# Patient Record
Sex: MALE | Race: WHITE | NOT HISPANIC OR LATINO | Employment: OTHER | URBAN - METROPOLITAN AREA
[De-identification: names, ages, dates, MRNs, and addresses within clinical notes are randomized per-mention and may not be internally consistent; named-entity substitution may affect disease eponyms.]

---

## 2019-08-01 ENCOUNTER — OFFICE VISIT (OUTPATIENT)
Dept: NEUROLOGY | Facility: CLINIC | Age: 79
End: 2019-08-01

## 2019-08-01 VITALS
HEIGHT: 70 IN | SYSTOLIC BLOOD PRESSURE: 126 MMHG | BODY MASS INDEX: 33.36 KG/M2 | WEIGHT: 233 LBS | OXYGEN SATURATION: 96 % | DIASTOLIC BLOOD PRESSURE: 78 MMHG | HEART RATE: 85 BPM

## 2019-08-01 DIAGNOSIS — R25.1 TREMORS OF NERVOUS SYSTEM: Primary | ICD-10-CM

## 2019-08-01 PROCEDURE — 99204 OFFICE O/P NEW MOD 45 MIN: CPT | Performed by: PSYCHIATRY & NEUROLOGY

## 2019-08-01 RX ORDER — RANITIDINE 150 MG/1
150 TABLET ORAL 2 TIMES DAILY PRN
Refills: 3 | COMMUNITY
Start: 2019-06-03 | End: 2020-05-06

## 2019-08-01 RX ORDER — SPIRONOLACTONE 25 MG/1
25 TABLET ORAL DAILY
Refills: 1 | COMMUNITY
Start: 2019-06-29 | End: 2020-11-12 | Stop reason: HOSPADM

## 2019-08-01 RX ORDER — CARVEDILOL 6.25 MG/1
6.25 TABLET ORAL 2 TIMES DAILY
Refills: 1 | COMMUNITY
Start: 2019-06-19

## 2019-08-01 RX ORDER — TAMSULOSIN HYDROCHLORIDE 0.4 MG/1
1 CAPSULE ORAL DAILY
Refills: 3 | COMMUNITY
Start: 2019-07-15

## 2019-08-01 RX ORDER — ATORVASTATIN CALCIUM 40 MG/1
40 TABLET, FILM COATED ORAL DAILY
Refills: 3 | COMMUNITY
Start: 2019-07-16 | End: 2020-11-12 | Stop reason: HOSPADM

## 2019-08-01 RX ORDER — ELUXADOLINE 100 MG/1
1 TABLET, FILM COATED ORAL 2 TIMES DAILY WITH MEALS
Refills: 2 | COMMUNITY
Start: 2019-05-16 | End: 2019-08-01 | Stop reason: ALTCHOICE

## 2019-08-01 RX ORDER — CYANOCOBALAMIN 1000 UG/ML
INJECTION, SOLUTION INTRAMUSCULAR; SUBCUTANEOUS
Refills: 2 | COMMUNITY
Start: 2019-05-18 | End: 2020-05-06

## 2019-08-01 RX ORDER — DULAGLUTIDE 0.75 MG/.5ML
INJECTION, SOLUTION SUBCUTANEOUS
Refills: 1 | COMMUNITY
Start: 2019-06-06 | End: 2020-11-12 | Stop reason: HOSPADM

## 2019-08-01 RX ORDER — SACUBITRIL AND VALSARTAN 24; 26 MG/1; MG/1
1 TABLET, FILM COATED ORAL 3 TIMES DAILY
Refills: 1 | COMMUNITY
Start: 2019-06-29 | End: 2020-11-12 | Stop reason: HOSPADM

## 2019-08-01 RX ORDER — PYRIDOSTIGMINE BROMIDE 60 MG/1
TABLET ORAL
Refills: 0 | COMMUNITY
Start: 2019-06-21 | End: 2020-05-06

## 2019-08-01 RX ORDER — GLIMEPIRIDE 4 MG/1
TABLET ORAL
Refills: 1 | COMMUNITY
Start: 2019-07-28 | End: 2020-12-10 | Stop reason: HOSPADM

## 2019-08-01 RX ORDER — BUPROPION HYDROCHLORIDE 150 MG/1
150 TABLET ORAL EVERY MORNING
Refills: 2 | COMMUNITY
Start: 2019-06-03 | End: 2020-05-06

## 2019-08-01 RX ORDER — ASPIRIN 81 MG/1
81 TABLET ORAL DAILY
COMMUNITY
End: 2020-11-12 | Stop reason: HOSPADM

## 2019-08-01 RX ORDER — FUROSEMIDE 20 MG/1
TABLET ORAL
Refills: 1 | COMMUNITY
Start: 2019-07-07 | End: 2020-05-06

## 2019-08-01 RX ORDER — GABAPENTIN 400 MG/1
CAPSULE ORAL
Refills: 3 | Status: ON HOLD | COMMUNITY
Start: 2019-07-01 | End: 2020-12-05 | Stop reason: SDUPTHER

## 2019-08-01 NOTE — PROGRESS NOTES
Subjective:    CC: Jak Pierre is seen today in consultation at the request of Jason Foy MD for Tremors        HPI:  79-year-old male accompanied by his wife with a history of diabetes mellitus type 2, hypertension, hyperlipidemia, CAD, arthritis presents with tremors.  As per patient and his wife he started having tremors in his hands a few months ago.  They have noticed it at rest where his thumb shakes and also while carrying out tasks and holding objects in his hands.  In fact he has to clasp his hands together to prevent them from shaking at rest.  He has also been having balance problems as well as difficulty initiating tasks such as walking.  Has sustained a few falls.  Denies having any symptoms of constipation, anosmia or acting out of dreams.  Wife has noticed that his voice has changed as it has become softer but there have been no changes in handwriting.  There is no family history of tremors or Parkinson's disease.  Patient was seen by Dr. Canales few months ago and started on Sinemet however he stopped taking it after 1 week as he felt that it was worsening his diarrhea which he has had for almost a year.  Has had extensive testing including a colonoscopy that did not show any abnormalities.  He is being treated by his gastroenterologist with low doses of pyridostigmine but does not know if it has helped.    The following portions of the patient's history were reviewed today and updated as of 08/01/2019  : allergies, current medications, past family history, past medical history, past social history, past surgical history and problem list  These document will be scanned to patient's chart.      Current Outpatient Medications:   •  aspirin 81 MG EC tablet, Take 81 mg by mouth Daily., Disp: , Rfl:   •  atorvastatin (LIPITOR) 40 MG tablet, Take 40 mg by mouth Daily., Disp: , Rfl: 3  •  buPROPion XL (WELLBUTRIN XL) 150 MG 24 hr tablet, Take 150 mg by mouth Every Morning., Disp: , Rfl: 2  •   carvedilol (COREG) 6.25 MG tablet, Take 6.25 mg by mouth 2 (Two) Times a Day., Disp: , Rfl: 1  •  Cetirizine HCl 10 MG tablet dispersible, Take  by mouth., Disp: , Rfl:   •  cyanocobalamin 1000 MCG/ML injection, INJECT 1 ML EVERY OTHER WEEK, Disp: , Rfl: 2  •  ENTRESTO 24-26 MG tablet, Take 1 tablet by mouth 2 (Two) Times a Day., Disp: , Rfl: 1  •  furosemide (LASIX) 20 MG tablet, TAKE 1 TABLET ONCE A DAY IN THE MORNING, Disp: , Rfl: 1  •  gabapentin (NEURONTIN) 400 MG capsule, TAKE ONE CAPSULE BY MOUTH EVERY MORNING, 1 IN THE EVENING, AND 2 AT NIGHT, Disp: , Rfl: 3  •  glimepiride (AMARYL) 4 MG tablet, TAKE 1 TABLET BY MOUTH DAILY WITH BREAKFAST OR THE FIRST MAIN MEAL OF THE DAY, Disp: , Rfl: 1  •  Melatonin-Pyridoxine (MELATIN) 3-1 MG tablet, Take  by mouth., Disp: , Rfl:   •  pyridostigmine (MESTINON) 60 MG tablet, TAKE 1/2-1 TABLET 1-2 TIMES DAILY AS NEEDED, Disp: , Rfl: 0  •  raNITIdine (ZANTAC) 150 MG tablet, Take 150 mg by mouth 2 (Two) Times a Day As Needed., Disp: , Rfl: 3  •  spironolactone (ALDACTONE) 25 MG tablet, Take 25 mg by mouth Daily., Disp: , Rfl: 1  •  tamsulosin (FLOMAX) 0.4 MG capsule 24 hr capsule, Take 1 capsule by mouth Daily., Disp: , Rfl: 3  •  TRULICITY 0.75 MG/0.5ML solution pen-injector, INJECT 0.75 MG SUBCUNTANEOUSLY ONCE A WEEK FOR 84 DAYS, Disp: , Rfl: 1  •  carbidopa-levodopa (SINEMET)  MG per tablet, Take 1 tablet by mouth 3 (Three) Times a Day. With food, Disp: 90 tablet, Rfl: 3   Past Medical History:   Diagnosis Date   • Arthritis    • Diabetes (CMS/HCC)    • Heart disease    • Hyperlipidemia    • Hypertension    • Tremor       History reviewed. No pertinent surgical history.   Family History   Problem Relation Age of Onset   • Cancer Brother    • Diabetes Brother    • Heart disease Brother    • Hypertension Brother       Social History     Socioeconomic History   • Marital status:      Spouse name: Not on file   • Number of children: Not on file   • Years of  "education: Not on file   • Highest education level: Not on file   Tobacco Use   • Smoking status: Never Smoker   • Smokeless tobacco: Never Used   Substance and Sexual Activity   • Alcohol use: No     Frequency: Never     Review of Systems   Constitutional: Positive for fatigue.   HENT: Positive for congestion.    Eyes: Negative.    Respiratory: Positive for shortness of breath.    Cardiovascular: Negative.    Gastrointestinal: Positive for abdominal pain (After hernia repair) and diarrhea (off and on).   Endocrine: Negative.    Genitourinary: Positive for frequency.   Musculoskeletal: Positive for back pain, gait problem (Balance issues/Trouble walking at times) and neck pain.   Allergic/Immunologic: Negative.    Neurological: Positive for tremors and numbness (Legs- tingling and numbness).   Hematological: Negative.    Psychiatric/Behavioral: Negative.    All other systems reviewed and are negative.      Objective:    /78   Pulse 85   Ht 177.8 cm (70\")   Wt 106 kg (233 lb)   SpO2 96%   BMI 33.43 kg/m²     Neurology Exam:    General apperance: Slight masklike facies with tongue protruding out    Mental status: Alert, awake and oriented to time place and person.    Recent and Remote memory: Intact.    Attention span and Concentration: Normal.     Language and Speech: Intact- No dysarthria.    Fluency, Naming , Repitition and Comprehension:  Intact    Cranial Nerves:   CN II: Visual fields are full. Intact. Fundi - Normal, No papillederma, Pupils - LOS  CN III, IV and VI: Extraocular movements are intact. Normal saccades.  Mild difficulty with upward gaze  CN V: Facial sensation is intact.   CN VII: Muscles of facial expression reveal no asymmetry. Intact.   CN VIII: Hearing is intact. Whispered voice intact.   CN IX and X: Palate elevates symmetrically. Intact  CN XI: Shoulder shrug is intact.   CN XII: Tongue is midline without evidence of atrophy or fasciculation.     Ophthalmoscopic exam of optic " disc-normal    Motor:-No resting tremors appreciated today.  Extremely mild postural and kinetic tremors in both hands.  No tremors on drawing Metzger's wheel or while writing.  No micrographia noted    Right UE muscle strength 5/5. Normal tone.     Left UE muscle strength 5/5.  Slight cogwheel rigidity     Right LE muscle strength5/5. Normal tone.     Left LE muscle strength 5/5. Normal tone.      Sensory: Normal light touch, vibration and pinprick sensation bilaterally.    DTRs: 2+ bilaterally in upper and lower extremities.    Babinski: Negative bilaterally.    Co-ordination: Normal finger-to-nose, heel to shin B/L.    Rhomberg: Negative.    Gait: Festinating gait with no tremors noted    Cardiovascular: Regular rate and rhythm without murmur, gallop or rub.    Assessment and Plan:  1. Tremors of nervous system  Patient could have Parkinson's disease versus a mixed tremor disorder  I will restart him back on Sinemet 25/100 mg gradually increasing to 1 tab p.o. 3 times daily as I do not think that was the cause of his diarrhea  I have also told them to speak to his gastroenterologist about possibly stopping pyridostigmine as that can worsen diarrhea  Will give him a balance therapy referral at his next visit  I went over all his medications in detail    Return in about 4 weeks (around 8/29/2019).         Allyson Mac MD

## 2019-10-11 ENCOUNTER — TRANSCRIBE ORDERS (OUTPATIENT)
Dept: ADMINISTRATIVE | Facility: HOSPITAL | Age: 79
End: 2019-10-11

## 2019-10-11 ENCOUNTER — LAB (OUTPATIENT)
Dept: LAB | Facility: HOSPITAL | Age: 79
End: 2019-10-11

## 2019-10-11 DIAGNOSIS — N18.30 CHRONIC KIDNEY DISEASE, STAGE III (MODERATE) (HCC): Primary | ICD-10-CM

## 2019-10-11 DIAGNOSIS — N18.30 CHRONIC KIDNEY DISEASE, STAGE III (MODERATE) (HCC): ICD-10-CM

## 2019-10-11 LAB
ANION GAP SERPL CALCULATED.3IONS-SCNC: 12.7 MMOL/L (ref 5–15)
BUN BLD-MCNC: 17 MG/DL (ref 8–23)
BUN/CREAT SERPL: 12.2 (ref 7–25)
CALCIUM SPEC-SCNC: 9.4 MG/DL (ref 8.6–10.5)
CHLORIDE SERPL-SCNC: 101 MMOL/L (ref 98–107)
CO2 SERPL-SCNC: 24.3 MMOL/L (ref 22–29)
CREAT BLD-MCNC: 1.39 MG/DL (ref 0.76–1.27)
GFR SERPL CREATININE-BSD FRML MDRD: 49 ML/MIN/1.73
GLUCOSE BLD-MCNC: 166 MG/DL (ref 65–99)
POTASSIUM BLD-SCNC: 5.7 MMOL/L (ref 3.5–5.2)
SODIUM BLD-SCNC: 138 MMOL/L (ref 136–145)

## 2019-10-11 PROCEDURE — 80048 BASIC METABOLIC PNL TOTAL CA: CPT

## 2019-10-11 PROCEDURE — 36415 COLL VENOUS BLD VENIPUNCTURE: CPT

## 2019-10-16 ENCOUNTER — TRANSCRIBE ORDERS (OUTPATIENT)
Dept: LAB | Facility: HOSPITAL | Age: 79
End: 2019-10-16

## 2019-10-16 ENCOUNTER — LAB (OUTPATIENT)
Dept: LAB | Facility: HOSPITAL | Age: 79
End: 2019-10-16

## 2019-10-16 DIAGNOSIS — N18.30 CHRONIC KIDNEY DISEASE, STAGE III (MODERATE) (HCC): Primary | ICD-10-CM

## 2019-10-16 DIAGNOSIS — N18.30 CHRONIC KIDNEY DISEASE, STAGE III (MODERATE) (HCC): ICD-10-CM

## 2019-10-16 LAB
ANION GAP SERPL CALCULATED.3IONS-SCNC: 11.9 MMOL/L (ref 5–15)
BUN BLD-MCNC: 16 MG/DL (ref 8–23)
BUN/CREAT SERPL: 13.4 (ref 7–25)
CALCIUM SPEC-SCNC: 9.4 MG/DL (ref 8.6–10.5)
CHLORIDE SERPL-SCNC: 104 MMOL/L (ref 98–107)
CO2 SERPL-SCNC: 25.1 MMOL/L (ref 22–29)
CREAT BLD-MCNC: 1.19 MG/DL (ref 0.76–1.27)
GFR SERPL CREATININE-BSD FRML MDRD: 59 ML/MIN/1.73
GLUCOSE BLD-MCNC: 158 MG/DL (ref 65–99)
POTASSIUM BLD-SCNC: 5.4 MMOL/L (ref 3.5–5.2)
SODIUM BLD-SCNC: 141 MMOL/L (ref 136–145)

## 2019-10-16 PROCEDURE — 80048 BASIC METABOLIC PNL TOTAL CA: CPT

## 2019-10-16 PROCEDURE — 36415 COLL VENOUS BLD VENIPUNCTURE: CPT

## 2020-01-09 ENCOUNTER — TRANSCRIBE ORDERS (OUTPATIENT)
Dept: LAB | Facility: HOSPITAL | Age: 80
End: 2020-01-09

## 2020-01-09 ENCOUNTER — LAB (OUTPATIENT)
Dept: LAB | Facility: HOSPITAL | Age: 80
End: 2020-01-09

## 2020-01-09 DIAGNOSIS — N18.30 CHRONIC KIDNEY DISEASE, STAGE III (MODERATE) (HCC): ICD-10-CM

## 2020-01-09 DIAGNOSIS — N18.30 CHRONIC KIDNEY DISEASE, STAGE III (MODERATE) (HCC): Primary | ICD-10-CM

## 2020-01-09 LAB
ALBUMIN SERPL-MCNC: 4 G/DL (ref 3.5–5.2)
ANION GAP SERPL CALCULATED.3IONS-SCNC: 10.3 MMOL/L (ref 5–15)
BUN BLD-MCNC: 14 MG/DL (ref 8–23)
BUN/CREAT SERPL: 10.7 (ref 7–25)
CALCIUM SPEC-SCNC: 9.3 MG/DL (ref 8.6–10.5)
CHLORIDE SERPL-SCNC: 97 MMOL/L (ref 98–107)
CO2 SERPL-SCNC: 27.7 MMOL/L (ref 22–29)
CREAT BLD-MCNC: 1.31 MG/DL (ref 0.76–1.27)
GFR SERPL CREATININE-BSD FRML MDRD: 53 ML/MIN/1.73
GLUCOSE BLD-MCNC: 140 MG/DL (ref 65–99)
PHOSPHATE SERPL-MCNC: 3 MG/DL (ref 2.5–4.5)
POTASSIUM BLD-SCNC: 5.1 MMOL/L (ref 3.5–5.2)
SODIUM BLD-SCNC: 135 MMOL/L (ref 136–145)
URATE SERPL-MCNC: 7.5 MG/DL (ref 3.4–7)

## 2020-01-09 PROCEDURE — 80069 RENAL FUNCTION PANEL: CPT

## 2020-01-09 PROCEDURE — 84550 ASSAY OF BLOOD/URIC ACID: CPT

## 2020-01-09 PROCEDURE — 36415 COLL VENOUS BLD VENIPUNCTURE: CPT

## 2020-05-05 ENCOUNTER — TELEPHONE (OUTPATIENT)
Dept: ONCOLOGY | Facility: CLINIC | Age: 80
End: 2020-05-05

## 2020-05-06 ENCOUNTER — APPOINTMENT (OUTPATIENT)
Dept: LAB | Facility: HOSPITAL | Age: 80
End: 2020-05-06

## 2020-05-06 ENCOUNTER — TELEPHONE (OUTPATIENT)
Dept: ONCOLOGY | Facility: CLINIC | Age: 80
End: 2020-05-06

## 2020-05-06 ENCOUNTER — CONSULT (OUTPATIENT)
Dept: ONCOLOGY | Facility: CLINIC | Age: 80
End: 2020-05-06

## 2020-05-06 VITALS
BODY MASS INDEX: 35.07 KG/M2 | TEMPERATURE: 97.8 F | HEIGHT: 70 IN | HEART RATE: 84 BPM | WEIGHT: 245 LBS | DIASTOLIC BLOOD PRESSURE: 61 MMHG | RESPIRATION RATE: 16 BRPM | OXYGEN SATURATION: 97 % | SYSTOLIC BLOOD PRESSURE: 132 MMHG

## 2020-05-06 DIAGNOSIS — D47.2 MONOCLONAL GAMMOPATHY OF UNKNOWN SIGNIFICANCE (MGUS): Primary | ICD-10-CM

## 2020-05-06 LAB
ANION GAP SERPL CALCULATED.3IONS-SCNC: 9.3 MMOL/L (ref 5–15)
BASOPHILS # BLD AUTO: 0.07 10*3/MM3 (ref 0–0.2)
BASOPHILS NFR BLD AUTO: 0.7 % (ref 0–1.5)
BUN BLD-MCNC: 24 MG/DL (ref 8–23)
BUN/CREAT SERPL: 17.4 (ref 7–25)
CALCIUM SPEC-SCNC: 9.9 MG/DL (ref 8.6–10.5)
CHLORIDE SERPL-SCNC: 102 MMOL/L (ref 98–107)
CO2 SERPL-SCNC: 24.7 MMOL/L (ref 22–29)
CREAT BLD-MCNC: 1.38 MG/DL (ref 0.76–1.27)
DEPRECATED RDW RBC AUTO: 43.6 FL (ref 37–54)
EOSINOPHIL # BLD AUTO: 0.27 10*3/MM3 (ref 0–0.4)
EOSINOPHIL NFR BLD AUTO: 2.9 % (ref 0.3–6.2)
ERYTHROCYTE [DISTWIDTH] IN BLOOD BY AUTOMATED COUNT: 12.7 % (ref 12.3–15.4)
GFR SERPL CREATININE-BSD FRML MDRD: 49 ML/MIN/1.73
GLUCOSE BLD-MCNC: 283 MG/DL (ref 65–99)
HCT VFR BLD AUTO: 40.7 % (ref 37.5–51)
HGB BLD-MCNC: 14.2 G/DL (ref 13–17.7)
IMM GRANULOCYTES # BLD AUTO: 0.05 10*3/MM3 (ref 0–0.05)
IMM GRANULOCYTES NFR BLD AUTO: 0.5 % (ref 0–0.5)
LYMPHOCYTES # BLD AUTO: 1.79 10*3/MM3 (ref 0.7–3.1)
LYMPHOCYTES NFR BLD AUTO: 18.9 % (ref 19.6–45.3)
MCH RBC QN AUTO: 32.6 PG (ref 26.6–33)
MCHC RBC AUTO-ENTMCNC: 34.9 G/DL (ref 31.5–35.7)
MCV RBC AUTO: 93.3 FL (ref 79–97)
MONOCYTES # BLD AUTO: 0.7 10*3/MM3 (ref 0.1–0.9)
MONOCYTES NFR BLD AUTO: 7.4 % (ref 5–12)
NEUTROPHILS # BLD AUTO: 6.57 10*3/MM3 (ref 1.7–7)
NEUTROPHILS NFR BLD AUTO: 69.6 % (ref 42.7–76)
NRBC BLD AUTO-RTO: 0 /100 WBC (ref 0–0.2)
PLATELET # BLD AUTO: 125 10*3/MM3 (ref 140–450)
PMV BLD AUTO: 9.6 FL (ref 6–12)
POTASSIUM BLD-SCNC: 6.3 MMOL/L (ref 3.5–5.2)
RBC # BLD AUTO: 4.36 10*6/MM3 (ref 4.14–5.8)
SODIUM BLD-SCNC: 136 MMOL/L (ref 136–145)
WBC NRBC COR # BLD: 9.45 10*3/MM3 (ref 3.4–10.8)

## 2020-05-06 PROCEDURE — 36415 COLL VENOUS BLD VENIPUNCTURE: CPT | Performed by: INTERNAL MEDICINE

## 2020-05-06 PROCEDURE — 99204 OFFICE O/P NEW MOD 45 MIN: CPT | Performed by: INTERNAL MEDICINE

## 2020-05-06 PROCEDURE — 85025 COMPLETE CBC W/AUTO DIFF WBC: CPT | Performed by: INTERNAL MEDICINE

## 2020-05-06 PROCEDURE — 80048 BASIC METABOLIC PNL TOTAL CA: CPT | Performed by: INTERNAL MEDICINE

## 2020-05-06 PROCEDURE — 84165 PROTEIN E-PHORESIS SERUM: CPT | Performed by: INTERNAL MEDICINE

## 2020-05-06 PROCEDURE — 84155 ASSAY OF PROTEIN SERUM: CPT | Performed by: INTERNAL MEDICINE

## 2020-05-06 RX ORDER — MULTIVIT,IRON,MINERALS/LUTEIN
1 TABLET ORAL DAILY
COMMUNITY

## 2020-05-06 RX ORDER — RETINOL, ERGOCALCIFEROL, .ALPHA.-TOCOPHEROL ACETATE, DL-, PHYTONADIONE, ASCORBIC ACID, NIACINAMIDE, RIBOFLAVIN 5-PHOSPHATE SODIUM, THIAMINE HYDROCHLORIDE, PYRIDOXINE HYDROCHLORIDE, DEXPANTHENOL, BIOTIN, FOLIC ACID, AND CYANOCOBALAMIN 200-150/10
KIT INTRAVENOUS
COMMUNITY
End: 2020-11-12 | Stop reason: HOSPADM

## 2020-05-06 RX ORDER — SODIUM POLYSTYRENE SULFONATE 15 G/60ML
15 SUSPENSION ORAL; RECTAL ONCE
Qty: 60 ML | Refills: 0 | Status: SHIPPED | OUTPATIENT
Start: 2020-05-06 | End: 2020-05-06

## 2020-05-06 RX ORDER — FAMOTIDINE 20 MG/1
20 TABLET, FILM COATED ORAL 2 TIMES DAILY PRN
COMMUNITY
End: 2020-12-10 | Stop reason: HOSPADM

## 2020-05-06 RX ORDER — PYRIDOXINE HCL (VITAMIN B6) 50 MG
100 TABLET ORAL DAILY
COMMUNITY

## 2020-05-06 RX ORDER — SODIUM POLYSTYRENE SULFONATE 15 G/60ML
15 SUSPENSION ORAL; RECTAL ONCE
Qty: 60 ML | Refills: 0 | Status: SHIPPED | OUTPATIENT
Start: 2020-05-06 | End: 2020-05-06 | Stop reason: SDUPTHER

## 2020-05-06 NOTE — TELEPHONE ENCOUNTER
Discussed with dr thompson. Spoke with pt's wife and advised that 1 dose of kayexalate will be sent to pharmacy for hyperkalemia

## 2020-05-06 NOTE — TELEPHONE ENCOUNTER
----- Message from Genesis Olivera RN sent at 5/6/2020 12:57 PM EDT -----      Critical Test Results      MD: Nakita    Date: 5/6/2020     Critical test result:  K is 6.3    Time results received:  12:58pm

## 2020-05-06 NOTE — TELEPHONE ENCOUNTER
[place] PT'S WIFE CALLED. SHE JUST CAME FROM [facility] AND THEY HAVE NOT RECEIVED THE THE PRESCRIPTION FOR KAYEXALATE.    [facility]   PH- [phone]    [place]  [phone]

## 2020-05-06 NOTE — TELEPHONE ENCOUNTER
Spoke with [facility] who state they received the RX, but it is not something they carry.  Spoke with [facility] who has the medicine available. [facility]ris advised via VM that rx has been sent there

## 2020-05-06 NOTE — PROGRESS NOTES
DATE OF CONSULTATION: 5/6/2020    REFERRING PHYSICIAN: Jason Foy MD    Dear Jason Poe MD  Thank you for asking for my medical advice on this patient. I saw him in the  [facility] on 5/6/2020    REASON FOR CONSULTATION: MGUS     HISTORY OF PRESENT ILLNESS: The patient is a very pleasant 80 y.o.  male   who was in his usual state of health until September 2019.  Patient had serum protein to pheresis during work-up for chronic kidney disease that revealed IgA monoclonal protein.  The patient was referred to me for further recommendations.    SUBJECTIVE: When I saw the patient today he is here today by himself.  He is doing fairly well.  He does have chronic fatigue.  He is in wheelchair today he does have history of Parkinson disease.    Review of Systems   Constitutional: Negative for activity change, appetite change, chills, fatigue, fever and unexpected weight change.   HENT: Negative for hearing loss, mouth sores, nosebleeds, sore throat and trouble swallowing.    Eyes: Negative for visual disturbance.   Respiratory: Negative for cough, chest tightness, shortness of breath and wheezing.    Cardiovascular: Negative for chest pain, palpitations and leg swelling.   Gastrointestinal: Negative for abdominal distention, abdominal pain, blood in stool, constipation, diarrhea, nausea, rectal pain and vomiting.   Endocrine: Negative for cold intolerance and heat intolerance.   Genitourinary: Negative for difficulty urinating, dysuria, frequency and urgency.   Musculoskeletal: Negative for arthralgias, back pain, gait problem, joint swelling and myalgias.   Skin: Negative for rash.   Neurological: Negative for dizziness, tremors, syncope, weakness, light-headedness, numbness and headaches.   Hematological: Negative for adenopathy. Does not bruise/bleed easily.   Psychiatric/Behavioral: Negative for confusion, sleep disturbance and suicidal ideas. The patient is not nervous/anxious.        Past  Medical History:   Diagnosis Date   • Angina of effort (CMS/HCC)    • Aortic valve replaced    • Arthritis    • Cataract    • CHF (congestive heart failure) (CMS/HCC)    • Colitis    • Diabetes (CMS/HCC)    • Diverticulitis    • Gall stones    • Heart disease    • Hyperlipidemia    • Hypertension    • Hypertension    • Kidney stones    • Skin cancer     left shoulder   • Tremor        Social History     Socioeconomic History   • Marital status:      Spouse name: Not on file   • Number of children: Not on file   • Years of education: Not on file   • Highest education level: Not on file   Tobacco Use   • Smoking status: Never Smoker   • Smokeless tobacco: Current User     Types: Chew   Substance and Sexual Activity   • Alcohol use: No     Frequency: Never   • Drug use: Never       Family History   Problem Relation Age of Onset   • Cancer Brother    • Diabetes Brother    • Heart disease Brother    • Hypertension Brother        Past Surgical History:   Procedure Laterality Date   • AORTIC VALVE REPAIR/REPLACEMENT  2016   • COLONOSCOPY  2018   • HERNIA REPAIR  1963    x2   • SINUS SURGERY  1978       No Known Allergies       Current Outpatient Medications:   •  aspirin 81 MG EC tablet, Take 81 mg by mouth Daily., Disp: , Rfl:   •  atorvastatin (LIPITOR) 40 MG tablet, Take 40 mg by mouth Daily., Disp: , Rfl: 3  •  carbidopa-levodopa (SINEMET)  MG per tablet, TAKE 1 TABLET BY MOUTH 3 (THREE) TIMES A DAY. WITH FOOD, Disp: 270 tablet, Rfl: 0  •  carvedilol (COREG) 6.25 MG tablet, Take 6.25 mg by mouth 2 (Two) Times a Day., Disp: , Rfl: 1  •  CINNAMON PO, Take 1 tablet by mouth Daily., Disp: , Rfl:   •  cyanocobalamin (CYANOCOBALAMIN) 100 MCG tablet tablet, Take 100 mcg by mouth Daily., Disp: , Rfl:   •  ENTRESTO 24-26 MG tablet, Take 1 tablet by mouth 3 (Three) Times a Day., Disp: , Rfl: 1  •  famotidine (PEPCID) 20 MG tablet, Take 20 mg by mouth 2 (Two) Times a Day., Disp: , Rfl:   •  gabapentin (NEURONTIN) 400  "MG capsule, TAKE ONE CAPSULE BY MOUTH EVERY MORNING, 1 IN THE EVENING, AND 2 AT NIGHT, Disp: , Rfl: 3  •  glimepiride (AMARYL) 4 MG tablet, TAKE 1 TABLET BY MOUTH DAILY WITH BREAKFAST OR THE FIRST MAIN MEAL OF THE DAY, Disp: , Rfl: 1  •  Multiple Minerals-Vitamins (CALCIUM-MAGNESIUM-ZINC-D3) tablet, Take 1 tablet by mouth Daily., Disp: , Rfl:   •  multiple vitamin (M.V.I. Adult) injection, Infuse  into a venous catheter., Disp: , Rfl:   •  Prenatal Vit-Fe Fumarate-FA (M-VIT PO), Take 1 tablet by mouth Daily., Disp: , Rfl:   •  spironolactone (ALDACTONE) 25 MG tablet, Take 25 mg by mouth Daily., Disp: , Rfl: 1  •  tamsulosin (FLOMAX) 0.4 MG capsule 24 hr capsule, Take 1 capsule by mouth Daily., Disp: , Rfl: 3  •  TRULICITY 0.75 MG/0.5ML solution pen-injector, INJECT 0.75 MG SUBCUNTANEOUSLY ONCE A WEEK FOR 84 DAYS, Disp: , Rfl: 1    PHYSICAL EXAMINATION:   /61   Pulse 84   Temp 97.8 °F (36.6 °C) (Temporal)   Resp 16   Ht 177.8 cm (70\")   Wt 111 kg (245 lb)   SpO2 97%   BMI 35.15 kg/m²   Pain Score    05/06/20 1108   PainSc: 0-No pain       ECOG Performance Status: 1 - Symptomatic but completely ambulatory  General Appearance:  alert, cooperative, no apparent distress and appears stated age   Neurologic/Psychiatric: A&O x 3, gait steady, appropriate affect, strength 5/5 in all muscle groups   HEENT:  Normocephalic, without obvious abnormality, mucous membranes moist   Neck: Supple, symmetrical, trachea midline, no adenopathy;  No thyromegaly, masses, or tenderness   Lungs:   Clear to auscultation bilaterally; respirations regular, even, and unlabored bilaterally   Heart:  Regular rate and rhythm, no murmurs appreciated   Abdomen:   Soft, non-tender, non-distended and no organomegaly   Lymph nodes: No cervical, supraclavicular, inguinal or axillary adenopathy noted   Extremities: Normal, atraumatic; no clubbing, cyanosis, or edema    Skin: No rashes, ulcers, or suspicious lesions noted       No visits with " results within 2 Week(s) from this visit.   Latest known visit with results is:   Lab on 01/09/2020   Component Date Value Ref Range Status   • Glucose 01/09/2020 140* 65 - 99 mg/dL Final   • BUN 01/09/2020 14  8 - 23 mg/dL Final   • Creatinine 01/09/2020 1.31* 0.76 - 1.27 mg/dL Final   • Sodium 01/09/2020 135* 136 - 145 mmol/L Final   • Potassium 01/09/2020 5.1  3.5 - 5.2 mmol/L Final   • Chloride 01/09/2020 97* 98 - 107 mmol/L Final   • CO2 01/09/2020 27.7  22.0 - 29.0 mmol/L Final   • Calcium 01/09/2020 9.3  8.6 - 10.5 mg/dL Final   • Albumin 01/09/2020 4.00  3.50 - 5.20 g/dL Final   • Phosphorus 01/09/2020 3.0  2.5 - 4.5 mg/dL Final   • Anion Gap 01/09/2020 10.3  5.0 - 15.0 mmol/L Final   • BUN/Creatinine Ratio 01/09/2020 10.7  7.0 - 25.0 Final   • eGFR Non African Amer 01/09/2020 53* >60 mL/min/1.73 Final   • Uric Acid 01/09/2020 7.5* 3.4 - 7.0 mg/dL Final        No results found.      DIAGNOSTIC DATA:   1. Radiology: None available   2. Dr. Marte's note from 08/01/2019 reviewed by me and documented in the  chart.   3. Pathology report: None available  4. Laboratory data: Reviewed by me and documented in the patient's jart     ASSESSMENT: The patient is a very pleasant 80 y.o.  male  with MGUS    PROBLEM LIST:   1. MGUS, IGA kappa restricted:  A.  Incidentally noted on blood work done  September 30, 2019 for chronic kidney disease  2.  Chronic kidney disease stage III  3.  Parkinson disease  4.  Hypertension  5.  Type 2 diabetes    PLAN:   1. I had a long discussion today with the patient about his new diagnosis of MGUS. I did go over the final pathology report in detail with both of them. I reviewed the patient's documents including refereing provider's notes, lab results, imaging, and path report.   2.  I explained to the patient his current disease, MGUS has the potential of transforming to multiple myeloma, is a low risk but chemo to risk with time it is 1.5% a year with 15 to 20% risk in 10 years.  Given  the patient age as well as multiple comorbidities I doubt this will cause any clinical problems for him down the road or in the near future.  3.  I will repeat the patient SPEP today will call patient with results.  4.  He will follow-up with me in 6 months with repeated labs if everything remains stable we will switch him to annual visits.  5.  The patient will continue to follow-up with nephrology for chronic kidney disease.    Dulce Mace MD  5/6/2020

## 2020-05-07 LAB
ALBUMIN SERPL-MCNC: 3.8 G/DL (ref 2.9–4.4)
ALBUMIN/GLOB SERPL: 1.2 {RATIO} (ref 0.7–1.7)
ALPHA1 GLOB FLD ELPH-MCNC: 0.2 G/DL (ref 0–0.4)
ALPHA2 GLOB SERPL ELPH-MCNC: 0.9 G/DL (ref 0.4–1)
B-GLOBULIN SERPL ELPH-MCNC: 1.4 G/DL (ref 0.7–1.3)
GAMMA GLOB SERPL ELPH-MCNC: 0.7 G/DL (ref 0.4–1.8)
GLOBULIN SER CALC-MCNC: 3.1 G/DL (ref 2.2–3.9)
Lab: ABNORMAL
M-SPIKE: 0.5 G/DL
PROT SERPL-MCNC: 6.9 G/DL (ref 6–8.5)

## 2020-05-20 ENCOUNTER — TELEPHONE (OUTPATIENT)
Dept: ONCOLOGY | Facility: CLINIC | Age: 80
End: 2020-05-20

## 2020-05-20 NOTE — TELEPHONE ENCOUNTER
Patient wife calling wanting labs drawn again for potassium and to check protein. Wants someone to give her a call.

## 2020-05-20 NOTE — TELEPHONE ENCOUNTER
Phone rang with no answer and no VM picked up.  Per Dr Mace, will recheeck his abnormal protein as scheduled in November as scheduled, but potassium will need to be followed and managed by his cardiologist or PCP

## 2020-05-22 ENCOUNTER — LAB (OUTPATIENT)
Dept: LAB | Facility: HOSPITAL | Age: 80
End: 2020-05-22

## 2020-05-22 ENCOUNTER — TELEPHONE (OUTPATIENT)
Dept: ONCOLOGY | Facility: CLINIC | Age: 80
End: 2020-05-22

## 2020-05-22 DIAGNOSIS — E87.5 HYPERKALEMIA: ICD-10-CM

## 2020-05-22 DIAGNOSIS — D47.2 MONOCLONAL GAMMOPATHY OF UNKNOWN SIGNIFICANCE (MGUS): ICD-10-CM

## 2020-05-22 DIAGNOSIS — D47.2 MONOCLONAL GAMMOPATHY OF UNKNOWN SIGNIFICANCE (MGUS): Primary | ICD-10-CM

## 2020-05-22 LAB
ANION GAP SERPL CALCULATED.3IONS-SCNC: 11.4 MMOL/L (ref 5–15)
BASOPHILS # BLD AUTO: 0.06 10*3/MM3 (ref 0–0.2)
BASOPHILS NFR BLD AUTO: 0.6 % (ref 0–1.5)
BUN BLD-MCNC: 26 MG/DL (ref 8–23)
BUN/CREAT SERPL: 18.4 (ref 7–25)
CALCIUM SPEC-SCNC: 9.5 MG/DL (ref 8.6–10.5)
CHLORIDE SERPL-SCNC: 103 MMOL/L (ref 98–107)
CO2 SERPL-SCNC: 25.6 MMOL/L (ref 22–29)
CREAT BLD-MCNC: 1.41 MG/DL (ref 0.76–1.27)
DEPRECATED RDW RBC AUTO: 44 FL (ref 37–54)
EOSINOPHIL # BLD AUTO: 0.23 10*3/MM3 (ref 0–0.4)
EOSINOPHIL NFR BLD AUTO: 2.4 % (ref 0.3–6.2)
ERYTHROCYTE [DISTWIDTH] IN BLOOD BY AUTOMATED COUNT: 12.9 % (ref 12.3–15.4)
GFR SERPL CREATININE-BSD FRML MDRD: 48 ML/MIN/1.73
GLUCOSE BLD-MCNC: 224 MG/DL (ref 65–99)
HCT VFR BLD AUTO: 39.3 % (ref 37.5–51)
HGB BLD-MCNC: 13.3 G/DL (ref 13–17.7)
IMM GRANULOCYTES # BLD AUTO: 0.04 10*3/MM3 (ref 0–0.05)
IMM GRANULOCYTES NFR BLD AUTO: 0.4 % (ref 0–0.5)
LYMPHOCYTES # BLD AUTO: 1.95 10*3/MM3 (ref 0.7–3.1)
LYMPHOCYTES NFR BLD AUTO: 20.6 % (ref 19.6–45.3)
MCH RBC QN AUTO: 32 PG (ref 26.6–33)
MCHC RBC AUTO-ENTMCNC: 33.8 G/DL (ref 31.5–35.7)
MCV RBC AUTO: 94.5 FL (ref 79–97)
MONOCYTES # BLD AUTO: 0.71 10*3/MM3 (ref 0.1–0.9)
MONOCYTES NFR BLD AUTO: 7.5 % (ref 5–12)
NEUTROPHILS # BLD AUTO: 6.49 10*3/MM3 (ref 1.7–7)
NEUTROPHILS NFR BLD AUTO: 68.5 % (ref 42.7–76)
NRBC BLD AUTO-RTO: 0 /100 WBC (ref 0–0.2)
PLATELET # BLD AUTO: 118 10*3/MM3 (ref 140–450)
PMV BLD AUTO: 9.7 FL (ref 6–12)
POTASSIUM BLD-SCNC: 5.5 MMOL/L (ref 3.5–5.2)
RBC # BLD AUTO: 4.16 10*6/MM3 (ref 4.14–5.8)
SODIUM BLD-SCNC: 140 MMOL/L (ref 136–145)
WBC NRBC COR # BLD: 9.48 10*3/MM3 (ref 3.4–10.8)

## 2020-05-22 PROCEDURE — 80048 BASIC METABOLIC PNL TOTAL CA: CPT

## 2020-05-22 PROCEDURE — 85025 COMPLETE CBC W/AUTO DIFF WBC: CPT

## 2020-05-22 PROCEDURE — 36415 COLL VENOUS BLD VENIPUNCTURE: CPT

## 2020-05-22 NOTE — TELEPHONE ENCOUNTER
VM left advising of improved K+ 5.5. To stay hydrated and drink fluids, and recheck when he sees his doctor as scheduled on 6/10

## 2020-05-22 NOTE — TELEPHONE ENCOUNTER
Advised of m-spike 0.5m, will recheck at next appt. Will recheck potassium today, as pt does not have an appt with his new cardiologist until 6/10/20.  Will order bmp for today and call her with results

## 2020-05-22 NOTE — TELEPHONE ENCOUNTER
PT wife called back for lab results. Explained pt potassium will be checked at his next appt per previous encounter. Please call her @ [phone]

## 2020-06-17 ENCOUNTER — TRANSCRIBE ORDERS (OUTPATIENT)
Dept: LAB | Facility: HOSPITAL | Age: 80
End: 2020-06-17

## 2020-06-17 ENCOUNTER — LAB (OUTPATIENT)
Dept: LAB | Facility: HOSPITAL | Age: 80
End: 2020-06-17

## 2020-06-17 DIAGNOSIS — I50.9 HEART FAILURE, UNSPECIFIED HF CHRONICITY, UNSPECIFIED HEART FAILURE TYPE (HCC): ICD-10-CM

## 2020-06-17 DIAGNOSIS — E78.5 HYPERLIPIDEMIA, UNSPECIFIED HYPERLIPIDEMIA TYPE: ICD-10-CM

## 2020-06-17 DIAGNOSIS — I10 ESSENTIAL HYPERTENSION: ICD-10-CM

## 2020-06-17 DIAGNOSIS — R06.02 SHORTNESS OF BREATH: ICD-10-CM

## 2020-06-17 DIAGNOSIS — R07.9 CHEST PAIN ON EXERTION: ICD-10-CM

## 2020-06-17 DIAGNOSIS — I50.9 CHF (CONGESTIVE HEART FAILURE), NYHA CLASS II, UNSPECIFIED FAILURE CHRONICITY, UNSPECIFIED TYPE (HCC): ICD-10-CM

## 2020-06-17 DIAGNOSIS — R25.2 CRAMP AND SPASM: ICD-10-CM

## 2020-06-17 DIAGNOSIS — E78.5 HYPERLIPIDEMIA, UNSPECIFIED HYPERLIPIDEMIA TYPE: Primary | ICD-10-CM

## 2020-06-17 DIAGNOSIS — K21.9 GERD WITHOUT ESOPHAGITIS: ICD-10-CM

## 2020-06-17 DIAGNOSIS — N52.9 ERECTILE DYSFUNCTION, UNSPECIFIED ERECTILE DYSFUNCTION TYPE: ICD-10-CM

## 2020-06-17 DIAGNOSIS — E53.8 DEFICIENCY OF OTHER SPECIFIED B GROUP VITAMINS: ICD-10-CM

## 2020-06-17 DIAGNOSIS — I35.9 AVD (AORTIC VALVE DISEASE): ICD-10-CM

## 2020-06-17 DIAGNOSIS — I10 HYPERTENSION, UNSPECIFIED TYPE: ICD-10-CM

## 2020-06-17 DIAGNOSIS — I35.9 AVD (AORTIC VALVE DISEASE): Primary | ICD-10-CM

## 2020-06-17 DIAGNOSIS — E11.40 DIABETIC NEUROPATHY WITH NEUROLOGIC COMPLICATION (HCC): ICD-10-CM

## 2020-06-17 DIAGNOSIS — Z95.2 S/P TAVR (TRANSCATHETER AORTIC VALVE REPLACEMENT): ICD-10-CM

## 2020-06-17 DIAGNOSIS — E11.49 DIABETIC NEUROPATHY WITH NEUROLOGIC COMPLICATION (HCC): ICD-10-CM

## 2020-06-17 LAB
ALBUMIN SERPL-MCNC: 3.9 G/DL (ref 3.5–5.2)
ALBUMIN/GLOB SERPL: 1.3 G/DL
ALP SERPL-CCNC: 74 U/L (ref 39–117)
ALT SERPL W P-5'-P-CCNC: 7 U/L (ref 1–41)
ANION GAP SERPL CALCULATED.3IONS-SCNC: 9.6 MMOL/L (ref 5–15)
AST SERPL-CCNC: 15 U/L (ref 1–40)
BACTERIA UR QL AUTO: ABNORMAL /HPF
BASOPHILS # BLD AUTO: 0.09 10*3/MM3 (ref 0–0.2)
BASOPHILS NFR BLD AUTO: 0.9 % (ref 0–1.5)
BILIRUB SERPL-MCNC: 0.5 MG/DL (ref 0.2–1.2)
BILIRUB UR QL STRIP: NEGATIVE
BUN BLD-MCNC: 20 MG/DL (ref 8–23)
BUN/CREAT SERPL: 12.8 (ref 7–25)
CALCIUM SPEC-SCNC: 9.9 MG/DL (ref 8.6–10.5)
CHLORIDE SERPL-SCNC: 98 MMOL/L (ref 98–107)
CHOLEST SERPL-MCNC: 105 MG/DL (ref 0–200)
CLARITY UR: CLEAR
CO2 SERPL-SCNC: 26.4 MMOL/L (ref 22–29)
COLOR UR: YELLOW
CREAT BLD-MCNC: 1.56 MG/DL (ref 0.76–1.27)
DEPRECATED RDW RBC AUTO: 44.4 FL (ref 37–54)
EOSINOPHIL # BLD AUTO: 0.29 10*3/MM3 (ref 0–0.4)
EOSINOPHIL NFR BLD AUTO: 2.8 % (ref 0.3–6.2)
ERYTHROCYTE [DISTWIDTH] IN BLOOD BY AUTOMATED COUNT: 12.8 % (ref 12.3–15.4)
FOLATE SERPL-MCNC: 14.7 NG/ML (ref 4.78–24.2)
GFR SERPL CREATININE-BSD FRML MDRD: 43 ML/MIN/1.73
GLOBULIN UR ELPH-MCNC: 3.1 GM/DL
GLUCOSE BLD-MCNC: 168 MG/DL (ref 65–99)
GLUCOSE UR STRIP-MCNC: NEGATIVE MG/DL
HBA1C MFR BLD: 7.8 % (ref 4.8–5.6)
HCT VFR BLD AUTO: 39.8 % (ref 37.5–51)
HDLC SERPL-MCNC: 32 MG/DL (ref 40–60)
HGB BLD-MCNC: 13.5 G/DL (ref 13–17.7)
HGB UR QL STRIP.AUTO: NEGATIVE
HYALINE CASTS UR QL AUTO: ABNORMAL /LPF
IMM GRANULOCYTES # BLD AUTO: 0.03 10*3/MM3 (ref 0–0.05)
IMM GRANULOCYTES NFR BLD AUTO: 0.3 % (ref 0–0.5)
KETONES UR QL STRIP: NEGATIVE
LDLC SERPL CALC-MCNC: 42 MG/DL (ref 0–100)
LDLC/HDLC SERPL: 1.32 {RATIO}
LEUKOCYTE ESTERASE UR QL STRIP.AUTO: NEGATIVE
LYMPHOCYTES # BLD AUTO: 2.27 10*3/MM3 (ref 0.7–3.1)
LYMPHOCYTES NFR BLD AUTO: 21.8 % (ref 19.6–45.3)
MAGNESIUM SERPL-MCNC: 1.5 MG/DL (ref 1.6–2.4)
MCH RBC QN AUTO: 31.8 PG (ref 26.6–33)
MCHC RBC AUTO-ENTMCNC: 33.9 G/DL (ref 31.5–35.7)
MCV RBC AUTO: 93.6 FL (ref 79–97)
MONOCYTES # BLD AUTO: 0.99 10*3/MM3 (ref 0.1–0.9)
MONOCYTES NFR BLD AUTO: 9.5 % (ref 5–12)
NEUTROPHILS # BLD AUTO: 6.72 10*3/MM3 (ref 1.7–7)
NEUTROPHILS NFR BLD AUTO: 64.7 % (ref 42.7–76)
NITRITE UR QL STRIP: NEGATIVE
NRBC BLD AUTO-RTO: 0 /100 WBC (ref 0–0.2)
PH UR STRIP.AUTO: 6.5 [PH] (ref 5–8)
PLATELET # BLD AUTO: 126 10*3/MM3 (ref 140–450)
PMV BLD AUTO: 10.1 FL (ref 6–12)
POTASSIUM BLD-SCNC: 5 MMOL/L (ref 3.5–5.2)
PROT SERPL-MCNC: 7 G/DL (ref 6–8.5)
PROT UR QL STRIP: ABNORMAL
RBC # BLD AUTO: 4.25 10*6/MM3 (ref 4.14–5.8)
RBC # UR: ABNORMAL /HPF
REF LAB TEST METHOD: ABNORMAL
SODIUM BLD-SCNC: 134 MMOL/L (ref 136–145)
SP GR UR STRIP: 1.02 (ref 1–1.03)
SQUAMOUS #/AREA URNS HPF: ABNORMAL /HPF
TRIGL SERPL-MCNC: 154 MG/DL (ref 0–150)
TSH SERPL DL<=0.05 MIU/L-ACNC: 1.72 UIU/ML (ref 0.27–4.2)
UROBILINOGEN UR QL STRIP: ABNORMAL
VIT B12 BLD-MCNC: 923 PG/ML (ref 211–946)
VLDLC SERPL-MCNC: 30.8 MG/DL (ref 5–40)
WBC NRBC COR # BLD: 10.39 10*3/MM3 (ref 3.4–10.8)
WBC UR QL AUTO: ABNORMAL /HPF

## 2020-06-17 PROCEDURE — 82746 ASSAY OF FOLIC ACID SERUM: CPT

## 2020-06-17 PROCEDURE — 80061 LIPID PANEL: CPT

## 2020-06-17 PROCEDURE — 81001 URINALYSIS AUTO W/SCOPE: CPT

## 2020-06-17 PROCEDURE — 80053 COMPREHEN METABOLIC PANEL: CPT

## 2020-06-17 PROCEDURE — 84443 ASSAY THYROID STIM HORMONE: CPT

## 2020-06-17 PROCEDURE — 82607 VITAMIN B-12: CPT

## 2020-06-17 PROCEDURE — 83735 ASSAY OF MAGNESIUM: CPT

## 2020-06-17 PROCEDURE — 85025 COMPLETE CBC W/AUTO DIFF WBC: CPT

## 2020-06-17 PROCEDURE — 36415 COLL VENOUS BLD VENIPUNCTURE: CPT

## 2020-06-17 PROCEDURE — 83036 HEMOGLOBIN GLYCOSYLATED A1C: CPT

## 2020-08-12 ENCOUNTER — HOSPITAL ENCOUNTER (EMERGENCY)
Facility: HOSPITAL | Age: 80
Discharge: HOME | End: 2020-08-12
Attending: EMERGENCY MEDICINE | Admitting: EMERGENCY MEDICINE

## 2020-08-12 VITALS
HEART RATE: 84 BPM | WEIGHT: 240 LBS | HEIGHT: 71 IN | DIASTOLIC BLOOD PRESSURE: 73 MMHG | TEMPERATURE: 97.7 F | OXYGEN SATURATION: 93 % | BODY MASS INDEX: 33.6 KG/M2 | RESPIRATION RATE: 18 BRPM | SYSTOLIC BLOOD PRESSURE: 131 MMHG

## 2020-08-12 DIAGNOSIS — R59.1 LYMPHADENOPATHY: Primary | ICD-10-CM

## 2020-08-12 PROCEDURE — 99282 EMERGENCY DEPT VISIT SF MDM: CPT

## 2020-08-12 RX ORDER — AMOXICILLIN AND CLAVULANATE POTASSIUM 875; 125 MG/1; MG/1
1 TABLET, FILM COATED ORAL 2 TIMES DAILY
Qty: 20 TABLET | Refills: 0 | Status: SHIPPED | OUTPATIENT
Start: 2020-08-12 | End: 2020-08-22

## 2020-08-12 NOTE — ED PROVIDER NOTES
Subjective   80-year-old male presents to the ED with a chief complaint of facial swelling.  Patient is complaining of right-sided facial swelling that started a few hours ago.  States that it is swollen from his ear.  No intraoral pain or lesions.  No fever or chills.  Minimally painful to palpation of the site.  No nausea vomiting diarrhea abdominal pain.  No other complaints at this time.          Review of Systems   Constitutional: Negative for fatigue and fever.   HENT: Positive for facial swelling.    All other systems reviewed and are negative.      Past Medical History:   Diagnosis Date   • Angina of effort (CMS/HCC)    • Aortic valve replaced    • Arthritis    • Cataract    • CHF (congestive heart failure) (CMS/HCC)    • Colitis    • Diabetes (CMS/HCC)    • Diverticulitis    • Gall stones    • Heart disease    • Hyperlipidemia    • Hypertension    • Hypertension    • Kidney stones    • Skin cancer     left shoulder   • Tremor        No Known Allergies    Past Surgical History:   Procedure Laterality Date   • AORTIC VALVE REPAIR/REPLACEMENT  2016   • COLONOSCOPY  2018   • HERNIA REPAIR  1963    x2   • SINUS SURGERY  1978       Family History   Problem Relation Age of Onset   • Cancer Brother    • Diabetes Brother    • Heart disease Brother    • Hypertension Brother        Social History     Socioeconomic History   • Marital status:      Spouse name: Not on file   • Number of children: Not on file   • Years of education: Not on file   • Highest education level: Not on file   Tobacco Use   • Smoking status: Never Smoker   • Smokeless tobacco: Current User     Types: Chew   Substance and Sexual Activity   • Alcohol use: No     Frequency: Never   • Drug use: Never           Objective   Physical Exam   Constitutional: He is oriented to person, place, and time. No distress.   Elderly appearing     HENT:   Head: Normocephalic and atraumatic.   Nose: Nose normal.   Mild right-sided preauricular  lymphadenopathy.  Mildly tenderness to palpation.   Eyes: Pupils are equal, round, and reactive to light. Conjunctivae and EOM are normal.   Cardiovascular: Normal rate and regular rhythm.   Pulmonary/Chest: Effort normal and breath sounds normal. No respiratory distress.   Abdominal: Soft. He exhibits no distension. There is no tenderness.   Musculoskeletal: He exhibits no edema or deformity.   Neurological: He is alert and oriented to person, place, and time. No cranial nerve deficit. Coordination normal.   Skin: He is not diaphoretic.   Nursing note and vitals reviewed.      Procedures           ED Course                                           MDM      Patient presents with right-sided facial swelling.  On exam he has some preauricular lymphadenopathy.  Will cover with antibiotics.  Discharged to follow-up as needed.  Strict return precautions given.  Patient agreeable to plan.          Final diagnoses:   Lymphadenopathy            Epifanio Pak, DO  08/12/20 1685

## 2020-11-01 ENCOUNTER — HOSPITAL ENCOUNTER (EMERGENCY)
Facility: HOSPITAL | Age: 80
Discharge: HOME | End: 2020-11-01
Attending: EMERGENCY MEDICINE | Admitting: STUDENT IN AN ORGANIZED HEALTH CARE EDUCATION/TRAINING PROGRAM

## 2020-11-01 ENCOUNTER — APPOINTMENT (OUTPATIENT)
Dept: ULTRASOUND IMAGING | Facility: HOSPITAL | Age: 80
End: 2020-11-01

## 2020-11-01 VITALS
DIASTOLIC BLOOD PRESSURE: 67 MMHG | RESPIRATION RATE: 16 BRPM | HEIGHT: 71 IN | BODY MASS INDEX: 32.9 KG/M2 | WEIGHT: 235 LBS | HEART RATE: 84 BPM | TEMPERATURE: 98.2 F | SYSTOLIC BLOOD PRESSURE: 127 MMHG | OXYGEN SATURATION: 93 %

## 2020-11-01 DIAGNOSIS — L03.116 CELLULITIS OF LEFT LOWER EXTREMITY: Primary | ICD-10-CM

## 2020-11-01 LAB
ALBUMIN SERPL-MCNC: 3.7 G/DL (ref 3.5–5.2)
ALBUMIN/GLOB SERPL: 1.2 G/DL
ALP SERPL-CCNC: 94 U/L (ref 39–117)
ALT SERPL W P-5'-P-CCNC: <5 U/L (ref 1–41)
ANION GAP SERPL CALCULATED.3IONS-SCNC: 10.7 MMOL/L (ref 5–15)
AST SERPL-CCNC: 18 U/L (ref 1–40)
BASOPHILS # BLD AUTO: 0.06 10*3/MM3 (ref 0–0.2)
BASOPHILS NFR BLD AUTO: 0.6 % (ref 0–1.5)
BILIRUB SERPL-MCNC: 0.4 MG/DL (ref 0–1.2)
BUN SERPL-MCNC: 27 MG/DL (ref 8–23)
BUN/CREAT SERPL: 19 (ref 7–25)
CALCIUM SPEC-SCNC: 9.4 MG/DL (ref 8.6–10.5)
CHLORIDE SERPL-SCNC: 96 MMOL/L (ref 98–107)
CO2 SERPL-SCNC: 30.3 MMOL/L (ref 22–29)
CREAT SERPL-MCNC: 1.42 MG/DL (ref 0.76–1.27)
DEPRECATED RDW RBC AUTO: 46.6 FL (ref 37–54)
EOSINOPHIL # BLD AUTO: 0.12 10*3/MM3 (ref 0–0.4)
EOSINOPHIL NFR BLD AUTO: 1.3 % (ref 0.3–6.2)
ERYTHROCYTE [DISTWIDTH] IN BLOOD BY AUTOMATED COUNT: 13.8 % (ref 12.3–15.4)
GFR SERPL CREATININE-BSD FRML MDRD: 48 ML/MIN/1.73
GLOBULIN UR ELPH-MCNC: 3 GM/DL
GLUCOSE SERPL-MCNC: 236 MG/DL (ref 65–99)
HCT VFR BLD AUTO: 37.4 % (ref 37.5–51)
HGB BLD-MCNC: 12.5 G/DL (ref 13–17.7)
IMM GRANULOCYTES # BLD AUTO: 0.05 10*3/MM3 (ref 0–0.05)
IMM GRANULOCYTES NFR BLD AUTO: 0.5 % (ref 0–0.5)
LYMPHOCYTES # BLD AUTO: 1.68 10*3/MM3 (ref 0.7–3.1)
LYMPHOCYTES NFR BLD AUTO: 18.1 % (ref 19.6–45.3)
MCH RBC QN AUTO: 31.6 PG (ref 26.6–33)
MCHC RBC AUTO-ENTMCNC: 33.4 G/DL (ref 31.5–35.7)
MCV RBC AUTO: 94.7 FL (ref 79–97)
MONOCYTES # BLD AUTO: 0.71 10*3/MM3 (ref 0.1–0.9)
MONOCYTES NFR BLD AUTO: 7.7 % (ref 5–12)
NEUTROPHILS NFR BLD AUTO: 6.66 10*3/MM3 (ref 1.7–7)
NEUTROPHILS NFR BLD AUTO: 71.8 % (ref 42.7–76)
NRBC BLD AUTO-RTO: 0 /100 WBC (ref 0–0.2)
PLATELET # BLD AUTO: 95 10*3/MM3 (ref 140–450)
PMV BLD AUTO: 9.6 FL (ref 6–12)
POTASSIUM SERPL-SCNC: 4.5 MMOL/L (ref 3.5–5.2)
PROT SERPL-MCNC: 6.7 G/DL (ref 6–8.5)
RBC # BLD AUTO: 3.95 10*6/MM3 (ref 4.14–5.8)
SODIUM SERPL-SCNC: 137 MMOL/L (ref 136–145)
WBC # BLD AUTO: 9.28 10*3/MM3 (ref 3.4–10.8)

## 2020-11-01 PROCEDURE — 99283 EMERGENCY DEPT VISIT LOW MDM: CPT

## 2020-11-01 PROCEDURE — 85025 COMPLETE CBC W/AUTO DIFF WBC: CPT | Performed by: PHYSICIAN ASSISTANT

## 2020-11-01 PROCEDURE — 93971 EXTREMITY STUDY: CPT

## 2020-11-01 PROCEDURE — 80053 COMPREHEN METABOLIC PANEL: CPT | Performed by: PHYSICIAN ASSISTANT

## 2020-11-01 RX ORDER — SULFAMETHOXAZOLE AND TRIMETHOPRIM 800; 160 MG/1; MG/1
1 TABLET ORAL 2 TIMES DAILY
Qty: 14 TABLET | Refills: 0 | Status: SHIPPED | OUTPATIENT
Start: 2020-11-02 | End: 2020-11-09

## 2020-11-01 RX ORDER — SULFAMETHOXAZOLE AND TRIMETHOPRIM 800; 160 MG/1; MG/1
1 TABLET ORAL ONCE
Status: COMPLETED | OUTPATIENT
Start: 2020-11-01 | End: 2020-11-01

## 2020-11-01 RX ADMIN — SULFAMETHOXAZOLE AND TRIMETHOPRIM 160 MG: 800; 160 TABLET ORAL at 19:29

## 2020-11-01 NOTE — ED PROVIDER NOTES
Subjective   80-year-old presents with left leg redness and swelling.  He has a known history of DVT, and is on Xarelto.  His wife noticed some redness around the left heel that spread today.  No fever or chills.  They were concerned that his DVT may be getting worse or he is developing a cellulitis.  He is also on Lasix 40 mg for the swelling.  He has a history of diabetes.      History provided by:  Patient and relative   used: No        Review of Systems   Musculoskeletal:        Left leg swelling   Skin: Positive for rash.   All other systems reviewed and are negative.      Past Medical History:   Diagnosis Date   • Angina of effort (CMS/HCC)    • Aortic valve replaced    • Arthritis    • Cataract    • CHF (congestive heart failure) (CMS/HCC)    • Colitis    • Diabetes (CMS/HCC)    • Diverticulitis    • Gall stones    • Heart disease    • Hyperlipidemia    • Hypertension    • Hypertension    • Kidney stones    • Skin cancer     left shoulder   • Tremor        No Known Allergies    Past Surgical History:   Procedure Laterality Date   • AORTIC VALVE REPAIR/REPLACEMENT  2016   • COLONOSCOPY  2018   • HERNIA REPAIR  1963    x2   • SINUS SURGERY  1978       Family History   Problem Relation Age of Onset   • Cancer Brother    • Diabetes Brother    • Heart disease Brother    • Hypertension Brother        Social History     Socioeconomic History   • Marital status:      Spouse name: Not on file   • Number of children: Not on file   • Years of education: Not on file   • Highest education level: Not on file   Tobacco Use   • Smoking status: Never Smoker   • Smokeless tobacco: Current User     Types: Chew   Substance and Sexual Activity   • Alcohol use: No     Frequency: Never   • Drug use: Never           Objective   Physical Exam  Vitals signs and nursing note reviewed.   Constitutional:       Appearance: He is well-developed.   HENT:      Head: Normocephalic and atraumatic.   Neck:       Musculoskeletal: Normal range of motion.   Cardiovascular:      Rate and Rhythm: Normal rate and regular rhythm.   Pulmonary:      Effort: Pulmonary effort is normal.      Breath sounds: Normal breath sounds.   Abdominal:      General: Bowel sounds are normal.      Palpations: Abdomen is soft.   Musculoskeletal: Normal range of motion.        Feet:       Comments: Cellulitis from the left heel just above the left ankle mild warmth to touch   Skin:     General: Skin is warm and dry.   Neurological:      Mental Status: He is alert and oriented to person, place, and time.   Psychiatric:         Behavior: Behavior normal.         Thought Content: Thought content normal.         Judgment: Judgment normal.         Procedures           ED Course                                           MDM  Number of Diagnoses or Management Options  Cellulitis of left lower extremity: new and requires workup     Amount and/or Complexity of Data Reviewed  Clinical lab tests: reviewed  Tests in the radiology section of CPT®: reviewed  Decide to obtain previous medical records or to obtain history from someone other than the patient: yes    Risk of Complications, Morbidity, and/or Mortality  Presenting problems: minimal  Diagnostic procedures: minimal    Patient Progress  Patient progress: stable      Final diagnoses:   Cellulitis of left lower extremity            Xavi Bender Jr., PA-C  11/01/20 1926

## 2020-11-09 ENCOUNTER — APPOINTMENT (OUTPATIENT)
Dept: ULTRASOUND IMAGING | Facility: HOSPITAL | Age: 80
End: 2020-11-09

## 2020-11-09 ENCOUNTER — HOSPITAL ENCOUNTER (INPATIENT)
Facility: HOSPITAL | Age: 80
LOS: 3 days | Discharge: HOME HEALTH | End: 2020-11-12
Attending: EMERGENCY MEDICINE | Admitting: FAMILY MEDICINE

## 2020-11-09 ENCOUNTER — APPOINTMENT (OUTPATIENT)
Dept: GENERAL RADIOLOGY | Facility: HOSPITAL | Age: 80
End: 2020-11-09

## 2020-11-09 ENCOUNTER — TELEPHONE (OUTPATIENT)
Dept: ONCOLOGY | Facility: CLINIC | Age: 80
End: 2020-11-09

## 2020-11-09 DIAGNOSIS — U07.1 ACUTE RESPIRATORY FAILURE DUE TO COVID-19 (HCC): ICD-10-CM

## 2020-11-09 DIAGNOSIS — J96.00 ACUTE RESPIRATORY FAILURE DUE TO COVID-19 (HCC): ICD-10-CM

## 2020-11-09 DIAGNOSIS — U07.1 COVID-19 VIRUS INFECTION: Primary | ICD-10-CM

## 2020-11-09 DIAGNOSIS — R09.02 HYPOXIA: ICD-10-CM

## 2020-11-09 DIAGNOSIS — J96.01 ACUTE RESPIRATORY FAILURE WITH HYPOXIA (HCC): ICD-10-CM

## 2020-11-09 DIAGNOSIS — L03.116 CELLULITIS OF LEFT LOWER EXTREMITY: ICD-10-CM

## 2020-11-09 LAB
ALBUMIN SERPL-MCNC: 3.8 G/DL (ref 3.5–5.2)
ALBUMIN/GLOB SERPL: 1.3 G/DL
ALP SERPL-CCNC: 92 U/L (ref 39–117)
ALT SERPL W P-5'-P-CCNC: 9 U/L (ref 1–41)
ANION GAP SERPL CALCULATED.3IONS-SCNC: 9.9 MMOL/L (ref 5–15)
AST SERPL-CCNC: 19 U/L (ref 1–40)
BASOPHILS # BLD AUTO: 0.03 10*3/MM3 (ref 0–0.2)
BASOPHILS NFR BLD AUTO: 0.4 % (ref 0–1.5)
BILIRUB SERPL-MCNC: 0.4 MG/DL (ref 0–1.2)
BUN SERPL-MCNC: 23 MG/DL (ref 8–23)
BUN/CREAT SERPL: 15.4 (ref 7–25)
CALCIUM SPEC-SCNC: 9.6 MG/DL (ref 8.6–10.5)
CHLORIDE SERPL-SCNC: 98 MMOL/L (ref 98–107)
CO2 SERPL-SCNC: 31.1 MMOL/L (ref 22–29)
CREAT SERPL-MCNC: 1.49 MG/DL (ref 0.76–1.27)
D-LACTATE SERPL-SCNC: 2.1 MMOL/L (ref 0.5–2)
DEPRECATED RDW RBC AUTO: 50.4 FL (ref 37–54)
EOSINOPHIL # BLD AUTO: 0.09 10*3/MM3 (ref 0–0.4)
EOSINOPHIL NFR BLD AUTO: 1.3 % (ref 0.3–6.2)
ERYTHROCYTE [DISTWIDTH] IN BLOOD BY AUTOMATED COUNT: 14.8 % (ref 12.3–15.4)
GFR SERPL CREATININE-BSD FRML MDRD: 45 ML/MIN/1.73
GLOBULIN UR ELPH-MCNC: 2.9 GM/DL
GLUCOSE SERPL-MCNC: 249 MG/DL (ref 65–99)
HCT VFR BLD AUTO: 37.6 % (ref 37.5–51)
HGB BLD-MCNC: 12.4 G/DL (ref 13–17.7)
HOLD SPECIMEN: NORMAL
HOLD SPECIMEN: NORMAL
IMM GRANULOCYTES # BLD AUTO: 0.04 10*3/MM3 (ref 0–0.05)
IMM GRANULOCYTES NFR BLD AUTO: 0.6 % (ref 0–0.5)
LACTATE HOLD SPECIMEN: NORMAL
LYMPHOCYTES # BLD AUTO: 0.77 10*3/MM3 (ref 0.7–3.1)
LYMPHOCYTES NFR BLD AUTO: 11 % (ref 19.6–45.3)
MCH RBC QN AUTO: 31.7 PG (ref 26.6–33)
MCHC RBC AUTO-ENTMCNC: 33 G/DL (ref 31.5–35.7)
MCV RBC AUTO: 96.2 FL (ref 79–97)
MONOCYTES # BLD AUTO: 0.84 10*3/MM3 (ref 0.1–0.9)
MONOCYTES NFR BLD AUTO: 12.1 % (ref 5–12)
NEUTROPHILS NFR BLD AUTO: 5.2 10*3/MM3 (ref 1.7–7)
NEUTROPHILS NFR BLD AUTO: 74.6 % (ref 42.7–76)
NRBC BLD AUTO-RTO: 0 /100 WBC (ref 0–0.2)
NT-PROBNP SERPL-MCNC: 264.6 PG/ML (ref 0–1800)
PLATELET # BLD AUTO: 78 10*3/MM3 (ref 140–450)
PMV BLD AUTO: 9.3 FL (ref 6–12)
POTASSIUM SERPL-SCNC: 4.4 MMOL/L (ref 3.5–5.2)
PROT SERPL-MCNC: 6.7 G/DL (ref 6–8.5)
RBC # BLD AUTO: 3.91 10*6/MM3 (ref 4.14–5.8)
SARS-COV-2 RNA PNL SPEC NAA+PROBE: DETECTED
SODIUM SERPL-SCNC: 139 MMOL/L (ref 136–145)
TROPONIN T SERPL-MCNC: 0.01 NG/ML (ref 0–0.03)
WBC # BLD AUTO: 6.97 10*3/MM3 (ref 3.4–10.8)
WHOLE BLOOD HOLD SPECIMEN: NORMAL
WHOLE BLOOD HOLD SPECIMEN: NORMAL

## 2020-11-09 PROCEDURE — G0378 HOSPITAL OBSERVATION PER HR: HCPCS

## 2020-11-09 PROCEDURE — 85025 COMPLETE CBC W/AUTO DIFF WBC: CPT

## 2020-11-09 PROCEDURE — 93971 EXTREMITY STUDY: CPT

## 2020-11-09 PROCEDURE — 80053 COMPREHEN METABOLIC PANEL: CPT

## 2020-11-09 PROCEDURE — 87635 SARS-COV-2 COVID-19 AMP PRB: CPT

## 2020-11-09 PROCEDURE — 99284 EMERGENCY DEPT VISIT MOD MDM: CPT

## 2020-11-09 PROCEDURE — 25010000002 PIPERACILLIN SOD-TAZOBACTAM PER 1 G: Performed by: PHYSICIAN ASSISTANT

## 2020-11-09 PROCEDURE — 71045 X-RAY EXAM CHEST 1 VIEW: CPT

## 2020-11-09 PROCEDURE — 83880 ASSAY OF NATRIURETIC PEPTIDE: CPT

## 2020-11-09 PROCEDURE — 93005 ELECTROCARDIOGRAM TRACING: CPT

## 2020-11-09 PROCEDURE — 83605 ASSAY OF LACTIC ACID: CPT

## 2020-11-09 PROCEDURE — 87040 BLOOD CULTURE FOR BACTERIA: CPT | Performed by: EMERGENCY MEDICINE

## 2020-11-09 PROCEDURE — 84484 ASSAY OF TROPONIN QUANT: CPT

## 2020-11-09 RX ORDER — SODIUM CHLORIDE 0.9 % (FLUSH) 0.9 %
10 SYRINGE (ML) INJECTION AS NEEDED
Status: DISCONTINUED | OUTPATIENT
Start: 2020-11-09 | End: 2020-11-12 | Stop reason: HOSPADM

## 2020-11-09 RX ADMIN — TAZOBACTAM SODIUM AND PIPERACILLIN SODIUM 3.38 G: 375; 3 INJECTION, SOLUTION INTRAVENOUS at 23:00

## 2020-11-10 ENCOUNTER — TELEPHONE (OUTPATIENT)
Dept: ONCOLOGY | Facility: CLINIC | Age: 80
End: 2020-11-10

## 2020-11-10 PROBLEM — J96.00 ACUTE RESPIRATORY FAILURE DUE TO COVID-19 (HCC): Status: ACTIVE | Noted: 2020-11-09

## 2020-11-10 PROBLEM — B99.9 ACUTE INFECTION WITHOUT SEPSIS: Status: ACTIVE | Noted: 2020-11-10

## 2020-11-10 LAB
ALBUMIN SERPL-MCNC: 3.1 G/DL (ref 3.5–5.2)
ALBUMIN/GLOB SERPL: 1 G/DL
ALP SERPL-CCNC: 77 U/L (ref 39–117)
ALT SERPL W P-5'-P-CCNC: 13 U/L (ref 1–41)
ANION GAP SERPL CALCULATED.3IONS-SCNC: 6.5 MMOL/L (ref 5–15)
AST SERPL-CCNC: 20 U/L (ref 1–40)
BASOPHILS # BLD AUTO: 0.03 10*3/MM3 (ref 0–0.2)
BASOPHILS NFR BLD AUTO: 0.5 % (ref 0–1.5)
BILIRUB SERPL-MCNC: 0.5 MG/DL (ref 0–1.2)
BUN SERPL-MCNC: 24 MG/DL (ref 8–23)
BUN/CREAT SERPL: 14.9 (ref 7–25)
CALCIUM SPEC-SCNC: 8.7 MG/DL (ref 8.6–10.5)
CHLORIDE SERPL-SCNC: 100 MMOL/L (ref 98–107)
CO2 SERPL-SCNC: 29.5 MMOL/L (ref 22–29)
CREAT SERPL-MCNC: 1.61 MG/DL (ref 0.76–1.27)
D-LACTATE SERPL-SCNC: 1.9 MMOL/L (ref 0.5–2)
DEPRECATED RDW RBC AUTO: 53.2 FL (ref 37–54)
EOSINOPHIL # BLD AUTO: 0.05 10*3/MM3 (ref 0–0.4)
EOSINOPHIL NFR BLD AUTO: 0.9 % (ref 0.3–6.2)
ERYTHROCYTE [DISTWIDTH] IN BLOOD BY AUTOMATED COUNT: 15 % (ref 12.3–15.4)
GFR SERPL CREATININE-BSD FRML MDRD: 41 ML/MIN/1.73
GLOBULIN UR ELPH-MCNC: 3 GM/DL
GLUCOSE BLDC GLUCOMTR-MCNC: 187 MG/DL (ref 70–130)
GLUCOSE BLDC GLUCOMTR-MCNC: 204 MG/DL (ref 70–130)
GLUCOSE BLDC GLUCOMTR-MCNC: 323 MG/DL (ref 70–130)
GLUCOSE SERPL-MCNC: 188 MG/DL (ref 65–99)
HCT VFR BLD AUTO: 34.5 % (ref 37.5–51)
HGB BLD-MCNC: 11.1 G/DL (ref 13–17.7)
IMM GRANULOCYTES # BLD AUTO: 0.02 10*3/MM3 (ref 0–0.05)
IMM GRANULOCYTES NFR BLD AUTO: 0.4 % (ref 0–0.5)
LYMPHOCYTES # BLD AUTO: 0.91 10*3/MM3 (ref 0.7–3.1)
LYMPHOCYTES NFR BLD AUTO: 16.6 % (ref 19.6–45.3)
MCH RBC QN AUTO: 31.4 PG (ref 26.6–33)
MCHC RBC AUTO-ENTMCNC: 32.2 G/DL (ref 31.5–35.7)
MCV RBC AUTO: 97.5 FL (ref 79–97)
MONOCYTES # BLD AUTO: 0.75 10*3/MM3 (ref 0.1–0.9)
MONOCYTES NFR BLD AUTO: 13.7 % (ref 5–12)
MRSA DNA SPEC QL NAA+PROBE: NORMAL
NEUTROPHILS NFR BLD AUTO: 3.71 10*3/MM3 (ref 1.7–7)
NEUTROPHILS NFR BLD AUTO: 67.9 % (ref 42.7–76)
NRBC BLD AUTO-RTO: 0 /100 WBC (ref 0–0.2)
PLATELET # BLD AUTO: 66 10*3/MM3 (ref 140–450)
PMV BLD AUTO: 9.9 FL (ref 6–12)
POTASSIUM SERPL-SCNC: 5 MMOL/L (ref 3.5–5.2)
PROT SERPL-MCNC: 6.1 G/DL (ref 6–8.5)
RBC # BLD AUTO: 3.54 10*6/MM3 (ref 4.14–5.8)
SODIUM SERPL-SCNC: 136 MMOL/L (ref 136–145)
WBC # BLD AUTO: 5.47 10*3/MM3 (ref 3.4–10.8)

## 2020-11-10 PROCEDURE — 87641 MR-STAPH DNA AMP PROBE: CPT | Performed by: FAMILY MEDICINE

## 2020-11-10 PROCEDURE — XW033E5 INTRODUCTION OF REMDESIVIR ANTI-INFECTIVE INTO PERIPHERAL VEIN, PERCUTANEOUS APPROACH, NEW TECHNOLOGY GROUP 5: ICD-10-PCS | Performed by: FAMILY MEDICINE

## 2020-11-10 PROCEDURE — 82962 GLUCOSE BLOOD TEST: CPT

## 2020-11-10 PROCEDURE — 25010000002 DEXAMETHASONE PER 1 MG: Performed by: INTERNAL MEDICINE

## 2020-11-10 PROCEDURE — 85025 COMPLETE CBC W/AUTO DIFF WBC: CPT | Performed by: FAMILY MEDICINE

## 2020-11-10 PROCEDURE — 99223 1ST HOSP IP/OBS HIGH 75: CPT | Performed by: FAMILY MEDICINE

## 2020-11-10 PROCEDURE — 97165 OT EVAL LOW COMPLEX 30 MIN: CPT

## 2020-11-10 PROCEDURE — 25010000002 VANCOMYCIN 5 G RECONSTITUTED SOLUTION 5,000 MG VIAL: Performed by: FAMILY MEDICINE

## 2020-11-10 PROCEDURE — 97161 PT EVAL LOW COMPLEX 20 MIN: CPT

## 2020-11-10 PROCEDURE — 63710000001 INSULIN ASPART PER 5 UNITS: Performed by: FAMILY MEDICINE

## 2020-11-10 PROCEDURE — 80053 COMPREHEN METABOLIC PANEL: CPT | Performed by: FAMILY MEDICINE

## 2020-11-10 PROCEDURE — 25010000002 PIPERACILLIN SOD-TAZOBACTAM PER 1 G: Performed by: FAMILY MEDICINE

## 2020-11-10 RX ORDER — BISACODYL 10 MG
10 SUPPOSITORY, RECTAL RECTAL DAILY PRN
Status: DISCONTINUED | OUTPATIENT
Start: 2020-11-10 | End: 2020-11-12 | Stop reason: HOSPADM

## 2020-11-10 RX ORDER — L.ACID,PARA/B.BIFIDUM/S.THERM 8B CELL
1 CAPSULE ORAL 3 TIMES DAILY
Status: DISCONTINUED | OUTPATIENT
Start: 2020-11-10 | End: 2020-11-12 | Stop reason: HOSPADM

## 2020-11-10 RX ORDER — ONDANSETRON 2 MG/ML
4 INJECTION INTRAMUSCULAR; INTRAVENOUS EVERY 6 HOURS PRN
Status: DISCONTINUED | OUTPATIENT
Start: 2020-11-10 | End: 2020-11-12 | Stop reason: HOSPADM

## 2020-11-10 RX ORDER — SPIRONOLACTONE 25 MG/1
25 TABLET ORAL DAILY
Status: DISCONTINUED | OUTPATIENT
Start: 2020-11-10 | End: 2020-11-11

## 2020-11-10 RX ORDER — CARVEDILOL 6.25 MG/1
6.25 TABLET ORAL 2 TIMES DAILY
Status: DISCONTINUED | OUTPATIENT
Start: 2020-11-10 | End: 2020-11-11

## 2020-11-10 RX ORDER — SODIUM CHLORIDE 0.9 % (FLUSH) 0.9 %
10 SYRINGE (ML) INJECTION AS NEEDED
Status: DISCONTINUED | OUTPATIENT
Start: 2020-11-10 | End: 2020-11-12 | Stop reason: HOSPADM

## 2020-11-10 RX ORDER — GABAPENTIN 400 MG/1
800 CAPSULE ORAL NIGHTLY
Status: DISCONTINUED | OUTPATIENT
Start: 2020-11-10 | End: 2020-11-11

## 2020-11-10 RX ORDER — ONDANSETRON 4 MG/1
4 TABLET, FILM COATED ORAL EVERY 6 HOURS PRN
Status: DISCONTINUED | OUTPATIENT
Start: 2020-11-10 | End: 2020-11-12 | Stop reason: HOSPADM

## 2020-11-10 RX ORDER — SODIUM CHLORIDE 0.9 % (FLUSH) 0.9 %
10 SYRINGE (ML) INJECTION EVERY 12 HOURS SCHEDULED
Status: DISCONTINUED | OUTPATIENT
Start: 2020-11-10 | End: 2020-11-12 | Stop reason: HOSPADM

## 2020-11-10 RX ORDER — ATORVASTATIN CALCIUM 40 MG/1
40 TABLET, FILM COATED ORAL DAILY
Status: DISCONTINUED | OUTPATIENT
Start: 2020-11-10 | End: 2020-11-12 | Stop reason: HOSPADM

## 2020-11-10 RX ORDER — CHOLECALCIFEROL (VITAMIN D3) 125 MCG
5 CAPSULE ORAL NIGHTLY PRN
Status: DISCONTINUED | OUTPATIENT
Start: 2020-11-10 | End: 2020-11-12 | Stop reason: HOSPADM

## 2020-11-10 RX ORDER — GABAPENTIN 400 MG/1
400 CAPSULE ORAL
Status: DISCONTINUED | OUTPATIENT
Start: 2020-11-10 | End: 2020-11-12 | Stop reason: HOSPADM

## 2020-11-10 RX ORDER — DEXTROSE MONOHYDRATE 25 G/50ML
25 INJECTION, SOLUTION INTRAVENOUS
Status: DISCONTINUED | OUTPATIENT
Start: 2020-11-10 | End: 2020-11-12 | Stop reason: HOSPADM

## 2020-11-10 RX ORDER — AMINO ACIDS/PROTEIN HYDROLYS 15G-100/30
30 LIQUID (ML) ORAL 2 TIMES DAILY
Status: DISCONTINUED | OUTPATIENT
Start: 2020-11-10 | End: 2020-11-12 | Stop reason: HOSPADM

## 2020-11-10 RX ORDER — ACETAMINOPHEN 650 MG/1
650 SUPPOSITORY RECTAL EVERY 4 HOURS PRN
Status: DISCONTINUED | OUTPATIENT
Start: 2020-11-10 | End: 2020-11-12 | Stop reason: HOSPADM

## 2020-11-10 RX ORDER — NICOTINE POLACRILEX 4 MG
1 LOZENGE BUCCAL
Status: DISCONTINUED | OUTPATIENT
Start: 2020-11-10 | End: 2020-11-12 | Stop reason: HOSPADM

## 2020-11-10 RX ORDER — ACETAMINOPHEN 325 MG/1
650 TABLET ORAL EVERY 4 HOURS PRN
Status: DISCONTINUED | OUTPATIENT
Start: 2020-11-10 | End: 2020-11-12 | Stop reason: HOSPADM

## 2020-11-10 RX ORDER — ASPIRIN 81 MG/1
81 TABLET ORAL DAILY
Status: DISCONTINUED | OUTPATIENT
Start: 2020-11-10 | End: 2020-11-12 | Stop reason: HOSPADM

## 2020-11-10 RX ORDER — ACETAMINOPHEN 160 MG/5ML
650 SOLUTION ORAL EVERY 4 HOURS PRN
Status: DISCONTINUED | OUTPATIENT
Start: 2020-11-10 | End: 2020-11-12 | Stop reason: HOSPADM

## 2020-11-10 RX ORDER — TAMSULOSIN HYDROCHLORIDE 0.4 MG/1
0.4 CAPSULE ORAL DAILY
Status: DISCONTINUED | OUTPATIENT
Start: 2020-11-10 | End: 2020-11-12 | Stop reason: HOSPADM

## 2020-11-10 RX ORDER — DEXAMETHASONE SODIUM PHOSPHATE 4 MG/ML
6 INJECTION, SOLUTION INTRA-ARTICULAR; INTRALESIONAL; INTRAMUSCULAR; INTRAVENOUS; SOFT TISSUE DAILY
Status: DISCONTINUED | OUTPATIENT
Start: 2020-11-10 | End: 2020-11-12 | Stop reason: HOSPADM

## 2020-11-10 RX ORDER — FAMOTIDINE 20 MG/1
20 TABLET, FILM COATED ORAL 2 TIMES DAILY
Status: DISCONTINUED | OUTPATIENT
Start: 2020-11-10 | End: 2020-11-12 | Stop reason: HOSPADM

## 2020-11-10 RX ADMIN — TAMSULOSIN HYDROCHLORIDE 0.4 MG: 0.4 CAPSULE ORAL at 09:33

## 2020-11-10 RX ADMIN — Medication 1 CAPSULE: at 21:55

## 2020-11-10 RX ADMIN — FAMOTIDINE 20 MG: 20 TABLET, FILM COATED ORAL at 09:33

## 2020-11-10 RX ADMIN — SODIUM CHLORIDE, PRESERVATIVE FREE 10 ML: 5 INJECTION INTRAVENOUS at 00:57

## 2020-11-10 RX ADMIN — INSULIN ASPART 2 UNITS: 100 INJECTION, SOLUTION INTRAVENOUS; SUBCUTANEOUS at 06:49

## 2020-11-10 RX ADMIN — TAZOBACTAM SODIUM AND PIPERACILLIN SODIUM 3.38 G: 375; 3 INJECTION, SOLUTION INTRAVENOUS at 21:55

## 2020-11-10 RX ADMIN — SODIUM CHLORIDE, PRESERVATIVE FREE 10 ML: 5 INJECTION INTRAVENOUS at 09:33

## 2020-11-10 RX ADMIN — DEXAMETHASONE SODIUM PHOSPHATE 6 MG: 4 INJECTION, SOLUTION INTRAMUSCULAR; INTRAVENOUS at 12:50

## 2020-11-10 RX ADMIN — REMDESIVIR 200 MG: 100 INJECTION, POWDER, LYOPHILIZED, FOR SOLUTION INTRAVENOUS at 06:14

## 2020-11-10 RX ADMIN — INSULIN ASPART 7 UNITS: 100 INJECTION, SOLUTION INTRAVENOUS; SUBCUTANEOUS at 18:53

## 2020-11-10 RX ADMIN — MELATONIN TAB 5 MG 5 MG: 5 TAB at 21:55

## 2020-11-10 RX ADMIN — TAZOBACTAM SODIUM AND PIPERACILLIN SODIUM 3.38 G: 375; 3 INJECTION, SOLUTION INTRAVENOUS at 07:15

## 2020-11-10 RX ADMIN — VANCOMYCIN HYDROCHLORIDE 2000 MG: 500 INJECTION, POWDER, LYOPHILIZED, FOR SOLUTION INTRAVENOUS at 00:54

## 2020-11-10 RX ADMIN — Medication 1 CAPSULE: at 18:53

## 2020-11-10 RX ADMIN — ACETAMINOPHEN 650 MG: 650 SOLUTION ORAL at 12:51

## 2020-11-10 RX ADMIN — GABAPENTIN 800 MG: 400 CAPSULE ORAL at 00:55

## 2020-11-10 RX ADMIN — SODIUM CHLORIDE, PRESERVATIVE FREE 10 ML: 5 INJECTION INTRAVENOUS at 21:56

## 2020-11-10 RX ADMIN — INSULIN ASPART 4 UNITS: 100 INJECTION, SOLUTION INTRAVENOUS; SUBCUTANEOUS at 12:51

## 2020-11-10 RX ADMIN — SACUBITRIL AND VALSARTAN 1 TABLET: 24; 26 TABLET, FILM COATED ORAL at 00:55

## 2020-11-10 RX ADMIN — GABAPENTIN 400 MG: 400 CAPSULE ORAL at 06:47

## 2020-11-10 RX ADMIN — ATORVASTATIN CALCIUM 40 MG: 40 TABLET, FILM COATED ORAL at 09:33

## 2020-11-10 RX ADMIN — SACUBITRIL AND VALSARTAN 1 TABLET: 24; 26 TABLET, FILM COATED ORAL at 09:32

## 2020-11-10 RX ADMIN — ASPIRIN 81 MG: 81 TABLET, COATED ORAL at 09:33

## 2020-11-10 RX ADMIN — TAZOBACTAM SODIUM AND PIPERACILLIN SODIUM 3.38 G: 375; 3 INJECTION, SOLUTION INTRAVENOUS at 16:08

## 2020-11-10 RX ADMIN — RIVAROXABAN 10 MG: 10 TABLET, FILM COATED ORAL at 18:53

## 2020-11-10 RX ADMIN — GABAPENTIN 800 MG: 400 CAPSULE ORAL at 21:55

## 2020-11-10 RX ADMIN — Medication 30 ML: at 21:56

## 2020-11-10 RX ADMIN — FAMOTIDINE 20 MG: 20 TABLET, FILM COATED ORAL at 21:55

## 2020-11-10 RX ADMIN — Medication 1 CAPSULE: at 09:32

## 2020-11-10 RX ADMIN — SPIRONOLACTONE 25 MG: 25 TABLET, FILM COATED ORAL at 09:32

## 2020-11-10 RX ADMIN — GABAPENTIN 400 MG: 400 CAPSULE ORAL at 18:53

## 2020-11-10 RX ADMIN — FAMOTIDINE 20 MG: 20 TABLET, FILM COATED ORAL at 00:55

## 2020-11-10 RX ADMIN — CARVEDILOL 6.25 MG: 6.25 TABLET, FILM COATED ORAL at 09:32

## 2020-11-10 RX ADMIN — CARVEDILOL 6.25 MG: 6.25 TABLET, FILM COATED ORAL at 00:55

## 2020-11-10 NOTE — PHARMACY RECOMMENDATION
Pharmacy to dose Remdesivir     Results from last 7 days   Lab Units 11/09/20 1918   WBC 10*3/mm3 6.97   CREATININE mg/dL 1.49*   ALT (SGPT) U/L 9   AST (SGOT) U/L 19   Estimated Creatinine Clearance: 47.6 mL/min (A) (by C-G formula based on SCr of 1.49 mg/dL (H)).    Indication:  Positive COVID 19    Micro:  Microbiology Results (last 10 days)       Procedure Component Value - Date/Time    COVID-19,[facility] IN-HOUSE,Nasal Swab No Transport Media 3-4 HR TAT - Swab, Nasal Cavity [[id number]]  (Abnormal) Collected: 11/09/20 1911    Lab Status: Final result Specimen: Swab from Nasal Cavity Updated: 11/09/20 2016     COVID19 Detected    Narrative:      Fact sheet for providers: https://www.fda.gov/media/864246/download     Fact sheet for patients: https://www.fda.gov/media/244615/download            Current Abx:  Anti-Infectives (From admission, onward)      Ordered     Dose/Rate Route Frequency Start Stop    11/10/20 0457  remdesivir 100 mg in sodium chloride 0.9 % 250 mL IVPB (powder vial)     Ordering Provider: Carrol Castro DO    100 mg  over 60 Minutes Intravenous Every 24 Hours 11/11/20 0545 11/15/20 0544    11/10/20 0540  vancomycin 1250 mg in sodium chloride 0.9% 250 mL IVPB     Ordering Provider: Carrol Castro DO    1,250 mg  over 90 Minutes Intravenous Every 24 Hours 11/11/20 0000 11/15/20 2359    11/10/20 0528  piperacillin-tazobactam (ZOSYN) 3.375 g in iso-osmotic dextrose 50 ml (premix)     Ordering Provider: Carrol Castro DO    3.375 g  over 4 Hours Intravenous Every 8 Hours 11/10/20 0600 11/15/20 0614    11/10/20 0457  remdesivir 200 mg in sodium chloride 0.9 % 250 mL IVPB (powder vial)     Ordering Provider: Carrol Castro DO    200 mg  over 60 Minutes Intravenous Every 24 Hours 11/10/20 0545 11/11/20 0544    11/10/20 0524  Pharmacy to Dose Zosyn     Ordering Provider: Carrol Castro DO     Does not apply Continuous PRN 11/10/20 0524 11/15/20 0523    11/10/20 0524  Pharmacy to dose  vancomycin     Ordering Provider: Carrol Castro DO     Does not apply Continuous PRN 11/10/20 0524 11/13/20 0523    11/09/20 2056  piperacillin-tazobactam (ZOSYN) 3.375 g in iso-osmotic dextrose 50 ml (premix)     Ordering Provider: Tyler Nguyễn PA-C    3.375 g  over 30 Minutes Intravenous Once 11/09/20 2058 11/10/20 0000    11/09/20 2056  vancomycin 2000 mg in sodium chloride 0.9% 500 mL IVPB     Note to Pharmacy: Pharmacy to dose   Ordering Provider: Carrol Castro DO    2,000 mg  over 120 Minutes Intravenous Once 11/09/20 2058 11/10/20 0300            Assessment/Plan:  Remdesivir 200 mg IVPB once  Remdesivir 100 mg IVPB every 24 hours for 4 doses

## 2020-11-10 NOTE — TELEPHONE ENCOUNTER
Caller: WESTLEY CHEN    Relationship to patient: WIFE    Best call back number: [phone]    PT IS CURRENTLY IN THE HOSPITAL WITH COVID, AND CANNOT MAKE IT TO HIS APPT TOMORROW. WILL CALL AND RESCHEDULE WHEN RELEASED

## 2020-11-10 NOTE — PROGRESS NOTES
[facility]    Name:  Jak Pierre   Age:  80 y.o.  Sex:  male  :  1940  MRN:  [id number]   Visit Number:  [id number]  Admission Date:  2020  Date Of Service:  11/10/20  Primary Care Physician:  Jason Foy MD     LOS: 0 days :  Patient Care Team:  Jason Foy MD as PCP - General (General Practice):    Chief Complaint:      Fever, shortness of breath and cellulitis    Subjective / Interval History:     Patient was seen and evaluated resting comfortably in bed.  He was sitting up on 2 L nasal cannula.  Had no complaints at this time.  Stated that he felt well and pacifically denied cough or worsening shortness of breath.  His only request is a can of Pepsi.  No acute events overnight per nursing staff.    Review of Systems:     General ROS: Patient denies any fevers, chills or loss of consciousness.  Respiratory ROS: Denies cough or shortness of breath.  Cardiovascular ROS: Denies chest pain or palpitations. No history of exertional chest pain.  Gastrointestinal ROS: Denies nausea and vomiting. Denies any abdominal pain. No diarrhea.  Neurological ROS: Denies any focal weakness. No loss of consciousness. Denies any numbness.  Dermatological ROS: Denies any redness or pruritis.    Vital Signs:    Temp:  [98.8 °F (37.1 °C)-101.5 °F (38.6 °C)] 100.4 °F (38 °C)  Heart Rate:  [] 86  Resp:  [18-20] 18  BP: ()/(46-75) 90/46    Intake and output:    I/O last 3 completed shifts:  In: 500 [IV Piggyback:500]  Out: 175 [Urine:175]  I/O this shift:  In: 290 [P.O.:240; IV Piggyback:50]  Out: 100 [Urine:100]    Physical Examination:    General Appearance:  Alert and cooperative, not in any acute distress.   Head:  Atraumatic and normocephalic, without obvious abnormality.   Eyes:          PERRLA, conjunctivae and sclerae normal, no Icterus. No pallor. Extraocular movements are within normal limits.   Neck: Supple, trachea midline, no  thyromegaly, no carotid bruit.   Lungs:   Chest shape is normal. Breath sounds heard bilaterally equally.  No crackles or wheezing.    Heart:  Normal S1 and S2, no murmur,  No JVD   Abdomen:   Normal bowel sounds, Soft, nontender, nondistended, no guarding, no rebound tenderness.   Extremities:  Left lower extremity is erythematous and mildly edematous.  Tender to the touch the patient states is improving.   Skin: No bleeding, bruising or rash.   Neurologic: Awake, alert and oriented times 3.      Laboratory results:    Results from last 7 days   Lab Units 11/10/20  0637 11/09/20  1918   SODIUM mmol/L 136 139   POTASSIUM mmol/L 5.0 4.4   CHLORIDE mmol/L 100 98   CO2 mmol/L 29.5* 31.1*   BUN mg/dL 24* 23   CREATININE mg/dL 1.61* 1.49*   CALCIUM mg/dL 8.7 9.6   BILIRUBIN mg/dL 0.5 0.4   ALK PHOS U/L 77 92   ALT (SGPT) U/L 13 9   AST (SGOT) U/L 20 19   GLUCOSE mg/dL 188* 249*     Results from last 7 days   Lab Units 11/10/20  0637 11/09/20  1918   WBC 10*3/mm3 5.47 6.97   HEMOGLOBIN g/dL 11.1* 12.4*   HEMATOCRIT % 34.5* 37.6   PLATELETS 10*3/mm3 66* 78*         Results from last 7 days   Lab Units 11/09/20 1918   TROPONIN T ng/mL 0.014               I have reviewed the patient's laboratory results.    Radiology results:    Imaging Results (Last 24 Hours)     Procedure Component Value Units Date/Time    XR Chest 1 View [[id number]] Collected: 11/10/20 1322     Updated: 11/10/20 1329    Narrative:      Portable chest     INDICATION: Short of breath.     FINDINGS: Single frontal portable chest. No previous. EKG leads overlie  the chest. Patient is rotated to the left. Heart size is normal.  Scattered vascular calcifications. Degenerative changes in the spine and  shoulders. Coarsened interstitial opacities are favored to be chronic.  No pneumothorax. No distinct consolidation or effusion.       Impression:      Chronic appearing findings. Followup PA and lateral views  would be helpful for a more complete evaluation.        This report was finalized on 11/10/2020 1:27 PM by Domingo Waters MD.    US Venous Doppler Lower Extremity Left (duplex) [[id number]] Collected: 11/09/20 2237     Updated: 11/09/20 2238    Narrative:      FINAL REPORT    TECHNIQUE:  Multiple transverse and longitudinal images were performed of  the femoral-popliteal deep venous system with augmentation and  compression maneuvers.    CLINICAL HISTORY:  swelling, pain    FINDINGS:  Left lower extremity duplex ultrasound demonstrates normal flow  in the deep venous system.  There is no abnormal echogenicity to  suggest thrombus.  There is normal compression and augmentation.      Impression:      No evidence of DVT.    Authenticated by Paul Saavedra M.D. on 11/09/2020 10:37:05 PM          I have reviewed the patient's radiology reports.    Medication Review:     I have reviewed the patients active and prn medications.       Acute respiratory failure due to COVID-19 (CMS/[facility])    Acute infection without sepsis      Assessment:    1.  Acute hypoxic respiratory failure, prior to admission, secondary to #2  2.  COVID-19 pneumonia, prior to admission, without sepsis  3.  Persistent left lower extremity cellulitis, prior to admission  4.  Diabetes mellitus type 2  5.  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  History of aortic valve replacement    Plan:    We will continue to monitor patient in the Covid unit with telemetry.  Continue to provide supplemental oxygen as necessary to maintain saturation greater than 88%.  Currently patient is requiring 2 L nasal cannula.  Given his hypoxia, remdesivir was started on admission.  We will continue with the assistance of pharmacy.  Patient is also continued on IV Decadron, vancomycin and Zosyn.  Antibiotics serve dual purpose for cellulitis as well as potential bacterial pneumonia.  Continue home Xarelto.  Left venous duplex ultrasound was negative for DVT.  Further orders as clinical course dictates.  Anticipate greater than 2 midnight  stay.    Mike Everett DO  11/10/20  17:41 EST    Dictated utilizing Dragon dictation.

## 2020-11-10 NOTE — H&P
[facility]   HISTORY AND PHYSICAL      Name:  Jak Pierre   Age:  80 y.o.  Sex:  male  :  1940  MRN:  [id number]   Visit Number:  [id number]  Admission Date:  2020  Date Of Service:  20  Primary Care Physician:  Jason oFy MD  Patient seen at 11:30PM    Chief Complaint: Fever, shortness of breath, and left lower leg infection      History Of Presenting Illness: The patient is an 80-year-old gentleman who has a past medical history of aortic valve replacement, diabetes, hypertension, hyperlipidemia, history of DVT on chronic Xarelto.  The patient is seen in the middle the night while asleep at about around 11:30 PM and so history was difficult to obtain.  He complains of progressively worsening 2 days duration of cough and shortness of breath.  Unfortunately, he does have a positive COVID-19 exposure to his son-in-law that occurred last week.  In addition, the patient has had a left lower extremity cellulitis, that had not improved with partial Bactrim treatment switched to Keflex.  He presented to the ER for further evaluation.    On evaluation he has a temperature of 99.7 and was noted to be 87% on room air.  His heart rate was 100 and his blood pressure was 134/71.  He was placed on 2 L nasal cannula oxygen.  A stat venous duplex ultrasound of the left lower extremity was negative for DVT.  He was initiated on vancomycin and Zosyn for his left lower extremity cellulitis that failed outpatient therapy.  He was admitted to the Covid unit under precautions for further evaluation and treatment.    He states he feels like he has got fever, cough, shortness of breath, and persistent left lower extremity erythema and edema.    Review Of Systems:     General ROS: Positive subjective fevers, without chills or loss of consciousness. Complains of generalized weakness.   Psychological ROS: Denies any hallucinations and delusions.  Ophthalmic ROS: Denies any diplopia  or transient loss of vision.  ENT ROS: Denies sore throat, nasal congestion or ear pain.   Allergy and Immunology ROS: Denies rash or itching.  Hematological and Lymphatic ROS: Denies neck swelling or easy bleeding.  Endocrine ROS: Denies any recent unintentional weight gain or loss.  Breast ROS: Denies any pain or swelling.  Respiratory ROS: Positive cough and shortness of breath.   Cardiovascular ROS: Denies chest pain or palpitations. No history of exertional chest pain.  Gastrointestinal ROS: Denies nausea and vomiting. Denies any abdominal pain. No diarrhea.  Genitourinary ROS: Denies dysuria or hematuria.  Musculoskeletal ROS: Denies back pain. No muscle pain. No calf pain.   Neurological ROS: Denies any focal weakness. No loss of consciousness. Denies any numbness. Denies neck pain.   Dermatological ROS: Denies any redness or pruritis.       Past Medical History: Reviewed    Past Medical History:   Diagnosis Date   • Angina of effort (CMS/HCC)    • Aortic valve replaced    • Arthritis    • Cataract    • CHF (congestive heart failure) (CMS/HCC)    • Colitis    • Diabetes (CMS/HCC)    • Diverticulitis    • Gall stones    • Heart disease    • Hyperlipidemia    • Hypertension    • Hypertension    • Kidney stones    • Skin cancer     left shoulder   • Tremor        Past Surgical history: Reviewed    Past Surgical History:   Procedure Laterality Date   • AORTIC VALVE REPAIR/REPLACEMENT  2016   • COLONOSCOPY  2018   • HERNIA REPAIR  1963    x2   • SINUS SURGERY  1978       Social History: Chews tobacco.  Denies smoking.  Denies alcohol or drug use.  Pediatric History   Patient Parents   • Not on file     Other Topics Concern   • Not on file   Social History Narrative   • Not on file       Family History: Reviewed    Family History   Problem Relation Age of Onset   • Cancer Brother    • Diabetes Brother    • Heart disease Brother    • Hypertension Brother        Allergies: Reviewed    Patient has no known  allergies.    Home Medications:    Prior to Admission Medications     Prescriptions Last Dose Informant Patient Reported? Taking?    aspirin 81 MG EC tablet   Yes No    Take 81 mg by mouth Daily.    atorvastatin (LIPITOR) 40 MG tablet   Yes No    Take 40 mg by mouth Daily.    carbidopa-levodopa (SINEMET)  MG per tablet   No No    TAKE 1 TABLET BY MOUTH 3 (THREE) TIMES A DAY. WITH FOOD    carvedilol (COREG) 6.25 MG tablet   Yes No    Take 6.25 mg by mouth 2 (Two) Times a Day.    CINNAMON PO   Yes No    Take 1 tablet by mouth Daily.    cyanocobalamin (CYANOCOBALAMIN) 100 MCG tablet tablet   Yes No    Take 100 mcg by mouth Daily.    ENTRESTO 24-26 MG tablet   Yes No    Take 1 tablet by mouth 3 (Three) Times a Day.    famotidine (PEPCID) 20 MG tablet   Yes No    Take 20 mg by mouth 2 (Two) Times a Day.    gabapentin (NEURONTIN) 400 MG capsule   Yes No    TAKE ONE CAPSULE BY MOUTH EVERY MORNING, 1 IN THE EVENING, AND 2 AT NIGHT    glimepiride (AMARYL) 4 MG tablet   Yes No    TAKE 1 TABLET BY MOUTH DAILY WITH BREAKFAST OR THE FIRST MAIN MEAL OF THE DAY    Multiple Minerals-Vitamins (CALCIUM-MAGNESIUM-ZINC-D3) tablet   Yes No    Take 1 tablet by mouth Daily.    multiple vitamin (M.V.I. Adult) injection   Yes No    Infuse  into a venous catheter.    Prenatal Vit-Fe Fumarate-FA (M-VIT PO)   Yes No    Take 1 tablet by mouth Daily.    spironolactone (ALDACTONE) 25 MG tablet   Yes No    Take 25 mg by mouth Daily.    tamsulosin (FLOMAX) 0.4 MG capsule 24 hr capsule   Yes No    Take 1 capsule by mouth Daily.    TRULICITY 0.75 MG/0.5ML solution pen-injector   Yes No    INJECT 0.75 MG SUBCUNTANEOUSLY ONCE A WEEK FOR 84 DAYS             ED Medications:    Medications   sodium chloride 0.9 % flush 10 mL (has no administration in time range)   vancomycin 2000 mg in sodium chloride 0.9% 500 mL IVPB (has no administration in time range)   piperacillin-tazobactam (ZOSYN) 3.375 g in iso-osmotic dextrose 50 ml (premix) (3.375 g  Intravenous New Bag 11/9/20 2300)       Vital Signs:    Temp:  [98.8 °F (37.1 °C)-99.7 °F (37.6 °C)] 98.8 °F (37.1 °C)  Heart Rate:  [100-104] 102  Resp:  [18-20] 20  BP: (126-134)/(69-75) 128/69    No intake or output data in the 24 hours ending 11/10/20 0005        11/09/20  1903   Weight: 107 kg (235 lb)       Body mass index is 32.78 kg/m².    Physical Exam:      General Appearance:   Elderly man.  Was asleep on my arrival, alert but quickly falls back to sleep.  As possible he was cooperative, no acute distress, oriented to person and place   Head:    Atraumatic and normocephalic, without obvious defect.   Eyes:            PERRLA, conjunctivae and sclerae normal, no icterus. No pallor. Extraocular movements are within normal limits.   Ears:    Ears appear intact with no abnormalities noted.   Throat:   No oral lesions, no thrush, oral mucosa moist.   Neck:   Supple, trachea midline, no thyromegaly.       Lungs:     Chest shape is normal. Breath sounds heard bilaterally equally.  Left lower lobe crackles without wheezing. No Pleural rub or bronchial breathing.      Heart:    Normal S1 and S2, + murmur, no gallop, no rub. No JVD   Abdomen:     Normal bowel sounds, no masses, no organomegaly. Soft        non-tender, non-distended, no guarding, no rebound                tenderness   Extremities:   Moves all extremities well, trace left lower extremity edema, no cyanosis, no             clubbing   Pulses:   Pulses palpable and equal bilaterally   Skin:   No bleeding, bruising or rash   Lymph nodes:   No palpable adenopathy   Neurologic:   No tremor, sensation intact, Motor power is normal and equal bilaterally.       EKG:    Sinus tach no acute ST elevation  Labs:    I have reviewed the labs done in the emergency room.  Results from last 7 days   Lab Units 11/09/20  1918   SODIUM mmol/L 139   POTASSIUM mmol/L 4.4   CHLORIDE mmol/L 98   CO2 mmol/L 31.1*   BUN mg/dL 23   CREATININE mg/dL 1.49*   CALCIUM mg/dL 9.6    BILIRUBIN mg/dL 0.4   ALK PHOS U/L 92   ALT (SGPT) U/L 9   AST (SGOT) U/L 19   GLUCOSE mg/dL 249*     Results from last 7 days   Lab Units 11/09/20 1918   WBC 10*3/mm3 6.97   HEMOGLOBIN g/dL 12.4*   HEMATOCRIT % 37.6   PLATELETS 10*3/mm3 78*         Results from last 7 days   Lab Units 11/09/20 1918   TROPONIN T ng/mL 0.014     Results from last 7 days   Lab Units 11/09/20 1918   PROBNP pg/mL 264.6                       Invalid input(s): USDES,  BLOODU, NITRITITE, BACT, EP  Pain Management Panel     There is no flowsheet data to display.              Radiology:    Imaging Results (Last 72 Hours)     Procedure Component Value Units Date/Time    US Venous Doppler Lower Extremity Left (duplex) [[id number]] Collected: 11/09/20 2237     Updated: 11/09/20 2238    Narrative:      FINAL REPORT    TECHNIQUE:  Multiple transverse and longitudinal images were performed of  the femoral-popliteal deep venous system with augmentation and  compression maneuvers.    CLINICAL HISTORY:  swelling, pain    FINDINGS:  Left lower extremity duplex ultrasound demonstrates normal flow  in the deep venous system.  There is no abnormal echogenicity to  suggest thrombus.  There is normal compression and augmentation.      Impression:      No evidence of DVT.    Authenticated by Paul Saavedra M.D. on 11/09/2020 10:37:05 PM    XR Chest 1 View [[id number]] Resulted: 11/09/20 2008     Updated: 11/09/20 2009          Assessment:    COVID-19 virus infection    1.  Acute hypoxic respiratory failure, prior to admission, secondary to #2  2.  COVID-19 pneumonia, prior to admission, without sepsis  3.  Persistent left lower extremity cellulitis, prior to admission  4.  Diabetes mellitus type 2  5.  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  History of aortic valve replacement        Plan:    Admit the patient to the Covid unit with telemetry.  Titrate oxygen.  I have monitored him overnight and since the patient is hypoxic, he will qualify for pharmacy  consult for Remdesivir.   Check daily labs. Titrate medications accordingly. Continue Xarelto. Further recommendations depend on the hospital course.  Continue vancomycin and zosyn for cellulitis. Fortunately, left venous duplex ultrasound was negative for DVT.     Medication risks and benefits were discussed in detail. Patient reported being satisfied with current treatment plan.    Advance Care Planning   ACP discussion was held with the patient during this visit. Patient does not have an advance directive, declines further assistance. He requested Full code status.    Carrol Castro,   11/10/20  00:05 EST

## 2020-11-10 NOTE — THERAPY EVALUATION
Patient Name: Jak Pierre  : 1940    MRN: [id number]                              Today's Date: 11/10/2020       Admit Date: 2020    Visit Dx:     ICD-10-CM ICD-9-CM   1. COVID-19 virus infection  U07.1 079.89   2. Hypoxia  R09.02 799.02   3. Cellulitis of left lower extremity  L03.116 682.6     Patient Active Problem List   Diagnosis   • Tremors of nervous system   • Monoclonal gammopathy of unknown significance (MGUS)   • Acute respiratory failure due to COVID-19 (CMS/HCC)   • Acute infection without sepsis     Past Medical History:   Diagnosis Date   • Angina of effort (CMS/[facility])    • Aortic valve replaced    • Arthritis    • Cataract    • CHF (congestive heart failure) (CMS/[facility])    • Colitis    • Diabetes (CMS/[facility])    • Diverticulitis    • Gall stones    • Heart disease    • Hyperlipidemia    • Hypertension    • Hypertension    • Impaired functional mobility, balance, gait, and endurance    • Kidney stones    • Skin cancer     left shoulder   • Tremor      Past Surgical History:   Procedure Laterality Date   • AORTIC VALVE REPAIR/REPLACEMENT  2016   • COLONOSCOPY  2018   • HERNIA REPAIR  1963    x2   • SINUS SURGERY  1978     General Information     Row Name 11/10/20 1342          OT Time and Intention    Document Type  evaluation  -SD     Mode of Treatment  occupational therapy  -SD     Row Name 11/10/20 1342          General Information    Patient Profile Reviewed  yes  -SD     Prior Level of Function  independent:;community mobility  -SD     Existing Precautions/Restrictions  fall;oxygen therapy device and L/min  -SD     Row Name 11/10/20 1342          Living Environment    Lives With  spouse  -SD     Row Name 11/10/20 1342          Home Main Entrance    Number of Stairs, Main Entrance  -- ramp  -SD     Row Name 11/10/20 1342          Stairs Within Home, Primary    Number of Stairs, Within Home, Primary  none  -SD     Row Name 11/10/20 1342          Cognition    Orientation Status (Cognition)   oriented x 4  -SD     Row Name 11/10/20 1342          Safety Issues, Functional Mobility    Safety Issues Affecting Function (Mobility)  safety precautions follow-through/compliance;safety precaution awareness;positioning of assistive device;insight into deficits/self-awareness;awareness of need for assistance;impulsivity  -SD     Impairments Affecting Function (Mobility)  balance;endurance/activity tolerance;postural/trunk control;shortness of breath;strength  -SD       User Key  (r) = Recorded By, (t) = Taken By, (c) = Cosigned By    Initials Name Provider Type    SD Sonia Castillo OT Occupational Therapist        Mobility/ADL's     Row Name 11/10/20 1343          Bed Mobility    Bed Mobility  supine-sit  -SD     Supine-Sit [place] (Bed Mobility)  moderate assist (50% patient effort)  -SD     Bed Mobility, Safety Issues  decreased use of arms for pushing/pulling;decreased use of legs for bridging/pushing;impaired trunk control for bed mobility  -SD     Assistive Device (Bed Mobility)  bed rails;head of bed elevated  -SD     Row Name 11/10/20 1343          Transfers    Transfers  sit-stand transfer  -SD     Sit-Stand [place] (Transfers)  minimum assist (75% patient effort);moderate assist (50% patient effort);2 person assist  -SD     Row Name 11/10/20 1343          Sit-Stand Transfer    Assistive Device (Sit-Stand Transfers)  cane, straight  -SD     Row Name 11/10/20 1343          Functional Mobility    Functional Mobility- Ind. Level  minimum assist (75% patient effort);moderate assist (50% patient effort);2 person assist required  -SD     Functional Mobility- Device  rolling walker  -SD     Functional Mobility-Distance (Feet)  12  -SD     Functional Mobility- Safety Issues  balance decreased during turns;sequencing ability decreased;step length decreased;weight-shifting ability decreased;supplemental O2  -SD     Row Name 11/10/20 1343          Activities of Daily Living    BADL  Assessment/Intervention  bathing;upper body dressing;lower body dressing;feeding;grooming;toileting  -SD     Row Name 11/10/20 1343          Bathing Assessment/Intervention    [place] Level (Bathing)  maximum assist (25% patient effort)  -SD     Row Name 11/10/20 1343          Upper Body Dressing Assessment/Training    [place] Level (Upper Body Dressing)  minimum assist (75% patient effort)  -SD     Row Name 11/10/20 1343          Lower Body Dressing Assessment/Training    [place] Level (Lower Body Dressing)  maximum assist (25% patient effort)  -SD     Row Name 11/10/20 1343          Self-Feeding Assessment/Training    [place] Level (Feeding)  supervision  -SD     Row Name 11/10/20 1343          Grooming Assessment/Training    [place] Level (Grooming)  minimum assist (75% patient effort)  -SD     Row Name 11/10/20 1343          Toileting Assessment/Training    [place] Level (Toileting)  maximum assist (25% patient effort)  -SD       User Key  (r) = Recorded By, (t) = Taken By, (c) = Cosigned By    Initials Name Provider Type    Sonia Mcwilliams OT Occupational Therapist        Obj/Interventions     Row Name 11/10/20 1348          Range of Motion Comprehensive    General Range of Motion  bilateral upper extremity ROM WFL  -SD     Row Name 11/10/20 1348          Strength Comprehensive (MMT)    General Manual Muscle Testing (MMT) Assessment  upper extremity strength deficits identified  -SD     Comment, General Manual Muscle Testing (MMT) Assessment  UB 3+/5  -SD       User Key  (r) = Recorded By, (t) = Taken By, (c) = Cosigned By    Initials Name Provider Type    Sonia Mcwilliams OT Occupational Therapist        Goals/Plan     Row Name 11/10/20 1356          Transfer Goal 1 (OT)    Activity/Assistive Device (Transfer Goal 1, OT)  sit-to-stand/stand-to-sit;walker, rolling  -SD     [place] Level/Cues Needed (Transfer Goal 1, OT)  contact guard assist  -SD     Time Frame  (Transfer Goal 1, OT)  long term goal (LTG)  -SD     Progress/Outcome (Transfer Goal 1, OT)  goal ongoing  -SD     Row Name 11/10/20 1356          Dressing Goal 1 (OT)    Activity/Device (Dressing Goal 1, OT)  lower body dressing  -SD     [place]/Cues Needed (Dressing Goal 1, OT)  minimum assist (75% or more patient effort);moderate assist (50-74% patient effort)  -SD     Time Frame (Dressing Goal 1, OT)  2 weeks  -SD     Progress/Outcome (Dressing Goal 1, OT)  goal ongoing  -SD     [facility] Name 11/10/20 1356          Toileting Goal 1 (OT)    Activity/Device (Toileting Goal 1, OT)  toileting skills, all;commode, bedside without drop arms  -SD     [place] Level/Cues Needed (Toileting Goal 1, OT)  minimum assist (75% or more patient effort);moderate assist (50-74% patient effort)  -SD     Time Frame (Toileting Goal 1, OT)  2 weeks  -SD     Progress/Outcome (Toileting Goal 1, OT)  goal ongoing  -SD     Row Name 11/10/20 [facility]6          Strength Goal 1 (OT)    Strength Goal 1 (OT)  Patient to perform UB ther ex as tolerated  -SD     Time Frame (Strength Goal 1, OT)  long term goal (LTG)  -SD     Progress/Outcome (Strength Goal 1, OT)  goal ongoing  -SD     Row Name 11/10/20 1359          Therapy Assessment/Plan (OT)    Planned Therapy Interventions (OT)  activity tolerance training;adaptive equipment training;BADL retraining;patient/caregiver education/training;strengthening exercise;transfer/mobility retraining  -SD       User Key  (r) = Recorded By, (t) = Taken By, (c) = Cosigned By    Initials Name Provider Type    SD Sonia Castillo, OT Occupational Therapist        Clinical Impression     Row Name 11/10/20 8008          Pain Scale: Numbers Pre/Post-Treatment    Pretreatment Pain Rating  0/10 - no pain  -SD     Posttreatment Pain Rating  0/10 - no pain  -SD     Row Name 11/10/20 8294          Plan of Care Review    Plan of Care Reviewed With  patient  -SD     Progress  no change  -SD     Outcome Summary  OT  eval completed. Patient presents deficits in strength, endurance, balance, mobility and ADL performance. Patient is expected to benefit from continued OT services prior to DC.  -SD     Row Name 11/10/20 8383          Therapy Assessment/Plan (OT)    Rehab Potential (OT)  good, to achieve stated therapy goals  -SD     Criteria for Skilled Therapeutic Interventions Met (OT)  skilled treatment is necessary  -SD     Therapy Frequency (OT)  3 times/wk 5 times if indicated  -SD     Row Name 11/10/20 7598          Therapy Plan Review/Discharge Plan (OT)    Anticipated Discharge Disposition (OT)  other (see comments) unknown, dependent on improvement in functional status  -SD     Row Name 11/10/20 1349          Vital Signs    Pre SpO2 (%)  96  -SD     O2 Delivery Pre Treatment  supplemental O2  -SD     Intra SpO2 (%)  93  -SD     O2 Delivery Intra Treatment  room air  -SD     Post SpO2 (%)  96  -SD     O2 Delivery Post Treatment  supplemental O2  -SD     Row Name 11/10/20 0597          Positioning and Restraints    Pre-Treatment Position  in bed  -SD     Post Treatment Position  chair  -SD     In Chair  reclined;call light within reach;encouraged to call for assist  -SD       User Key  (r) = Recorded By, (t) = Taken By, (c) = Cosigned By    Initials Name Provider Type    Sonia Mcwilliams OT Occupational Therapist        Outcome Measures     Row Name 11/10/20 7792          How much help from another is currently needed...    Putting on and taking off regular lower body clothing?  2  -SD     Bathing (including washing, rinsing, and drying)  2  -SD     Toileting (which includes using toilet bed pan or urinal)  2  -SD     Putting on and taking off regular upper body clothing  3  -SD     Taking care of personal grooming (such as brushing teeth)  3  -SD     Eating meals  3  -SD     AM-PAC 6 Clicks Score (OT)  15  -SD     Row Name 11/10/20 2287          Functional Assessment    Outcome Measure Options  AM-PAC 6 Clicks Daily  Activity (OT)  -SD       User Key  (r) = Recorded By, (t) = Taken By, (c) = Cosigned By    Initials Name Provider Type    SD Sonia Castillo OT Occupational Therapist        Occupational Therapy Education                 Title: PT OT SLP Therapies (In Progress)     Topic: Occupational Therapy (In Progress)     Point: ADL training (Done)     Description:   Instruct learner(s) on proper safety adaptation and remediation techniques during self care or transfers.   Instruct in proper use of assistive devices.              Learning Progress Summary           Patient Acceptance, E,TB, VU by SD at 11/10/2020 5265    Comment: Benefit of OT; OT POC                   Point: Home exercise program (Not Started)     Description:   Instruct learner(s) on appropriate technique for monitoring, assisting and/or progressing therapeutic exercises/activities.              Learner Progress:  Not documented in this visit.          Point: Precautions (Not Started)     Description:   Instruct learner(s) on prescribed precautions during self-care and functional transfers.              Learner Progress:  Not documented in this visit.          Point: Body mechanics (Not Started)     Description:   Instruct learner(s) on proper positioning and spine alignment during self-care, functional mobility activities and/or exercises.              Learner Progress:  Not documented in this visit.                      User Key     Initials Effective Dates Name Provider Type Discipline    SD 03/07/18 -  Sonia Castillo OT Occupational Therapist OT              OT Recommendation and Plan  Planned Therapy Interventions (OT): activity tolerance training, adaptive equipment training, BADL retraining, patient/caregiver education/training, strengthening exercise, transfer/mobility retraining  Therapy Frequency (OT): 3 times/wk(5 times if indicated)  Plan of Care Review  Plan of Care Reviewed With: patient  Progress: no change  Outcome Summary: OT ga  completed. Patient presents deficits in strength, endurance, balance, mobility and ADL performance. Patient is expected to benefit from continued OT services prior to DC.     Time Calculation:     Therapy Charges for Today     Code Description Service Date Service Provider Modifiers Qty    [id number]  OT EVAL LOW COMPLEXITY 3 11/10/2020 Sonia Castillo OT GO 1               Sonia Castillo OT  11/10/2020

## 2020-11-10 NOTE — PROGRESS NOTES
Discharge Planning Assessment   Madhu     Patient Name: Jak Pierre  MRN: [id number]  Today's Date: 11/10/2020    Admit Date: 11/9/2020    Discharge Needs Assessment     Row Name 11/10/20 1226       Living Environment    Lives With  spouse    Current Living Arrangements  home/apartment/condo    Primary Care Provided by  self    Provides Primary Care For  no one    Family Caregiver if Needed  spouse    Family Caregiver Names  wife, retired nurse, plans to drive pt home    Quality of Family Relationships  supportive       Resource/Environmental Concerns    Resource/Environmental Concerns  none       Transition Planning    Patient/Family Anticipates Transition to  home with help/services possibly need hh and oxygen    Patient/Family Anticipated Services at Transition  home health care    Transportation Anticipated  family or friend will provide       Discharge Needs Assessment    Readmission Within the Last 30 Days  no previous admission in last 30 days    Equipment Currently Used at Home  cane, straight;grab bar;shower chair;walker, rolling    Concerns to be Addressed  discharge planning        Discharge Plan     Row Name 11/10/20 1228       Plan    Plan  wife, retired nurse,  states before admission pt still driving and mowing grass but she assist with meds and helping get in and out of shower; does not wear oxygen at home, discussed list of DME and Home Health and  if needed would chose [facility] for oxygen and [facility]; discussed meds to beds and call center, questions answered; she plans for pt to come back home on discharge and she will transport; at the moment no other concerns or needs; will continue to follow    Provided Post Acute Provider List?  Yes    Post Acute Provider List  DME Supplier;Home Health    Provided Post Acute Provider Quality & Resource List?  Yes    Post Acute Provider Quality and Resource List  Home Health    Delivered To  Support Person    Method of Delivery  Telephone    Patient/Family  in Agreement with Plan  yes        Continued Care and Services - Admitted Since 11/9/2020    Coordination has not been started for this encounter.         Demographic Summary     Row Name 11/10/20 1221       General Information    Admission Type  observation;inpatient    Referral Source  admission list    Reason for Consult  discharge planning    Preferred Language  English     Used During This Interaction  no        Functional Status     Row Name 11/10/20 1222       Functional Status    Usual Activity Tolerance  fair    Current Activity Tolerance  fair    Functional Status Comments  called and spoke with wife; stated pt still mows yard and drives but she assist with fixing meds and helps in and out of shower chair       Functional Status, IADL    Medications  assistive person    Meal Preparation  independent;assistive person    Housekeeping  assistive person    Laundry  assistive person    Shopping  assistive person       Mental Status Summary    Recent Changes in Mental Status/Cognitive Functioning  unable to assess    Mental Status Comments  positive covid        Psychosocial    No documentation.       Abuse/Neglect    No documentation.       Legal    No documentation.       Substance Abuse    No documentation.       Patient Forms    No documentation.           Lissy Tolentino RN

## 2020-11-10 NOTE — ED PROVIDER NOTES
Subjective   This patient comes in for evaluation of several days of subjective fever, increased shortness of breath and known exposure to someone with COVID-19 in the past week.  He was here recently and diagnosed with cellulitis in his left lower extremity ultrasound was negative for DVT and he states has been going on for several months and not getting any better after finishing Bactrim most recently.  He denies chest pain he states he does not have a significant cough.  No abdominal pain.  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  He states his son-in-law who he was around about a week ago found out he was Covid positive yesterday.          Review of Systems   Constitutional: Positive for fever.   HENT: Negative.    Eyes: Negative.    Respiratory: Positive for cough and shortness of breath.    Cardiovascular: Negative.    Gastrointestinal: Negative.    Genitourinary: Negative.    Musculoskeletal:        Swelling and redness of the left lower extremity   Skin: Negative.    Allergic/Immunologic: Negative.    Neurological: Negative.    Psychiatric/Behavioral: Negative.    All other systems reviewed and are negative.      Past Medical History:   Diagnosis Date   • Angina of effort (CMS/HCC)    • Aortic valve replaced    • Arthritis    • Cataract    • CHF (congestive heart failure) (CMS/HCC)    • Colitis    • Diabetes (CMS/HCC)    • Diverticulitis    • Gall stones    • Heart disease    • Hyperlipidemia    • Hypertension    • Hypertension    • Kidney stones    • Skin cancer     left shoulder   • Tremor        No Known Allergies    Past Surgical History:   Procedure Laterality Date   • AORTIC VALVE REPAIR/REPLACEMENT  2016   • COLONOSCOPY  2018   • HERNIA REPAIR  1963    x2   • SINUS SURGERY  1978       Family History   Problem Relation Age of Onset   • Cancer Brother    • Diabetes Brother    • Heart disease Brother    • Hypertension Brother        Social History     Socioeconomic History    • Marital status:      Spouse name: Not on file   • Number of children: Not on file   • Years of education: Not on file   • Highest education level: Not on file   Tobacco Use   • Smoking status: Never Smoker   • Smokeless tobacco: Current User     Types: Chew   Substance and Sexual Activity   • Alcohol use: No     Frequency: Never   • Drug use: Never           Objective   Physical Exam  Vitals signs and nursing note reviewed.   Constitutional:       Appearance: Normal appearance. He is obese.   HENT:      Head: Normocephalic and atraumatic.      Nose: Nose normal.   Eyes:      Extraocular Movements: Extraocular movements intact.   Neck:      Musculoskeletal: Normal range of motion.   Cardiovascular:      Rate and Rhythm: Normal rate and regular rhythm.   Pulmonary:      Effort: Pulmonary effort is normal.      Breath sounds: Normal breath sounds.   Abdominal:      Palpations: Abdomen is soft.      Tenderness: There is no abdominal tenderness.   Musculoskeletal:      Comments: Erythema and pitting edema to the left lower extremity with evidence of cellulitis on the lower half of the shin.   Skin:     General: Skin is warm and dry.   Neurological:      General: No focal deficit present.      Mental Status: He is alert.   Psychiatric:         Mood and Affect: Mood normal.         Behavior: Behavior normal.         Procedures           ED Course  ED Course as of Nov 09 2057   Mon Nov 09, 2020 2026 EKG interpreted by me: Sinus tachycardia, no acute ST changes, some nonspecific T wave changes, left axis, this is an abnormal EKG    [MP]      ED Course User Index  [MP] Adarsh Chen MD                                           MDM  Discussion with Dr. Castro.  She requested a repeat ultrasound left lower extremity, vancomycin and Zosyn for cellulitis, accepts an admission to [facility].  Final diagnoses:   COVID-19 virus infection   Hypoxia   Cellulitis of left lower extremity             Tyler Nguyễn PA-C  11/09/20 2056       Tyler Nguyễn PA-C  11/09/20 2057

## 2020-11-10 NOTE — PLAN OF CARE
Problem: Adult Inpatient Plan of Care  Goal: Plan of Care Review  Recent Flowsheet Documentation  Taken 11/10/2020 1239 by Bre Spence, PT  Progress: no change  Plan of Care Reviewed With: patient  Outcome Summary: PT eval completed. Patient presents with deficits in strength, balance,endurance and independence. He is expected to benefit from continued skilled PT intervention to improve his mobility status prior to D/C.

## 2020-11-10 NOTE — PAYOR COMM NOTE
"TO:AETNA  FROM:FIDELINA ASCENCIO, RN PHONE [phone]  IN CLINICALS REF# [id number]    Dilan Mcbride (80 y.o. Male)     Date of Birth Social Security Number Address Home Phone MRN    1940  [address] [place] [id number] [phone] [id number]    Sabianism Marital Status          Unknown        Admission Date Admission Type Admitting Provider Attending Provider Department, Room/Bed    20 Emergency Carrol Castro DO Shields, Morgan Blanton, DO [facility] MED SURG  3,     Discharge Date Discharge Disposition Discharge Destination                       Attending Provider: Mike Everett DO    Allergies: No Known Allergies    Isolation: Enh Drop/Con, Contact Air   Infection: COVID (confirmed) (20)   Code Status: CPR    Ht: 180.3 cm (71\")   Wt: 107 kg (235 lb)    Admission Cmt: None   Principal Problem: None                Active Insurance as of 2020     Primary Coverage     Payor Plan Insurance Group Employer/Plan Group    AETNA MEDICARE REPLACEMENT AETNA MEDICARE REPLACEMENT [id number]     Payor Plan Address Payor Plan Phone Number Payor Plan Fax Number Effective Dates    [address] [phone]  2020 - None Entered    [facility] [address]       Subscriber Name Subscriber Birth Date Member ID       DILAN MCBRIDE 1940 [id number]                 Emergency Contacts      (Rel.) Home Phone Work Phone Mobile Phone    Parris Mcbride (Spouse) [phone] -- --               History & Physical      Carrol Castro DO at 11/10/20 0005              [facility] HOSPITALIST   HISTORY AND PHYSICAL      Name:  Dilan Mcbride   Age:  80 y.o.  Sex:  male  :  1940  MRN:  [id number]   Visit Number:  [id number]  Admission Date:  2020  Date Of Service:  20  Primary Care Physician:  Jason Foy MD  Patient seen at 11:30PM    Chief Complaint: Fever, shortness of breath, and " left lower leg infection      History Of Presenting Illness: The patient is an 80-year-old gentleman who has a past medical history of aortic valve replacement, diabetes, hypertension, hyperlipidemia, history of DVT on chronic Xarelto.  The patient is seen in the middle the night while asleep at about around 11:30 PM and so history was difficult to obtain.  He complains of progressively worsening 2 days duration of cough and shortness of breath.  Unfortunately, he does have a positive COVID-19 exposure to his son-in-law that occurred last week.  In addition, the patient has had a left lower extremity cellulitis, that had not improved with partial Bactrim treatment switched to Keflex.  He presented to the ER for further evaluation.    On evaluation he has a temperature of 99.7 and was noted to be 87% on room air.  His heart rate was 100 and his blood pressure was 134/71.  He was placed on 2 L nasal cannula oxygen.  A stat venous duplex ultrasound of the left lower extremity was negative for DVT.  He was initiated on vancomycin and Zosyn for his left lower extremity cellulitis that failed outpatient therapy.  He was admitted to the Covid unit under precautions for further evaluation and treatment.    He states he feels like he has got fever, cough, shortness of breath, and persistent left lower extremity erythema and edema.    Review Of Systems:     General ROS: Positive subjective fevers, without chills or loss of consciousness. Complains of generalized weakness.   Psychological ROS: Denies any hallucinations and delusions.  Ophthalmic ROS: Denies any diplopia or transient loss of vision.  ENT ROS: Denies sore throat, nasal congestion or ear pain.   Allergy and Immunology ROS: Denies rash or itching.  Hematological and Lymphatic ROS: Denies neck swelling or easy bleeding.  Endocrine ROS: Denies any recent unintentional weight gain or loss.  Breast ROS: Denies any pain or swelling.  Respiratory ROS: Positive cough and  shortness of breath.   Cardiovascular ROS: Denies chest pain or palpitations. No history of exertional chest pain.  Gastrointestinal ROS: Denies nausea and vomiting. Denies any abdominal pain. No diarrhea.  Genitourinary ROS: Denies dysuria or hematuria.  Musculoskeletal ROS: Denies back pain. No muscle pain. No calf pain.   Neurological ROS: Denies any focal weakness. No loss of consciousness. Denies any numbness. Denies neck pain.   Dermatological ROS: Denies any redness or pruritis.       Past Medical History: Reviewed    Past Medical History:   Diagnosis Date   • Angina of effort (CMS/HCC)    • Aortic valve replaced    • Arthritis    • Cataract    • CHF (congestive heart failure) (CMS/HCC)    • Colitis    • Diabetes (CMS/HCC)    • Diverticulitis    • Gall stones    • Heart disease    • Hyperlipidemia    • Hypertension    • Hypertension    • Kidney stones    • Skin cancer     left shoulder   • Tremor        Past Surgical history: Reviewed    Past Surgical History:   Procedure Laterality Date   • AORTIC VALVE REPAIR/REPLACEMENT  2016   • COLONOSCOPY  2018   • HERNIA REPAIR  1963    x2   • SINUS SURGERY  1978       Social History: Chews tobacco.  Denies smoking.  Denies alcohol or drug use.  Pediatric History   Patient Parents   • Not on file     Other Topics Concern   • Not on file   Social History Narrative   • Not on file       Family History: Reviewed    Family History   Problem Relation Age of Onset   • Cancer Brother    • Diabetes Brother    • Heart disease Brother    • Hypertension Brother        Allergies: Reviewed    Patient has no known allergies.    Home Medications:    Prior to Admission Medications     Prescriptions Last Dose Informant Patient Reported? Taking?    aspirin 81 MG EC tablet   Yes No    Take 81 mg by mouth Daily.    atorvastatin (LIPITOR) 40 MG tablet   Yes No    Take 40 mg by mouth Daily.    carbidopa-levodopa (SINEMET)  MG per tablet   No No    TAKE 1 TABLET BY MOUTH 3 (THREE)  TIMES A DAY. WITH FOOD    carvedilol (COREG) 6.25 MG tablet   Yes No    Take 6.25 mg by mouth 2 (Two) Times a Day.    CINNAMON PO   Yes No    Take 1 tablet by mouth Daily.    cyanocobalamin (CYANOCOBALAMIN) 100 MCG tablet tablet   Yes No    Take 100 mcg by mouth Daily.    ENTRESTO 24-26 MG tablet   Yes No    Take 1 tablet by mouth 3 (Three) Times a Day.    famotidine (PEPCID) 20 MG tablet   Yes No    Take 20 mg by mouth 2 (Two) Times a Day.    gabapentin (NEURONTIN) 400 MG capsule   Yes No    TAKE ONE CAPSULE BY MOUTH EVERY MORNING, 1 IN THE EVENING, AND 2 AT NIGHT    glimepiride (AMARYL) 4 MG tablet   Yes No    TAKE 1 TABLET BY MOUTH DAILY WITH BREAKFAST OR THE FIRST MAIN MEAL OF THE DAY    Multiple Minerals-Vitamins (CALCIUM-MAGNESIUM-ZINC-D3) tablet   Yes No    Take 1 tablet by mouth Daily.    multiple vitamin (M.V.I. Adult) injection   Yes No    Infuse  into a venous catheter.    Prenatal Vit-Fe Fumarate-FA (M-VIT PO)   Yes No    Take 1 tablet by mouth Daily.    spironolactone (ALDACTONE) 25 MG tablet   Yes No    Take 25 mg by mouth Daily.    tamsulosin (FLOMAX) 0.4 MG capsule 24 hr capsule   Yes No    Take 1 capsule by mouth Daily.    TRULICITY 0.75 MG/0.5ML solution pen-injector   Yes No    INJECT 0.75 MG SUBCUNTANEOUSLY ONCE A WEEK FOR 84 DAYS             ED Medications:    Medications   sodium chloride 0.9 % flush 10 mL (has no administration in time range)   vancomycin 2000 mg in sodium chloride 0.9% 500 mL IVPB (has no administration in time range)   piperacillin-tazobactam (ZOSYN) 3.375 g in iso-osmotic dextrose 50 ml (premix) (3.375 g Intravenous New Bag 11/9/20 2300)       Vital Signs:    Temp:  [98.8 °F (37.1 °C)-99.7 °F (37.6 °C)] 98.8 °F (37.1 °C)  Heart Rate:  [100-104] 102  Resp:  [18-20] 20  BP: (126-134)/(69-75) 128/69    No intake or output data in the 24 hours ending 11/10/20 0005        11/09/20 1903   Weight: 107 kg (235 lb)       Body mass index is 32.78 kg/m².    Physical  Exam:      General Appearance:   Elderly man.  Was asleep on my arrival, alert but quickly falls back to sleep.  As possible he was cooperative, no acute distress, oriented to person and place   Head:    Atraumatic and normocephalic, without obvious defect.   Eyes:            PERRLA, conjunctivae and sclerae normal, no icterus. No pallor. Extraocular movements are within normal limits.   Ears:    Ears appear intact with no abnormalities noted.   Throat:   No oral lesions, no thrush, oral mucosa moist.   Neck:   Supple, trachea midline, no thyromegaly.       Lungs:     Chest shape is normal. Breath sounds heard bilaterally equally.  Left lower lobe crackles without wheezing. No Pleural rub or bronchial breathing.      Heart:    Normal S1 and S2, + murmur, no gallop, no rub. No JVD   Abdomen:     Normal bowel sounds, no masses, no organomegaly. Soft        non-tender, non-distended, no guarding, no rebound                tenderness   Extremities:   Moves all extremities well, trace left lower extremity edema, no cyanosis, no             clubbing   Pulses:   Pulses palpable and equal bilaterally   Skin:   No bleeding, bruising or rash   Lymph nodes:   No palpable adenopathy   Neurologic:   No tremor, sensation intact, Motor power is normal and equal bilaterally.       EKG:    Sinus tach no acute ST elevation  Labs:    I have reviewed the labs done in the emergency room.  Results from last 7 days   Lab Units 11/09/20 1918   SODIUM mmol/L 139   POTASSIUM mmol/L 4.4   CHLORIDE mmol/L 98   CO2 mmol/L 31.1*   BUN mg/dL 23   CREATININE mg/dL 1.49*   CALCIUM mg/dL 9.6   BILIRUBIN mg/dL 0.4   ALK PHOS U/L 92   ALT (SGPT) U/L 9   AST (SGOT) U/L 19   GLUCOSE mg/dL 249*     Results from last 7 days   Lab Units 11/09/20 1918   WBC 10*3/mm3 6.97   HEMOGLOBIN g/dL 12.4*   HEMATOCRIT % 37.6   PLATELETS 10*3/mm3 78*         Results from last 7 days   Lab Units 11/09/20 1918   TROPONIN T ng/mL 0.014     Results from last 7 days    Lab Units 11/09/20  1918   PROBNP pg/mL 264.6                       Invalid input(s): USDES,  BLOODU, NITRITITE, BACT, EP  Pain Management Panel     There is no flowsheet data to display.              Radiology:    Imaging Results (Last 72 Hours)     Procedure Component Value Units Date/Time    US Venous Doppler Lower Extremity Left (duplex) [[id number]] Collected: 11/09/20 2237     Updated: 11/09/20 2238    Narrative:      FINAL REPORT    TECHNIQUE:  Multiple transverse and longitudinal images were performed of  the femoral-popliteal deep venous system with augmentation and  compression maneuvers.    CLINICAL HISTORY:  swelling, pain    FINDINGS:  Left lower extremity duplex ultrasound demonstrates normal flow  in the deep venous system.  There is no abnormal echogenicity to  suggest thrombus.  There is normal compression and augmentation.      Impression:      No evidence of DVT.    Authenticated by Paul Saavedra M.D. on 11/09/2020 10:37:05 PM    XR Chest 1 View [[id number]] Resulted: 11/09/20 2008     Updated: 11/09/20 2009          Assessment:    COVID-19 virus infection    1.  Acute hypoxic respiratory failure, prior to admission, secondary to #2  2.  COVID-19 pneumonia, prior to admission, without sepsis  3.  Persistent left lower extremity cellulitis, prior to admission  4.  Diabetes mellitus type 2  5.  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  History of aortic valve replacement        Plan:    Admit the patient to the Covid unit with telemetry.  Titrate oxygen.  I have monitored him overnight and since the patient is hypoxic, he will qualify for pharmacy consult for Remdesivir.   Check daily labs. Titrate medications accordingly. Continue Xarelto. Further recommendations depend on the hospital course.  Continue vancomycin and zosyn for cellulitis. Fortunately, left venous duplex ultrasound was negative for DVT.     Medication risks and benefits were discussed in detail. Patient reported being satisfied with  current treatment plan.    Advance Care Planning   ACP discussion was held with the patient during this visit. Patient does not have an advance directive, declines further assistance. He requested Full code status.    Carrol Castro DO  11/10/20  00:05 EST        Electronically signed by Carrol Castro DO at 11/10/20 0525          Emergency Department Notes      Tyler Nguyễn PA-C at 11/09/20 1933     Attestation signed by Adarsh Chen MD at 11/10/20 0111          For this patient encounter, I reviewed the NP or PA documentation, treatment plan, and medical decision making. Adarsh Chen MD 11/10/2020 01:11 EST                  Subjective   This patient comes in for evaluation of several days of subjective fever, increased shortness of breath and known exposure to someone with COVID-19 in the past week.  He was here recently and diagnosed with cellulitis in his left lower extremity ultrasound was negative for DVT and he states has been going on for several months and not getting any better after finishing Bactrim most recently.  He denies chest pain he states he does not have a significant cough.  No abdominal pain.  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  He states his son-in-law who he was around about a week ago found out he was Covid positive yesterday.          Review of Systems   Constitutional: Positive for fever.   HENT: Negative.    Eyes: Negative.    Respiratory: Positive for cough and shortness of breath.    Cardiovascular: Negative.    Gastrointestinal: Negative.    Genitourinary: Negative.    Musculoskeletal:        Swelling and redness of the left lower extremity   Skin: Negative.    Allergic/Immunologic: Negative.    Neurological: Negative.    Psychiatric/Behavioral: Negative.    All other systems reviewed and are negative.      Past Medical History:   Diagnosis Date   • Angina of effort (CMS/HCC)    • Aortic valve replaced    •  Arthritis    • Cataract    • CHF (congestive heart failure) (CMS/HCC)    • Colitis    • Diabetes (CMS/HCC)    • Diverticulitis    • Gall stones    • Heart disease    • Hyperlipidemia    • Hypertension    • Hypertension    • Kidney stones    • Skin cancer     left shoulder   • Tremor        No Known Allergies    Past Surgical History:   Procedure Laterality Date   • AORTIC VALVE REPAIR/REPLACEMENT  2016   • COLONOSCOPY  2018   • HERNIA REPAIR  1963    x2   • SINUS SURGERY  1978       Family History   Problem Relation Age of Onset   • Cancer Brother    • Diabetes Brother    • Heart disease Brother    • Hypertension Brother        Social History     Socioeconomic History   • Marital status:      Spouse name: Not on file   • Number of children: Not on file   • Years of education: Not on file   • Highest education level: Not on file   Tobacco Use   • Smoking status: Never Smoker   • Smokeless tobacco: Current User     Types: Chew   Substance and Sexual Activity   • Alcohol use: No     Frequency: Never   • Drug use: Never           Objective   Physical Exam  Vitals signs and nursing note reviewed.   Constitutional:       Appearance: Normal appearance. He is obese.   HENT:      Head: Normocephalic and atraumatic.      Nose: Nose normal.   Eyes:      Extraocular Movements: Extraocular movements intact.   Neck:      Musculoskeletal: Normal range of motion.   Cardiovascular:      Rate and Rhythm: Normal rate and regular rhythm.   Pulmonary:      Effort: Pulmonary effort is normal.      Breath sounds: Normal breath sounds.   Abdominal:      Palpations: Abdomen is soft.      Tenderness: There is no abdominal tenderness.   Musculoskeletal:      Comments: Erythema and pitting edema to the left lower extremity with evidence of cellulitis on the lower half of the shin.   Skin:     General: Skin is warm and dry.   Neurological:      General: No focal deficit present.      Mental Status: He is alert.   Psychiatric:          Mood and Affect: Mood normal.         Behavior: Behavior normal.         Procedures          ED Course  ED Course as of Nov 09 2057   Mon Nov 09, 2020 2026 EKG interpreted by me: Sinus tachycardia, no acute ST changes, some nonspecific T wave changes, left axis, this is an abnormal EKG    [MP]      ED Course User Index  [MP] Adarsh Chen MD                                           MDM  Discussion with Dr. Castro.  She requested a repeat ultrasound left lower extremity, vancomycin and Zosyn for cellulitis, accepts an admission to [facility].  Final diagnoses:   COVID-19 virus infection   Hypoxia   Cellulitis of left lower extremity            Tyler Nguyễn PA-C  11/09/20 2056       Tyler Nguyễn PA-C  11/09/20 2057      Electronically signed by Adarsh Chen MD at 11/10/20 0111     Alicia Ley at 11/09/20 2058        Pt will be going to room 328     Alicia Ley  11/09/20 2058      Electronically signed by Alicia Ley at 11/09/20 2058     Renetta Melchor, RN at 11/09/20 2107        Report called to Renetta Kasper rn, RN  11/09/20 2108      Electronically signed by Renetta Melchor, RN at 11/09/20 2108       Vital Signs (last day)     Date/Time   Temp   Temp src   Pulse   Resp   BP   Patient Position   SpO2    11/10/20 1229   (!) 101.5 (38.6)   Axillary   82   18   95/51   Lying   --    11/10/20 0746   (!) 101 (38.3)   Axillary   --   18   93/54   Lying   --    11/10/20 0324   100.3 (37.9)   Oral   93   18   100/60   Lying   96    11/09/20 2246   98.8 (37.1)   Oral   102   20   128/69   --   96    11/09/20 2030   --   --   104   20   134/75   Lying   99    11/09/20 2000   --   --   103   18   126/72   Lying   99    11/09/20 1903   99.7 (37.6)   Oral   100   18   134/71   Sitting   (!) 87                Current Facility-Administered Medications   Medication Dose Route Frequency Provider Last Rate Last Dose   • acetaminophen (TYLENOL) tablet 650  mg  650 mg Oral Q4H PRN Carrol Castro, DO        Or   • acetaminophen (TYLENOL) 160 MG/5ML solution 650 mg  650 mg Oral Q4H PRN Carrol Castro DO        Or   • acetaminophen (TYLENOL) suppository 650 mg  650 mg Rectal Q4H PRN Carrol Castro, DO       • aspirin EC tablet 81 mg  81 mg Oral Daily Carrol Castro, DO   81 mg at 11/10/20 0933   • atorvastatin (LIPITOR) tablet 40 mg  40 mg Oral Daily Carrol Castro, DO   40 mg at 11/10/20 0933   • bisacodyl (DULCOLAX) suppository 10 mg  10 mg Rectal Daily PRN Carrol Castro DO       • carvedilol (COREG) tablet 6.25 mg  6.25 mg Oral BID Carrol Castro DO   6.25 mg at 11/10/20 0932   • dexamethasone (DECADRON) injection 6 mg  6 mg Intravenous Daily Mike Everett,        • dextrose (D50W) 25 g/ 50mL Intravenous Solution 25 g  25 g Intravenous Q15 Min PRN Carrol Castro DO       • dextrose (GLUTOSE) oral gel 1 tube  1 tube Oral Q15 Min PRN Carrol Castro DO       • famotidine (PEPCID) tablet 20 mg  20 mg Oral BID Carrol Castro DO   20 mg at 11/10/20 0933   • gabapentin (NEURONTIN) capsule 400 mg  400 mg Oral BID AC Carrol Castro DO   400 mg at 11/10/20 0647   • gabapentin (NEURONTIN) capsule 800 mg  800 mg Oral Nightly Carrol Castro DO   800 mg at 11/10/20 0055   • glucagon (human recombinant) (GLUCAGEN DIAGNOSTIC) injection 1 mg  1 mg Subcutaneous PRN Carrol Castro DO       • insulin aspart (novoLOG) injection 0-9 Units  0-9 Units Subcutaneous TID AC Carrol Castro DO   2 Units at 11/10/20 0649   • lactobacillus acidophilus (RISAQUAD) capsule 1 capsule  1 capsule Oral TID Carrol Castro DO   1 capsule at 11/10/20 0932   • melatonin tablet 5 mg  5 mg Oral Nightly PRN Carrol Castro, DO       • ondansetron (ZOFRAN) tablet 4 mg  4 mg Oral Q6H PRN Carrol Castro, DO        Or   • ondansetron (ZOFRAN) injection 4 mg  4 mg Intravenous Q6H PRN Carrol Castro, DO       • Pharmacy Consult - Remdesivir   Does not apply Continuous PRN  Carrol Castro DO       • Pharmacy to dose vancomycin   Does not apply Continuous PRN Carrol Castro DO       • Pharmacy to Dose Zosyn   Does not apply Continuous PRN Carrol Castro DO       • piperacillin-tazobactam (ZOSYN) 3.375 g in iso-osmotic dextrose 50 ml (premix)  3.375 g Intravenous Q8H Carrol Castro DO       • [START ON 11/11/2020] remdesivir 100 mg in sodium chloride 0.9 % 250 mL IVPB (powder vial)  100 mg Intravenous Q24H Carrol Castro DO       • rivaroxaban (XARELTO) tablet 10 mg  10 mg Oral Daily With Dinner Carrol Castro DO       • sacubitril-valsartan (ENTRESTO) 24-26 MG tablet 1 tablet  1 tablet Oral Q12H Carrol Castro DO   1 tablet at 11/10/20 0932   • sodium chloride 0.9 % flush 10 mL  10 mL Intravenous PRN Carrol Castro DO       • sodium chloride 0.9 % flush 10 mL  10 mL Intravenous Q12H Carrol Castro DO   10 mL at 11/10/20 0933   • sodium chloride 0.9 % flush 10 mL  10 mL Intravenous PRN Carrol Castro DO       • spironolactone (ALDACTONE) tablet 25 mg  25 mg Oral Daily Carrol Castro DO   25 mg at 11/10/20 0932   • tamsulosin (FLOMAX) 24 hr capsule 0.4 mg  0.4 mg Oral Daily Carrol Castro DO   0.4 mg at 11/10/20 0933   • [START ON 11/11/2020] vancomycin 1250 mg in sodium chloride 0.9% 250 mL IVPB  1,250 mg Intravenous Q24H Carrol Castro DO           Lab Results (last 24 hours)     Procedure Component Value Units Date/Time    POC Glucose Once [[id number]]  (Abnormal) Collected: 11/10/20 1034    Specimen: Blood Updated: 11/10/20 1115     Glucose 204 mg/dL      Comment: Serial Number: [id number]:  [id number]       MRSA Screen, PCR (Inpatient) - Swab, Nares [[id number]] Collected: 11/10/20 0941    Specimen: Swab from Nares Updated: 11/10/20 1002    Comprehensive Metabolic Panel [[id number]]  (Abnormal) Collected: 11/10/20 0637    Specimen: Blood Updated: 11/10/20 0744     Glucose 188 mg/dL      Comment: Glucose >180, Hemoglobin A1C recommended.        BUN 24 mg/dL       Creatinine 1.61 mg/dL      Sodium 136 mmol/L      Potassium 5.0 mmol/L      Chloride 100 mmol/L      CO2 29.5 mmol/L      Calcium 8.7 mg/dL      Total Protein 6.1 g/dL      Albumin 3.10 g/dL      ALT (SGPT) 13 U/L      AST (SGOT) 20 U/L      Alkaline Phosphatase 77 U/L      Total Bilirubin 0.5 mg/dL      eGFR Non African Amer 41 mL/min/1.73      Globulin 3.0 gm/dL      A/G Ratio 1.0 g/dL      BUN/Creatinine Ratio 14.9     Anion Gap 6.5 mmol/L     Narrative:      GFR Normal >60  Chronic Kidney Disease <60  Kidney Failure <15      CBC & Differential [[id number]]  (Abnormal) Collected: 11/10/20 0637    Specimen: Blood Updated: 11/10/20 0728    Narrative:      The following orders were created for panel order CBC & Differential.  Procedure                               Abnormality         Status                     ---------                               -----------         ------                     CBC Auto Differential[[id number]]        Abnormal            Final result                 Please view results for these tests on the individual orders.    CBC Auto Differential [[id number]]  (Abnormal) Collected: 11/10/20 0637    Specimen: Blood Updated: 11/10/20 0728     WBC 5.47 10*3/mm3      RBC 3.54 10*6/mm3      Hemoglobin 11.1 g/dL      Hematocrit 34.5 %      MCV 97.5 fL      MCH 31.4 pg      MCHC 32.2 g/dL      RDW 15.0 %      RDW-SD 53.2 fl      MPV 9.9 fL      Platelets 66 10*3/mm3      Neutrophil % 67.9 %      Lymphocyte % 16.6 %      Monocyte % 13.7 %      Eosinophil % 0.9 %      Basophil % 0.5 %      Immature Grans % 0.4 %      Neutrophils, Absolute 3.71 10*3/mm3      Lymphocytes, Absolute 0.91 10*3/mm3      Monocytes, Absolute 0.75 10*3/mm3      Eosinophils, Absolute 0.05 10*3/mm3      Basophils, Absolute 0.03 10*3/mm3      Immature Grans, Absolute 0.02 10*3/mm3      nRBC 0.0 /100 WBC     POC Glucose Once [[id number]]  (Abnormal) Collected: 11/10/20 0602    Specimen: Blood Updated: 11/10/20 0630     Glucose 187  mg/dL      Comment: Serial Number: [id number]:  [id number]       Lactic Acid, Reflex [[id number]]  (Normal) Collected: 11/09/20 2354    Specimen: Blood Updated: 11/10/20 0008     Lactate 1.9 mmol/L     Lactic Acid, Reflex Timer (This will reflex a repeat order 3-3:15 hours after ordered.) [[id number]] Collected: 11/09/20 1918    Specimen: Blood Updated: 11/09/20 2330     Hold Tube Hold for add-ons.     Comment: Auto resulted.       Blood Culture - Blood, Arm, Left [[id number]] Collected: 11/09/20 2253    Specimen: Blood from Arm, Left Updated: 11/09/20 2259    Blood Culture - Blood, Arm, Right [[id number]] Collected: 11/09/20 2257    Specimen: Blood from Arm, Right Updated: 11/09/20 2259    [place] Draw [[id number]] Collected: 11/09/20 1918    Specimen: Blood Updated: 11/09/20 2030    Narrative:      The following orders were created for panel order [place] Draw.  Procedure                               Abnormality         Status                     ---------                               -----------         ------                     Light Blue Top[[id number]]                                   Final result               Green Top (Gel)[[id number]]                                  Final result               Lavender Top[[id number]]                                     Final result               Gold Top - SST[[id number]]                                   Final result               Green Top (No Gel)[[id number]]                               In process                   Please view results for these tests on the individual orders.    Light Blue Top [[id number]] Collected: 11/09/20 1918    Specimen: Blood Updated: 11/09/20 2030     Extra Tube hold for add-on     Comment: Auto resulted       Green Top (Gel) [[id number]] Collected: 11/09/20 1918    Specimen: Blood Updated: 11/09/20 2030     Extra Tube Hold for add-ons.     Comment: Auto resulted.       Lavender Top [[id number]] Collected: 11/09/20 1918    Specimen: Blood Updated:  11/09/20 2030     Extra Tube hold for add-on     Comment: Auto resulted       Gold Top - SST [[id number]] Collected: 11/09/20 1918    Specimen: Blood Updated: 11/09/20 2030     Extra Tube Hold for add-ons.     Comment: Auto resulted.       Lactic Acid, Plasma [[id number]]  (Abnormal) Collected: 11/09/20 1918    Specimen: Blood Updated: 11/09/20 2022     Lactate 2.1 mmol/L     COVID-19,[facility]-HOUSE,Nasal Swab No Transport Media 3-4 HR TAT - Swab, Nasal Cavity [[id number]]  (Abnormal) Collected: 11/09/20 1911    Specimen: Swab from Nasal Cavity Updated: 11/09/20 2016     COVID19 Detected    Narrative:      Fact sheet for providers: https://www.fda.gov/media/953593/download     Fact sheet for patients: https://www.fda.gov/media/237199/download    BNP [[id number]]  (Normal) Collected: 11/09/20 1918    Specimen: Blood Updated: 11/09/20 2000     proBNP 264.6 pg/mL     Narrative:      Among patients with dyspnea, NT-proBNP is highly sensitive for the detection of acute congestive heart failure. In addition NT-proBNP of <300 pg/ml effectively rules out acute congestive heart failure with 99% negative predictive value.    Results may be falsely decreased if patient taking Biotin.      Troponin [[id number]]  (Normal) Collected: 11/09/20 1918    Specimen: Blood Updated: 11/09/20 2000     Troponin T 0.014 ng/mL     Narrative:      Troponin T Reference Range:  <= 0.03 ng/mL-   Negative for AMI  >0.03 ng/mL-     Abnormal for myocardial necrosis.  Clinicians would have to utilize clinical acumen, EKG, Troponin and serial changes to determine if it is an Acute Myocardial Infarction or myocardial injury due to an underlying chronic condition.       Results may be falsely decreased if patient taking Biotin.      Comprehensive Metabolic Panel [[id number]]  (Abnormal) Collected: 11/09/20 1918    Specimen: Blood Updated: 11/09/20 1957     Glucose 249 mg/dL      Comment: Glucose >180, Hemoglobin A1C recommended.        BUN 23 mg/dL       Creatinine 1.49 mg/dL      Sodium 139 mmol/L      Potassium 4.4 mmol/L      Chloride 98 mmol/L      CO2 31.1 mmol/L      Calcium 9.6 mg/dL      Total Protein 6.7 g/dL      Albumin 3.80 g/dL      ALT (SGPT) 9 U/L      AST (SGOT) 19 U/L      Alkaline Phosphatase 92 U/L      Total Bilirubin 0.4 mg/dL      eGFR Non African Amer 45 mL/min/1.73      Globulin 2.9 gm/dL      A/G Ratio 1.3 g/dL      BUN/Creatinine Ratio 15.4     Anion Gap 9.9 mmol/L     Narrative:      GFR Normal >60  Chronic Kidney Disease <60  Kidney Failure <15      CBC & Differential [[id number]]  (Abnormal) Collected: 11/09/20 1918    Specimen: Blood Updated: 11/09/20 1932    Narrative:      The following orders were created for panel order CBC & Differential.  Procedure                               Abnormality         Status                     ---------                               -----------         ------                     CBC Auto Differential[[id number]]        Abnormal            Final result                 Please view results for these tests on the individual orders.    CBC Auto Differential [[id number]]  (Abnormal) Collected: 11/09/20 1918    Specimen: Blood Updated: 11/09/20 1932     WBC 6.97 10*3/mm3      RBC 3.91 10*6/mm3      Hemoglobin 12.4 g/dL      Hematocrit 37.6 %      MCV 96.2 fL      MCH 31.7 pg      MCHC 33.0 g/dL      RDW 14.8 %      RDW-SD 50.4 fl      MPV 9.3 fL      Platelets 78 10*3/mm3      Neutrophil % 74.6 %      Lymphocyte % 11.0 %      Monocyte % 12.1 %      Eosinophil % 1.3 %      Basophil % 0.4 %      Immature Grans % 0.6 %      Neutrophils, Absolute 5.20 10*3/mm3      Lymphocytes, Absolute 0.77 10*3/mm3      Monocytes, Absolute 0.84 10*3/mm3      Eosinophils, Absolute 0.09 10*3/mm3      Basophils, Absolute 0.03 10*3/mm3      Immature Grans, Absolute 0.04 10*3/mm3      nRBC 0.0 /100 WBC     Green Top (No Gel) [[id number]] Collected: 11/09/20 1918    Specimen: Blood Updated: 11/09/20 1929          Physician Progress  Notes (last 48 hours) (Notes from 11/08/20 1243 through 11/10/20 1243)    No notes of this type exist for this encounter.       Consult Notes (last 48 hours) (Notes from 11/08/20 1243 through 11/10/20 1243)    No notes of this type exist for this encounter.

## 2020-11-10 NOTE — PLAN OF CARE
Goal Outcome Evaluation:  Plan of Care Reviewed With: patient  Progress: no change  Outcome Summary: Patient is a new admission to the floor. Vitals have been stable. slight increase in temperature this morning. no overnight events to report. will continue to monitor.

## 2020-11-10 NOTE — PROGRESS NOTES
Adult Nutrition  Assessment/PES    Patient Name:  Jak Pierre  YOB: 1940  MRN: [id number]  Admit Date:  11/9/2020    Assessment Date:  11/10/2020    Comments:    Recommend:  1. Continue current diet order as medically appropriate and tolerated.  2. Encourage PO intake. PO intake average ~75% x 1 meal.  3. RD ordered Prostat BID.  4. Consider a multivitamin with minerals daily.     RD to follow pt and available PRN.      Reason for Assessment     Row Name 11/10/20 1518          Reason for Assessment    Reason For Assessment  diagnosis/disease state     Diagnosis  diabetes diagnosis/complications;pulmonary disease;cardiac disease DM 2, HTN, HLD, COVID-19 virus, Lower extremity cellulitis, Acute hypoxic respiratory failure     Identified At Risk by Screening Criteria  BMI;large or nonhealing wound, burn or pressure injury             Labs/Tests/Procedures/Meds     Row Name 11/10/20 1519          Labs/Procedures/Meds    Lab Results Reviewed  reviewed, pertinent     Lab Results Comments  Low: Alb, Platelets High: Gluc, BUN, Cr        Medications    Pertinent Medications Reviewed  reviewed, pertinent     Pertinent Medications Comments  Lipitor, Decadron, Pepcid, Novolog         Physical Findings     Row Name 11/10/20 1520          Physical Findings    Overall Physical Appearance  obese     Skin  other (see comments) Lower extremity cellulitis         Estimated/Assessed Needs     Row Name 11/10/20 1520          Calculation Measurements    Weight Used For Calculations  85.2 kg (187 lb 12 oz) Adjusted BW        Estimated/Assessed Needs    Additional Documentation  Calorie Requirements (Group);Protein Requirements (Group);[place]-St. Jeor Equation (Group);Fluid Requirements (Group)        Calorie Requirements    Estimated Calorie Need Method  [place]     Estimated Calorie Requirement Comment  0831 - 4948        [place]-St. Jeor Equation    RMR ([place]-St. Jeor Equation)  0440.843     [place] Activity Factors  1.4 - 1.5     [facility] Factors [facility]  7112.091 - 8879.8643        Protein Requirements    Weight Used For Protein Calculations  85.2 kg (187 lb 12 oz)     Est Protein Requirement Amount (gms/kg)  1.5 gm protein 103 - 127 gm     Estimated Protein Requirements (gms/day)  127.74        Fluid Requirements    Estimated Fluid Requirement Method  Fly-Segar Formula     [place]-Segar Method (over 20 kg)  3203.26         Nutrition Prescription Ordered     Row Name 11/10/20 1523          Nutrition Prescription PO    Current PO Diet  Regular     Common Modifiers  Cardiac;Consistent Carbohydrate         Evaluation of Received Nutrient/Fluid Intake     Row Name 11/10/20 1523          PO Evaluation    Number of Days PO Intake Evaluated  1 day     Number of Meals  1     % PO Intake  75               Problem/Interventions:  Problem 1     Row Name 11/10/20 1523          Nutrition Diagnoses Problem 1    Problem 1  Increased Nutrient Needs     Macronutrient  Fluid;Protein Arginine     Etiology (related to)  Medical Diagnosis     Pulmonary/Critical Care  Other (comment) COVID-19 virus     Skin  Cellulitis     Signs/Symptoms (evidenced by)  Report/Observation     Reported/Observed By  MD;RN         Problem 2     Row Name 11/10/20 1524          Nutrition Diagnoses Problem 2    Problem 2  Overweight/Obesity     Etiology (related to)  Factors Affecting Nutrition     Food Habit/Preferences  Large Meals     Signs/Symptoms (evidenced by)  BMI     BMI  30 - 34.9         Problem 3     Row Name 11/10/20 1524          Nutrition Diagnoses Problem 3    Problem 3  Impaired Nutrient Utilization     Etiology (related to)  Medical Diagnosis     Endocrine  DM Type 2     Signs/Symptoms (evidenced by)  Biochemical     Specific Labs Noted  Glucose           Intervention Goal     Row Name 11/10/20 1524          Intervention Goal    General  Meet nutritional needs for age/condition;Improved nutrition related lab(s)      PO  Meet estimated needs;Maintain intake;PO intake (%)     PO Intake %  -- 75 - 100%     Weight  No significant weight loss         Nutrition Intervention     Row Name 11/10/20 1524          Nutrition Intervention    RD/Tech Action  Follow Tx progress;Encourage intake;Recommend/ordered     Recommended/Ordered  Supplement         Nutrition Prescription     Row Name 11/10/20 1525          Nutrition Prescription PO    PO Prescription  Begin/change supplement;Other (comment) Continue current diet order as medically appropriate and tolerated     Supplement  PRO liquid     Supplement Frequency  2 times a day     New PO Prescription Ordered?  Yes        Other Orders    Obtain Weight  Daily     Obtain Weight Ordered?  No, recommended     Supplement  Vitamin mineral supplement     Supplement Ordered?  No, recommended         Education/Evaluation     Row Name 11/10/20 1525          Education    Education  Education not appropriate at this time     Please explain  Other (comment) Isolation status        Monitor/Evaluation    Monitor  Per protocol;I&O;PO intake;Supplement intake;Pertinent labs;Weight;Skin status           Electronically signed by:  Dorothy Millan RD  11/10/20 15:26 EST

## 2020-11-10 NOTE — THERAPY EVALUATION
Patient Name: Jak Pierre  : 1940    MRN: [id number]                              Today's Date: 11/10/2020       Admit Date: 2020    Visit Dx:     ICD-10-CM ICD-9-CM   1. COVID-19 virus infection  U07.1 079.89   2. Hypoxia  R09.02 799.02   3. Cellulitis of left lower extremity  L03.116 682.6     Patient Active Problem List   Diagnosis   • Tremors of nervous system   • Monoclonal gammopathy of unknown significance (MGUS)   • Acute respiratory failure due to COVID-19 (CMS/HCC)   • Acute infection without sepsis     Past Medical History:   Diagnosis Date   • Angina of effort (CMS/[facility])    • Aortic valve replaced    • Arthritis    • Cataract    • CHF (congestive heart failure) (CMS/[facility])    • Colitis    • Diabetes (CMS/[facility])    • Diverticulitis    • Gall stones    • Heart disease    • Hyperlipidemia    • Hypertension    • Hypertension    • Impaired functional mobility, balance, gait, and endurance    • Kidney stones    • Skin cancer     left shoulder   • Tremor      Past Surgical History:   Procedure Laterality Date   • AORTIC VALVE REPAIR/REPLACEMENT  2016   • COLONOSCOPY  2018   • HERNIA REPAIR  1963    x2   • SINUS SURGERY  1978     General Information     Row Name 11/10/20 1320          Physical Therapy Time and Intention    Document Type  evaluation  -LM     Mode of Treatment  physical therapy  -LM     Row Name 11/10/20 1320          General Information    Patient Profile Reviewed  yes  -LM     Prior Level of Function  independent:;community mobility  -LM     Existing Precautions/Restrictions  fall;oxygen therapy device and L/min  -LM     Row Name 11/10/20 1320          Living Environment    Lives With  spouse  -LM     Row Name 11/10/20 1320          Home Main Entrance    Number of Stairs, Main Entrance  other (see comments) Ramp to enter  -LM     Row Name 11/10/20 1320          Stairs Within Home, Primary    Number of Stairs, Within Home, Primary  none  -LM     Row Name 11/10/20 1320           Cognition    Orientation Status (Cognition)  oriented x 4  -LM     Row Name 11/10/20 1320          Safety Issues, Functional Mobility    Safety Issues Affecting Function (Mobility)  awareness of need for assistance;insight into deficits/self-awareness;judgment;impulsivity;safety precaution awareness;safety precautions follow-through/compliance;sequencing abilities  -LM     Impairments Affecting Function (Mobility)  balance;endurance/activity tolerance;strength;motor control;postural/trunk control  -LM       User Key  (r) = Recorded By, (t) = Taken By, (c) = Cosigned By    Initials Name Provider Type    LM Bre Spence, PT Physical Therapist        Mobility     Row Name 11/10/20 1239          Bed Mobility    Bed Mobility  supine-sit  -LM     Supine-Sit [place] (Bed Mobility)  verbal cues;minimum assist (75% patient effort);moderate assist (50% patient effort)  -LM     Assistive Device (Bed Mobility)  bed rails;head of bed elevated  -LM     Row Name 11/10/20 1239          Sit-Stand Transfer    Sit-Stand [place] (Transfers)  verbal cues;minimum assist (75% patient effort);moderate assist (50% patient effort);2 person assist  -LM     Assistive Device (Sit-Stand Transfers)  cane, straight;other (see comments) Not safe with straight cane; HHA x2; trial RW  -LM     Row Name 11/10/20 1239          Gait/Stairs (Locomotion)    [place] Level (Gait)  verbal cues;minimum assist (75% patient effort);moderate assist (50% patient effort);2 person assist  -LM     Assistive Device (Gait)  other (see comments) HHA x 2  -LM     Distance in Feet (Gait)  12'  -LM     Deviations/Abnormal Patterns (Gait)  base of support, narrow;martina decreased;festinating/shuffling;gait speed decreased;stride length decreased;weight shifting decreased  -LM     Bilateral Gait Deviations  forward flexed posture;weight shift ability decreased;heel strike decreased  -LM       User Key  (r) = Recorded By, (t) = Taken By, (c) = Cosigned By     Initials Name Provider Type    LM Bre Spence, PT Physical Therapist        Obj/Interventions     Row Name 11/10/20 1239          Range of Motion Comprehensive    General Range of Motion  bilateral upper extremity ROM WFL;bilateral lower extremity ROM WFL  -LM     Row Name 11/10/20 1239          Strength Comprehensive (MMT)    General Manual Muscle Testing (MMT) Assessment  upper extremity strength deficits identified;lower extremity strength deficits identified  -LM     Comment, General Manual Muscle Testing (MMT) Assessment  Grossly 3+/5  -LM     Row Name 11/10/20 1239          Balance    Balance Assessment  sitting static balance;sitting dynamic balance;standing static balance;standing dynamic balance  -LM     Static Sitting Balance  mild impairment;unsupported;sitting, edge of bed  -LM     Dynamic Sitting Balance  mild impairment;unsupported;sitting, edge of bed  -LM     Static Standing Balance  moderate impairment;supported  -LM     Dynamic Standing Balance  moderate impairment;supported  -LM       User Key  (r) = Recorded By, (t) = Taken By, (c) = Cosigned By    Initials Name Provider Type    LM Bre Spence, PT Physical Therapist        Goals/Plan     Row Name 11/10/20 1239          Bed Mobility Goal 1 (PT)    Activity/Assistive Device (Bed Mobility Goal 1, PT)  bed mobility activities, all;bed rails  -LM     [place] Level/Cues Needed (Bed Mobility Goal 1, PT)  contact guard assist  -LM     Time Frame (Bed Mobility Goal 1, PT)  short term goal (STG);10 days  -LM     Progress/Outcomes (Bed Mobility Goal 1, PT)  goal ongoing  -LM     Row Name 11/10/20 1239          Transfer Goal 1 (PT)    Activity/Assistive Device (Transfer Goal 1, PT)  sit-to-stand/stand-to-sit;bed-to-chair/chair-to-bed;toilet;walker, rolling  -LM     [place] Level/Cues Needed (Transfer Goal 1, PT)  minimum assist (75% or more patient effort)  -LM     Time Frame (Transfer Goal 1, PT)  short term goal (STG);10 days  -LM      Progress/Outcome (Transfer Goal 1, PT)  goal ongoing  -LM     Row Name 11/10/20 1239          Gait Training Goal 1 (PT)    Activity/Assistive Device (Gait Training Goal 1, PT)  gait (walking locomotion);assistive device use;walker, rolling  -LM     [place] Level (Gait Training Goal 1, PT)  minimum assist (75% or more patient effort)  -LM     Distance (Gait Training Goal 1, PT)  50'  -LM     Time Frame (Gait Training Goal 1, PT)  short term goal (STG);10 days  -LM     Progress/Outcome (Gait Training Goal 1, PT)  goal ongoing  -LM     Row Name 11/10/20 1239          Patient Education Goal (PT)    Activity (Patient Education Goal, PT)  Pt will perform B LE ther ex x 15 reps.  -LM     [place]/Cues/Accuracy (Memory Goal 2, PT)  demonstrates adequately;verbalizes understanding  -LM     Time Frame (Patient Education Goal, PT)  short term goal (STG);10 days  -LM     Progress/Outcome (Patient Education Goal, PT)  goal ongoing  -LM       User Key  (r) = Recorded By, (t) = Taken By, (c) = Cosigned By    Initials Name Provider Type    LM Bre Spence, PT Physical Therapist        Clinical Impression     Row Name 11/10/20 1239          Pain    Additional Documentation  Pain Scale: Numbers Pre/Post-Treatment (Group)  -LM     Row Name 11/10/20 1233          Pain Scale: Numbers Pre/Post-Treatment    Pretreatment Pain Rating  0/10 - no pain  -LM     Posttreatment Pain Rating  0/10 - no pain  -LM     Row Name 11/10/20 1239          Plan of Care Review    Plan of Care Reviewed With  patient  -LM     Progress  no change  -LM     Outcome Summary  PT eval completed. Patient presents with deficits in strength, balance,endurance and independence. He is expected to benefit from continued skilled PT intervention to improve his mobility status prior to D/C.  -LM     Row Name 11/10/20 1238          Therapy Assessment/Plan (PT)    Patient/Family Therapy Goals Statement (PT)  Return home.  -LM     Rehab Potential (PT)  good, to  achieve stated therapy goals  -LM     Criteria for Skilled Interventions Met (PT)  yes;skilled treatment is necessary  -LM     Row Name 11/10/20 1239          Vital Signs    O2 Delivery Pre Treatment  supplemental O2  -LM     O2 Delivery Intra Treatment  supplemental O2  -LM     O2 Delivery Post Treatment  supplemental O2  -LM     Row Name 11/10/20 1239          Positioning and Restraints    Pre-Treatment Position  in bed  -LM     Post Treatment Position  chair  -LM     In Chair  notified nsg;reclined;call light within reach;encouraged to call for assist  -LM       User Key  (r) = Recorded By, (t) = Taken By, (c) = Cosigned By    Initials Name Provider Type    Bre Quiroz, PT Physical Therapist        Outcome Measures     Row Name 11/10/20 1239          How much help from another person do you currently need...    Turning from your back to your side while in flat bed without using bedrails?  2  -LM     Moving from lying on back to sitting on the side of a flat bed without bedrails?  2  -LM     Moving to and from a bed to a chair (including a wheelchair)?  2  -LM     Standing up from a chair using your arms (e.g., wheelchair, bedside chair)?  2  -LM     Climbing 3-5 steps with a railing?  1  -LM     To walk in hospital room?  2  -LM     AM-PAC 6 Clicks Score (PT)  11  -LM     Row Name 11/10/20 1239          Functional Assessment    Outcome Measure Options  AM-PAC 6 Clicks Basic Mobility (PT)  -LM       User Key  (r) = Recorded By, (t) = Taken By, (c) = Cosigned By    Initials Name Provider Type    Bre Quiroz, PT Physical Therapist        Physical Therapy Education                 Title: PT OT SLP Therapies (Done)     Topic: Physical Therapy (Done)     Point: Mobility training (Done)     Learning Progress Summary           Patient Acceptance, E,TB, VU by MULUGETA at 11/10/2020 6927    Comment: Purpose of PT/POC.                   Point: Home exercise program (Done)     Learning Progress Summary           Patient  Acceptance, E,TB, VU by  at 11/10/2020 1337    Comment: Purpose of PT/POC.                   Point: Precautions (Done)     Learning Progress Summary           Patient Acceptance, E,TB, VU by  at 11/10/2020 1337    Comment: Purpose of PT/POC.                               User Key     Initials Effective Dates Name Provider Type Discipline     04/03/18 -  Bre Spence, PT Physical Therapist PT              PT Recommendation and Plan  Planned Therapy Interventions (PT): balance training, bed mobility training, gait training, transfer training, home exercise program, patient/family education, strengthening  Plan of Care Reviewed With: patient  Progress: no change  Outcome Summary: PT eval completed. Patient presents with deficits in strength, balance,endurance and independence. He is expected to benefit from continued skilled PT intervention to improve his mobility status prior to D/C.     Time Calculation:   PT Charges     Row Name 11/10/20 1338             Time Calculation    Start Time  1239  -LM      PT Received On  11/10/20  -      PT Goal Re-Cert Due Date  11/20/20  -        User Key  (r) = Recorded By, (t) = Taken By, (c) = Cosigned By    Initials Name Provider Type     Bre Spence, PT Physical Therapist        Therapy Charges for Today     Code Description Service Date Service Provider Modifiers Qty    [id number] HC PT EVAL LOW COMPLEXITY 3 11/10/2020 Bre Spence, PT GP 1          PT G-Codes  Outcome Measure Options: AM-PAC 6 Clicks Basic Mobility (PT)  AM-PAC 6 Clicks Score (PT): 11    Bre Spence PT  11/10/2020

## 2020-11-10 NOTE — PLAN OF CARE
Goal Outcome Evaluation:  Plan of Care Reviewed With: patient  Progress: no change  Outcome Summary: Pt is weak and sleeping between care. PT does require 2L NC. Pt did not tolerate RA. Pt self ambulated to the bathroom and accidentally removed his IV. New site pending.

## 2020-11-10 NOTE — PLAN OF CARE
Problem: Adult Inpatient Plan of Care  Goal: Plan of Care Review  Recent Flowsheet Documentation  Taken 11/10/2020 8828 by Sonia Castillo OT  Progress: no change  Plan of Care Reviewed With: patient  Outcome Summary: OT eval completed. Patient presents deficits in strength, endurance, balance, mobility and ADL performance. Patient is expected to benefit from continued OT services prior to DC.

## 2020-11-10 NOTE — PHARMACY RECOMMENDATION
"Pharmacy to dose Piperacillin-tazobactam and Vancomycin    Jak Pierre is a 80 y.o. male  180.3 cm (71\") 107 kg (235 lb)    Indication for use: skin/soft tissue infection    Results from last 7 days   Lab Units 11/09/20 1918   WBC 10*3/mm3 6.97   CREATININE mg/dL 1.49*      Estimated Creatinine Clearance: 47.6 mL/min (A) (by C-G formula based on SCr of 1.49 mg/dL (H)).  Temp Readings from Last 1 Encounters:   11/10/20 100.3 °F (37.9 °C) (Oral)       Culture results  Microbiology Results (last 10 days)       Procedure Component Value - Date/Time    COVID-19,[facility] IN-HOUSE,Nasal Swab No Transport Media 3-4 HR TAT - Swab, Nasal Cavity [[id number]]  (Abnormal) Collected: 11/09/20 1911    Lab Status: Final result Specimen: Swab from Nasal Cavity Updated: 11/09/20 2016     COVID19 Detected    Narrative:      Fact sheet for providers: https://www.fda.gov/media/232482/download     Fact sheet for patients: https://www.fda.gov/media/233366/download            Other Antimicrobials  Remdesivir 200 mg IVPB once   Remdesivir 100 mg IVPB every 24 hours for 4 doses    Assessment/Plan  Piperacillin-tazobactam 3.375 g IVPB every 8 hours, extended infusion. Initiated Vancomycin 2000 mg IVPB once, followed by 1250 mg Q24H. Random Vancomycin ordered for 11/11 with am labs. Exposure target: AUC24 (range)400-600 mg/L.hr  AUC24,ss: 497 mg/L.hr  PAUC*: 73 %  Ctrough,ss: 15.9 mg/L  Pconc*: 30 %   Pharmacy will monitor renal function and adjust dose accordingly.    Thank you,  Kaci Velasco [facility]  11/10/20 06:07 EST      "

## 2020-11-11 PROBLEM — N18.30 CHRONIC KIDNEY DISEASE, STAGE III (MODERATE) (HCC): Status: ACTIVE | Noted: 2020-11-11

## 2020-11-11 PROBLEM — L02.416 CELLULITIS AND ABSCESS OF LEFT LOWER EXTREMITY: Status: ACTIVE | Noted: 2020-11-11

## 2020-11-11 PROBLEM — J96.01 ACUTE RESPIRATORY FAILURE WITH HYPOXIA (HCC): Status: ACTIVE | Noted: 2020-11-11

## 2020-11-11 PROBLEM — N17.9 ACUTE RENAL FAILURE (ARF) (HCC): Status: ACTIVE | Noted: 2020-11-11

## 2020-11-11 PROBLEM — E11.21 TYPE 2 DIABETES MELLITUS WITH NEPHROPATHY (HCC): Status: ACTIVE | Noted: 2020-11-11

## 2020-11-11 PROBLEM — L03.116 CELLULITIS AND ABSCESS OF LEFT LOWER EXTREMITY: Status: ACTIVE | Noted: 2020-11-11

## 2020-11-11 LAB
ALBUMIN SERPL-MCNC: 3.1 G/DL (ref 3.5–5.2)
ALBUMIN/GLOB SERPL: 1.2 G/DL
ALP SERPL-CCNC: 70 U/L (ref 39–117)
ALT SERPL W P-5'-P-CCNC: 15 U/L (ref 1–41)
ANION GAP SERPL CALCULATED.3IONS-SCNC: 10.8 MMOL/L (ref 5–15)
AST SERPL-CCNC: 20 U/L (ref 1–40)
BASOPHILS # BLD AUTO: 0.01 10*3/MM3 (ref 0–0.2)
BASOPHILS NFR BLD AUTO: 0.2 % (ref 0–1.5)
BILIRUB SERPL-MCNC: 0.3 MG/DL (ref 0–1.2)
BUN SERPL-MCNC: 43 MG/DL (ref 8–23)
BUN/CREAT SERPL: 16.7 (ref 7–25)
CALCIUM SPEC-SCNC: 8.5 MG/DL (ref 8.6–10.5)
CHLORIDE SERPL-SCNC: 100 MMOL/L (ref 98–107)
CO2 SERPL-SCNC: 28.2 MMOL/L (ref 22–29)
CREAT SERPL-MCNC: 2.58 MG/DL (ref 0.76–1.27)
DEPRECATED RDW RBC AUTO: 53.1 FL (ref 37–54)
EOSINOPHIL # BLD AUTO: 0 10*3/MM3 (ref 0–0.4)
EOSINOPHIL NFR BLD AUTO: 0 % (ref 0.3–6.2)
ERYTHROCYTE [DISTWIDTH] IN BLOOD BY AUTOMATED COUNT: 14.8 % (ref 12.3–15.4)
GFR SERPL CREATININE-BSD FRML MDRD: 24 ML/MIN/1.73
GLOBULIN UR ELPH-MCNC: 2.6 GM/DL
GLUCOSE BLDC GLUCOMTR-MCNC: 203 MG/DL (ref 70–130)
GLUCOSE BLDC GLUCOMTR-MCNC: 254 MG/DL (ref 70–130)
GLUCOSE BLDC GLUCOMTR-MCNC: 296 MG/DL (ref 70–130)
GLUCOSE SERPL-MCNC: 275 MG/DL (ref 65–99)
HCT VFR BLD AUTO: 35.8 % (ref 37.5–51)
HGB BLD-MCNC: 11.4 G/DL (ref 13–17.7)
IMM GRANULOCYTES # BLD AUTO: 0.02 10*3/MM3 (ref 0–0.05)
IMM GRANULOCYTES NFR BLD AUTO: 0.3 % (ref 0–0.5)
LYMPHOCYTES # BLD AUTO: 0.67 10*3/MM3 (ref 0.7–3.1)
LYMPHOCYTES NFR BLD AUTO: 10.2 % (ref 19.6–45.3)
MCH RBC QN AUTO: 31.2 PG (ref 26.6–33)
MCHC RBC AUTO-ENTMCNC: 31.8 G/DL (ref 31.5–35.7)
MCV RBC AUTO: 98.1 FL (ref 79–97)
MONOCYTES # BLD AUTO: 0.3 10*3/MM3 (ref 0.1–0.9)
MONOCYTES NFR BLD AUTO: 4.5 % (ref 5–12)
NEUTROPHILS NFR BLD AUTO: 5.6 10*3/MM3 (ref 1.7–7)
NEUTROPHILS NFR BLD AUTO: 84.8 % (ref 42.7–76)
NRBC BLD AUTO-RTO: 0 /100 WBC (ref 0–0.2)
PLATELET # BLD AUTO: 75 10*3/MM3 (ref 140–450)
PMV BLD AUTO: 9.8 FL (ref 6–12)
POTASSIUM SERPL-SCNC: 5 MMOL/L (ref 3.5–5.2)
PROT SERPL-MCNC: 5.7 G/DL (ref 6–8.5)
RBC # BLD AUTO: 3.65 10*6/MM3 (ref 4.14–5.8)
SODIUM SERPL-SCNC: 139 MMOL/L (ref 136–145)
VANCOMYCIN SERPL-MCNC: 20.3 MCG/ML (ref 5–40)
WBC # BLD AUTO: 6.6 10*3/MM3 (ref 3.4–10.8)

## 2020-11-11 PROCEDURE — 25010000002 PIPERACILLIN SOD-TAZOBACTAM PER 1 G: Performed by: FAMILY MEDICINE

## 2020-11-11 PROCEDURE — 85025 COMPLETE CBC W/AUTO DIFF WBC: CPT | Performed by: INTERNAL MEDICINE

## 2020-11-11 PROCEDURE — 63710000001 INSULIN ASPART PER 5 UNITS: Performed by: FAMILY MEDICINE

## 2020-11-11 PROCEDURE — 80053 COMPREHEN METABOLIC PANEL: CPT | Performed by: FAMILY MEDICINE

## 2020-11-11 PROCEDURE — 97110 THERAPEUTIC EXERCISES: CPT

## 2020-11-11 PROCEDURE — 25010000002 VANCOMYCIN 5 G RECONSTITUTED SOLUTION 5,000 MG VIAL: Performed by: FAMILY MEDICINE

## 2020-11-11 PROCEDURE — 82962 GLUCOSE BLOOD TEST: CPT

## 2020-11-11 PROCEDURE — 97530 THERAPEUTIC ACTIVITIES: CPT

## 2020-11-11 PROCEDURE — 97116 GAIT TRAINING THERAPY: CPT

## 2020-11-11 PROCEDURE — 99232 SBSQ HOSP IP/OBS MODERATE 35: CPT | Performed by: INTERNAL MEDICINE

## 2020-11-11 PROCEDURE — 80202 ASSAY OF VANCOMYCIN: CPT | Performed by: FAMILY MEDICINE

## 2020-11-11 PROCEDURE — 25010000002 DEXAMETHASONE PER 1 MG: Performed by: INTERNAL MEDICINE

## 2020-11-11 RX ORDER — CEPHALEXIN 500 MG/1
500 CAPSULE ORAL 4 TIMES DAILY
COMMUNITY
End: 2020-11-12 | Stop reason: HOSPADM

## 2020-11-11 RX ORDER — MULTIPLE VITAMINS W/ MINERALS TAB 9MG-400MCG
1 TAB ORAL DAILY
Status: DISCONTINUED | OUTPATIENT
Start: 2020-11-11 | End: 2020-11-12 | Stop reason: HOSPADM

## 2020-11-11 RX ORDER — TORSEMIDE 20 MG/1
40 TABLET ORAL DAILY
COMMUNITY
End: 2020-11-12 | Stop reason: HOSPADM

## 2020-11-11 RX ORDER — MULTIPLE VITAMINS W/ MINERALS TAB 9MG-400MCG
1 TAB ORAL DAILY
Status: DISCONTINUED | OUTPATIENT
Start: 2020-11-11 | End: 2020-11-11

## 2020-11-11 RX ORDER — ASCORBIC ACID 500 MG
500 TABLET ORAL DAILY
Status: DISCONTINUED | OUTPATIENT
Start: 2020-11-11 | End: 2020-11-12 | Stop reason: HOSPADM

## 2020-11-11 RX ORDER — SODIUM CHLORIDE 9 MG/ML
75 INJECTION, SOLUTION INTRAVENOUS CONTINUOUS
Status: DISCONTINUED | OUTPATIENT
Start: 2020-11-11 | End: 2020-11-12

## 2020-11-11 RX ORDER — PANTOPRAZOLE SODIUM 40 MG/1
40 TABLET, DELAYED RELEASE ORAL DAILY
COMMUNITY

## 2020-11-11 RX ORDER — DULAGLUTIDE 1.5 MG/.5ML
1.5 INJECTION, SOLUTION SUBCUTANEOUS WEEKLY
COMMUNITY
End: 2020-12-10 | Stop reason: HOSPADM

## 2020-11-11 RX ORDER — SODIUM CHLORIDE 9 MG/ML
75 INJECTION, SOLUTION INTRAVENOUS CONTINUOUS
Status: ACTIVE | OUTPATIENT
Start: 2020-11-11 | End: 2020-11-11

## 2020-11-11 RX ORDER — ROPINIROLE 0.5 MG/1
0.5 TABLET, FILM COATED ORAL NIGHTLY PRN
COMMUNITY

## 2020-11-11 RX ADMIN — SODIUM CHLORIDE, PRESERVATIVE FREE 10 ML: 5 INJECTION INTRAVENOUS at 09:33

## 2020-11-11 RX ADMIN — ACETAMINOPHEN 650 MG: 325 TABLET ORAL at 21:57

## 2020-11-11 RX ADMIN — TAZOBACTAM SODIUM AND PIPERACILLIN SODIUM 3.38 G: 375; 3 INJECTION, SOLUTION INTRAVENOUS at 16:06

## 2020-11-11 RX ADMIN — MELATONIN TAB 5 MG 5 MG: 5 TAB at 21:57

## 2020-11-11 RX ADMIN — DEXAMETHASONE SODIUM PHOSPHATE 6 MG: 4 INJECTION, SOLUTION INTRAMUSCULAR; INTRAVENOUS at 09:34

## 2020-11-11 RX ADMIN — TAZOBACTAM SODIUM AND PIPERACILLIN SODIUM 3.38 G: 375; 3 INJECTION, SOLUTION INTRAVENOUS at 05:48

## 2020-11-11 RX ADMIN — REMDESIVIR 100 MG: 100 INJECTION, POWDER, LYOPHILIZED, FOR SOLUTION INTRAVENOUS at 05:49

## 2020-11-11 RX ADMIN — INSULIN ASPART 6 UNITS: 100 INJECTION, SOLUTION INTRAVENOUS; SUBCUTANEOUS at 12:43

## 2020-11-11 RX ADMIN — MULTIPLE VITAMINS W/ MINERALS TAB 1 TABLET: TAB at 17:50

## 2020-11-11 RX ADMIN — Medication 1 CAPSULE: at 17:50

## 2020-11-11 RX ADMIN — TAZOBACTAM SODIUM AND PIPERACILLIN SODIUM 3.38 G: 375; 3 INJECTION, SOLUTION INTRAVENOUS at 21:23

## 2020-11-11 RX ADMIN — SODIUM CHLORIDE, PRESERVATIVE FREE 10 ML: 5 INJECTION INTRAVENOUS at 21:21

## 2020-11-11 RX ADMIN — GABAPENTIN 400 MG: 400 CAPSULE ORAL at 17:50

## 2020-11-11 RX ADMIN — ASPIRIN 81 MG: 81 TABLET, COATED ORAL at 09:33

## 2020-11-11 RX ADMIN — FAMOTIDINE 20 MG: 20 TABLET, FILM COATED ORAL at 09:33

## 2020-11-11 RX ADMIN — Medication 30 ML: at 09:33

## 2020-11-11 RX ADMIN — INSULIN ASPART 6 UNITS: 100 INJECTION, SOLUTION INTRAVENOUS; SUBCUTANEOUS at 09:34

## 2020-11-11 RX ADMIN — Medication 1 CAPSULE: at 21:21

## 2020-11-11 RX ADMIN — Medication 1 CAPSULE: at 09:33

## 2020-11-11 RX ADMIN — ATORVASTATIN CALCIUM 40 MG: 40 TABLET, FILM COATED ORAL at 09:33

## 2020-11-11 RX ADMIN — VANCOMYCIN HYDROCHLORIDE 1250 MG: 500 INJECTION, POWDER, LYOPHILIZED, FOR SOLUTION INTRAVENOUS at 00:32

## 2020-11-11 RX ADMIN — RIVAROXABAN 10 MG: 10 TABLET, FILM COATED ORAL at 17:50

## 2020-11-11 RX ADMIN — SODIUM CHLORIDE 75 ML/HR: 9 INJECTION, SOLUTION INTRAVENOUS at 17:52

## 2020-11-11 RX ADMIN — SODIUM CHLORIDE 75 ML/HR: 9 INJECTION, SOLUTION INTRAVENOUS at 12:43

## 2020-11-11 RX ADMIN — GABAPENTIN 400 MG: 400 CAPSULE ORAL at 09:34

## 2020-11-11 RX ADMIN — SODIUM CHLORIDE 500 ML: 9 INJECTION, SOLUTION INTRAVENOUS at 05:00

## 2020-11-11 RX ADMIN — OXYCODONE HYDROCHLORIDE AND ACETAMINOPHEN 500 MG: 500 TABLET ORAL at 17:50

## 2020-11-11 RX ADMIN — INSULIN ASPART 6 UNITS: 100 INJECTION, SOLUTION INTRAVENOUS; SUBCUTANEOUS at 17:51

## 2020-11-11 RX ADMIN — Medication 30 ML: at 21:22

## 2020-11-11 RX ADMIN — SODIUM CHLORIDE 75 ML/HR: 9 INJECTION, SOLUTION INTRAVENOUS at 05:19

## 2020-11-11 RX ADMIN — FAMOTIDINE 20 MG: 20 TABLET, FILM COATED ORAL at 21:22

## 2020-11-11 RX ADMIN — TAMSULOSIN HYDROCHLORIDE 0.4 MG: 0.4 CAPSULE ORAL at 09:33

## 2020-11-11 NOTE — PROGRESS NOTES
[facility]    Name:  Jak Pierre   Age:  80 y.o.  Sex:  male  :  1940  MRN:  [id number]   Visit Number:  [id number]  Admission Date:  2020  Date Of Service:  20  Primary Care Physician:  Jason Foy MD     LOS: 1 day :  Patient Care Team:  Jason Foy MD as PCP - General (General Practice):    Chief Complaint:      Generalized weakness.    Subjective / Interval History:     Mr. Pierre was seen and examined this afternoon.  He is lying down on the bed and is comfortable at rest and is asking to go home.  He was noted to have hypotension through the night and all his home medications for blood pressure have been discontinued.  He is currently on IV fluids.  He was admitted from the emergency room on 2020 with fever, shortness of breath and left lower extremity redness.  He was recently exposed to COVID-19 through his son-in-law.  Patient was tested positive for COVID-19 on admission.  He was also noted to have acute renal failure.  He was placed on IV antibiotics therapy with Zosyn and vancomycin.  Left lower extremity duplex scan was negative for DVT.  He was started on dexamethasone and remdesevir.  He was continued on Xarelto that he takes at home.  Previous physician documentation, laboratory and imaging data have been reviewed.    Review of Systems:     General ROS: Patient denies any fevers, chills or loss of consciousness.  Generalized weakness.  Respiratory ROS: Cough and chest congestion.  Cardiovascular ROS: Denies chest pain or palpitations. No history of exertional chest pain.  Gastrointestinal ROS: Denies nausea and vomiting. Denies any abdominal pain.  Neurological ROS: Denies any focal weakness. No loss of consciousness. Denies any numbness.  Dermatological ROS: Redness and swelling of the left lower extremity.    Vital Signs:    Temp:  [96 °F (35.6 °C)-100.4 °F (38 °C)] 97.5 °F (36.4 °C)  Heart Rate:  [59-86]  62  Resp:  [16-18] 18  BP: (73-98)/(32-49) 89/47    Intake and output:    I/O last 3 completed shifts:  In: 1290 [P.O.:240; IV Piggyback:1050]  Out: 275 [Urine:275]  I/O this shift:  In: 480 [P.O.:480]  Out: -     Physical Examination:    General Appearance:  Alert and cooperative, not in any acute distress.   Head:  Atraumatic and normocephalic, without obvious abnormality.   Eyes:          Conjunctivae and sclerae normal, no Icterus. No pallor. Extraocular movements are within normal limits.   Neck: Supple, trachea midline, no thyromegaly, no carotid bruit.   Lungs:   Chest shape is normal. Breath sounds decreased bilaterally in the bases.  No crackles or wheezing. No pleural rub or bronchial breathing.   Heart:  Normal S1 and S2, no murmur, no gallop, no rub. No JVD   Abdomen:   Normal bowel sounds, no masses, no organomegaly. Soft, nontender, obese, no guarding, no rebound tenderness.   Extremities: Moves all extremities, 1+ pitting ankle edema, no cyanosis, no clubbing.   Skin: No bleeding.  Chronic appearing hyperpigmentation noted of the left lower leg.   Neurologic: Awake, alert and oriented times 3. Moves all 4 extremities equally.     Laboratory results:    Results from last 7 days   Lab Units 11/11/20  0820 11/10/20  0637 11/09/20  1918   SODIUM mmol/L 139 136 139   POTASSIUM mmol/L 5.0 5.0 4.4   CHLORIDE mmol/L 100 100 98   CO2 mmol/L 28.2 29.5* 31.1*   BUN mg/dL 43* 24* 23   CREATININE mg/dL 2.58* 1.61* 1.49*   CALCIUM mg/dL 8.5* 8.7 9.6   BILIRUBIN mg/dL 0.3 0.5 0.4   ALK PHOS U/L 70 77 92   ALT (SGPT) U/L 15 13 9   AST (SGOT) U/L 20 20 19   GLUCOSE mg/dL 275* 188* 249*     Results from last 7 days   Lab Units 11/11/20  0820 11/10/20  0637 11/09/20  1918   WBC 10*3/mm3 6.60 5.47 6.97   HEMOGLOBIN g/dL 11.4* 11.1* 12.4*   HEMATOCRIT % 35.8* 34.5* 37.6   PLATELETS 10*3/mm3 75* 66* 78*         Results from last 7 days   Lab Units 11/09/20 1918   TROPONIN T ng/mL 0.014     Results from last 7 days   Lab  Units 11/09/20  2257 11/09/20  2253   BLOODCX  No growth at 24 hours No growth at 24 hours           I have reviewed the patient's laboratory results.    Radiology results:    Imaging Results (Last 24 Hours)     ** No results found for the last 24 hours. **        I have reviewed the patient's radiology reports.    Medication Review:     I have reviewed the patient's active and prn medications.     Problem List:      Acute respiratory failure due to COVID-19 (CMS/[facility])    Acute infection without sepsis    Assessment:    1.  Acute hypoxic respiratory failure, present on admission secondary to #2.  2.  COVID-19 pneumonia, present on admission.  3.  Left lower extremity cellulitis, related to diabetes mellitus type 2, present on admission.  4.  Acute renal failure on chronic kidney disease stage III.  5.  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  6.  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    Plan:    Mr. Pierre is currently on IV hydration.  He has had a bump in his creatinine.  We will repeat his renal function in the morning.  He does have underlying chronic kidney disease stage III.  We will decrease his gabapentin dose and discontinue the nighttime extra dose of gabapentin.  He will be continued on remdesivir and dexamethasone for his COVID-19 pneumonia.    Patient's left lower extremity cellulitis is improving.  He has been on Zosyn and vancomycin since admission.  Nasal swab for MRSA is negative and I will discontinue vancomycin.  He will be placed on lactobacillus supplements.  He will be placed on multivitamins and vitamin C.  Further recommendations depend upon his clinical course, completion of remdesivir therapy as well as renal function.  Discussed with nursing staff and multidisciplinary team.    Juan Alberto Franco MD  11/11/20  16:05 EST    Dictated utilizing Dragon dictation.

## 2020-11-11 NOTE — THERAPY TREATMENT NOTE
Patient Name: Jak Pierre  : 1940    MRN: [id number]                              Today's Date: 2020       Admit Date: 2020    Visit Dx:     ICD-10-CM ICD-9-CM   1. COVID-19 virus infection  U07.1 079.89   2. Hypoxia  R09.02 799.02   3. Cellulitis of left lower extremity  L03.116 682.6     Patient Active Problem List   Diagnosis   • Tremors of nervous system   • Monoclonal gammopathy of unknown significance (MGUS)   • Acute respiratory failure due to COVID-19 (CMS/HCC)   • Acute infection without sepsis     Past Medical History:   Diagnosis Date   • Angina of effort (CMS/[facility])    • Aortic valve replaced    • Arthritis    • Cataract    • CHF (congestive heart failure) (CMS/[facility])    • Colitis    • Diabetes (CMS/[facility])    • Diverticulitis    • Gall stones    • Heart disease    • Hyperlipidemia    • Hypertension    • Hypertension    • Impaired functional mobility, balance, gait, and endurance    • Kidney stones    • Skin cancer     left shoulder   • Tremor      Past Surgical History:   Procedure Laterality Date   • AORTIC VALVE REPAIR/REPLACEMENT  2016   • COLONOSCOPY  2018   • HERNIA REPAIR  1963    x2   • SINUS SURGERY  1978     General Information     Row Name 20 1347          Physical Therapy Time and Intention    Document Type  therapy note (daily note)  -RM     Mode of Treatment  physical therapy  -RM     Row Name 20 1347          General Information    Patient Profile Reviewed  yes  -RM     Existing Precautions/Restrictions  fall;oxygen therapy device and L/min 2 lpm  -RM     Row Name 20 1341          Cognition    Orientation Status (Cognition)  oriented x 4  -RM     Row Name 20 1347          Safety Issues, Functional Mobility    Impairments Affecting Function (Mobility)  balance;endurance/activity tolerance;shortness of breath;strength  -RM       User Key  (r) = Recorded By, (t) = Taken By, (c) = Cosigned By    Initials Name Provider Type    Uche Yates, PTA  Physical Therapy Assistant        Mobility     Row Name 11/11/20 1347          Bed Mobility    Supine-Sit [place] (Bed Mobility)  contact guard;minimum assist (75% patient effort);verbal cues  -RM     Assistive Device (Bed Mobility)  bed rails;head of bed elevated  -RM     Row Name 11/11/20 1347          Sit-Stand Transfer    Sit-Stand [place] (Transfers)  contact guard;minimum assist (75% patient effort);verbal cues  -RM     Assistive Device (Sit-Stand Transfers)  walker, front-wheeled  -RM     Row Name 11/11/20 1347          Gait/Stairs (Locomotion)    [place] Level (Gait)  verbal cues;minimum assist (75% patient effort);contact guard  -RM     Assistive Device (Gait)  walker, front-wheeled  -RM     Distance in Feet (Gait)  40'  -RM     Deviations/Abnormal Patterns (Gait)  base of support, wide;gait speed decreased;stride length decreased  -RM     Bilateral Gait Deviations  forward flexed posture;weight shift ability decreased;heel strike decreased  -RM       User Key  (r) = Recorded By, (t) = Taken By, (c) = Cosigned By    Initials Name Provider Type    RM Uche Flores, PTA Physical Therapy Assistant        Obj/Interventions     Row Name 11/11/20 1349          Motor Skills    Therapeutic Exercise  hip;knee;ankle  -RM     Row Name 11/11/20 1349          Hip (Therapeutic Exercise)    Hip (Therapeutic Exercise)  strengthening exercise;isometric exercises  -RM     Hip Isometrics (Therapeutic Exercise)  bilateral;gluteal sets;2 sets;10 repetitions  -RM     Hip Strengthening (Therapeutic Exercise)  marching while seated;2 sets;10 repetitions;bilateral  -RM     Row Name 11/11/20 1349          Knee (Therapeutic Exercise)    Knee (Therapeutic Exercise)  strengthening exercise  -RM     Knee Strengthening (Therapeutic Exercise)  LAQ (long arc quad);2 sets;10 repetitions;bilateral  -RM     Row Name 11/11/20 1349          Ankle (Therapeutic Exercise)    Ankle (Therapeutic Exercise)  AROM (active range of  motion)  -RM     Ankle AROM (Therapeutic Exercise)  dorsiflexion;plantarflexion;bilateral;2 sets;10 repetitions  -RM       User Key  (r) = Recorded By, (t) = Taken By, (c) = Cosigned By    Initials Name Provider Type    Uche Yates, ROGER Physical Therapy Assistant        Goals/Plan    No documentation.       Clinical Impression     Row Name 11/11/20 1354          Pain Scale: Numbers Pre/Post-Treatment    Pretreatment Pain Rating  0/10 - no pain  -RM     Posttreatment Pain Rating  0/10 - no pain  -RM     Row Name 11/11/20 1355          Plan of Care Review    Plan of Care Reviewed With  patient  -RM     Progress  improving  -RM     Outcome Summary  Pt tolerates treatment well. Pt presented with improved activity tolerance evident by advancing gait distance to 40' cga/min a with rw as well as perforing B LE strengthening in seated position.  See flowsheet for details.  -RM     Row Name 11/11/20 3387          Vital Signs    Pre SpO2 (%)  93  -RM     O2 Delivery Pre Treatment  room air  -RM     Intra SpO2 (%)  90  -RM     O2 Delivery Intra Treatment  room air  -RM     Post SpO2 (%)  96  -RM     O2 Delivery Post Treatment  supplemental O2 2 lpm  -RM     Pre Patient Position  Supine  -RM     Intra Patient Position  Standing  -RM     Post Patient Position  Sitting  -RM     Row Name 11/11/20 4088          Positioning and Restraints    Pre-Treatment Position  in bed  -RM     Post Treatment Position  chair  -RM     In Chair  notified nsg;reclined;call light within reach;encouraged to call for assist  -RM       User Key  (r) = Recorded By, (t) = Taken By, (c) = Cosigned By    Initials Name Provider Type    Uche Yates, ROGER Physical Therapy Assistant        Outcome Measures     Row Name 11/11/20 7419          How much help from another person do you currently need...    Turning from your back to your side while in flat bed without using bedrails?  3  -RM     Moving from lying on back to sitting on the side of a  flat bed without bedrails?  3  -RM     Moving to and from a bed to a chair (including a wheelchair)?  3  -RM     Standing up from a chair using your arms (e.g., wheelchair, bedside chair)?  3  -RM     Climbing 3-5 steps with a railing?  1  -RM     To walk in hospital room?  3  -RM     AM-PAC 6 Clicks Score (PT)  16  -RM     Row Name 11/11/20 1353          Functional Assessment    Outcome Measure Options  AM-PAC 6 Clicks Basic Mobility (PT)  -RM       User Key  (r) = Recorded By, (t) = Taken By, (c) = Cosigned By    Initials Name Provider Type     Uche Flores, ROGER Physical Therapy Assistant        Physical Therapy Education                 Title: PT OT SLP Therapies (Done)     Topic: Physical Therapy (Done)     Point: Mobility training (Done)     Learning Progress Summary           Patient Acceptance, E,TB,D, VU,NR by  at 11/11/2020 1353    Comment: Safety with transfers and exercise techniques.    Acceptance, E,TB, VU by AS at 11/11/2020 0144    Acceptance, E,TB, VU by MD at 11/10/2020 1729    Acceptance, E,TB, VU by  at 11/10/2020 1337    Comment: Purpose of PT/POC.                   Point: Home exercise program (Done)     Learning Progress Summary           Patient Acceptance, E,TB,D, VU,NR by  at 11/11/2020 1353    Comment: Safety with transfers and exercise techniques.    Acceptance, E,TB, VU by AS at 11/11/2020 0144    Acceptance, E,TB, VU by MD at 11/10/2020 1729    Acceptance, E,TB, VU by MULUGETA at 11/10/2020 1337    Comment: Purpose of PT/POC.                   Point: Precautions (Done)     Learning Progress Summary           Patient Acceptance, E,TB, VU by AS at 11/11/2020 0144    Acceptance, E,TB, VU by MD at 11/10/2020 1729    Acceptance, E,TB, VU by MULUGETA at 11/10/2020 1337    Comment: Purpose of PT/POC.                               User Key     Initials Effective Dates Name Provider Type Discipline     04/03/18 -  Bre Spence, PT Physical Therapist PT     03/07/18 -  Uche Flores, ROGER  Physical Therapy Assistant PT    AS 06/17/20 -  Katia Baugh, RN Registered Nurse Nurse    MD 11/11/19 -  Adarsh Beard, RN Registered Nurse Nurse              PT Recommendation and Plan     Plan of Care Reviewed With: patient  Progress: improving  Outcome Summary: Pt tolerates treatment well. Pt presented with improved activity tolerance evident by advancing gait distance to 40' cga/min a with rw as well as perforing B LE strengthening in seated position.  See flowsheet for details.     Time Calculation:   PT Charges     Row Name 11/11/20 1354             Time Calculation    Start Time  1116  -RM      Stop Time  1139  -RM      Time Calculation (min)  23 min  -RM      PT Received On  11/11/20  -RM      PT Goal Re-Cert Due Date  11/20/20  -RM         Time Calculation- PT    Total Timed Code Minutes- PT  23 minute(s)  -RM         Timed Charges    60299 - PT Therapeutic Exercise Minutes  10  -RM      07110 - Gait Training Minutes   13  -RM        User Key  (r) = Recorded By, (t) = Taken By, (c) = Cosigned By    Initials Name Provider Type    Uche Yates, ROGER Physical Therapy Assistant        Therapy Charges for Today     Code Description Service Date Service Provider Modifiers Qty    76446460935 HC PT THER PROC EA 15 MIN 11/11/2020 Uche Flores, PTA GP 1    88126931981 HC GAIT TRAINING EA 15 MIN 11/11/2020 Uche Flores, PTA GP 1          PT G-Codes  Outcome Measure Options: AM-PAC 6 Clicks Basic Mobility (PT)  AM-PAC 6 Clicks Score (PT): 16  AM-PAC 6 Clicks Score (OT): 15    Uche Flores PTA  11/11/2020

## 2020-11-11 NOTE — PLAN OF CARE
Problem: Adult Inpatient Plan of Care  Goal: Plan of Care Review  Recent Flowsheet Documentation  Taken 11/11/2020 1350 by Uche Flores, PTA  Progress: improving  Plan of Care Reviewed With: patient  Outcome Summary: Pt tolerates treatment well. Pt presented with improved activity tolerance evident by advancing gait distance to 40' cga/min a with rw as well as perforing B LE strengthening in seated position.  See flowsheet for details.

## 2020-11-11 NOTE — PROGRESS NOTES
NOCTURNIST NOTE:  Nursing called to report electronic blood pressure reading low of 87/44. I requested bilateral manual blood pressure reading for comparison.   Also, I added 500 cc bolus plus NS at 75 cc for 12 hours. I stopped his Coreg, Entresto, and Spironolactone for the current time.   He will need reassessment further tomorrow.   Further recommendations depend on the clinical course.

## 2020-11-11 NOTE — PLAN OF CARE
Problem: Adult Inpatient Plan of Care  Goal: Plan of Care Review  Recent Flowsheet Documentation  Taken 11/11/2020 1529 by Sonia Castillo OT  Progress: improving  Plan of Care Reviewed With: patient  Outcome Summary: OT tx completed. Patient completed bed mobility with SBA-CGA, functional tf x 10 with CGA, LBD sitting EOB with CGA, followed by UB resistance exercises using red theraband. Continue OT POC

## 2020-11-11 NOTE — PLAN OF CARE
Goal Outcome Evaluation:  Plan of Care Reviewed With: patient  Progress: no change  Outcome Summary: Pt slept between care on RA-2L this shift. MD called regarding low BP. Bolus given and fluids started.

## 2020-11-11 NOTE — THERAPY TREATMENT NOTE
Patient Name: Jak Pierre  : 1940    MRN: [id number]                              Today's Date: 2020       Admit Date: 2020    Visit Dx:     ICD-10-CM ICD-9-CM   1. COVID-19 virus infection  U07.1 079.89   2. Hypoxia  R09.02 799.02   3. Cellulitis of left lower extremity  L03.116 682.6     Patient Active Problem List   Diagnosis   • Tremors of nervous system   • Monoclonal gammopathy of unknown significance (MGUS)   • Acute respiratory failure due to COVID-19 (CMS/HCC)   • Acute infection without sepsis     Past Medical History:   Diagnosis Date   • Angina of effort (CMS/HCC)    • Aortic valve replaced    • Arthritis    • Cataract    • CHF (congestive heart failure) (CMS/HCC)    • Colitis    • Diabetes (CMS/[facility])    • Diverticulitis    • Gall stones    • Heart disease    • Hyperlipidemia    • Hypertension    • Hypertension    • Impaired functional mobility, balance, gait, and endurance    • Kidney stones    • Skin cancer     left shoulder   • Tremor      Past Surgical History:   Procedure Laterality Date   • AORTIC VALVE REPAIR/REPLACEMENT  2016   • COLONOSCOPY  2018   • HERNIA REPAIR  1963    x2   • SINUS SURGERY  1978     General Information     Row Name 20 1526          OT Time and Intention    Document Type  therapy note (daily note)  -SD     Mode of Treatment  occupational therapy  -SD       User Key  (r) = Recorded By, (t) = Taken By, (c) = Cosigned By    Initials Name Provider Type    Sonia Mcwilliams OT Occupational Therapist        Mobility/ADL's     Row Name 20 1526          Bed Mobility    Bed Mobility  supine-sit;sit-supine  -SD     Supine-Sit [place] (Bed Mobility)  contact guard;standby assist  -SD     Sit-Supine [place] (Bed Mobility)  contact guard;standby assist  -SD     Assistive Device (Bed Mobility)  head of bed elevated  -SD     Row Name 20 1526          Transfers    Transfers  bed-chair transfer;sit-stand transfer  -SD     Comment (Transfers)   sit to stand x 10  -SD     Bed-Chair [place] (Transfers)  contact guard  -SD     Sit-Stand [place] (Transfers)  contact guard  -SD     Row Name 11/11/20 1526          Lower Body Dressing Assessment/Training    [place] Level (Lower Body Dressing)  don;socks;contact guard assist  -SD       User Key  (r) = Recorded By, (t) = Taken By, (c) = Cosigned By    Initials Name Provider Type    Sonia Mcwilliams OT Occupational Therapist        Obj/Interventions     Row Name 11/11/20 1528          Shoulder (Therapeutic Exercise)    Shoulder (Therapeutic Exercise)  strengthening exercise  -SD     Shoulder Strengthening (Therapeutic Exercise)  10 repetitions;resistance band;2 sets;red;bilateral;flexion;extension;aBduction;aDduction  -SD     Row Name 11/11/20 1528          Elbow/Forearm (Therapeutic Exercise)    Elbow/Forearm (Therapeutic Exercise)  strengthening exercise  -SD     Elbow/Forearm Strengthening (Therapeutic Exercise)  10 repetitions;2 sets;resistance band;red;bilateral;flexion;extension  -SD     Row Name 11/11/20 1528          Therapeutic Exercise    Therapeutic Exercise  shoulder;elbow/forearm  -SD       User Key  (r) = Recorded By, (t) = Taken By, (c) = Cosigned By    Initials Name Provider Type    Sonia Mcwilliams OT Occupational Therapist        Goals/Plan    No documentation.       Clinical Impression     Row Name 11/11/20 1529          Pain Scale: Numbers Pre/Post-Treatment    Pretreatment Pain Rating  0/10 - no pain  -SD     Posttreatment Pain Rating  0/10 - no pain  -SD     Row Name 11/11/20 1529          Plan of Care Review    Plan of Care Reviewed With  patient  -SD     Progress  improving  -SD     Outcome Summary  OT tx completed. Patient completed bed mobility with SBA-CGA, functional tf x 10 with CGA, LBD sitting EOB with CGA, followed by UB resistance exercises using red theraband. Continue OT POC  -SD     Row Name 11/11/20 1524          Vital Signs    Pre SpO2 (%)  95  -SD     O2  Delivery Pre Treatment  room air  -SD     O2 Delivery Intra Treatment  room air  -SD     Post SpO2 (%)  96  -SD     O2 Delivery Post Treatment  room air  -SD     Row Name 11/11/20 1529          Positioning and Restraints    Pre-Treatment Position  in bed  -SD     Post Treatment Position  bed  -SD     In Bed  supine;call light within reach;encouraged to call for assist  -SD       User Key  (r) = Recorded By, (t) = Taken By, (c) = Cosigned By    Initials Name Provider Type    Sonia Mcwilliams OT Occupational Therapist        Outcome Measures     Row Name 11/11/20 1530          How much help from another is currently needed...    Putting on and taking off regular lower body clothing?  3  -SD     Bathing (including washing, rinsing, and drying)  3  -SD     Toileting (which includes using toilet bed pan or urinal)  3  -SD     Putting on and taking off regular upper body clothing  3  -SD     Taking care of personal grooming (such as brushing teeth)  3  -SD     Eating meals  4  -SD     AM-PAC 6 Clicks Score (OT)  19  -SD     Row Name 11/11/20 1530          Functional Assessment    Outcome Measure Options  AM-PAC 6 Clicks Daily Activity (OT)  -SD       User Key  (r) = Recorded By, (t) = Taken By, (c) = Cosigned By    Initials Name Provider Type    Sonia Mcwilliams OT Occupational Therapist        Occupational Therapy Education                 Title: PT OT SLP Therapies (Done)     Topic: Occupational Therapy (Done)     Point: ADL training (Done)     Description:   Instruct learner(s) on proper safety adaptation and remediation techniques during self care or transfers.   Instruct in proper use of assistive devices.              Learning Progress Summary           Patient Acceptance, E,TB, VU by SD at 11/11/2020 1531    Comment: Safety and sequencing during functional tf    Acceptance, E,TB, VU by AS at 11/11/2020 0144    Acceptance, E,TB, VU by MD at 11/10/2020 1729    Acceptance, E,TB, VU by SD at 11/10/2020 9400     Comment: Benefit of OT; OT POC                   Point: Home exercise program (Done)     Description:   Instruct learner(s) on appropriate technique for monitoring, assisting and/or progressing therapeutic exercises/activities.              Learning Progress Summary           Patient Acceptance, E,TB, VU by AS at 11/11/2020 0144    Acceptance, E,TB, VU by MD at 11/10/2020 1729                   Point: Precautions (Done)     Description:   Instruct learner(s) on prescribed precautions during self-care and functional transfers.              Learning Progress Summary           Patient Acceptance, E,TB, VU by AS at 11/11/2020 0144    Acceptance, E,TB, VU by MD at 11/10/2020 1729                   Point: Body mechanics (Done)     Description:   Instruct learner(s) on proper positioning and spine alignment during self-care, functional mobility activities and/or exercises.              Learning Progress Summary           Patient Acceptance, E,TB, VU by AS at 11/11/2020 0144    Acceptance, E,TB, VU by MD at 11/10/2020 1729                               User Key     Initials Effective Dates Name Provider Type Discipline    SD 03/07/18 -  Snoia Castillo OT Occupational Therapist OT    AS 06/17/20 -  Katia Baugh, RN Registered Nurse Nurse    MD 11/11/19 -  Adarsh Beard, RN Registered Nurse Nurse              OT Recommendation and Plan  Planned Therapy Interventions (OT): activity tolerance training, adaptive equipment training, BADL retraining, patient/caregiver education/training, strengthening exercise, transfer/mobility retraining  Therapy Frequency (OT): 3 times/wk(5 times if indicated)  Plan of Care Review  Plan of Care Reviewed With: patient  Progress: improving  Outcome Summary: OT tx completed. Patient completed bed mobility with SBA-CGA, functional tf x 10 with CGA, LBD sitting EOB with CGA, followed by UB resistance exercises using red theraband. Continue OT POC     Time Calculation:   Time  Calculation- OT     Row Name 11/11/20 1532 11/11/20 1354          Time Calculation- OT    OT Start Time  1436  -SD  --     OT Stop Time  1508  -SD  --     OT Time Calculation (min)  32 min  -SD  --     OT Received On  11/11/20  -SD  --     OT Goal Re-Cert Due Date  11/20/20  -SD  --        Timed Charges    18386 - OT Therapeutic Exercise Minutes  17  -SD  --     03907 - Gait Training Minutes   --  13  -RM     12463 - OT Therapeutic Activity Minutes  15  -SD  --       User Key  (r) = Recorded By, (t) = Taken By, (c) = Cosigned By    Initials Name Provider Type    Uche Yates, PTA Physical Therapy Assistant    SD Sonia Castillo OT Occupational Therapist        Therapy Charges for Today     Code Description Service Date Service Provider Modifiers Qty    26187643379  OT EVAL LOW COMPLEXITY 3 11/10/2020 Sonia Castillo OT GO 1    38667439211 HC OT THER PROC EA 15 MIN 11/11/2020 Sonia Castillo OT GO 1    28573860524 HC OT THERAPEUTIC ACT EA 15 MIN 11/11/2020 Sonia Castillo OT GO 1               Sonia Castillo OT  11/11/2020

## 2020-11-11 NOTE — PROGRESS NOTES
"Pharmacokinetic Follow-up Note - Vancomycin     Jak Pierre is a 80 y.o. male  180.3 cm (71\") 107 kg (235 lb)     Indication for use:  PNA, Cellulitis    Results from last 7 days   Lab Units 11/11/20  0820 11/10/20  0637 11/09/20 1918   WBC 10*3/mm3 6.60 5.47 6.97   CREATININE mg/dL 2.58* 1.61* 1.49*      Estimated Creatinine Clearance: 27.5 mL/min (A) (by C-G formula based on SCr of 2.58 mg/dL (H)).  Temp Readings from Last 1 Encounters:   11/11/20 97.5 °F (36.4 °C) (Oral)     Lab Results   Component Value Date    VANCORANDOM 20.30 11/11/2020       Microbiology:  Microbiology Results (last 10 days)       Procedure Component Value - Date/Time    MRSA Screen, PCR (Inpatient) - Swab, Nares [[id number]]  (Normal) Collected: 11/10/20 0941    Lab Status: Final result Specimen: Swab from Nares Updated: 11/10/20 2110     MRSA PCR No MRSA Detected    Blood Culture - Blood, Arm, Right [[id number]] Collected: 11/09/20 2257    Lab Status: Preliminary result Specimen: Blood from Arm, Right Updated: 11/10/20 2300     Blood Culture No growth at 24 hours    Blood Culture - Blood, Arm, Left [[id number]] Collected: 11/09/20 2253    Lab Status: Preliminary result Specimen: Blood from Arm, Left Updated: 11/10/20 2300     Blood Culture No growth at 24 hours    COVID-19,[facility] IN-HOUSE,Nasal Swab No Transport Media 3-4 HR TAT - Swab, Nasal Cavity [[id number]]  (Abnormal) Collected: 11/09/20 1911    Lab Status: Final result Specimen: Swab from Nasal Cavity Updated: 11/09/20 2016     COVID19 Detected    Narrative:      Fact sheet for providers: https://www.fda.gov/media/458043/download     Fact sheet for patients: https://www.fda.gov/media/137989/download            Current Vancomycin Dose:  1250 mg IVPB every 24 hours.    Other Antimicrobials:   Zosyn 3.375 g IV every 8 hours  Remdesivir 100 mg IV every 24 hours    Assessment/Plan:  Based on vancomycin level, will adjust to 1500 mg IV every 48 hours to target an AUC of 400-600.  " Pharmacy will continue to monitor renal function and adjust dose accordingly.      Regimen: 1500 mg every 48 hours  Exposure target: AUC24 (range)400-600 mg/L.hr  AUC24,ss: 489 mg/L.hr  PAUC*: 82 %  Ctrough,ss: 14.4 mg/L  Pconc*: 16 %  Tox.: 10 %    Parker Navarrete RP   11/11/20 15:31 EST

## 2020-11-11 NOTE — PAYOR COMM NOTE
"TO:AETNA  FROM:FIDELINA ASCENCIO, RN PHONE [phone]  UPDATED CLINICALS     Dilan Pierre (80 y.o. Male)     Date of Birth Social Security Number Address Home Phone MRN    1940  [address]  [facility] [address] [phone] [id number]    Confucianist Marital Status          Unknown        Admission Date Admission Type Admitting Provider Attending Provider Department, Room/Bed    11/9/20 Emergency Juan Alberto Franco MD Pais, Roshan, MD [facility] MED SURG  3, 328/1    Discharge Date Discharge Disposition Discharge Destination                       Attending Provider: Juan Alberto Franco MD    Allergies: No Known Allergies    Isolation: Enh Drop/Con, Contact Air   Infection: COVID (confirmed) (11/09/20)   Code Status: CPR    Ht: 180.3 cm (71\")   Wt: 107 kg (235 lb)    Admission Cmt: None   Principal Problem: None                Active Insurance as of 11/9/2020     Primary Coverage     Payor Plan Insurance Group Employer/Plan Group    AETNA MEDICARE REPLACEMENT AETNA MEDICARE REPLACEMENT [id number]     Payor Plan Address Payor Plan Phone Number Payor Plan Fax Number Effective Dates    [address] [phone]  1/1/2020 - None Entered    [facility] [id number]       Subscriber Name Subscriber Birth Date Member ID       DILAN PIERRE 1940 [id number]                 Emergency Contacts      (Rel.) Home Phone Work Phone Mobile Phone    Parris Pierre (Spouse) [phone] -- --            Vital Signs (last day)     Date/Time   Temp   Temp src   Pulse   Resp   BP   Patient Position   SpO2    11/11/20 1119   97.5 (36.4)   Oral   62   18   (!) 89/47   Lying   95    11/11/20 0833   97.2 (36.2)   Oral   61   16   (!) 87/46   Lying   93    11/11/20 0500   96 (35.6)   Axillary   59   18   --   --   --    11/11/20 0436   --   --   --   --   (!) 82/38   Lying   --    11/11/20 0434   --   --   --   --   (!) 80/32   Lying   --    11/11/20 0400   --   --   66   --   (!) " 87/44   --   (!) 75    11/11/20 0229   --   --   61   --   (!) 83/40   --   94    11/10/20 2253   97.1 (36.2)   Oral   69   --   (!) 73/43   Lying   (!) 87    11/10/20 2158   --   --   76   --   --   --   --    11/10/20 2048   97.1 (36.2)   Oral   80   18   98/49   Lying   (!) 88    11/10/20 1611   100.4 (38)   Oral   86   18   90/46   Sitting   95    11/10/20 1229   (!) 101.5 (38.6)   Axillary   82   18   95/51   Lying   --    11/10/20 0746   (!) 101 (38.3)   Axillary   --   18   93/54   Lying   --    11/10/20 0324   100.3 (37.9)   Oral   93   18   100/60   Lying   96              Current Facility-Administered Medications   Medication Dose Route Frequency Provider Last Rate Last Dose   • acetaminophen (TYLENOL) tablet 650 mg  650 mg Oral Q4H PRN Carrol Castro DO        Or   • acetaminophen (TYLENOL) 160 MG/5ML solution 650 mg  650 mg Oral Q4H PRN Carrol Castro DO   650 mg at 11/10/20 1251    Or   • acetaminophen (TYLENOL) suppository 650 mg  650 mg Rectal Q4H PRN Carrol Castro DO       • aspirin EC tablet 81 mg  81 mg Oral Daily Carrol Castro DO   81 mg at 11/11/20 0933   • atorvastatin (LIPITOR) tablet 40 mg  40 mg Oral Daily Carrol Castro DO   40 mg at 11/11/20 0933   • bisacodyl (DULCOLAX) suppository 10 mg  10 mg Rectal Daily PRN Carrol Castro DO       • dexamethasone (DECADRON) injection 6 mg  6 mg Intravenous Daily Mike Everett DO   6 mg at 11/11/20 0934   • dextrose (D50W) 25 g/ 50mL Intravenous Solution 25 g  25 g Intravenous Q15 Min PRN Carrol Castro DO       • dextrose (GLUTOSE) oral gel 1 tube  1 tube Oral Q15 Min PRN Gandee, Carrol G, DO       • famotidine (PEPCID) tablet 20 mg  20 mg Oral BID Carrol aCstro, DO   20 mg at 11/11/20 0933   • gabapentin (NEURONTIN) capsule 400 mg  400 mg Oral BID AC Carrol Castro, DO   400 mg at 11/11/20 0934   • gabapentin (NEURONTIN) capsule 800 mg  800 mg Oral Nightly Carrol Castro, DO   800 mg at 11/10/20 2153   • glucagon (human  recombinant) (GLUCAGEN DIAGNOSTIC) injection 1 mg  1 mg Subcutaneous PRN Carrol Castro, DO       • insulin aspart (novoLOG) injection 0-9 Units  0-9 Units Subcutaneous TID AC Carrol Castro DO   6 Units at 11/11/20 1243   • lactobacillus acidophilus (RISAQUAD) capsule 1 capsule  1 capsule Oral TID Carrol Castro DO   1 capsule at 11/11/20 0933   • melatonin tablet 5 mg  5 mg Oral Nightly PRN Carrol Castro DO   5 mg at 11/10/20 2155   • ondansetron (ZOFRAN) tablet 4 mg  4 mg Oral Q6H PRN Carrol Castro DO        Or   • ondansetron (ZOFRAN) injection 4 mg  4 mg Intravenous Q6H PRN Carrol Castro DO       • Pharmacy Consult - Remdesivir   Does not apply Continuous PRN Carorl Castro DO       • Pharmacy to dose vancomycin   Does not apply Continuous PRN Carrol Castro DO       • Pharmacy to Dose Zosyn   Does not apply Continuous PRN Carrol Castro DO       • piperacillin-tazobactam (ZOSYN) 3.375 g in iso-osmotic dextrose 50 ml (premix)  3.375 g Intravenous Q8H Carrol Castro DO   3.375 g at 11/11/20 0548   • PRO-STAT oral liquid 30 mL  30 mL Oral BID Mike Everett, DO   30 mL at 11/11/20 0933   • remdesivir 100 mg in sodium chloride 0.9 % 250 mL IVPB (powder vial)  100 mg Intravenous Q24H Carrol Castro DO   100 mg at 11/11/20 0549   • rivaroxaban (XARELTO) tablet 10 mg  10 mg Oral Daily With Dinner Carrol Castro DO   10 mg at 11/10/20 1853   • sodium chloride 0.9 % flush 10 mL  10 mL Intravenous PRN Carrol Castro DO       • sodium chloride 0.9 % flush 10 mL  10 mL Intravenous Q12H Carrol Castro DO   10 mL at 11/11/20 0933   • sodium chloride 0.9 % flush 10 mL  10 mL Intravenous PRN Carrol Castro DO       • sodium chloride 0.9 % infusion  75 mL/hr Intravenous Continuous Carrol Castro DO 75 mL/hr at 11/11/20 1243 75 mL/hr at 11/11/20 1243   • tamsulosin (FLOMAX) 24 hr capsule 0.4 mg  0.4 mg Oral Daily Carrol Castro DO   0.4 mg at 11/11/20 0939   • vancomycin 1250 mg in  sodium chloride 0.9% 250 mL IVPB  1,250 mg Intravenous Q24H Carrol Castro DO   1,250 mg at 11/11/20 0032     Lab Results (last 24 hours)     Procedure Component Value Units Date/Time    Vancomycin, Random [[id number]]  (Normal) Collected: 11/11/20 0820    Specimen: Blood Updated: 11/11/20 0906     Vancomycin Random 20.30 mcg/mL     Comprehensive Metabolic Panel [[id number]]  (Abnormal) Collected: 11/11/20 0820    Specimen: Blood Updated: 11/11/20 0904     Glucose 275 mg/dL      Comment: Glucose >180, Hemoglobin A1C recommended.        BUN 43 mg/dL      Creatinine 2.58 mg/dL      Sodium 139 mmol/L      Potassium 5.0 mmol/L      Chloride 100 mmol/L      CO2 28.2 mmol/L      Calcium 8.5 mg/dL      Total Protein 5.7 g/dL      Albumin 3.10 g/dL      ALT (SGPT) 15 U/L      AST (SGOT) 20 U/L      Alkaline Phosphatase 70 U/L      Total Bilirubin 0.3 mg/dL      eGFR Non African Amer 24 mL/min/1.73      Globulin 2.6 gm/dL      A/G Ratio 1.2 g/dL      BUN/Creatinine Ratio 16.7     Anion Gap 10.8 mmol/L     Narrative:      GFR Normal >60  Chronic Kidney Disease <60  Kidney Failure <15      CBC & Differential [[id number]]  (Abnormal) Collected: 11/11/20 0820    Specimen: Blood Updated: 11/11/20 0841    Narrative:      The following orders were created for panel order CBC & Differential.  Procedure                               Abnormality         Status                     ---------                               -----------         ------                     CBC Auto Differential[[id number]]        Abnormal            Final result                 Please view results for these tests on the individual orders.    CBC Auto Differential [[id number]]  (Abnormal) Collected: 11/11/20 0820    Specimen: Blood Updated: 11/11/20 0841     WBC 6.60 10*3/mm3      RBC 3.65 10*6/mm3      Hemoglobin 11.4 g/dL      Hematocrit 35.8 %      MCV 98.1 fL      MCH 31.2 pg      MCHC 31.8 g/dL      RDW 14.8 %      RDW-SD 53.1 fl      MPV 9.8 fL       Platelets 75 10*3/mm3      Neutrophil % 84.8 %      Lymphocyte % 10.2 %      Monocyte % 4.5 %      Eosinophil % 0.0 %      Basophil % 0.2 %      Immature Grans % 0.3 %      Neutrophils, Absolute 5.60 10*3/mm3      Lymphocytes, Absolute 0.67 10*3/mm3      Monocytes, Absolute 0.30 10*3/mm3      Eosinophils, Absolute 0.00 10*3/mm3      Basophils, Absolute 0.01 10*3/mm3      Immature Grans, Absolute 0.02 10*3/mm3      nRBC 0.0 /100 WBC     POC Glucose Once [[id number]]  (Abnormal) Collected: 11/11/20 0657    Specimen: Blood Updated: 11/11/20 0704     Glucose 254 mg/dL      Comment: Serial Number: [id number]:  [id number]       Blood Culture - Blood, Arm, Left [[id number]] Collected: 11/09/20 2253    Specimen: Blood from Arm, Left Updated: 11/10/20 2300     Blood Culture No growth at 24 hours    Blood Culture - Blood, Arm, Right [[id number]] Collected: 11/09/20 2257    Specimen: Blood from Arm, Right Updated: 11/10/20 2300     Blood Culture No growth at 24 hours    MRSA Screen, PCR (Inpatient) - Swab, Nares [[id number]]  (Normal) Collected: 11/10/20 0941    Specimen: Swab from Nares Updated: 11/10/20 2110     MRSA PCR No MRSA Detected    POC Glucose Once [[id number]]  (Abnormal) Collected: 11/10/20 2047    Specimen: Blood Updated: 11/10/20 2105     Glucose 323 mg/dL      Comment: Serial Number: [id number]:  [id number]           Imaging Results (Last 24 Hours)     ** No results found for the last 24 hours. **           Physician Progress Notes (last 48 hours) (Notes from 11/09/20 1337 through 11/11/20 1337)      Carrol Castro DO at 11/11/20 0434        NOCTURNIST NOTE:  Nursing called to report electronic blood pressure reading low of 87/44. I requested bilateral manual blood pressure reading for comparison.   Also, I added 500 cc bolus plus NS at 75 cc for 12 hours. I stopped his Coreg, Entresto, and Spironolactone for the current time.   He will need reassessment further tomorrow.   Further recommendations depend  on the clinical course.    Electronically signed by Carrol Castro DO at 20 4426     Mike Everett DO at 11/10/20 1052                [facility]    PROGRESS NOTE    Name:  Jak Pierre   Age:  80 y.o.  Sex:  male  :  1940  MRN:  [id number]   Visit Number:  [id number]  Admission Date:  2020  Date Of Service:  11/10/20  Primary Care Physician:  Jason Foy MD     LOS: 0 days :  Patient Care Team:  Jason Foy MD as PCP - General (General Practice):    Chief Complaint:      Fever, shortness of breath and cellulitis    Subjective / Interval History:     Patient was seen and evaluated resting comfortably in bed.  He was sitting up on 2 L nasal cannula.  Had no complaints at this time.  Stated that he felt well and pacifically denied cough or worsening shortness of breath.  His only request is a can of Pepsi.  No acute events overnight per nursing staff.    Review of Systems:     General ROS: Patient denies any fevers, chills or loss of consciousness.  Respiratory ROS: Denies cough or shortness of breath.  Cardiovascular ROS: Denies chest pain or palpitations. No history of exertional chest pain.  Gastrointestinal ROS: Denies nausea and vomiting. Denies any abdominal pain. No diarrhea.  Neurological ROS: Denies any focal weakness. No loss of consciousness. Denies any numbness.  Dermatological ROS: Denies any redness or pruritis.    Vital Signs:    Temp:  [98.8 °F (37.1 °C)-101.5 °F (38.6 °C)] 100.4 °F (38 °C)  Heart Rate:  [] 86  Resp:  [18-20] 18  BP: ()/(46-75) 90/46    Intake and output:    I/O last 3 completed shifts:  In: 500 [IV Piggyback:500]  Out: 175 [Urine:175]  I/O this shift:  In: 290 [P.O.:240; IV Piggyback:50]  Out: 100 [Urine:100]    Physical Examination:    General Appearance:  Alert and cooperative, not in any acute distress.   Head:  Atraumatic and normocephalic, without obvious abnormality.   Eyes:          PERRLA,  conjunctivae and sclerae normal, no Icterus. No pallor. Extraocular movements are within normal limits.   Neck: Supple, trachea midline, no thyromegaly, no carotid bruit.   Lungs:   Chest shape is normal. Breath sounds heard bilaterally equally.  No crackles or wheezing.    Heart:  Normal S1 and S2, no murmur,  No JVD   Abdomen:   Normal bowel sounds, Soft, nontender, nondistended, no guarding, no rebound tenderness.   Extremities:  Left lower extremity is erythematous and mildly edematous.  Tender to the touch the patient states is improving.   Skin: No bleeding, bruising or rash.   Neurologic: Awake, alert and oriented times 3.      Laboratory results:    Results from last 7 days   Lab Units 11/10/20  0637 11/09/20  1918   SODIUM mmol/L 136 139   POTASSIUM mmol/L 5.0 4.4   CHLORIDE mmol/L 100 98   CO2 mmol/L 29.5* 31.1*   BUN mg/dL 24* 23   CREATININE mg/dL 1.61* 1.49*   CALCIUM mg/dL 8.7 9.6   BILIRUBIN mg/dL 0.5 0.4   ALK PHOS U/L 77 92   ALT (SGPT) U/L 13 9   AST (SGOT) U/L 20 19   GLUCOSE mg/dL 188* 249*     Results from last 7 days   Lab Units 11/10/20  0637 11/09/20 1918   WBC 10*3/mm3 5.47 6.97   HEMOGLOBIN g/dL 11.1* 12.4*   HEMATOCRIT % 34.5* 37.6   PLATELETS 10*3/mm3 66* 78*         Results from last 7 days   Lab Units 11/09/20 1918   TROPONIN T ng/mL 0.014               I have reviewed the patient's laboratory results.    Radiology results:    Imaging Results (Last 24 Hours)     Procedure Component Value Units Date/Time    XR Chest 1 View [[id number]] Collected: 11/10/20 1322     Updated: 11/10/20 1329    Narrative:      Portable chest     INDICATION: Short of breath.     FINDINGS: Single frontal portable chest. No previous. EKG leads overlie  the chest. Patient is rotated to the left. Heart size is normal.  Scattered vascular calcifications. Degenerative changes in the spine and  shoulders. Coarsened interstitial opacities are favored to be chronic.  No pneumothorax. No distinct consolidation or  effusion.       Impression:      Chronic appearing findings. Followup PA and lateral views  would be helpful for a more complete evaluation.     This report was finalized on 11/10/2020 1:27 PM by Domingo Waters MD.    US Venous Doppler Lower Extremity Left (duplex) [[id number]] Collected: 11/09/20 2237     Updated: 11/09/20 2238    Narrative:      FINAL REPORT    TECHNIQUE:  Multiple transverse and longitudinal images were performed of  the femoral-popliteal deep venous system with augmentation and  compression maneuvers.    CLINICAL HISTORY:  swelling, pain    FINDINGS:  Left lower extremity duplex ultrasound demonstrates normal flow  in the deep venous system.  There is no abnormal echogenicity to  suggest thrombus.  There is normal compression and augmentation.      Impression:      No evidence of DVT.    Authenticated by Paul Saavedra M.D. on 11/09/2020 10:37:05 PM          I have reviewed the patient's radiology reports.    Medication Review:     I have reviewed the patients active and prn medications.       Acute respiratory failure due to COVID-19 (CMS/[facility])    Acute infection without sepsis      Assessment:    1.  Acute hypoxic respiratory failure, prior to admission, secondary to #2  2.  COVID-19 pneumonia, prior to admission, without sepsis  3.  Persistent left lower extremity cellulitis, prior to admission  4.  Diabetes mellitus type 2  5.  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  History of aortic valve replacement    Plan:    We will continue to monitor patient in the Covid unit with telemetry.  Continue to provide supplemental oxygen as necessary to maintain saturation greater than 88%.  Currently patient is requiring 2 L nasal cannula.  Given his hypoxia, remdesivir was started on admission.  We will continue with the assistance of pharmacy.  Patient is also continued on IV Decadron, vancomycin and Zosyn.  Antibiotics serve dual purpose for cellulitis as well as potential bacterial pneumonia.  Continue home  Xarelto.  Left venous duplex ultrasound was negative for DVT.  Further orders as clinical course dictates.  Anticipate greater than 2 midnight stay.    Mike Everett DO  11/10/20  17:41 EST    Dictated utilizing Dragon dictation.      Electronically signed by Mike Everett DO at 11/10/20 1743       Consult Notes (last 48 hours) (Notes from 11/09/20 1337 through 11/11/20 1337)    No notes of this type exist for this encounter.

## 2020-11-11 NOTE — PLAN OF CARE
Goal Outcome Evaluation:  Plan of Care Reviewed With: patient  Progress: improving  Outcome Summary: PT Feeling better and wanting to return home. Pt is hyoptensive and improving. Cellulitis and edema in LLE is improving. Pt is RA and standby assist today with formed BM.

## 2020-11-12 ENCOUNTER — READMISSION MANAGEMENT (OUTPATIENT)
Dept: CALL CENTER | Facility: HOSPITAL | Age: 80
End: 2020-11-12

## 2020-11-12 VITALS
BODY MASS INDEX: 32.9 KG/M2 | HEIGHT: 71 IN | WEIGHT: 235 LBS | TEMPERATURE: 96.7 F | SYSTOLIC BLOOD PRESSURE: 111 MMHG | RESPIRATION RATE: 18 BRPM | HEART RATE: 109 BPM | DIASTOLIC BLOOD PRESSURE: 62 MMHG | OXYGEN SATURATION: 93 %

## 2020-11-12 LAB
ALBUMIN SERPL-MCNC: 2.9 G/DL (ref 3.5–5.2)
ALBUMIN/GLOB SERPL: 1 G/DL
ALP SERPL-CCNC: 63 U/L (ref 39–117)
ALT SERPL W P-5'-P-CCNC: 16 U/L (ref 1–41)
ANION GAP SERPL CALCULATED.3IONS-SCNC: 6.8 MMOL/L (ref 5–15)
AST SERPL-CCNC: 23 U/L (ref 1–40)
BILIRUB SERPL-MCNC: 0.3 MG/DL (ref 0–1.2)
BUN SERPL-MCNC: 43 MG/DL (ref 8–23)
BUN/CREAT SERPL: 21.9 (ref 7–25)
CALCIUM SPEC-SCNC: 8.7 MG/DL (ref 8.6–10.5)
CHLORIDE SERPL-SCNC: 102 MMOL/L (ref 98–107)
CO2 SERPL-SCNC: 29.2 MMOL/L (ref 22–29)
CREAT SERPL-MCNC: 1.96 MG/DL (ref 0.76–1.27)
DEPRECATED RDW RBC AUTO: 51.8 FL (ref 37–54)
ERYTHROCYTE [DISTWIDTH] IN BLOOD BY AUTOMATED COUNT: 14.9 % (ref 12.3–15.4)
GFR SERPL CREATININE-BSD FRML MDRD: 33 ML/MIN/1.73
GLOBULIN UR ELPH-MCNC: 2.8 GM/DL
GLUCOSE BLDC GLUCOMTR-MCNC: 198 MG/DL (ref 70–130)
GLUCOSE BLDC GLUCOMTR-MCNC: 213 MG/DL (ref 70–130)
GLUCOSE BLDC GLUCOMTR-MCNC: 233 MG/DL (ref 70–130)
GLUCOSE SERPL-MCNC: 213 MG/DL (ref 65–99)
HCT VFR BLD AUTO: 36.1 % (ref 37.5–51)
HGB BLD-MCNC: 12 G/DL (ref 13–17.7)
MCH RBC QN AUTO: 31.9 PG (ref 26.6–33)
MCHC RBC AUTO-ENTMCNC: 33.2 G/DL (ref 31.5–35.7)
MCV RBC AUTO: 96 FL (ref 79–97)
PLATELET # BLD AUTO: 81 10*3/MM3 (ref 140–450)
PMV BLD AUTO: 9.9 FL (ref 6–12)
POTASSIUM SERPL-SCNC: 5.3 MMOL/L (ref 3.5–5.2)
PROT SERPL-MCNC: 5.7 G/DL (ref 6–8.5)
RBC # BLD AUTO: 3.76 10*6/MM3 (ref 4.14–5.8)
SODIUM SERPL-SCNC: 138 MMOL/L (ref 136–145)
WBC # BLD AUTO: 12.75 10*3/MM3 (ref 3.4–10.8)

## 2020-11-12 PROCEDURE — 85027 COMPLETE CBC AUTOMATED: CPT | Performed by: INTERNAL MEDICINE

## 2020-11-12 PROCEDURE — 25010000002 PIPERACILLIN SOD-TAZOBACTAM PER 1 G: Performed by: FAMILY MEDICINE

## 2020-11-12 PROCEDURE — 80053 COMPREHEN METABOLIC PANEL: CPT | Performed by: FAMILY MEDICINE

## 2020-11-12 PROCEDURE — 82962 GLUCOSE BLOOD TEST: CPT

## 2020-11-12 PROCEDURE — 25010000002 DEXAMETHASONE PER 1 MG: Performed by: INTERNAL MEDICINE

## 2020-11-12 PROCEDURE — 63710000001 INSULIN ASPART PER 5 UNITS: Performed by: FAMILY MEDICINE

## 2020-11-12 PROCEDURE — 99239 HOSP IP/OBS DSCHRG MGMT >30: CPT | Performed by: INTERNAL MEDICINE

## 2020-11-12 RX ORDER — DEXAMETHASONE 4 MG/1
6 TABLET ORAL DAILY
Qty: 6 TABLET | Refills: 0 | Status: SHIPPED | OUTPATIENT
Start: 2020-11-13 | End: 2020-12-10 | Stop reason: HOSPADM

## 2020-11-12 RX ORDER — SODIUM CHLORIDE 9 MG/ML
100 INJECTION, SOLUTION INTRAVENOUS CONTINUOUS
Status: DISCONTINUED | OUTPATIENT
Start: 2020-11-12 | End: 2020-11-12 | Stop reason: HOSPADM

## 2020-11-12 RX ORDER — PANTOPRAZOLE SODIUM 40 MG/1
40 TABLET, DELAYED RELEASE ORAL DAILY
Status: DISCONTINUED | OUTPATIENT
Start: 2020-11-12 | End: 2020-11-12 | Stop reason: HOSPADM

## 2020-11-12 RX ORDER — AMOXICILLIN AND CLAVULANATE POTASSIUM 500; 125 MG/1; MG/1
1 TABLET, FILM COATED ORAL EVERY 12 HOURS
Qty: 6 TABLET | Refills: 0 | Status: SHIPPED | OUTPATIENT
Start: 2020-11-12 | End: 2020-11-15

## 2020-11-12 RX ADMIN — PANTOPRAZOLE SODIUM 40 MG: 40 TABLET, DELAYED RELEASE ORAL at 08:20

## 2020-11-12 RX ADMIN — Medication 30 ML: at 08:20

## 2020-11-12 RX ADMIN — SODIUM CHLORIDE, PRESERVATIVE FREE 10 ML: 5 INJECTION INTRAVENOUS at 08:20

## 2020-11-12 RX ADMIN — REMDESIVIR 100 MG: 100 INJECTION, POWDER, LYOPHILIZED, FOR SOLUTION INTRAVENOUS at 06:06

## 2020-11-12 RX ADMIN — TAZOBACTAM SODIUM AND PIPERACILLIN SODIUM 3.38 G: 375; 3 INJECTION, SOLUTION INTRAVENOUS at 05:31

## 2020-11-12 RX ADMIN — TAMSULOSIN HYDROCHLORIDE 0.4 MG: 0.4 CAPSULE ORAL at 08:20

## 2020-11-12 RX ADMIN — OXYCODONE HYDROCHLORIDE AND ACETAMINOPHEN 500 MG: 500 TABLET ORAL at 08:19

## 2020-11-12 RX ADMIN — MULTIPLE VITAMINS W/ MINERALS TAB 1 TABLET: TAB at 08:20

## 2020-11-12 RX ADMIN — GABAPENTIN 400 MG: 400 CAPSULE ORAL at 06:41

## 2020-11-12 RX ADMIN — INSULIN ASPART 4 UNITS: 100 INJECTION, SOLUTION INTRAVENOUS; SUBCUTANEOUS at 06:42

## 2020-11-12 RX ADMIN — ASPIRIN 81 MG: 81 TABLET, COATED ORAL at 08:20

## 2020-11-12 RX ADMIN — ATORVASTATIN CALCIUM 40 MG: 40 TABLET, FILM COATED ORAL at 08:20

## 2020-11-12 RX ADMIN — DEXAMETHASONE SODIUM PHOSPHATE 6 MG: 4 INJECTION, SOLUTION INTRAMUSCULAR; INTRAVENOUS at 08:20

## 2020-11-12 RX ADMIN — FAMOTIDINE 20 MG: 20 TABLET, FILM COATED ORAL at 08:20

## 2020-11-12 RX ADMIN — Medication 1 CAPSULE: at 08:19

## 2020-11-12 RX ADMIN — INSULIN ASPART 4 UNITS: 100 INJECTION, SOLUTION INTRAVENOUS; SUBCUTANEOUS at 12:23

## 2020-11-12 NOTE — PROGRESS NOTES
Discharge Planning Assessment   Madhu     Patient Name: Jak Pierre  MRN: [id number]  Today's Date: 11/12/2020    Admit Date: 11/9/2020    Discharge Needs Assessment    No documentation.       Discharge Plan     Row Name 11/12/20 1247       Plan    Plan Comments  Recieved oxygen order.  Called [facility]  spoke with Lela gabriel  even though with a Covid dx  they still have  to meet the same preremters for oxygen needs  so he does not qualify.   Dr Franco notified.        Continued Care and Services - Admitted Since 11/9/2020     Durable Medical Equipment     Service Provider Request Status Selected Services Address Phone Fax    [facility]  Pending - No Request Sent N/A [id number] [facility] [address] [phone]              Expected Discharge Date and Time     Expected Discharge Date Expected Discharge Time    Nov 12, 2020         Demographic Summary    No documentation.       Functional Status    No documentation.       Psychosocial    No documentation.       Abuse/Neglect    No documentation.       Legal    No documentation.       Substance Abuse    No documentation.       Patient Forms    No documentation.           Charlette Clark RN

## 2020-11-12 NOTE — PLAN OF CARE
Goal Outcome Evaluation:  Plan of Care Reviewed With: patient  Progress: improving  Outcome Summary: vss. pt on room air. denies any pain att to be discharged home today

## 2020-11-12 NOTE — DISCHARGE SUMMARY
[facility]   DISCHARGE SUMMARY      Name:  Jak Pierre   Age:  80 y.o.  Sex:  male  :  1940  MRN:  [id number]   Visit Number:  [id number]    Admission Date:  2020  Date of Discharge:  2020  Primary Care Physician:  Jason Foy MD    Important issues to note:    1.  Continue dexamethasone and Augmentin as prescribed for 3 more days.  2.  Follow-up with Dr. Gamez as scheduled on 2020 (his torsemide is currently on hold).  3.  Follow-up with primary care physician in 1 week with CBC and BMP.  4.  Patient did not qualify for home oxygen.  He already has home CPAP therapy.    Discharge Diagnoses:     1.  Acute hypoxic respiratory failure, present on admission secondary to #2.  2.  COVID-19 pneumonia, present on admission.  3.  Left lower extremity cellulitis, related to diabetes mellitus type 2, present on admission.  4.  Acute renal failure on chronic kidney disease stage III.  5.  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  6.  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    Problem List:       Acute respiratory failure due to COVID-19 (CMS/HCC)    Acute infection without sepsis    Acute respiratory failure with hypoxia (CMS/[facility])    Cellulitis and abscess of left lower extremity    Type 2 diabetes mellitus with nephropathy (CMS/HCC)    Acute renal failure (ARF) (CMS/[facility])    Chronic kidney disease, stage III (moderate)    Presenting Problem:    COVID-19 virus infection [U07.1]  COVID-19 virus infection [U07.1]     Consults:     Consulting Physician(s)             None          Procedures Performed:    None.    History of presenting illness:    The patient is an 80-year-old gentleman who has a past medical history of aortic valve replacement, diabetes, hypertension, hyperlipidemia, history of DVT on chronic Xarelto.  He presented with complains of progressively worsening 2 days duration of cough and shortness of  breath.  Unfortunately, he does have a positive COVID-19 exposure to his son-in-law that occurred last week.  In addition, the patient has had a left lower extremity cellulitis, that had not improved with partial Bactrim treatment switched to Keflex.  He presented to the ER for further evaluation.     On evaluation he had a temperature of 99.7 and was noted to be 87% on room air.  His heart rate was 100 and his blood pressure was 134/71.  He was placed on 2 L nasal cannula oxygen.  A stat venous duplex ultrasound of the left lower extremity was negative for DVT.  He was initiated on vancomycin and Zosyn for his left lower extremity cellulitis that failed outpatient therapy.  He was admitted to the Covid unit under precautions for further evaluation and treatment.     Hospital Course:    He was admitted from the emergency room on 11/9/2020 with fever, shortness of breath and left lower extremity redness.  He was recently exposed to COVID-19 through his son-in-law.  Patient was tested positive for COVID-19 on admission.  He was also noted to have acute renal failure.  He was placed on IV antibiotics therapy with Zosyn and vancomycin.  Left lower extremity duplex scan was negative for DVT.  He was started on dexamethasone and remdesevir.  He was continued on Xarelto that he takes at home.    Patient fortunately improved significantly with regards to his oxygen requirement within the next 24 hours.  He was maintained on 1 to 2 L of nasal cannula oxygen.  His renal function initially increased but subsequently started improving back to his baseline.  Patient was able to walk in the room and go to the bathroom without any difficulty.  Patient was very eager to go home from the day of admission.  He did get 3 days of remdesivir and dexamethasone.  He states that he wants to go home today and he cannot stay in the hospital anymore.  I do not think he has any significant cellulitis of his left lower extremity at this time  and the redness and swelling has significantly improved.  His blood cultures have been negative.  His nasal swab for MRSA was negative.  He will be discharged home on oral Augmentin for 3 more days and dexamethasone for 3 more days.    Patient does live with his wife and states uses a cane for ambulation.  He does have chronic kidney disease and follows up with Dr. Gamez.  He is on glimepiride and Trulicity for his diabetes but does not take any insulin.  He does not have home oxygen but uses a CPAP machine at home he states.  I discussed the patient's condition with his wife over the phone.  We discussed his home medications some of which have been already discontinued prior to admission that included spironolactone and Entresto.  His wife also states that his Lipitor was discontinued due to muscle cramps.  He was on torsemide at home and it was stopped at admission due to his worsening renal failure.  This may need to be restarted back when he follows up with his nephrologist.    Patient is currently being discharged home with home health.  He did not qualify for home oxygen.  He will need follow-up with his primary care physician in 1 week with repeat blood work including CBC and BMP.  He also has an appointment with Dr. Gamez on 11/23/2020.  Patient has been advised to quarantine for 14 days from the days of his symptom onset.    Vital Signs:    Temp:  [95.4 °F (35.2 °C)-97.6 °F (36.4 °C)] 96.7 °F (35.9 °C)  Heart Rate:  [] 109  Resp:  [18] 18  BP: ()/(51-73) 111/62    Physical Exam:    General Appearance:  Alert and cooperative, not in any acute distress.   Head:  Atraumatic and normocephalic, without obvious abnormality.   Eyes:          PERRLA, conjunctivae and sclerae normal, no icterus. No pallor. Extraocular movements are within normal limits.   Ears:  Ears appear intact with no abnormalities noted.   Throat: No oral lesions, no thrush, oral mucosa moist.   Neck: Supple, trachea midline, no  thyromegaly, no carotid bruit.   Back:   No kyphoscoliosis present. No tenderness to palpation,   range of motion normal.   Lungs:   Chest shape is normal. Breath sounds heard bilaterally equally.  No crackles or wheezing. No Pleural rub or bronchial breathing.   Heart:  Normal S1 and S2, no murmur, no gallop, no rub. No JVD.   Abdomen:   Normal bowel sounds, no masses, no organomegaly. Soft, nontender, obese, no guarding, no rebound tenderness.   Extremities: Moves all extremities, 1+ pitting ankle edema, no cyanosis, no clubbing.  Chronically appearing hyperpigmentation noted in the left lower extremity with no erythema at this time.   Pulses: Pulses palpable and equal bilaterally.   Skin: No bleeding, bruising or rash.   Neurologic: Alert and oriented x 3. Moves all four limbs equally. No tremors. No facial asymmetry.     Pertinent Lab Results:     Results from last 7 days   Lab Units 11/12/20  0602 11/11/20  0820 11/10/20  0637   SODIUM mmol/L 138 139 136   POTASSIUM mmol/L 5.3* 5.0 5.0   CHLORIDE mmol/L 102 100 100   CO2 mmol/L 29.2* 28.2 29.5*   BUN mg/dL 43* 43* 24*   CREATININE mg/dL 1.96* 2.58* 1.61*   CALCIUM mg/dL 8.7 8.5* 8.7   BILIRUBIN mg/dL 0.3 0.3 0.5   ALK PHOS U/L 63 70 77   ALT (SGPT) U/L 16 15 13   AST (SGOT) U/L 23 20 20   GLUCOSE mg/dL 213* 275* 188*     Results from last 7 days   Lab Units 11/12/20  0602 11/11/20  0820 11/10/20  0637   WBC 10*3/mm3 12.75* 6.60 5.47   HEMOGLOBIN g/dL 12.0* 11.4* 11.1*   HEMATOCRIT % 36.1* 35.8* 34.5*   PLATELETS 10*3/mm3 81* 75* 66*         Results from last 7 days   Lab Units 11/09/20 1918   TROPONIN T ng/mL 0.014     Results from last 7 days   Lab Units 11/09/20 1918   PROBNP pg/mL 264.6     Results from last 7 days   Lab Units 11/09/20 2257 11/09/20 2253   BLOODCX  No growth at 2 days No growth at 2 days     Pertinent Radiology Results:    Imaging Results (All)     Procedure Component Value Units Date/Time    XR Chest 1 View [[id number]] Collected:  11/10/20 1322     Updated: 11/10/20 1329    Narrative:      Portable chest     INDICATION: Short of breath.     FINDINGS: Single frontal portable chest. No previous. EKG leads overlie  the chest. Patient is rotated to the left. Heart size is normal.  Scattered vascular calcifications. Degenerative changes in the spine and  shoulders. Coarsened interstitial opacities are favored to be chronic.  No pneumothorax. No distinct consolidation or effusion.       Impression:      Chronic appearing findings. Followup PA and lateral views  would be helpful for a more complete evaluation.     This report was finalized on 11/10/2020 1:27 PM by Domingo Waters MD.    US Venous Doppler Lower Extremity Left (duplex) [[id number]] Collected: 11/09/20 2237     Updated: 11/09/20 2238    Narrative:      FINAL REPORT    TECHNIQUE:  Multiple transverse and longitudinal images were performed of  the femoral-popliteal deep venous system with augmentation and  compression maneuvers.    CLINICAL HISTORY:  swelling, pain    FINDINGS:  Left lower extremity duplex ultrasound demonstrates normal flow  in the deep venous system.  There is no abnormal echogenicity to  suggest thrombus.  There is normal compression and augmentation.      Impression:      No evidence of DVT.    Authenticated by Paul Saavedra M.D. on 11/09/2020 10:37:05 PM        Condition on Discharge:      Stable.    Code status during the hospital stay:    Code Status and Medical Interventions:   Ordered at: 11/10/20 0014     Level Of Support Discussed With:    Patient     Code Status:    CPR     Medical Interventions (Level of Support Prior to Arrest):    Full     Discharge Disposition:    Home-Health Care c    Discharge Medications:       Discharge Medications      New Medications      Instructions Start Date   amoxicillin-clavulanate 500-125 MG per tablet  Commonly known as: Augmentin   500 mg, Oral, Every 12 Hours      dexamethasone 4 MG tablet  Commonly known as: DECADRON   6 mg,  Oral, Daily   Start Date: November 13, 2020        Changes to Medications      Instructions Start Date   cyanocobalamin 100 MCG tablet tablet  Commonly known as: CYANOCOBALAMIN  What changed: Another medication with the same name was removed. Continue taking this medication, and follow the directions you see here.   100 mcg, Oral, Daily      Trulicity 1.5 MG/0.5ML solution pen-injector  Generic drug: Dulaglutide  What changed: Another medication with the same name was removed. Continue taking this medication, and follow the directions you see here.   1.5 mg, Subcutaneous, Weekly, Takes on Fridays          Continue These Medications      Instructions Start Date   Calcium-Magnesium-Zinc-D3 tablet   1 tablet, Oral, Daily      carbidopa-levodopa  MG per tablet  Commonly known as: SINEMET   1 tablet, Oral, 3 Times Daily, With food      carvedilol 6.25 MG tablet  Commonly known as: COREG   3.125 mg, Oral, 2 Times Daily, One-half tablet twice daily      CINNAMON PO   1 tablet, Oral, Daily      famotidine 20 MG tablet  Commonly known as: PEPCID   20 mg, Oral, 2 Times Daily PRN      gabapentin 400 MG capsule  Commonly known as: NEURONTIN   TAKE ONE CAPSULE BY MOUTH EVERY MORNING, 1 IN THE EVENING, AND 2 AT NIGHT      glimepiride 4 MG tablet  Commonly known as: AMARYL   TAKE 1 TABLET BY MOUTH DAILY WITH BREAKFAST OR THE FIRST MAIN MEAL OF THE DAY      pantoprazole 40 MG EC tablet  Commonly known as: PROTONIX   40 mg, Oral, Daily      rivaroxaban 20 MG tablet  Commonly known as: XARELTO   20 mg, Oral, Daily      rOPINIRole 0.5 MG tablet  Commonly known as: REQUIP   0.5 mg, Oral, Nightly PRN, Take 1 hour before bedtime.       tamsulosin 0.4 MG capsule 24 hr capsule  Commonly known as: FLOMAX   1 capsule, Oral, Daily         Stop These Medications    aspirin 81 MG EC tablet     atorvastatin 40 MG tablet  Commonly known as: LIPITOR     cephalexin 500 MG capsule  Commonly known as: KEFLEX     Entresto 24-26 MG  tablet  Generic drug: sacubitril-valsartan     M-VIT PO     multiple vitamin injection     spironolactone 25 MG tablet  Commonly known as: ALDACTONE     torsemide 20 MG tablet  Commonly known as: DEMADEX          Discharge Diet:     Diet Instructions     Diet: Renal, Consistent Carbohydrate; Thin      Discharge Diet:  Renal  Consistent Carbohydrate       Fluid Consistency: Thin        Activity at Discharge:     Activity Instructions     Activity as Tolerated          Follow-up Appointments:    Additional Instructions for the Follow-ups that You Need to Schedule     Ambulatory Referral to Home Health   As directed      Face to Face Visit Date: 11/12/2020    Follow-up provider for Plan of Care?: I treated the patient in an acute care facility and will not continue treatment after discharge.    Follow-up provider: ROMINA LEHMAN [[id number]]    Reason/Clinical Findings: COVID pneumonia, Acute on CKD, DM, Tremors    Describe mobility limitations that make leaving home difficult: Generalized weakness, Fall risk    Nursing/Therapeutic Services Requested: Skilled Nursing Physical Therapy Occupational Therapy    Skilled nursing orders: Medication education O2 instruction Cardiopulmonary assessments    PT orders: Therapeutic exercise Gait Training Transfer training Strengthening    Weight Bearing Status: As Tolerated    Occupational orders: Activities of daily living Strengthening    Frequency: 1 Week 1           Follow-up Information     Rickey Zee MD, FASN Follow up on 11/23/2020.    Specialties: Nephrology, Hospitalist  Contact information:  [address] [place] [address] [phone]             Romina Lehman MD Follow up in 1 week(s).    Specialty: General Practice  Why: with BMP & CBC  Contact information:  [address]  [address]  [facility] [address] [phone]                 Test Results Pending at Discharge:    Pending Labs     Order Current Status    Green Top (No Gel) In process     [place] Draw In process    Blood Culture - Blood, Arm, Left Preliminary result    Blood Culture - Blood, Arm, Right Preliminary result           Juan Alberto Franco MD  11/12/20  12:30 EST    Time: I spent 35 minutes on this discharge activity which included: face-to-face encounter with the patient, reviewing the data in the system, coordination of the care with the nursing staff as well as consultants, documentation, and entering orders.     Dictated utilizing Dragon dictation.

## 2020-11-12 NOTE — PROGRESS NOTES
Case Management Discharge Note           Provided Post Acute Provider List?: Yes  Post Acute Provider List: DME Supplier, Home Health  Provided Post Acute Provider Quality & Resource List?: Yes  Post Acute Provider Quality and Resource List: Home Health  Delivered To: Support Person  Method of Delivery: Telephone    Selected Continued Care - Admitted Since 11/9/2020     Destination    No services have been selected for the patient.              Durable Medical Equipment    No services have been selected for the patient.              Dialysis/Infusion    No services have been selected for the patient.              Home Medical Care Coordination complete    Service Provider Selected Services Address Phone Fax    [facility]  Home Health Services [address] [facility] [address] [phone]          Therapy    No services have been selected for the patient.              Community Resources    No services have been selected for the patient.                       Final Discharge Disposition Code: 06 - home with home health care

## 2020-11-12 NOTE — PROGRESS NOTES
Discharge Planning Assessment   [facility]     Patient Name: Jak Pierre  MRN: [id number]  Today's Date: 11/12/2020    Admit Date: 11/9/2020    Discharge Needs Assessment    No documentation.       Discharge Plan     Row Name 11/12/20 1306       Plan    Plan Comments  Dr Franco  states ok with patient not qualifiy for  oxygen.   Pulse ox given to Jacob RN  to give to patient on instructions how to use.  [facility] notified  of discharging today spoke joseph Yost  discussed Telehealth for Covid patient.  They wll accept    Row Name 11/12/20 6847       Plan    Plan Comments  Recieved oxygen order.  Called [facility]  spoke with Lela  states  even though with a Covid dx  they still have  to meet the same preremters for oxygen needs  so he does not qualify.   Dr Franco notified.        Continued Care and Services - Admitted Since 11/9/2020     Durable Medical Equipment     Service Provider Request Status Selected Services Address Phone Fax    [facility]  Pending - No Request Sent N/A [address] [facility] [address] [phone]          Home Medical Care Coordination complete    Service Provider Request Status Selected Services Address Phone Fax    [facility]   Selected Home Health Services [address] [facility] [address] [phone]              Expected Discharge Date and Time     Expected Discharge Date Expected Discharge Time    Nov 12, 2020         Demographic Summary    No documentation.       Functional Status    No documentation.       Psychosocial    No documentation.       Abuse/Neglect    No documentation.       Legal    No documentation.       Substance Abuse    No documentation.       Patient Forms    No documentation.           Charlette Clark RN

## 2020-11-13 ENCOUNTER — READMISSION MANAGEMENT (OUTPATIENT)
Dept: CALL CENTER | Facility: HOSPITAL | Age: 80
End: 2020-11-13

## 2020-11-13 NOTE — OUTREACH NOTE
COVID-19 Week 1 Survey      Responses   [facility] patient discharged from?  [facility]   Does the patient have one of the following disease processes/diagnoses(primary or secondary)?  COVID-19   COVID-19 underlying condition?  None   Call Number  Call 1   Week 1 Call successful?  Yes   Call start time  1135   Call end time  1140   Discharge diagnosis  Acute respiratory failure due to COVID-19    Is patient permission given to speak with other caregiver?  Yes   List who call center can speak with  wife   Person spoke with today (if not patient) and relationship  wife   Meds reviewed with patient/caregiver?  Yes   Is the patient having any side effects they believe may be caused by any medication additions or changes?  No   Does the patient have all medications ordered at discharge?  Yes   Is the patient taking all medications as directed (includes completed medication regime)?  Yes   Does the patient have a primary care provider?   Yes   Does the patient have an appointment with their PCP or specialist within 7 days of discharge?  No   What is preventing the patient from scheduling follow up appointments within 7 days of discharge?  Haven't had time   Nursing Interventions  Educated patient on importance of making appointment   Has the patient kept scheduled appointments due by today?  N/A   What is the Home health agency?   [facility]    Has home health visited the patient within 72 hours of discharge?  Call prior to 72 hours   Psychosocial issues?  No   Psychosocial comments  Wife is +covid now. Son is in hosp with +covid.    Comments  Left leg/foot cellulitis is improving, redness is fading. Appetite is WNL.    Did the patient receive a copy of their discharge instructions?  Yes   Did the patient receive a copy of COVID-19 specific instructions?  Yes   Nursing interventions  Reviewed instructions with patient   What is the patient's perception of their health status since discharge?  Improving   Does  the patient have any of the following symptoms?  Cough   Nursing Interventions  Nurse provided patient education   Pulse Ox monitoring  Intermittent   Pulse Ox device source  Patient   O2 Sat comments  97%   O2 Sat: education provided  Sat levels   Is the patient/caregiver able to teach back steps to recovery at home?  Set small, achievable goals for return to baseline health, Rest and rebuild strength, gradually increase activity   If the patient is a current smoker, are they able to teach back resources for cessation?  Not a smoker   Is the patient/caregiver able to teach back the hierarchy of who to call/visit for symptoms/problems? PCP, Specialist, Home health nurse, Urgent Care, ED, 911  Yes   COVID-19 call completed?  Yes          Elaine Salazar RN

## 2020-11-13 NOTE — OUTREACH NOTE
Prep Survey      Responses   [facility] patient discharged from?  [facility]   Is LACE score < 7 ?  No   Eligibility  Readm Mgmt   Discharge diagnosis  Acute respiratory failure due to COVID-19    Does the patient have one of the following disease processes/diagnoses(primary or secondary)?  COVID-19   Does the patient have Home health ordered?  Yes   What is the Home health agency?   [facility]    Is there a DME ordered?  No   Comments regarding appointments  PUlse ox given   Prep survey completed?  Yes          Cyndy Neal RN

## 2020-11-14 ENCOUNTER — READMISSION MANAGEMENT (OUTPATIENT)
Dept: CALL CENTER | Facility: HOSPITAL | Age: 80
End: 2020-11-14

## 2020-11-14 LAB
BACTERIA SPEC AEROBE CULT: NORMAL
BACTERIA SPEC AEROBE CULT: NORMAL

## 2020-11-14 NOTE — OUTREACH NOTE
Prep Survey      Responses   [facility] facility patient discharged from?  [facility]   Is LACE score < 7 ?  No   Eligibility  Readm Mgmt   Discharge diagnosis  COVID-19 pneumonia   Does the patient have one of the following disease processes/diagnoses(primary or secondary)?  COVID-19   Does the patient have Home health ordered?  Yes   What is the Home health agency?   [facility]    Is there a DME ordered?  No   Prep survey completed?  Yes          Jessica Cherry RN

## 2020-11-14 NOTE — OUTREACH NOTE
COVID-19 Week 1 Survey      Responses   [facility] patient discharged from?  [facility]   Does the patient have one of the following disease processes/diagnoses(primary or secondary)?  Other   Week 1 Call successful?  No   Revoke  Readmitted   Discharge diagnosis  Acute respiratory failure due to COVID-19           Jessica Cherry RN

## 2020-11-15 ENCOUNTER — READMISSION MANAGEMENT (OUTPATIENT)
Dept: CALL CENTER | Facility: HOSPITAL | Age: 80
End: 2020-11-15

## 2020-11-15 NOTE — OUTREACH NOTE
COVID-19 Week 1 Survey      Responses   [facility] patient discharged from?  [facility]   Does the patient have one of the following disease processes/diagnoses(primary or secondary)?  COVID-19   COVID-19 underlying condition?  None   Call Number  Call 2   Week 1 Call successful?  Yes   Call start time  1140   Call end time  1145   Discharge diagnosis  COVID-19 pneumonia   Is patient permission given to speak with other caregiver?  Yes   List who call center can speak with  wife   Person spoke with today (if not patient) and relationship  wife/Parris   Meds reviewed with patient/caregiver?  Yes   Is the patient having any side effects they believe may be caused by any medication additions or changes?  No   Does the patient have all medications ordered at discharge?  Yes   Is the patient taking all medications as directed (includes completed medication regime)?  Yes   Medication comments  Took last dose of antibiotic and has one more day on the decadron.   Does the patient have a primary care provider?   Yes   Does the patient have an appointment with their PCP or specialist within 7 days of discharge?  No   What is preventing the patient from scheduling follow up appointments within 7 days of discharge?  Haven't had time [Called Thurs and Friday with no answer so believes they were closed these days.]   Nursing Interventions  Educated patient on importance of making appointment, Advised patient to make appointment   Has the patient kept scheduled appointments due by today?  N/A   Comments  Wife states she will call first thing in the am for an appt.  Has appt with kidney dr on 11/23 and will have labs done 11/20   What is the Home health agency?   [facility]    Has home health visited the patient within 72 hours of discharge?  Yes   Psychosocial issues?  No   Did the patient receive a copy of their discharge instructions?  Yes   Did the patient receive a copy of COVID-19 specific instructions?  Yes    Nursing interventions  Reviewed instructions with patient   What is the patient's perception of their health status since discharge?  Improving   Does the patient have any of the following symptoms?  Cough   Nursing Interventions  Nurse provided patient education   Pulse Ox monitoring  Intermittent   Pulse Ox device source  Patient   O2 Sat comments  sates 97% on room air   O2 Sat: education provided  Sat levels, When to seek care   Is the patient/caregiver able to teach back steps to recovery at home?  Set small, achievable goals for return to baseline health, Rest and rebuild strength, gradually increase activity, Eat a well-balance diet, Make a list of questions for provider's appointment   If the patient is a current smoker, are they able to teach back resources for cessation?  Not a smoker   Is the patient/caregiver able to teach back the hierarchy of who to call/visit for symptoms/problems? PCP, Specialist, Home health nurse, Urgent Care, ED, 911  Yes   COVID-19 call completed?  Yes   Wrap up additional comments  Wife states he is doing much better.  Home health gave them a machine to montior his bp and oxygen levels and they have been good.  His feet are swollen but this has been on going.  Pt has dentures so has not need to replace toothbrush but others in the home have.  Home health was in yesterday.  Denies any questions or needs at this time          Giuliana Mullen LPN

## 2020-11-16 ENCOUNTER — READMISSION MANAGEMENT (OUTPATIENT)
Dept: CALL CENTER | Facility: HOSPITAL | Age: 80
End: 2020-11-16

## 2020-11-16 NOTE — OUTREACH NOTE
"COVID-19 Week 1 Survey      Responses   [facility] patient discharged from?  [facility]   Does the patient have one of the following disease processes/diagnoses(primary or secondary)?  COVID-19   COVID-19 underlying condition?  None   Call Number  Call 3   Week 1 Call successful?  Yes   Call start time  1210   Call end time  1212   Discharge diagnosis  COVID-19 pneumonia   Is patient permission given to speak with other caregiver?  Yes   List who call center can speak with  wife   Person spoke with today (if not patient) and relationship  wife/Parris   Is the patient taking all medications as directed (includes completed medication regime)?  Yes   Does the patient have a primary care provider?   Yes   Does the patient have an appointment with their PCP or specialist within 7 days of discharge?  Yes   Has the patient kept scheduled appointments due by today?  N/A   Comments  Has video visit with PCP Wed, labs on Friday and kidney doctor on Monday.   What is the Home health agency?   [facility]    Has home health visited the patient within 72 hours of discharge?  Yes   Psychosocial issues?  No   Did the patient receive a copy of their discharge instructions?  Yes   Did the patient receive a copy of COVID-19 specific instructions?  Yes   What is the patient's perception of their health status since discharge?  Improving   Does the patient have any of the following symptoms?  Cough [slight cough]   Pulse Ox monitoring  Intermittent   Pulse Ox device source  Patient   O2 Sat comments  Sats 96-97% on room air   If the patient is a current smoker, are they able to teach back resources for cessation?  Not a smoker   Is the patient/caregiver able to teach back the hierarchy of who to call/visit for symptoms/problems? PCP, Specialist, Home health nurse, Urgent Care, ED, 911  Yes   COVID-19 call completed?  Yes   Wrap up additional comments  Wife states he is doing \"pretty good\".  They are monitoring his weight, " sats, and bp and they all have been good.  His appetite has increased.  Denies quesitons or needs at this time          Giuliana Mullen LPN

## 2020-11-18 NOTE — PAYOR COMM NOTE
"TO:AETNA  FROM:FIDELINA ASCENCIO, RN PHONE [phone]  [facility]    Dilan Mcbride (80 y.o. Male)     Date of Birth Social Security Number Address Home Phone MRN    1940   [address] [facility] [phone] [id number]    Christian Marital Status          Unknown        Admission Date Admission Type Admitting Provider Attending Provider Department, Room/Bed    20 Emergency Carrol Castro DO  [facility] MED SURG  3, 328/    Discharge Date Discharge Disposition Discharge Destination        2020 Home-Health Care Svc              Attending Provider: (none)   Allergies: No Known Allergies    Isolation: Enh Drop/Con, Contact Air   Infection: COVID (confirmed) (20)   Code Status: Prior    Ht: 180.3 cm (71\")   Wt: 107 kg (235 lb)    Admission Cmt: None   Principal Problem: Acute respiratory failure due to COVID-19 (CMS/Formerly [facility]) [U07.1,J96.00]                 Active Insurance as of 2020     Primary Coverage     Payor Plan Insurance Group Employer/Plan Group    AETNA MEDICARE REPLACEMENT AETNA MEDICARE REPLACEMENT [id number]     Payor Plan Address Payor Plan Phone Number Payor Plan Fax Number Effective Dates    [address] [phone]  2020 - None Entered    [facility] [id number]       Subscriber Name Subscriber Birth Date Member ID       DILAN MCBRIDE 1940 [id number]                 Emergency Contacts      (Rel.) Home Phone Work Phone Mobile Phone    Parris Mcbride (Spouse) [phone] -- --               Discharge Summary      Juan Alberto Franco MD at 20 1230              [facility] HOSPITALIST   DISCHARGE SUMMARY      Name:  Dilan Mcbride   Age:  80 y.o.  Sex:  male  :  1940  MRN:  [id number]   Visit Number:  [id number]    Admission Date:  2020  Date of Discharge:  2020  Primary Care Physician:  Jason Foy MD    Important issues to note:    1.  Continue dexamethasone and Augmentin as " prescribed for 3 more days.  2.  Follow-up with Dr. Gamez as scheduled on 11/23/2020 (his torsemide is currently on hold).  3.  Follow-up with primary care physician in 1 week with CBC and BMP.  4.  Patient did not qualify for home oxygen.  He already has home CPAP therapy.    Discharge Diagnoses:     1.  Acute hypoxic respiratory failure, present on admission secondary to #2.  2.  COVID-19 pneumonia, present on admission.  3.  Left lower extremity cellulitis, related to diabetes mellitus type 2, present on admission.  4.  Acute renal failure on chronic kidney disease stage III.  5.  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  6.  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    Problem List:       Acute respiratory failure due to COVID-19 (CMS/HCC)    Acute infection without sepsis    Acute respiratory failure with hypoxia (CMS/HCC)    Cellulitis and abscess of left lower extremity    Type 2 diabetes mellitus with nephropathy (CMS/HCC)    Acute renal failure (ARF) (CMS/Formerly [facility])    Chronic kidney disease, stage III (moderate)    Presenting Problem:    COVID-19 virus infection [U07.1]  COVID-19 virus infection [U07.1]     Consults:     Consulting Physician(s)             None          Procedures Performed:    None.    History of presenting illness:    The patient is an 80-year-old gentleman who has a past medical history of aortic valve replacement, diabetes, hypertension, hyperlipidemia, history of DVT on chronic Xarelto.  He presented with complains of progressively worsening 2 days duration of cough and shortness of breath.  Unfortunately, he does have a positive COVID-19 exposure to his son-in-law that occurred last week.  In addition, the patient has had a left lower extremity cellulitis, that had not improved with partial Bactrim treatment switched to Keflex.  He presented to the ER for further evaluation.     On evaluation he had a temperature of 99.7 and was noted to be 87%  on room air.  His heart rate was 100 and his blood pressure was 134/71.  He was placed on 2 L nasal cannula oxygen.  A stat venous duplex ultrasound of the left lower extremity was negative for DVT.  He was initiated on vancomycin and Zosyn for his left lower extremity cellulitis that failed outpatient therapy.  He was admitted to the Covid unit under precautions for further evaluation and treatment.     Hospital Course:    He was admitted from the emergency room on 11/9/2020 with fever, shortness of breath and left lower extremity redness.  He was recently exposed to COVID-19 through his son-in-law.  Patient was tested positive for COVID-19 on admission.  He was also noted to have acute renal failure.  He was placed on IV antibiotics therapy with Zosyn and vancomycin.  Left lower extremity duplex scan was negative for DVT.  He was started on dexamethasone and remdesevir.  He was continued on Xarelto that he takes at home.    Patient fortunately improved significantly with regards to his oxygen requirement within the next 24 hours.  He was maintained on 1 to 2 L of nasal cannula oxygen.  His renal function initially increased but subsequently started improving back to his baseline.  Patient was able to walk in the room and go to the bathroom without any difficulty.  Patient was very eager to go home from the day of admission.  He did get 3 days of remdesivir and dexamethasone.  He states that he wants to go home today and he cannot stay in the hospital anymore.  I do not think he has any significant cellulitis of his left lower extremity at this time and the redness and swelling has significantly improved.  His blood cultures have been negative.  His nasal swab for MRSA was negative.  He will be discharged home on oral Augmentin for 3 more days and dexamethasone for 3 more days.    Patient does live with his wife and states uses a cane for ambulation.  He does have chronic kidney disease and follows up with   [facility].  He is on glimepiride and Trulicity for his diabetes but does not take any insulin.  He does not have home oxygen but uses a CPAP machine at home he states.  I discussed the patient's condition with his wife over the phone.  We discussed his home medications some of which have been already discontinued prior to admission that included spironolactone and Entresto.  His wife also states that his Lipitor was discontinued due to muscle cramps.  He was on torsemide at home and it was stopped at admission due to his worsening renal failure.  This may need to be restarted back when he follows up with his nephrologist.    Patient is currently being discharged home with home health.  He did not qualify for home oxygen.  He will need follow-up with his primary care physician in 1 week with repeat blood work including CBC and BMP.  He also has an appointment with Dr. Gamez on 11/23/2020.  Patient has been advised to quarantine for 14 days from the days of his symptom onset.    Vital Signs:    Temp:  [95.4 °F (35.2 °C)-97.6 °F (36.4 °C)] 96.7 °F (35.9 °C)  Heart Rate:  [] 109  Resp:  [18] 18  BP: ()/(51-73) 111/62    Physical Exam:    General Appearance:  Alert and cooperative, not in any acute distress.   Head:  Atraumatic and normocephalic, without obvious abnormality.   Eyes:          PERRLA, conjunctivae and sclerae normal, no icterus. No pallor. Extraocular movements are within normal limits.   Ears:  Ears appear intact with no abnormalities noted.   Throat: No oral lesions, no thrush, oral mucosa moist.   Neck: Supple, trachea midline, no thyromegaly, no carotid bruit.   Back:   No kyphoscoliosis present. No tenderness to palpation,   range of motion normal.   Lungs:   Chest shape is normal. Breath sounds heard bilaterally equally.  No crackles or wheezing. No Pleural rub or bronchial breathing.   Heart:  Normal S1 and S2, no murmur, no gallop, no rub. No JVD.   Abdomen:   Normal bowel sounds, no  masses, no organomegaly. Soft, nontender, obese, no guarding, no rebound tenderness.   Extremities: Moves all extremities, 1+ pitting ankle edema, no cyanosis, no clubbing.  Chronically appearing hyperpigmentation noted in the left lower extremity with no erythema at this time.   Pulses: Pulses palpable and equal bilaterally.   Skin: No bleeding, bruising or rash.   Neurologic: Alert and oriented x 3. Moves all four limbs equally. No tremors. No facial asymmetry.     Pertinent Lab Results:     Results from last 7 days   Lab Units 11/12/20  0602 11/11/20  0820 11/10/20  0637   SODIUM mmol/L 138 139 136   POTASSIUM mmol/L 5.3* 5.0 5.0   CHLORIDE mmol/L 102 100 100   CO2 mmol/L 29.2* 28.2 29.5*   BUN mg/dL 43* 43* 24*   CREATININE mg/dL 1.96* 2.58* 1.61*   CALCIUM mg/dL 8.7 8.5* 8.7   BILIRUBIN mg/dL 0.3 0.3 0.5   ALK PHOS U/L 63 70 77   ALT (SGPT) U/L 16 15 13   AST (SGOT) U/L 23 20 20   GLUCOSE mg/dL 213* 275* 188*     Results from last 7 days   Lab Units 11/12/20  0602 11/11/20  0820 11/10/20  0637   WBC 10*3/mm3 12.75* 6.60 5.47   HEMOGLOBIN g/dL 12.0* 11.4* 11.1*   HEMATOCRIT % 36.1* 35.8* 34.5*   PLATELETS 10*3/mm3 81* 75* 66*         Results from last 7 days   Lab Units 11/09/20 1918   TROPONIN T ng/mL 0.014     Results from last 7 days   Lab Units 11/09/20  1918   PROBNP pg/mL 264.6     Results from last 7 days   Lab Units 11/09/20 2257 11/09/20  2253   BLOODCX  No growth at 2 days No growth at 2 days     Pertinent Radiology Results:    Imaging Results (All)     Procedure Component Value Units Date/Time    XR Chest 1 View [[id number]] Collected: 11/10/20 1322     Updated: 11/10/20 1329    Narrative:      Portable chest     INDICATION: Short of breath.     FINDINGS: Single frontal portable chest. No previous. EKG leads overlie  the chest. Patient is rotated to the left. Heart size is normal.  Scattered vascular calcifications. Degenerative changes in the spine and  shoulders. Coarsened interstitial opacities  are favored to be chronic.  No pneumothorax. No distinct consolidation or effusion.       Impression:      Chronic appearing findings. Followup PA and lateral views  would be helpful for a more complete evaluation.     This report was finalized on 11/10/2020 1:27 PM by Domingo Waters MD.    US Venous Doppler Lower Extremity Left (duplex) [[id number]] Collected: 11/09/20 2237     Updated: 11/09/20 2238    Narrative:      FINAL REPORT    TECHNIQUE:  Multiple transverse and longitudinal images were performed of  the femoral-popliteal deep venous system with augmentation and  compression maneuvers.    CLINICAL HISTORY:  swelling, pain    FINDINGS:  Left lower extremity duplex ultrasound demonstrates normal flow  in the deep venous system.  There is no abnormal echogenicity to  suggest thrombus.  There is normal compression and augmentation.      Impression:      No evidence of DVT.    Authenticated by Paul Saavedra M.D. on 11/09/2020 10:37:05 PM        Condition on Discharge:      Stable.    Code status during the hospital stay:    Code Status and Medical Interventions:   Ordered at: 11/10/20 0014     Level Of Support Discussed With:    Patient     Code Status:    CPR     Medical Interventions (Level of Support Prior to Arrest):    Full     Discharge Disposition:    Home-Health Care [facility]    Discharge Medications:       Discharge Medications      New Medications      Instructions Start Date   amoxicillin-clavulanate 500-125 MG per tablet  Commonly known as: Augmentin   500 mg, Oral, Every 12 Hours      dexamethasone 4 MG tablet  Commonly known as: DECADRON   6 mg, Oral, Daily   Start Date: November 13, 2020        Changes to Medications      Instructions Start Date   cyanocobalamin 100 MCG tablet tablet  Commonly known as: CYANOCOBALAMIN  What changed: Another medication with the same name was removed. Continue taking this medication, and follow the directions you see here.   100 mcg, Oral, Daily      Trulicity 1.5 MG/0.5ML  solution pen-injector  Generic drug: Dulaglutide  What changed: Another medication with the same name was removed. Continue taking this medication, and follow the directions you see here.   1.5 mg, Subcutaneous, Weekly, Takes on Fridays          Continue These Medications      Instructions Start Date   Calcium-Magnesium-Zinc-D3 tablet   1 tablet, Oral, Daily      carbidopa-levodopa  MG per tablet  Commonly known as: SINEMET   1 tablet, Oral, 3 Times Daily, With food      carvedilol 6.25 MG tablet  Commonly known as: COREG   3.125 mg, Oral, 2 Times Daily, One-half tablet twice daily      CINNAMON PO   1 tablet, Oral, Daily      famotidine 20 MG tablet  Commonly known as: PEPCID   20 mg, Oral, 2 Times Daily PRN      gabapentin 400 MG capsule  Commonly known as: NEURONTIN   TAKE ONE CAPSULE BY MOUTH EVERY MORNING, 1 IN THE EVENING, AND 2 AT NIGHT      glimepiride 4 MG tablet  Commonly known as: AMARYL   TAKE 1 TABLET BY MOUTH DAILY WITH BREAKFAST OR THE FIRST MAIN MEAL OF THE DAY      pantoprazole 40 MG EC tablet  Commonly known as: PROTONIX   40 mg, Oral, Daily      rivaroxaban 20 MG tablet  Commonly known as: XARELTO   20 mg, Oral, Daily      rOPINIRole 0.5 MG tablet  Commonly known as: REQUIP   0.5 mg, Oral, Nightly PRN, Take 1 hour before bedtime.       tamsulosin 0.4 MG capsule 24 hr capsule  Commonly known as: FLOMAX   1 capsule, Oral, Daily         Stop These Medications    aspirin 81 MG EC tablet     atorvastatin 40 MG tablet  Commonly known as: LIPITOR     cephalexin 500 MG capsule  Commonly known as: KEFLEX     Entresto 24-26 MG tablet  Generic drug: sacubitril-valsartan     M-VIT PO     multiple vitamin injection     spironolactone 25 MG tablet  Commonly known as: ALDACTONE     torsemide 20 MG tablet  Commonly known as: DEMADEX          Discharge Diet:     Diet Instructions     Diet: Renal, Consistent Carbohydrate; Thin      Discharge Diet:  Renal  Consistent Carbohydrate       Fluid Consistency: Thin          Activity at Discharge:     Activity Instructions     Activity as Tolerated          Follow-up Appointments:    Additional Instructions for the Follow-ups that You Need to Schedule     Ambulatory Referral to Home Health   As directed      Face to Face Visit Date: 11/12/2020    Follow-up provider for Plan of Care?: I treated the patient in an acute care facility and will not continue treatment after discharge.    Follow-up provider: ROMINA LEHMAN [[id number]]    Reason/Clinical Findings: COVID pneumonia, Acute on CKD, DM, Tremors    Describe mobility limitations that make leaving home difficult: Generalized weakness, Fall risk    Nursing/Therapeutic Services Requested: Skilled Nursing Physical Therapy Occupational Therapy    Skilled nursing orders: Medication education O2 instruction Cardiopulmonary assessments    PT orders: Therapeutic exercise Gait Training Transfer training Strengthening    Weight Bearing Status: As Tolerated    Occupational orders: Activities of daily living Strengthening    Frequency: 1 Week 1           Follow-up Information     Rickey Zee MD, [facility] Follow up on 11/23/2020.    Specialties: Nephrology, Hospitalist  Contact information:  [address] [place] [address] [phone]             Romina Lehman MD Follow up in 1 week(s).    Specialty: General Practice  Why: with BMP & CBC  Contact information:  [address]  [address]  [facility] [address] [phone]                 Test Results Pending at Discharge:    Pending Labs     Order Current Status    Green Top (No Gel) In process    [place] Draw In process    Blood Culture - Blood, Arm, Left Preliminary result    Blood Culture - Blood, Arm, Right Preliminary result           Juan Alberto Franco MD  11/12/20  12:30 EST    Time: I spent 35 minutes on this discharge activity which included: face-to-face encounter with the patient, reviewing the data in the system, coordination of the care with the nursing staff as well  as consultants, documentation, and entering orders.     Dictated utilizing Dragon dictation.      Electronically signed by Juan Alberto Franco MD at 11/12/20 3371

## 2020-11-19 ENCOUNTER — READMISSION MANAGEMENT (OUTPATIENT)
Dept: CALL CENTER | Facility: HOSPITAL | Age: 80
End: 2020-11-19

## 2020-11-19 NOTE — OUTREACH NOTE
COVID-19 Week 1 Survey      Responses   [facility] patient discharged from?  [facility]   Does the patient have one of the following disease processes/diagnoses(primary or secondary)?  COVID-19   COVID-19 underlying condition?  None   Call Number  Call 4   Week 1 Call successful?  Yes   Call start time  1625   Call end time  1629   Discharge diagnosis  COVID-19 pneumonia   Is patient permission given to speak with other caregiver?  Yes   List who call center can speak with  wife   Meds reviewed with patient/caregiver?  Yes   Is the patient having any side effects they believe may be caused by any medication additions or changes?  No   Does the patient have all medications ordered at discharge?  Yes   Is the patient taking all medications as directed (includes completed medication regime)?  Yes   Comments regarding appointments  PUlse ox given   Does the patient have a primary care provider?   Yes   Does the patient have an appointment with their PCP or specialist within 7 days of discharge?  Yes   Has the patient kept scheduled appointments due by today?  Yes   What is the Home health agency?   [facility]    Has home health visited the patient within 72 hours of discharge?  Yes   Psychosocial issues?  No   Comments  Leg is improved, looks better, some swelling still.   Did the patient receive a copy of their discharge instructions?  Yes   Did the patient receive a copy of COVID-19 specific instructions?  Yes   Nursing interventions  Reviewed instructions with patient   What is the patient's perception of their health status since discharge?  Improving   Does the patient have any of the following symptoms?  Fever/chills, Cough   Nursing Interventions  Nurse provided patient education   Pulse Ox monitoring  Intermittent   Pulse Ox device source  Patient   O2 Sat comments  96-97% on room air.   O2 Sat: education provided  Sat levels   O2 Sat education comments  if sats 92 or below and stay, call 911   Is  the patient/caregiver able to teach back steps to recovery at home?  Set small, achievable goals for return to baseline health, Rest and rebuild strength, gradually increase activity, Eat a well-balance diet, Make a list of questions for provider's appointment   If the patient is a current smoker, are they able to teach back resources for cessation?  -- [uses tobacco.]   Is the patient/caregiver able to teach back the hierarchy of who to call/visit for symptoms/problems? PCP, Specialist, Home health nurse, Urgent Care, ED, 911  Yes   COVID-19 call completed?  Yes   Wrap up additional comments  Says his energy level is still poor, doing better in many ways.          Cyndy Neal RN

## 2020-11-20 ENCOUNTER — APPOINTMENT (OUTPATIENT)
Dept: GENERAL RADIOLOGY | Facility: HOSPITAL | Age: 80
End: 2020-11-20

## 2020-11-20 ENCOUNTER — APPOINTMENT (OUTPATIENT)
Dept: CT IMAGING | Facility: HOSPITAL | Age: 80
End: 2020-11-20

## 2020-11-20 ENCOUNTER — HOSPITAL ENCOUNTER (EMERGENCY)
Facility: HOSPITAL | Age: 80
Discharge: HOME | End: 2020-11-21
Attending: EMERGENCY MEDICINE | Admitting: EMERGENCY MEDICINE

## 2020-11-20 ENCOUNTER — HOSPITAL ENCOUNTER (EMERGENCY)
Facility: HOSPITAL | Age: 80
Discharge: HOME | End: 2020-11-20
Attending: EMERGENCY MEDICINE | Admitting: EMERGENCY MEDICINE

## 2020-11-20 VITALS
SYSTOLIC BLOOD PRESSURE: 103 MMHG | RESPIRATION RATE: 18 BRPM | WEIGHT: 234 LBS | HEIGHT: 71 IN | HEART RATE: 96 BPM | BODY MASS INDEX: 32.76 KG/M2 | OXYGEN SATURATION: 94 % | TEMPERATURE: 98.8 F | DIASTOLIC BLOOD PRESSURE: 57 MMHG

## 2020-11-20 DIAGNOSIS — R09.02 HYPOXIA: ICD-10-CM

## 2020-11-20 DIAGNOSIS — U07.1 PNEUMONIA DUE TO COVID-19 VIRUS: Primary | ICD-10-CM

## 2020-11-20 DIAGNOSIS — J12.82 PNEUMONIA DUE TO COVID-19 VIRUS: Primary | ICD-10-CM

## 2020-11-20 DIAGNOSIS — N48.1 BALANITIS: Primary | ICD-10-CM

## 2020-11-20 DIAGNOSIS — N39.0 URINARY TRACT INFECTION WITHOUT HEMATURIA, SITE UNSPECIFIED: ICD-10-CM

## 2020-11-20 LAB
ALBUMIN SERPL-MCNC: 2.8 G/DL (ref 3.5–5.2)
ALBUMIN/GLOB SERPL: 1 G/DL
ALP SERPL-CCNC: 62 U/L (ref 39–117)
ALT SERPL W P-5'-P-CCNC: 6 U/L (ref 1–41)
ANION GAP SERPL CALCULATED.3IONS-SCNC: 10.2 MMOL/L (ref 5–15)
AST SERPL-CCNC: 17 U/L (ref 1–40)
BACTERIA UR QL AUTO: ABNORMAL /HPF
BASOPHILS # BLD AUTO: 0.03 10*3/MM3 (ref 0–0.2)
BASOPHILS NFR BLD AUTO: 0.4 % (ref 0–1.5)
BILIRUB SERPL-MCNC: 0.7 MG/DL (ref 0–1.2)
BILIRUB UR QL STRIP: ABNORMAL
BUN SERPL-MCNC: 31 MG/DL (ref 8–23)
BUN/CREAT SERPL: 18.8 (ref 7–25)
CALCIUM SPEC-SCNC: 8.5 MG/DL (ref 8.6–10.5)
CHLORIDE SERPL-SCNC: 98 MMOL/L (ref 98–107)
CLARITY UR: ABNORMAL
CO2 SERPL-SCNC: 25.8 MMOL/L (ref 22–29)
COLOR UR: ABNORMAL
CREAT SERPL-MCNC: 1.65 MG/DL (ref 0.76–1.27)
D-LACTATE SERPL-SCNC: 1.8 MMOL/L (ref 0.5–2)
DEPRECATED RDW RBC AUTO: 50 FL (ref 37–54)
EOSINOPHIL # BLD AUTO: 0.04 10*3/MM3 (ref 0–0.4)
EOSINOPHIL NFR BLD AUTO: 0.5 % (ref 0.3–6.2)
ERYTHROCYTE [DISTWIDTH] IN BLOOD BY AUTOMATED COUNT: 14.2 % (ref 12.3–15.4)
FLUAV AG NPH QL: NEGATIVE
FLUBV AG NPH QL IA: NEGATIVE
GFR SERPL CREATININE-BSD FRML MDRD: 40 ML/MIN/1.73
GLOBULIN UR ELPH-MCNC: 2.8 GM/DL
GLUCOSE SERPL-MCNC: 358 MG/DL (ref 65–99)
GLUCOSE UR STRIP-MCNC: ABNORMAL MG/DL
HCT VFR BLD AUTO: 34.1 % (ref 37.5–51)
HGB BLD-MCNC: 11.1 G/DL (ref 13–17.7)
HGB UR QL STRIP.AUTO: ABNORMAL
HOLD SPECIMEN: NORMAL
HOLD SPECIMEN: NORMAL
HYALINE CASTS UR QL AUTO: ABNORMAL /LPF
IMM GRANULOCYTES # BLD AUTO: 0.07 10*3/MM3 (ref 0–0.05)
IMM GRANULOCYTES NFR BLD AUTO: 0.9 % (ref 0–0.5)
KETONES UR QL STRIP: NEGATIVE
LEUKOCYTE ESTERASE UR QL STRIP.AUTO: ABNORMAL
LYMPHOCYTES # BLD AUTO: 0.59 10*3/MM3 (ref 0.7–3.1)
LYMPHOCYTES NFR BLD AUTO: 7.9 % (ref 19.6–45.3)
MCH RBC QN AUTO: 31.3 PG (ref 26.6–33)
MCHC RBC AUTO-ENTMCNC: 32.6 G/DL (ref 31.5–35.7)
MCV RBC AUTO: 96.1 FL (ref 79–97)
MONOCYTES # BLD AUTO: 0.73 10*3/MM3 (ref 0.1–0.9)
MONOCYTES NFR BLD AUTO: 9.8 % (ref 5–12)
NEUTROPHILS NFR BLD AUTO: 6.02 10*3/MM3 (ref 1.7–7)
NEUTROPHILS NFR BLD AUTO: 80.5 % (ref 42.7–76)
NITRITE UR QL STRIP: POSITIVE
NRBC BLD AUTO-RTO: 0 /100 WBC (ref 0–0.2)
NT-PROBNP SERPL-MCNC: 1040 PG/ML (ref 0–1800)
PH UR STRIP.AUTO: <=5 [PH] (ref 5–8)
PLATELET # BLD AUTO: 64 10*3/MM3 (ref 140–450)
PMV BLD AUTO: 10.5 FL (ref 6–12)
POTASSIUM SERPL-SCNC: 4.2 MMOL/L (ref 3.5–5.2)
PROCALCITONIN SERPL-MCNC: 0.12 NG/ML (ref 0–0.25)
PROT SERPL-MCNC: 5.6 G/DL (ref 6–8.5)
PROT UR QL STRIP: ABNORMAL
RBC # BLD AUTO: 3.55 10*6/MM3 (ref 4.14–5.8)
RBC # UR: ABNORMAL /HPF
RBC MORPH BLD: NORMAL
REF LAB TEST METHOD: ABNORMAL
SMALL PLATELETS BLD QL SMEAR: NORMAL
SODIUM SERPL-SCNC: 134 MMOL/L (ref 136–145)
SP GR UR STRIP: 1.03 (ref 1–1.03)
SQUAMOUS #/AREA URNS HPF: ABNORMAL /HPF
TROPONIN T SERPL-MCNC: 0.05 NG/ML (ref 0–0.03)
UROBILINOGEN UR QL STRIP: ABNORMAL
WBC # BLD AUTO: 7.48 10*3/MM3 (ref 3.4–10.8)
WBC MORPH BLD: NORMAL
WBC UR QL AUTO: ABNORMAL /HPF
WHOLE BLOOD HOLD SPECIMEN: NORMAL
WHOLE BLOOD HOLD SPECIMEN: NORMAL

## 2020-11-20 PROCEDURE — 87086 URINE CULTURE/COLONY COUNT: CPT | Performed by: EMERGENCY MEDICINE

## 2020-11-20 PROCEDURE — 83605 ASSAY OF LACTIC ACID: CPT | Performed by: EMERGENCY MEDICINE

## 2020-11-20 PROCEDURE — 74176 CT ABD & PELVIS W/O CONTRAST: CPT

## 2020-11-20 PROCEDURE — 71250 CT THORAX DX C-: CPT

## 2020-11-20 PROCEDURE — 99285 EMERGENCY DEPT VISIT HI MDM: CPT

## 2020-11-20 PROCEDURE — 81001 URINALYSIS AUTO W/SCOPE: CPT | Performed by: EMERGENCY MEDICINE

## 2020-11-20 PROCEDURE — 85025 COMPLETE CBC W/AUTO DIFF WBC: CPT | Performed by: EMERGENCY MEDICINE

## 2020-11-20 PROCEDURE — 25010000002 PIPERACILLIN SOD-TAZOBACTAM PER 1 G: Performed by: EMERGENCY MEDICINE

## 2020-11-20 PROCEDURE — 85007 BL SMEAR W/DIFF WBC COUNT: CPT | Performed by: EMERGENCY MEDICINE

## 2020-11-20 PROCEDURE — 84484 ASSAY OF TROPONIN QUANT: CPT | Performed by: EMERGENCY MEDICINE

## 2020-11-20 PROCEDURE — 87040 BLOOD CULTURE FOR BACTERIA: CPT | Performed by: EMERGENCY MEDICINE

## 2020-11-20 PROCEDURE — 99282 EMERGENCY DEPT VISIT SF MDM: CPT

## 2020-11-20 PROCEDURE — 93005 ELECTROCARDIOGRAM TRACING: CPT | Performed by: EMERGENCY MEDICINE

## 2020-11-20 PROCEDURE — 96365 THER/PROPH/DIAG IV INF INIT: CPT

## 2020-11-20 PROCEDURE — 87804 INFLUENZA ASSAY W/OPTIC: CPT | Performed by: EMERGENCY MEDICINE

## 2020-11-20 PROCEDURE — 71045 X-RAY EXAM CHEST 1 VIEW: CPT

## 2020-11-20 PROCEDURE — 83880 ASSAY OF NATRIURETIC PEPTIDE: CPT | Performed by: EMERGENCY MEDICINE

## 2020-11-20 PROCEDURE — 94640 AIRWAY INHALATION TREATMENT: CPT

## 2020-11-20 PROCEDURE — 84145 PROCALCITONIN (PCT): CPT | Performed by: EMERGENCY MEDICINE

## 2020-11-20 PROCEDURE — 80053 COMPREHEN METABOLIC PANEL: CPT | Performed by: EMERGENCY MEDICINE

## 2020-11-20 RX ORDER — ACETAMINOPHEN 325 MG/1
975 TABLET ORAL ONCE
Status: COMPLETED | OUTPATIENT
Start: 2020-11-20 | End: 2020-11-20

## 2020-11-20 RX ORDER — ALBUTEROL SULFATE 90 UG/1
2 AEROSOL, METERED RESPIRATORY (INHALATION) ONCE
Status: COMPLETED | OUTPATIENT
Start: 2020-11-20 | End: 2020-11-20

## 2020-11-20 RX ORDER — METHYLPREDNISOLONE SODIUM SUCCINATE 125 MG/2ML
125 INJECTION, POWDER, LYOPHILIZED, FOR SOLUTION INTRAMUSCULAR; INTRAVENOUS ONCE
Status: COMPLETED | OUTPATIENT
Start: 2020-11-20 | End: 2020-11-21

## 2020-11-20 RX ORDER — CLOTRIMAZOLE 1 G/ML
SOLUTION TOPICAL 2 TIMES DAILY
Qty: 15 ML | Refills: 0 | Status: SHIPPED | OUTPATIENT
Start: 2020-11-20 | End: 2020-12-10 | Stop reason: HOSPADM

## 2020-11-20 RX ORDER — SODIUM CHLORIDE 0.9 % (FLUSH) 0.9 %
10 SYRINGE (ML) INJECTION AS NEEDED
Status: DISCONTINUED | OUTPATIENT
Start: 2020-11-20 | End: 2020-11-21 | Stop reason: HOSPADM

## 2020-11-20 RX ADMIN — TAZOBACTAM SODIUM AND PIPERACILLIN SODIUM 3.38 G: 375; 3 INJECTION, SOLUTION INTRAVENOUS at 21:27

## 2020-11-20 RX ADMIN — ACETAMINOPHEN 975 MG: 325 TABLET, FILM COATED ORAL at 21:27

## 2020-11-20 RX ADMIN — ALBUTEROL SULFATE 2 PUFF: 90 AEROSOL, METERED RESPIRATORY (INHALATION) at 21:28

## 2020-11-20 RX ADMIN — SODIUM CHLORIDE 1000 ML: 9 INJECTION, SOLUTION INTRAVENOUS at 21:27

## 2020-11-20 NOTE — ED PROVIDER NOTES
Subjective   This patient of her a few days of penile itching and white Candida-like material on the skin.  He is uncircumcised.  He is recently admitted to the hospital for COVID-19 and left lower extremity cellulitis.  He was on dexamethasone and a severe and antibiotics.  He denies any other symptoms other than penile itching and burning.  He denies that his blood sugars have been elevated.          Review of Systems   Constitutional: Negative.    HENT: Negative.    Eyes: Negative.    Respiratory: Negative.    Cardiovascular: Negative.    Gastrointestinal: Negative.    Genitourinary: Negative.    Musculoskeletal: Negative.    Skin:        Redness and burning to the penile head with some white Candida-like material on the skin   Allergic/Immunologic: Negative.    Neurological: Negative.    Psychiatric/Behavioral: Negative.    All other systems reviewed and are negative.      Past Medical History:   Diagnosis Date   • Angina of effort (CMS/[facility])    • Aortic valve replaced    • Arthritis    • Cataract    • CHF (congestive heart failure) (CMS/[facility])    • Colitis    • Diabetes (CMS/[facility])    • Diverticulitis    • Gall stones    • Heart disease    • Hyperlipidemia    • Hypertension    • Hypertension    • Impaired functional mobility, balance, gait, and endurance    • Kidney stones    • Skin cancer     left shoulder   • Tremor        No Known Allergies    Past Surgical History:   Procedure Laterality Date   • AORTIC VALVE REPAIR/REPLACEMENT  2016   • COLONOSCOPY  2018   • HERNIA REPAIR  1963    x2   • SINUS SURGERY  1978       Family History   Problem Relation Age of Onset   • Cancer Brother    • Diabetes Brother    • Heart disease Brother    • Hypertension Brother        Social History     Socioeconomic History   • Marital status:      Spouse name: Not on file   • Number of children: Not on file   • Years of education: Not on file   • Highest education level: Not on file   Tobacco Use   • Smoking status: Never Smoker    • Smokeless tobacco: Current User     Types: Chew   Substance and Sexual Activity   • Alcohol use: No     Frequency: Never   • Drug use: Never           Objective   Physical Exam  Vitals signs and nursing note reviewed.   Constitutional:       General: He is not in acute distress.     Appearance: Normal appearance. He is obese. He is not ill-appearing, toxic-appearing or diaphoretic.   HENT:      Head: Normocephalic and atraumatic.      Nose: Nose normal.   Eyes:      Extraocular Movements: Extraocular movements intact.   Neck:      Musculoskeletal: Normal range of motion.   Cardiovascular:      Rate and Rhythm: Normal rate.   Pulmonary:      Effort: Pulmonary effort is normal.   Abdominal:      Palpations: Abdomen is soft.   Musculoskeletal: Normal range of motion.   Skin:     Comments: Mild erythema and white Candida-like discharge on the penile head and the foreskin is retracted.  No penile discharge.  No cellulitis or lesions   Neurological:      General: No focal deficit present.      Mental Status: He is alert.   Psychiatric:         Mood and Affect: Mood normal.         Behavior: Behavior normal.         Procedures           ED Course                                           MDM    Final diagnoses:   Tyler Mueller PA-C  11/20/20 1400

## 2020-11-21 VITALS
TEMPERATURE: 98.2 F | RESPIRATION RATE: 18 BRPM | WEIGHT: 234 LBS | DIASTOLIC BLOOD PRESSURE: 62 MMHG | HEIGHT: 71 IN | HEART RATE: 76 BPM | SYSTOLIC BLOOD PRESSURE: 115 MMHG | OXYGEN SATURATION: 96 % | BODY MASS INDEX: 32.76 KG/M2

## 2020-11-21 LAB
TROPONIN T SERPL-MCNC: 0.06 NG/ML (ref 0–0.03)
TROPONIN T SERPL-MCNC: 0.07 NG/ML (ref 0–0.03)

## 2020-11-21 PROCEDURE — 96375 TX/PRO/DX INJ NEW DRUG ADDON: CPT

## 2020-11-21 PROCEDURE — 84484 ASSAY OF TROPONIN QUANT: CPT | Performed by: EMERGENCY MEDICINE

## 2020-11-21 PROCEDURE — 25010000002 METHYLPREDNISOLONE PER 125 MG: Performed by: EMERGENCY MEDICINE

## 2020-11-21 RX ORDER — AZITHROMYCIN 250 MG/1
TABLET, FILM COATED ORAL
Qty: 6 TABLET | Refills: 0 | Status: SHIPPED | OUTPATIENT
Start: 2020-11-21 | End: 2020-12-10 | Stop reason: HOSPADM

## 2020-11-21 RX ORDER — CEFUROXIME AXETIL 500 MG/1
500 TABLET ORAL 2 TIMES DAILY
Qty: 20 TABLET | Refills: 0 | Status: SHIPPED | OUTPATIENT
Start: 2020-11-21 | End: 2020-12-10 | Stop reason: HOSPADM

## 2020-11-21 RX ORDER — PREDNISONE 20 MG/1
20 TABLET ORAL 2 TIMES DAILY
Qty: 10 TABLET | Refills: 0 | Status: SHIPPED | OUTPATIENT
Start: 2020-11-21 | End: 2020-12-10 | Stop reason: HOSPADM

## 2020-11-21 RX ORDER — FLUCONAZOLE 100 MG/1
200 TABLET ORAL ONCE
Status: COMPLETED | OUTPATIENT
Start: 2020-11-21 | End: 2020-11-21

## 2020-11-21 RX ADMIN — METHYLPREDNISOLONE SODIUM SUCCINATE 125 MG: 125 INJECTION, POWDER, FOR SOLUTION INTRAMUSCULAR; INTRAVENOUS at 00:12

## 2020-11-21 RX ADMIN — FLUCONAZOLE 200 MG: 100 TABLET ORAL at 06:48

## 2020-11-22 ENCOUNTER — READMISSION MANAGEMENT (OUTPATIENT)
Dept: CALL CENTER | Facility: HOSPITAL | Age: 80
End: 2020-11-22

## 2020-11-22 NOTE — OUTREACH NOTE
COVID-19 Week 2 Survey      Responses   [facility] patient discharged from?  [facility]   Does the patient have one of the following disease processes/diagnoses(primary or secondary)?  COVID-19   COVID-19 underlying condition?  None   Call Number  Call 1   COVID-19 Week 2: Call 1 attempt successful?  Yes   Call start time  1158   Call end time  1203   Discharge diagnosis  COVID-19 pneumonia   Meds reviewed with patient/caregiver?  Yes   Is the patient having any side effects they believe may be caused by any medication additions or changes?  No   Does the patient have all medications ordered at discharge?  Yes   Is the patient taking all medications as directed (includes completed medication regime)?  Yes   Does the patient have a primary care provider?   Yes   Does the patient have an appointment with their PCP or specialist within 7 days of discharge?  Yes   Has the patient kept scheduled appointments due by today?  Yes   Comments  Has video visit with PCP Wed, labs on Friday and kidney doctor on Monday.   What is the Home health agency?   [facility]    Has home health visited the patient within 72 hours of discharge?  Yes   Psychosocial issues?  No   Psychosocial comments  Wife is +covid now. Son is in hosp with +covid.    Did the patient receive a copy of their discharge instructions?  Yes   Did the patient receive a copy of COVID-19 specific instructions?  Yes   Nursing interventions  Reviewed instructions with patient   What is the patient's perception of their health status since discharge?  Same   Does the patient have any of the following symptoms?  Fever/chills, Cough [100.7 cough]   Nursing Interventions  Nurse provided patient education   Pulse Ox monitoring  Intermittent   Pulse Ox device source  Patient   O2 Sat comments  96-97% on room air.   O2 Sat: education provided  Sat levels   O2 Sat education comments  if sats 92 or below and stay, call 911   Is the patient/caregiver able to teach  back steps to recovery at home?  Set small, achievable goals for return to baseline health, Rest and rebuild strength, gradually increase activity, Eat a well-balance diet, Make a list of questions for provider's appointment   If the patient is a current smoker, are they able to teach back resources for cessation?  [id number]   Is the patient/caregiver able to teach back the hierarchy of who to call/visit for symptoms/problems? PCP, Specialist, Home health nurse, Urgent Care, ED, 911  Yes   COVID-19 call completed?  Yes   Wrap up additional comments  fever this morning. 100.7  O2 sats wer 97% on 2L          Jessica Cherry RN

## 2020-11-23 ENCOUNTER — APPOINTMENT (OUTPATIENT)
Dept: CT IMAGING | Facility: HOSPITAL | Age: 80
End: 2020-11-23

## 2020-11-23 ENCOUNTER — APPOINTMENT (OUTPATIENT)
Dept: GENERAL RADIOLOGY | Facility: HOSPITAL | Age: 80
End: 2020-11-23

## 2020-11-23 ENCOUNTER — APPOINTMENT (OUTPATIENT)
Dept: CARDIOLOGY | Facility: HOSPITAL | Age: 80
End: 2020-11-23

## 2020-11-23 ENCOUNTER — HOSPITAL ENCOUNTER (INPATIENT)
Facility: HOSPITAL | Age: 80
LOS: 17 days | Discharge: HOME HEALTH | End: 2020-12-10
Attending: STUDENT IN AN ORGANIZED HEALTH CARE EDUCATION/TRAINING PROGRAM | Admitting: FAMILY MEDICINE

## 2020-11-23 ENCOUNTER — READMISSION MANAGEMENT (OUTPATIENT)
Dept: CALL CENTER | Facility: HOSPITAL | Age: 80
End: 2020-11-23

## 2020-11-23 DIAGNOSIS — J12.82 PNEUMONIA DUE TO COVID-19 VIRUS: Primary | ICD-10-CM

## 2020-11-23 DIAGNOSIS — U07.1 ACUTE RESPIRATORY FAILURE DUE TO COVID-19 (HCC): ICD-10-CM

## 2020-11-23 DIAGNOSIS — R73.9 HYPERGLYCEMIA: ICD-10-CM

## 2020-11-23 DIAGNOSIS — U07.1 PNEUMONIA DUE TO COVID-19 VIRUS: Primary | ICD-10-CM

## 2020-11-23 DIAGNOSIS — J96.00 ACUTE RESPIRATORY FAILURE DUE TO COVID-19 (HCC): ICD-10-CM

## 2020-11-23 LAB
ALBUMIN SERPL-MCNC: 3.1 G/DL (ref 3.5–5.2)
ALBUMIN/GLOB SERPL: 0.9 G/DL
ALP SERPL-CCNC: 60 U/L (ref 39–117)
ALT SERPL W P-5'-P-CCNC: <5 U/L (ref 1–41)
AMMONIA BLD-SCNC: 20 UMOL/L (ref 16–60)
ANION GAP SERPL CALCULATED.3IONS-SCNC: 9.8 MMOL/L (ref 5–15)
AST SERPL-CCNC: 18 U/L (ref 1–40)
BACTERIA SPEC AEROBE CULT: NO GROWTH
BACTERIA UR QL AUTO: ABNORMAL /HPF
BASOPHILS # BLD AUTO: 0.01 10*3/MM3 (ref 0–0.2)
BASOPHILS NFR BLD AUTO: 0.1 % (ref 0–1.5)
BILIRUB SERPL-MCNC: 0.5 MG/DL (ref 0–1.2)
BILIRUB UR QL STRIP: NEGATIVE
BUN SERPL-MCNC: 36 MG/DL (ref 8–23)
BUN/CREAT SERPL: 21.7 (ref 7–25)
CALCIUM SPEC-SCNC: 9.3 MG/DL (ref 8.6–10.5)
CHLORIDE SERPL-SCNC: 99 MMOL/L (ref 98–107)
CLARITY UR: CLEAR
CO2 SERPL-SCNC: 27.2 MMOL/L (ref 22–29)
COLOR UR: YELLOW
CREAT SERPL-MCNC: 1.66 MG/DL (ref 0.76–1.27)
D DIMER PPP FEU-MCNC: 1.64 MCGFEU/ML (ref 0–0.57)
D-LACTATE SERPL-SCNC: 1.9 MMOL/L (ref 0.5–2)
DEPRECATED RDW RBC AUTO: 50.1 FL (ref 37–54)
EOSINOPHIL # BLD AUTO: 0 10*3/MM3 (ref 0–0.4)
EOSINOPHIL NFR BLD AUTO: 0 % (ref 0.3–6.2)
ERYTHROCYTE [DISTWIDTH] IN BLOOD BY AUTOMATED COUNT: 14.1 % (ref 12.3–15.4)
FERRITIN SERPL-MCNC: 557.1 NG/ML (ref 30–400)
GFR SERPL CREATININE-BSD FRML MDRD: 40 ML/MIN/1.73
GLOBULIN UR ELPH-MCNC: 3.4 GM/DL
GLUCOSE BLDC GLUCOMTR-MCNC: 252 MG/DL (ref 70–130)
GLUCOSE BLDC GLUCOMTR-MCNC: 264 MG/DL (ref 70–130)
GLUCOSE BLDC GLUCOMTR-MCNC: 384 MG/DL (ref 70–130)
GLUCOSE BLDC GLUCOMTR-MCNC: 394 MG/DL (ref 70–130)
GLUCOSE BLDC GLUCOMTR-MCNC: 471 MG/DL (ref 70–130)
GLUCOSE SERPL-MCNC: 512 MG/DL (ref 65–99)
GLUCOSE UR STRIP-MCNC: ABNORMAL MG/DL
HCT VFR BLD AUTO: 38.2 % (ref 37.5–51)
HGB BLD-MCNC: 12.3 G/DL (ref 13–17.7)
HGB UR QL STRIP.AUTO: ABNORMAL
HOLD SPECIMEN: NORMAL
HOLD SPECIMEN: NORMAL
HYALINE CASTS UR QL AUTO: ABNORMAL /LPF
IMM GRANULOCYTES # BLD AUTO: 0.19 10*3/MM3 (ref 0–0.05)
IMM GRANULOCYTES NFR BLD AUTO: 1.5 % (ref 0–0.5)
KETONES UR QL STRIP: NEGATIVE
LDH SERPL-CCNC: 352 U/L (ref 135–225)
LEUKOCYTE ESTERASE UR QL STRIP.AUTO: NEGATIVE
LYMPHOCYTES # BLD AUTO: 0.29 10*3/MM3 (ref 0.7–3.1)
LYMPHOCYTES NFR BLD AUTO: 2.2 % (ref 19.6–45.3)
MAGNESIUM SERPL-MCNC: 1.6 MG/DL (ref 1.6–2.4)
MAXIMAL PREDICTED HEART RATE: 138 BPM
MCH RBC QN AUTO: 31.2 PG (ref 26.6–33)
MCHC RBC AUTO-ENTMCNC: 32.2 G/DL (ref 31.5–35.7)
MCV RBC AUTO: 97 FL (ref 79–97)
MONOCYTES # BLD AUTO: 0.51 10*3/MM3 (ref 0.1–0.9)
MONOCYTES NFR BLD AUTO: 3.9 % (ref 5–12)
NEUTROPHILS NFR BLD AUTO: 11.96 10*3/MM3 (ref 1.7–7)
NEUTROPHILS NFR BLD AUTO: 92.3 % (ref 42.7–76)
NITRITE UR QL STRIP: NEGATIVE
NRBC BLD AUTO-RTO: 0 /100 WBC (ref 0–0.2)
NT-PROBNP SERPL-MCNC: 1717 PG/ML (ref 0–1800)
PH UR STRIP.AUTO: <=5 [PH] (ref 5–8)
PLATELET # BLD AUTO: 60 10*3/MM3 (ref 140–450)
PMV BLD AUTO: 10.9 FL (ref 6–12)
POTASSIUM SERPL-SCNC: 5.7 MMOL/L (ref 3.5–5.2)
PROCALCITONIN SERPL-MCNC: 0.29 NG/ML (ref 0–0.25)
PROT SERPL-MCNC: 6.5 G/DL (ref 6–8.5)
PROT UR QL STRIP: ABNORMAL
RBC # BLD AUTO: 3.94 10*6/MM3 (ref 4.14–5.8)
RBC # UR: ABNORMAL /HPF
REF LAB TEST METHOD: ABNORMAL
SARS-COV-2 RNA PNL SPEC NAA+PROBE: DETECTED
SODIUM SERPL-SCNC: 136 MMOL/L (ref 136–145)
SP GR UR STRIP: >=1.03 (ref 1–1.03)
SQUAMOUS #/AREA URNS HPF: ABNORMAL /HPF
STRESS TARGET HR: 117 BPM
TSH SERPL DL<=0.05 MIU/L-ACNC: 0.25 UIU/ML (ref 0.27–4.2)
UROBILINOGEN UR QL STRIP: ABNORMAL
VIT B12 BLD-MCNC: >2000 PG/ML (ref 211–946)
WBC # BLD AUTO: 12.96 10*3/MM3 (ref 3.4–10.8)
WBC UR QL AUTO: ABNORMAL /HPF
WHOLE BLOOD HOLD SPECIMEN: NORMAL
WHOLE BLOOD HOLD SPECIMEN: NORMAL

## 2020-11-23 PROCEDURE — 84145 PROCALCITONIN (PCT): CPT | Performed by: EMERGENCY MEDICINE

## 2020-11-23 PROCEDURE — 87635 SARS-COV-2 COVID-19 AMP PRB: CPT | Performed by: STUDENT IN AN ORGANIZED HEALTH CARE EDUCATION/TRAINING PROGRAM

## 2020-11-23 PROCEDURE — 63710000001 INSULIN DETEMIR PER 5 UNITS: Performed by: EMERGENCY MEDICINE

## 2020-11-23 PROCEDURE — 80053 COMPREHEN METABOLIC PANEL: CPT | Performed by: STUDENT IN AN ORGANIZED HEALTH CARE EDUCATION/TRAINING PROGRAM

## 2020-11-23 PROCEDURE — 25010000002 VANCOMYCIN 5 G RECONSTITUTED SOLUTION 5,000 MG VIAL: Performed by: EMERGENCY MEDICINE

## 2020-11-23 PROCEDURE — 63710000001 INSULIN ASPART PER 5 UNITS: Performed by: EMERGENCY MEDICINE

## 2020-11-23 PROCEDURE — 82728 ASSAY OF FERRITIN: CPT | Performed by: EMERGENCY MEDICINE

## 2020-11-23 PROCEDURE — 25010000002 CEFTAZIDIME PER 500 MG: Performed by: EMERGENCY MEDICINE

## 2020-11-23 PROCEDURE — 70450 CT HEAD/BRAIN W/O DYE: CPT

## 2020-11-23 PROCEDURE — 63710000001 INSULIN REGULAR HUMAN PER 5 UNITS: Performed by: STUDENT IN AN ORGANIZED HEALTH CARE EDUCATION/TRAINING PROGRAM

## 2020-11-23 PROCEDURE — 82962 GLUCOSE BLOOD TEST: CPT

## 2020-11-23 PROCEDURE — 82607 VITAMIN B-12: CPT | Performed by: EMERGENCY MEDICINE

## 2020-11-23 PROCEDURE — 93005 ELECTROCARDIOGRAM TRACING: CPT | Performed by: STUDENT IN AN ORGANIZED HEALTH CARE EDUCATION/TRAINING PROGRAM

## 2020-11-23 PROCEDURE — 25010000002 PIPERACILLIN SOD-TAZOBACTAM PER 1 G: Performed by: STUDENT IN AN ORGANIZED HEALTH CARE EDUCATION/TRAINING PROGRAM

## 2020-11-23 PROCEDURE — 25010000002 VANCOMYCIN 5 G RECONSTITUTED SOLUTION 5,000 MG VIAL: Performed by: STUDENT IN AN ORGANIZED HEALTH CARE EDUCATION/TRAINING PROGRAM

## 2020-11-23 PROCEDURE — 87040 BLOOD CULTURE FOR BACTERIA: CPT | Performed by: STUDENT IN AN ORGANIZED HEALTH CARE EDUCATION/TRAINING PROGRAM

## 2020-11-23 PROCEDURE — 25010000002 FUROSEMIDE PER 20 MG: Performed by: EMERGENCY MEDICINE

## 2020-11-23 PROCEDURE — 83615 LACTATE (LD) (LDH) ENZYME: CPT | Performed by: EMERGENCY MEDICINE

## 2020-11-23 PROCEDURE — 85025 COMPLETE CBC W/AUTO DIFF WBC: CPT | Performed by: STUDENT IN AN ORGANIZED HEALTH CARE EDUCATION/TRAINING PROGRAM

## 2020-11-23 PROCEDURE — 83605 ASSAY OF LACTIC ACID: CPT | Performed by: STUDENT IN AN ORGANIZED HEALTH CARE EDUCATION/TRAINING PROGRAM

## 2020-11-23 PROCEDURE — 93306 TTE W/DOPPLER COMPLETE: CPT | Performed by: INTERNAL MEDICINE

## 2020-11-23 PROCEDURE — 83880 ASSAY OF NATRIURETIC PEPTIDE: CPT | Performed by: EMERGENCY MEDICINE

## 2020-11-23 PROCEDURE — 83735 ASSAY OF MAGNESIUM: CPT | Performed by: INTERNAL MEDICINE

## 2020-11-23 PROCEDURE — 71045 X-RAY EXAM CHEST 1 VIEW: CPT

## 2020-11-23 PROCEDURE — 84443 ASSAY THYROID STIM HORMONE: CPT | Performed by: EMERGENCY MEDICINE

## 2020-11-23 PROCEDURE — 99285 EMERGENCY DEPT VISIT HI MDM: CPT

## 2020-11-23 PROCEDURE — 93306 TTE W/DOPPLER COMPLETE: CPT

## 2020-11-23 PROCEDURE — 85379 FIBRIN DEGRADATION QUANT: CPT | Performed by: STUDENT IN AN ORGANIZED HEALTH CARE EDUCATION/TRAINING PROGRAM

## 2020-11-23 PROCEDURE — 99223 1ST HOSP IP/OBS HIGH 75: CPT | Performed by: EMERGENCY MEDICINE

## 2020-11-23 PROCEDURE — 81001 URINALYSIS AUTO W/SCOPE: CPT | Performed by: STUDENT IN AN ORGANIZED HEALTH CARE EDUCATION/TRAINING PROGRAM

## 2020-11-23 PROCEDURE — 82140 ASSAY OF AMMONIA: CPT | Performed by: EMERGENCY MEDICINE

## 2020-11-23 RX ORDER — TAMSULOSIN HYDROCHLORIDE 0.4 MG/1
0.4 CAPSULE ORAL DAILY
Status: DISCONTINUED | OUTPATIENT
Start: 2020-11-23 | End: 2020-12-10 | Stop reason: HOSPADM

## 2020-11-23 RX ORDER — CARVEDILOL 3.12 MG/1
3.12 TABLET ORAL 2 TIMES DAILY
Status: DISCONTINUED | OUTPATIENT
Start: 2020-11-23 | End: 2020-11-26

## 2020-11-23 RX ORDER — GABAPENTIN 400 MG/1
400 CAPSULE ORAL EVERY 12 HOURS SCHEDULED
Status: DISCONTINUED | OUTPATIENT
Start: 2020-11-23 | End: 2020-12-10 | Stop reason: HOSPADM

## 2020-11-23 RX ORDER — SACUBITRIL AND VALSARTAN 24; 26 MG/1; MG/1
1 TABLET, FILM COATED ORAL 2 TIMES DAILY
COMMUNITY
End: 2020-12-10 | Stop reason: HOSPADM

## 2020-11-23 RX ORDER — SODIUM CHLORIDE 0.9 % (FLUSH) 0.9 %
10 SYRINGE (ML) INJECTION AS NEEDED
Status: DISCONTINUED | OUTPATIENT
Start: 2020-11-23 | End: 2020-12-10 | Stop reason: HOSPADM

## 2020-11-23 RX ORDER — FUROSEMIDE 10 MG/ML
60 INJECTION INTRAMUSCULAR; INTRAVENOUS EVERY 6 HOURS
Status: DISCONTINUED | OUTPATIENT
Start: 2020-11-23 | End: 2020-11-24

## 2020-11-23 RX ORDER — NICOTINE POLACRILEX 4 MG
1 LOZENGE BUCCAL
Status: DISCONTINUED | OUTPATIENT
Start: 2020-11-23 | End: 2020-12-10 | Stop reason: HOSPADM

## 2020-11-23 RX ORDER — ATORVASTATIN CALCIUM 40 MG/1
40 TABLET, FILM COATED ORAL DAILY
COMMUNITY

## 2020-11-23 RX ORDER — DEXAMETHASONE SODIUM PHOSPHATE 4 MG/ML
6 INJECTION, SOLUTION INTRA-ARTICULAR; INTRALESIONAL; INTRAMUSCULAR; INTRAVENOUS; SOFT TISSUE
Status: DISCONTINUED | OUTPATIENT
Start: 2020-11-23 | End: 2020-11-30

## 2020-11-23 RX ORDER — DEXTROSE MONOHYDRATE 25 G/50ML
25 INJECTION, SOLUTION INTRAVENOUS
Status: DISCONTINUED | OUTPATIENT
Start: 2020-11-23 | End: 2020-12-10 | Stop reason: HOSPADM

## 2020-11-23 RX ORDER — FAMOTIDINE 20 MG/1
20 TABLET, FILM COATED ORAL
Status: DISCONTINUED | OUTPATIENT
Start: 2020-11-23 | End: 2020-11-26

## 2020-11-23 RX ORDER — ACETAMINOPHEN 500 MG
1000 TABLET ORAL ONCE
Status: COMPLETED | OUTPATIENT
Start: 2020-11-23 | End: 2020-11-23

## 2020-11-23 RX ADMIN — INSULIN ASPART 5 UNITS: 100 INJECTION, SOLUTION INTRAVENOUS; SUBCUTANEOUS at 12:14

## 2020-11-23 RX ADMIN — CEFTAZIDIME 1 G: 1 INJECTION, POWDER, FOR SOLUTION INTRAMUSCULAR; INTRAVENOUS at 10:11

## 2020-11-23 RX ADMIN — CARBIDOPA AND LEVODOPA 1 TABLET: 25; 100 TABLET ORAL at 20:12

## 2020-11-23 RX ADMIN — CARBIDOPA AND LEVODOPA 1 TABLET: 25; 100 TABLET ORAL at 10:11

## 2020-11-23 RX ADMIN — HUMAN INSULIN 10 UNITS: 100 INJECTION, SOLUTION SUBCUTANEOUS at 04:10

## 2020-11-23 RX ADMIN — GABAPENTIN 400 MG: 400 CAPSULE ORAL at 20:12

## 2020-11-23 RX ADMIN — FAMOTIDINE 20 MG: 20 TABLET, FILM COATED ORAL at 10:10

## 2020-11-23 RX ADMIN — CARVEDILOL 3.12 MG: 3.12 TABLET, FILM COATED ORAL at 20:12

## 2020-11-23 RX ADMIN — ACETAMINOPHEN 1000 MG: 500 TABLET, FILM COATED ORAL at 03:27

## 2020-11-23 RX ADMIN — RIVAROXABAN 20 MG: 10 TABLET, FILM COATED ORAL at 10:10

## 2020-11-23 RX ADMIN — FAMOTIDINE 20 MG: 20 TABLET, FILM COATED ORAL at 17:15

## 2020-11-23 RX ADMIN — INSULIN ASPART 5 UNITS: 100 INJECTION, SOLUTION INTRAVENOUS; SUBCUTANEOUS at 10:08

## 2020-11-23 RX ADMIN — CARBIDOPA AND LEVODOPA 1 TABLET: 25; 100 TABLET ORAL at 17:15

## 2020-11-23 RX ADMIN — FUROSEMIDE 60 MG: 10 INJECTION, SOLUTION INTRAMUSCULAR; INTRAVENOUS at 05:04

## 2020-11-23 RX ADMIN — INSULIN ASPART 8 UNITS: 100 INJECTION, SOLUTION INTRAVENOUS; SUBCUTANEOUS at 12:14

## 2020-11-23 RX ADMIN — INSULIN ASPART 5 UNITS: 100 INJECTION, SOLUTION INTRAVENOUS; SUBCUTANEOUS at 17:15

## 2020-11-23 RX ADMIN — TAMSULOSIN HYDROCHLORIDE 0.4 MG: 0.4 CAPSULE ORAL at 10:08

## 2020-11-23 RX ADMIN — INSULIN DETEMIR 15 UNITS: 100 INJECTION, SOLUTION SUBCUTANEOUS at 20:14

## 2020-11-23 RX ADMIN — INSULIN ASPART 8 UNITS: 100 INJECTION, SOLUTION INTRAVENOUS; SUBCUTANEOUS at 10:07

## 2020-11-23 RX ADMIN — CARVEDILOL 3.12 MG: 3.12 TABLET, FILM COATED ORAL at 10:10

## 2020-11-23 RX ADMIN — SODIUM CHLORIDE 500 ML: 9 INJECTION, SOLUTION INTRAVENOUS at 01:26

## 2020-11-23 RX ADMIN — CEFTAZIDIME 1 G: 1 INJECTION, POWDER, FOR SOLUTION INTRAMUSCULAR; INTRAVENOUS at 20:08

## 2020-11-23 RX ADMIN — VANCOMYCIN HYDROCHLORIDE 2000 MG: 500 INJECTION, POWDER, LYOPHILIZED, FOR SOLUTION INTRAVENOUS at 03:24

## 2020-11-23 RX ADMIN — GABAPENTIN 400 MG: 400 CAPSULE ORAL at 10:09

## 2020-11-23 RX ADMIN — INSULIN ASPART 6 UNITS: 100 INJECTION, SOLUTION INTRAVENOUS; SUBCUTANEOUS at 17:15

## 2020-11-23 RX ADMIN — VANCOMYCIN HYDROCHLORIDE 1250 MG: 500 INJECTION, POWDER, LYOPHILIZED, FOR SOLUTION INTRAVENOUS at 17:15

## 2020-11-23 RX ADMIN — TAZOBACTAM SODIUM AND PIPERACILLIN SODIUM 3.38 G: 375; 3 INJECTION, SOLUTION INTRAVENOUS at 02:13

## 2020-11-23 RX ADMIN — DEXAMETHASONE 6 MG: 2 TABLET ORAL at 10:09

## 2020-11-23 RX ADMIN — FUROSEMIDE 60 MG: 10 INJECTION, SOLUTION INTRAMUSCULAR; INTRAVENOUS at 17:15

## 2020-11-23 RX ADMIN — FUROSEMIDE 60 MG: 10 INJECTION, SOLUTION INTRAMUSCULAR; INTRAVENOUS at 10:10

## 2020-11-23 NOTE — OUTREACH NOTE
COVID-19 Week 2 Survey      Responses   [facility] patient discharged from?  [facility]   Does the patient have one of the following disease processes/diagnoses(primary or secondary)?  COVID-19   COVID-19 underlying condition?  None   Call Number  Call 2   COVID-19 Week 2: Call 1 attempt successful?  No   Revoke  Readmitted   Discharge diagnosis  COVID-19 pneumonia          Deborah Chinchilla RN

## 2020-11-24 LAB
ALBUMIN SERPL-MCNC: 2.5 G/DL (ref 3.5–5.2)
ALBUMIN/GLOB SERPL: 0.8 G/DL
ALP SERPL-CCNC: 49 U/L (ref 39–117)
ALT SERPL W P-5'-P-CCNC: <5 U/L (ref 1–41)
ANION GAP SERPL CALCULATED.3IONS-SCNC: 10.2 MMOL/L (ref 5–15)
AST SERPL-CCNC: 15 U/L (ref 1–40)
BASOPHILS # BLD AUTO: 0.01 10*3/MM3 (ref 0–0.2)
BASOPHILS NFR BLD AUTO: 0.1 % (ref 0–1.5)
BILIRUB SERPL-MCNC: 0.5 MG/DL (ref 0–1.2)
BUN SERPL-MCNC: 46 MG/DL (ref 8–23)
BUN/CREAT SERPL: 27.4 (ref 7–25)
CALCIUM SPEC-SCNC: 9.5 MG/DL (ref 8.6–10.5)
CHLORIDE SERPL-SCNC: 99 MMOL/L (ref 98–107)
CO2 SERPL-SCNC: 31.8 MMOL/L (ref 22–29)
CREAT SERPL-MCNC: 1.68 MG/DL (ref 0.76–1.27)
DEPRECATED RDW RBC AUTO: 49.9 FL (ref 37–54)
EOSINOPHIL # BLD AUTO: 0 10*3/MM3 (ref 0–0.4)
EOSINOPHIL NFR BLD AUTO: 0 % (ref 0.3–6.2)
ERYTHROCYTE [DISTWIDTH] IN BLOOD BY AUTOMATED COUNT: 14.2 % (ref 12.3–15.4)
GFR SERPL CREATININE-BSD FRML MDRD: 39 ML/MIN/1.73
GLOBULIN UR ELPH-MCNC: 3.3 GM/DL
GLUCOSE BLDC GLUCOMTR-MCNC: 283 MG/DL (ref 70–130)
GLUCOSE BLDC GLUCOMTR-MCNC: 353 MG/DL (ref 70–130)
GLUCOSE BLDC GLUCOMTR-MCNC: 380 MG/DL (ref 70–130)
GLUCOSE BLDC GLUCOMTR-MCNC: 422 MG/DL (ref 70–130)
GLUCOSE BLDC GLUCOMTR-MCNC: 452 MG/DL (ref 70–130)
GLUCOSE SERPL-MCNC: 277 MG/DL (ref 65–99)
HBA1C MFR BLD: 10.2 % (ref 4.8–5.6)
HCT VFR BLD AUTO: 35.3 % (ref 37.5–51)
HGB BLD-MCNC: 11.4 G/DL (ref 13–17.7)
IMM GRANULOCYTES # BLD AUTO: 0.08 10*3/MM3 (ref 0–0.05)
IMM GRANULOCYTES NFR BLD AUTO: 0.6 % (ref 0–0.5)
LYMPHOCYTES # BLD AUTO: 0.42 10*3/MM3 (ref 0.7–3.1)
LYMPHOCYTES NFR BLD AUTO: 3.1 % (ref 19.6–45.3)
MCH RBC QN AUTO: 31.1 PG (ref 26.6–33)
MCHC RBC AUTO-ENTMCNC: 32.3 G/DL (ref 31.5–35.7)
MCV RBC AUTO: 96.2 FL (ref 79–97)
MONOCYTES # BLD AUTO: 0.38 10*3/MM3 (ref 0.1–0.9)
MONOCYTES NFR BLD AUTO: 2.8 % (ref 5–12)
NEUTROPHILS NFR BLD AUTO: 12.79 10*3/MM3 (ref 1.7–7)
NEUTROPHILS NFR BLD AUTO: 93.4 % (ref 42.7–76)
NRBC BLD AUTO-RTO: 0 /100 WBC (ref 0–0.2)
PHOSPHATE SERPL-MCNC: 3 MG/DL (ref 2.5–4.5)
PLATELET # BLD AUTO: 48 10*3/MM3 (ref 140–450)
PMV BLD AUTO: 12.2 FL (ref 6–12)
POTASSIUM SERPL-SCNC: 4.6 MMOL/L (ref 3.5–5.2)
PROT SERPL-MCNC: 5.8 G/DL (ref 6–8.5)
RBC # BLD AUTO: 3.67 10*6/MM3 (ref 4.14–5.8)
RBC MORPH BLD: NORMAL
SMALL PLATELETS BLD QL SMEAR: NORMAL
SODIUM SERPL-SCNC: 141 MMOL/L (ref 136–145)
VANCOMYCIN SERPL-MCNC: 17.1 MCG/ML (ref 5–40)
WBC # BLD AUTO: 13.68 10*3/MM3 (ref 3.4–10.8)
WBC MORPH BLD: NORMAL

## 2020-11-24 PROCEDURE — 99233 SBSQ HOSP IP/OBS HIGH 50: CPT | Performed by: EMERGENCY MEDICINE

## 2020-11-24 PROCEDURE — 80053 COMPREHEN METABOLIC PANEL: CPT | Performed by: EMERGENCY MEDICINE

## 2020-11-24 PROCEDURE — 25010000002 CEFTAZIDIME PER 500 MG: Performed by: EMERGENCY MEDICINE

## 2020-11-24 PROCEDURE — 83036 HEMOGLOBIN GLYCOSYLATED A1C: CPT | Performed by: EMERGENCY MEDICINE

## 2020-11-24 PROCEDURE — 94799 UNLISTED PULMONARY SVC/PX: CPT

## 2020-11-24 PROCEDURE — 80202 ASSAY OF VANCOMYCIN: CPT | Performed by: EMERGENCY MEDICINE

## 2020-11-24 PROCEDURE — 63710000001 INSULIN ASPART PER 5 UNITS: Performed by: FAMILY MEDICINE

## 2020-11-24 PROCEDURE — 25010000002 VANCOMYCIN 5 G RECONSTITUTED SOLUTION 5,000 MG VIAL: Performed by: EMERGENCY MEDICINE

## 2020-11-24 PROCEDURE — 63710000001 INSULIN ASPART PER 5 UNITS: Performed by: EMERGENCY MEDICINE

## 2020-11-24 PROCEDURE — 85025 COMPLETE CBC W/AUTO DIFF WBC: CPT | Performed by: EMERGENCY MEDICINE

## 2020-11-24 PROCEDURE — 85007 BL SMEAR W/DIFF WBC COUNT: CPT | Performed by: EMERGENCY MEDICINE

## 2020-11-24 PROCEDURE — 82962 GLUCOSE BLOOD TEST: CPT

## 2020-11-24 PROCEDURE — 25010000002 FUROSEMIDE PER 20 MG: Performed by: EMERGENCY MEDICINE

## 2020-11-24 PROCEDURE — 63710000001 INSULIN DETEMIR PER 5 UNITS: Performed by: EMERGENCY MEDICINE

## 2020-11-24 PROCEDURE — 84100 ASSAY OF PHOSPHORUS: CPT | Performed by: INTERNAL MEDICINE

## 2020-11-24 RX ORDER — ATORVASTATIN CALCIUM 40 MG/1
40 TABLET, FILM COATED ORAL NIGHTLY
Status: DISCONTINUED | OUTPATIENT
Start: 2020-11-24 | End: 2020-12-10 | Stop reason: HOSPADM

## 2020-11-24 RX ORDER — ALBUTEROL SULFATE 90 UG/1
2 AEROSOL, METERED RESPIRATORY (INHALATION)
Status: DISCONTINUED | OUTPATIENT
Start: 2020-11-24 | End: 2020-12-10 | Stop reason: HOSPADM

## 2020-11-24 RX ADMIN — INSULIN DETEMIR 20 UNITS: 100 INJECTION, SOLUTION SUBCUTANEOUS at 21:52

## 2020-11-24 RX ADMIN — CARBIDOPA AND LEVODOPA 1 TABLET: 25; 100 TABLET ORAL at 18:09

## 2020-11-24 RX ADMIN — INSULIN ASPART 5 UNITS: 100 INJECTION, SOLUTION INTRAVENOUS; SUBCUTANEOUS at 10:01

## 2020-11-24 RX ADMIN — CARBIDOPA AND LEVODOPA 1 TABLET: 25; 100 TABLET ORAL at 20:18

## 2020-11-24 RX ADMIN — CARBIDOPA AND LEVODOPA 1 TABLET: 25; 100 TABLET ORAL at 10:05

## 2020-11-24 RX ADMIN — GABAPENTIN 400 MG: 400 CAPSULE ORAL at 10:00

## 2020-11-24 RX ADMIN — GABAPENTIN 400 MG: 400 CAPSULE ORAL at 20:18

## 2020-11-24 RX ADMIN — VANCOMYCIN HYDROCHLORIDE 1250 MG: 500 INJECTION, POWDER, LYOPHILIZED, FOR SOLUTION INTRAVENOUS at 13:33

## 2020-11-24 RX ADMIN — FUROSEMIDE 60 MG: 10 INJECTION, SOLUTION INTRAMUSCULAR; INTRAVENOUS at 00:04

## 2020-11-24 RX ADMIN — TAMSULOSIN HYDROCHLORIDE 0.4 MG: 0.4 CAPSULE ORAL at 10:00

## 2020-11-24 RX ADMIN — INSULIN ASPART 10 UNITS: 100 INJECTION, SOLUTION INTRAVENOUS; SUBCUTANEOUS at 21:52

## 2020-11-24 RX ADMIN — ALBUTEROL SULFATE 2 PUFF: 90 AEROSOL, METERED RESPIRATORY (INHALATION) at 17:00

## 2020-11-24 RX ADMIN — INSULIN ASPART 8 UNITS: 100 INJECTION, SOLUTION INTRAVENOUS; SUBCUTANEOUS at 13:34

## 2020-11-24 RX ADMIN — FUROSEMIDE 60 MG: 10 INJECTION, SOLUTION INTRAMUSCULAR; INTRAVENOUS at 10:00

## 2020-11-24 RX ADMIN — DEXAMETHASONE 6 MG: 2 TABLET ORAL at 10:00

## 2020-11-24 RX ADMIN — ATORVASTATIN CALCIUM 40 MG: 40 TABLET, FILM COATED ORAL at 20:18

## 2020-11-24 RX ADMIN — SODIUM CHLORIDE, PRESERVATIVE FREE 10 ML: 5 INJECTION INTRAVENOUS at 20:18

## 2020-11-24 RX ADMIN — FAMOTIDINE 20 MG: 20 TABLET, FILM COATED ORAL at 06:46

## 2020-11-24 RX ADMIN — CARVEDILOL 3.12 MG: 3.12 TABLET, FILM COATED ORAL at 20:18

## 2020-11-24 RX ADMIN — FAMOTIDINE 20 MG: 20 TABLET, FILM COATED ORAL at 18:11

## 2020-11-24 RX ADMIN — ALBUTEROL SULFATE 2 PUFF: 90 AEROSOL, METERED RESPIRATORY (INHALATION) at 13:33

## 2020-11-24 RX ADMIN — CEFTAZIDIME 1 G: 1 INJECTION, POWDER, FOR SOLUTION INTRAMUSCULAR; INTRAVENOUS at 20:40

## 2020-11-24 RX ADMIN — FUROSEMIDE 60 MG: 10 INJECTION, SOLUTION INTRAMUSCULAR; INTRAVENOUS at 06:46

## 2020-11-24 RX ADMIN — CARVEDILOL 3.12 MG: 3.12 TABLET, FILM COATED ORAL at 10:00

## 2020-11-24 RX ADMIN — INSULIN ASPART 5 UNITS: 100 INJECTION, SOLUTION INTRAVENOUS; SUBCUTANEOUS at 13:34

## 2020-11-24 RX ADMIN — INSULIN ASPART 15 UNITS: 100 INJECTION, SOLUTION INTRAVENOUS; SUBCUTANEOUS at 18:30

## 2020-11-24 RX ADMIN — ALBUTEROL SULFATE 2 PUFF: 90 AEROSOL, METERED RESPIRATORY (INHALATION) at 10:03

## 2020-11-24 RX ADMIN — ALBUTEROL SULFATE 2 PUFF: 90 AEROSOL, METERED RESPIRATORY (INHALATION) at 20:18

## 2020-11-24 RX ADMIN — FUROSEMIDE 60 MG: 10 INJECTION, SOLUTION INTRAMUSCULAR; INTRAVENOUS at 17:25

## 2020-11-24 RX ADMIN — CEFTAZIDIME 1 G: 1 INJECTION, POWDER, FOR SOLUTION INTRAMUSCULAR; INTRAVENOUS at 10:00

## 2020-11-24 RX ADMIN — INSULIN ASPART 6 UNITS: 100 INJECTION, SOLUTION INTRAVENOUS; SUBCUTANEOUS at 06:46

## 2020-11-25 PROBLEM — D69.6 THROMBOCYTOPENIA (HCC): Status: ACTIVE | Noted: 2020-11-25

## 2020-11-25 PROBLEM — N18.30 ACUTE RENAL FAILURE SUPERIMPOSED ON STAGE 3 CHRONIC KIDNEY DISEASE (HCC): Status: ACTIVE | Noted: 2020-11-11

## 2020-11-25 LAB
ALBUMIN SERPL-MCNC: 2.6 G/DL (ref 3.5–5.2)
ALBUMIN/GLOB SERPL: 0.8 G/DL
ALP SERPL-CCNC: 52 U/L (ref 39–117)
ALT SERPL W P-5'-P-CCNC: <5 U/L (ref 1–41)
ANION GAP SERPL CALCULATED.3IONS-SCNC: 10 MMOL/L (ref 5–15)
AST SERPL-CCNC: 11 U/L (ref 1–40)
BACTERIA SPEC AEROBE CULT: NORMAL
BACTERIA SPEC AEROBE CULT: NORMAL
BASOPHILS # BLD AUTO: 0.01 10*3/MM3 (ref 0–0.2)
BASOPHILS NFR BLD AUTO: 0.1 % (ref 0–1.5)
BILIRUB SERPL-MCNC: 0.5 MG/DL (ref 0–1.2)
BUN SERPL-MCNC: 61 MG/DL (ref 8–23)
BUN/CREAT SERPL: 40.4 (ref 7–25)
CALCIUM SPEC-SCNC: 9.9 MG/DL (ref 8.6–10.5)
CHLORIDE SERPL-SCNC: 97 MMOL/L (ref 98–107)
CO2 SERPL-SCNC: 35 MMOL/L (ref 22–29)
CREAT SERPL-MCNC: 1.51 MG/DL (ref 0.76–1.27)
DEPRECATED RDW RBC AUTO: 48.9 FL (ref 37–54)
EOSINOPHIL # BLD AUTO: 0 10*3/MM3 (ref 0–0.4)
EOSINOPHIL NFR BLD AUTO: 0 % (ref 0.3–6.2)
ERYTHROCYTE [DISTWIDTH] IN BLOOD BY AUTOMATED COUNT: 13.8 % (ref 12.3–15.4)
GFR SERPL CREATININE-BSD FRML MDRD: 44 ML/MIN/1.73
GLOBULIN UR ELPH-MCNC: 3.4 GM/DL
GLUCOSE BLDC GLUCOMTR-MCNC: 333 MG/DL (ref 70–130)
GLUCOSE BLDC GLUCOMTR-MCNC: 347 MG/DL (ref 70–130)
GLUCOSE BLDC GLUCOMTR-MCNC: 397 MG/DL (ref 70–130)
GLUCOSE BLDC GLUCOMTR-MCNC: 413 MG/DL (ref 70–130)
GLUCOSE SERPL-MCNC: 353 MG/DL (ref 65–99)
HCT VFR BLD AUTO: 35.1 % (ref 37.5–51)
HGB BLD-MCNC: 11.4 G/DL (ref 13–17.7)
IMM GRANULOCYTES # BLD AUTO: 0.05 10*3/MM3 (ref 0–0.05)
IMM GRANULOCYTES NFR BLD AUTO: 0.6 % (ref 0–0.5)
LYMPHOCYTES # BLD AUTO: 0.34 10*3/MM3 (ref 0.7–3.1)
LYMPHOCYTES NFR BLD AUTO: 4 % (ref 19.6–45.3)
MCH RBC QN AUTO: 31 PG (ref 26.6–33)
MCHC RBC AUTO-ENTMCNC: 32.5 G/DL (ref 31.5–35.7)
MCV RBC AUTO: 95.4 FL (ref 79–97)
MONOCYTES # BLD AUTO: 0.26 10*3/MM3 (ref 0.1–0.9)
MONOCYTES NFR BLD AUTO: 3.1 % (ref 5–12)
MRSA DNA SPEC QL NAA+PROBE: NORMAL
NEUTROPHILS NFR BLD AUTO: 7.86 10*3/MM3 (ref 1.7–7)
NEUTROPHILS NFR BLD AUTO: 92.2 % (ref 42.7–76)
NRBC BLD AUTO-RTO: 0 /100 WBC (ref 0–0.2)
PLATELET # BLD AUTO: 48 10*3/MM3 (ref 140–450)
PMV BLD AUTO: 12.4 FL (ref 6–12)
POTASSIUM SERPL-SCNC: 3.9 MMOL/L (ref 3.5–5.2)
PROCALCITONIN SERPL-MCNC: 0.22 NG/ML (ref 0–0.25)
PROT SERPL-MCNC: 6 G/DL (ref 6–8.5)
RBC # BLD AUTO: 3.68 10*6/MM3 (ref 4.14–5.8)
SODIUM SERPL-SCNC: 142 MMOL/L (ref 136–145)
URATE SERPL-MCNC: 11.8 MG/DL (ref 3.4–7)
WBC # BLD AUTO: 8.52 10*3/MM3 (ref 3.4–10.8)

## 2020-11-25 PROCEDURE — 99233 SBSQ HOSP IP/OBS HIGH 50: CPT | Performed by: FAMILY MEDICINE

## 2020-11-25 PROCEDURE — 63710000001 INSULIN DETEMIR PER 5 UNITS: Performed by: EMERGENCY MEDICINE

## 2020-11-25 PROCEDURE — 87641 MR-STAPH DNA AMP PROBE: CPT | Performed by: EMERGENCY MEDICINE

## 2020-11-25 PROCEDURE — 85025 COMPLETE CBC W/AUTO DIFF WBC: CPT | Performed by: EMERGENCY MEDICINE

## 2020-11-25 PROCEDURE — 25010000002 CEFTAZIDIME PER 500 MG: Performed by: EMERGENCY MEDICINE

## 2020-11-25 PROCEDURE — 63710000001 DEXAMETHASONE PER 0.25 MG: Performed by: EMERGENCY MEDICINE

## 2020-11-25 PROCEDURE — 63710000001 INSULIN ASPART PER 5 UNITS: Performed by: FAMILY MEDICINE

## 2020-11-25 PROCEDURE — 63710000001 INSULIN ASPART PER 5 UNITS: Performed by: EMERGENCY MEDICINE

## 2020-11-25 PROCEDURE — 80053 COMPREHEN METABOLIC PANEL: CPT | Performed by: EMERGENCY MEDICINE

## 2020-11-25 PROCEDURE — 82962 GLUCOSE BLOOD TEST: CPT

## 2020-11-25 PROCEDURE — 94799 UNLISTED PULMONARY SVC/PX: CPT

## 2020-11-25 PROCEDURE — 84550 ASSAY OF BLOOD/URIC ACID: CPT | Performed by: INTERNAL MEDICINE

## 2020-11-25 PROCEDURE — 84145 PROCALCITONIN (PCT): CPT | Performed by: EMERGENCY MEDICINE

## 2020-11-25 PROCEDURE — 25010000002 VANCOMYCIN 5 G RECONSTITUTED SOLUTION 5,000 MG VIAL: Performed by: EMERGENCY MEDICINE

## 2020-11-25 RX ADMIN — INSULIN ASPART 5 UNITS: 100 INJECTION, SOLUTION INTRAVENOUS; SUBCUTANEOUS at 09:02

## 2020-11-25 RX ADMIN — INSULIN ASPART 7 UNITS: 100 INJECTION, SOLUTION INTRAVENOUS; SUBCUTANEOUS at 18:04

## 2020-11-25 RX ADMIN — CARVEDILOL 3.12 MG: 3.12 TABLET, FILM COATED ORAL at 21:24

## 2020-11-25 RX ADMIN — CARBIDOPA AND LEVODOPA 1 TABLET: 25; 100 TABLET ORAL at 18:04

## 2020-11-25 RX ADMIN — FAMOTIDINE 20 MG: 20 TABLET, FILM COATED ORAL at 18:04

## 2020-11-25 RX ADMIN — CARBIDOPA AND LEVODOPA 1 TABLET: 25; 100 TABLET ORAL at 09:03

## 2020-11-25 RX ADMIN — ALBUTEROL SULFATE 2 PUFF: 90 AEROSOL, METERED RESPIRATORY (INHALATION) at 12:24

## 2020-11-25 RX ADMIN — GABAPENTIN 400 MG: 400 CAPSULE ORAL at 21:24

## 2020-11-25 RX ADMIN — CARBIDOPA AND LEVODOPA 1 TABLET: 25; 100 TABLET ORAL at 21:24

## 2020-11-25 RX ADMIN — DEXAMETHASONE 6 MG: 2 TABLET ORAL at 09:05

## 2020-11-25 RX ADMIN — SODIUM CHLORIDE, PRESERVATIVE FREE 10 ML: 5 INJECTION INTRAVENOUS at 09:02

## 2020-11-25 RX ADMIN — INSULIN ASPART 5 UNITS: 100 INJECTION, SOLUTION INTRAVENOUS; SUBCUTANEOUS at 18:05

## 2020-11-25 RX ADMIN — TAMSULOSIN HYDROCHLORIDE 0.4 MG: 0.4 CAPSULE ORAL at 09:02

## 2020-11-25 RX ADMIN — GABAPENTIN 400 MG: 400 CAPSULE ORAL at 09:02

## 2020-11-25 RX ADMIN — INSULIN DETEMIR 20 UNITS: 100 INJECTION, SOLUTION SUBCUTANEOUS at 21:28

## 2020-11-25 RX ADMIN — VANCOMYCIN HYDROCHLORIDE 1250 MG: 500 INJECTION, POWDER, LYOPHILIZED, FOR SOLUTION INTRAVENOUS at 14:49

## 2020-11-25 RX ADMIN — INSULIN ASPART 7 UNITS: 100 INJECTION, SOLUTION INTRAVENOUS; SUBCUTANEOUS at 06:56

## 2020-11-25 RX ADMIN — CARVEDILOL 3.12 MG: 3.12 TABLET, FILM COATED ORAL at 09:03

## 2020-11-25 RX ADMIN — INSULIN ASPART 5 UNITS: 100 INJECTION, SOLUTION INTRAVENOUS; SUBCUTANEOUS at 12:24

## 2020-11-25 RX ADMIN — ALBUTEROL SULFATE 2 PUFF: 90 AEROSOL, METERED RESPIRATORY (INHALATION) at 21:25

## 2020-11-25 RX ADMIN — FAMOTIDINE 20 MG: 20 TABLET, FILM COATED ORAL at 06:56

## 2020-11-25 RX ADMIN — INSULIN ASPART 15 UNITS: 100 INJECTION, SOLUTION INTRAVENOUS; SUBCUTANEOUS at 21:27

## 2020-11-25 RX ADMIN — ATORVASTATIN CALCIUM 40 MG: 40 TABLET, FILM COATED ORAL at 21:24

## 2020-11-25 RX ADMIN — ALBUTEROL SULFATE 2 PUFF: 90 AEROSOL, METERED RESPIRATORY (INHALATION) at 18:04

## 2020-11-25 RX ADMIN — CEFTAZIDIME 1 G: 1 INJECTION, POWDER, FOR SOLUTION INTRAMUSCULAR; INTRAVENOUS at 09:03

## 2020-11-25 RX ADMIN — INSULIN ASPART 8 UNITS: 100 INJECTION, SOLUTION INTRAVENOUS; SUBCUTANEOUS at 12:23

## 2020-11-25 RX ADMIN — ALBUTEROL SULFATE 2 PUFF: 90 AEROSOL, METERED RESPIRATORY (INHALATION) at 09:01

## 2020-11-26 LAB
ALBUMIN SERPL-MCNC: 2.6 G/DL (ref 3.5–5.2)
ALBUMIN/GLOB SERPL: 0.8 G/DL
ALP SERPL-CCNC: 54 U/L (ref 39–117)
ALT SERPL W P-5'-P-CCNC: <5 U/L (ref 1–41)
ANION GAP SERPL CALCULATED.3IONS-SCNC: 9.5 MMOL/L (ref 5–15)
AST SERPL-CCNC: 12 U/L (ref 1–40)
BASOPHILS # BLD AUTO: 0.01 10*3/MM3 (ref 0–0.2)
BASOPHILS NFR BLD AUTO: 0.1 % (ref 0–1.5)
BILIRUB SERPL-MCNC: 0.5 MG/DL (ref 0–1.2)
BUN SERPL-MCNC: 61 MG/DL (ref 8–23)
BUN/CREAT SERPL: 49.2 (ref 7–25)
CALCIUM SPEC-SCNC: 9.7 MG/DL (ref 8.6–10.5)
CHLORIDE SERPL-SCNC: 99 MMOL/L (ref 98–107)
CO2 SERPL-SCNC: 34.5 MMOL/L (ref 22–29)
CREAT SERPL-MCNC: 1.24 MG/DL (ref 0.76–1.27)
DEPRECATED RDW RBC AUTO: 47.7 FL (ref 37–54)
EOSINOPHIL # BLD AUTO: 0 10*3/MM3 (ref 0–0.4)
EOSINOPHIL NFR BLD AUTO: 0 % (ref 0.3–6.2)
ERYTHROCYTE [DISTWIDTH] IN BLOOD BY AUTOMATED COUNT: 13.8 % (ref 12.3–15.4)
GFR SERPL CREATININE-BSD FRML MDRD: 56 ML/MIN/1.73
GLOBULIN UR ELPH-MCNC: 3.4 GM/DL
GLUCOSE BLDC GLUCOMTR-MCNC: 305 MG/DL (ref 70–130)
GLUCOSE BLDC GLUCOMTR-MCNC: 319 MG/DL (ref 70–130)
GLUCOSE BLDC GLUCOMTR-MCNC: 388 MG/DL (ref 70–130)
GLUCOSE BLDC GLUCOMTR-MCNC: 410 MG/DL (ref 70–130)
GLUCOSE SERPL-MCNC: 294 MG/DL (ref 65–99)
HCT VFR BLD AUTO: 35.2 % (ref 37.5–51)
HGB BLD-MCNC: 11.5 G/DL (ref 13–17.7)
IMM GRANULOCYTES # BLD AUTO: 0.07 10*3/MM3 (ref 0–0.05)
IMM GRANULOCYTES NFR BLD AUTO: 0.7 % (ref 0–0.5)
LYMPHOCYTES # BLD AUTO: 0.51 10*3/MM3 (ref 0.7–3.1)
LYMPHOCYTES NFR BLD AUTO: 5.2 % (ref 19.6–45.3)
MCH RBC QN AUTO: 30.7 PG (ref 26.6–33)
MCHC RBC AUTO-ENTMCNC: 32.7 G/DL (ref 31.5–35.7)
MCV RBC AUTO: 94.1 FL (ref 79–97)
MONOCYTES # BLD AUTO: 0.35 10*3/MM3 (ref 0.1–0.9)
MONOCYTES NFR BLD AUTO: 3.6 % (ref 5–12)
NEUTROPHILS NFR BLD AUTO: 8.87 10*3/MM3 (ref 1.7–7)
NEUTROPHILS NFR BLD AUTO: 90.4 % (ref 42.7–76)
NRBC BLD AUTO-RTO: 0 /100 WBC (ref 0–0.2)
PLATELET # BLD AUTO: 63 10*3/MM3 (ref 140–450)
PMV BLD AUTO: 12.3 FL (ref 6–12)
POTASSIUM SERPL-SCNC: 4.1 MMOL/L (ref 3.5–5.2)
PROT SERPL-MCNC: 6 G/DL (ref 6–8.5)
RBC # BLD AUTO: 3.74 10*6/MM3 (ref 4.14–5.8)
RBC MORPH BLD: NORMAL
SMALL PLATELETS BLD QL SMEAR: NORMAL
SODIUM SERPL-SCNC: 143 MMOL/L (ref 136–145)
WBC # BLD AUTO: 9.81 10*3/MM3 (ref 3.4–10.8)
WBC MORPH BLD: NORMAL

## 2020-11-26 PROCEDURE — 94660 CPAP INITIATION&MGMT: CPT

## 2020-11-26 PROCEDURE — 85025 COMPLETE CBC W/AUTO DIFF WBC: CPT | Performed by: EMERGENCY MEDICINE

## 2020-11-26 PROCEDURE — 94799 UNLISTED PULMONARY SVC/PX: CPT

## 2020-11-26 PROCEDURE — 25010000002 ZIPRASIDONE MESYLATE PER 10 MG: Performed by: FAMILY MEDICINE

## 2020-11-26 PROCEDURE — 85007 BL SMEAR W/DIFF WBC COUNT: CPT | Performed by: EMERGENCY MEDICINE

## 2020-11-26 PROCEDURE — 63710000001 DEXAMETHASONE PER 0.25 MG: Performed by: EMERGENCY MEDICINE

## 2020-11-26 PROCEDURE — 63710000001 INSULIN DETEMIR PER 5 UNITS: Performed by: FAMILY MEDICINE

## 2020-11-26 PROCEDURE — 93005 ELECTROCARDIOGRAM TRACING: CPT | Performed by: FAMILY MEDICINE

## 2020-11-26 PROCEDURE — 99232 SBSQ HOSP IP/OBS MODERATE 35: CPT | Performed by: FAMILY MEDICINE

## 2020-11-26 PROCEDURE — 63710000001 INSULIN ASPART PER 5 UNITS: Performed by: FAMILY MEDICINE

## 2020-11-26 PROCEDURE — 82962 GLUCOSE BLOOD TEST: CPT

## 2020-11-26 PROCEDURE — 63710000001 INSULIN ASPART PER 5 UNITS: Performed by: EMERGENCY MEDICINE

## 2020-11-26 PROCEDURE — 80053 COMPREHEN METABOLIC PANEL: CPT | Performed by: EMERGENCY MEDICINE

## 2020-11-26 RX ORDER — L.ACID,PARA/B.BIFIDUM/S.THERM 8B CELL
1 CAPSULE ORAL 2 TIMES DAILY
Status: DISCONTINUED | OUTPATIENT
Start: 2020-11-26 | End: 2020-12-10 | Stop reason: HOSPADM

## 2020-11-26 RX ORDER — ALPRAZOLAM 0.25 MG/1
0.25 TABLET ORAL 2 TIMES DAILY PRN
Status: DISCONTINUED | OUTPATIENT
Start: 2020-11-26 | End: 2020-11-27

## 2020-11-26 RX ORDER — ZIPRASIDONE MESYLATE 20 MG/ML
20 INJECTION, POWDER, LYOPHILIZED, FOR SOLUTION INTRAMUSCULAR EVERY 4 HOURS PRN
Status: DISCONTINUED | OUTPATIENT
Start: 2020-11-26 | End: 2020-11-28

## 2020-11-26 RX ORDER — ROPINIROLE 0.25 MG/1
0.5 TABLET, FILM COATED ORAL NIGHTLY PRN
Status: DISCONTINUED | OUTPATIENT
Start: 2020-11-26 | End: 2020-12-10 | Stop reason: HOSPADM

## 2020-11-26 RX ORDER — FOLIC ACID/VIT B COMPLEX AND C 0.8 MG
1 TABLET ORAL DAILY
Status: DISCONTINUED | OUTPATIENT
Start: 2020-11-26 | End: 2020-12-10 | Stop reason: HOSPADM

## 2020-11-26 RX ORDER — CHOLECALCIFEROL (VITAMIN D3) 125 MCG
5 CAPSULE ORAL NIGHTLY
Status: DISCONTINUED | OUTPATIENT
Start: 2020-11-26 | End: 2020-12-10 | Stop reason: HOSPADM

## 2020-11-26 RX ORDER — ASCORBIC ACID 500 MG
500 TABLET ORAL DAILY
Status: DISCONTINUED | OUTPATIENT
Start: 2020-11-26 | End: 2020-12-10 | Stop reason: HOSPADM

## 2020-11-26 RX ORDER — METOPROLOL TARTRATE 5 MG/5ML
2.5 INJECTION INTRAVENOUS EVERY 4 HOURS PRN
Status: DISCONTINUED | OUTPATIENT
Start: 2020-11-26 | End: 2020-11-27

## 2020-11-26 RX ORDER — PANTOPRAZOLE SODIUM 40 MG/1
40 TABLET, DELAYED RELEASE ORAL DAILY
Status: DISCONTINUED | OUTPATIENT
Start: 2020-11-26 | End: 2020-12-10 | Stop reason: HOSPADM

## 2020-11-26 RX ORDER — CARVEDILOL 6.25 MG/1
6.25 TABLET ORAL 2 TIMES DAILY
Status: DISCONTINUED | OUTPATIENT
Start: 2020-11-26 | End: 2020-12-10 | Stop reason: HOSPADM

## 2020-11-26 RX ADMIN — ALBUTEROL SULFATE 2 PUFF: 90 AEROSOL, METERED RESPIRATORY (INHALATION) at 17:20

## 2020-11-26 RX ADMIN — OXYCODONE HYDROCHLORIDE AND ACETAMINOPHEN 500 MG: 500 TABLET ORAL at 17:20

## 2020-11-26 RX ADMIN — INSULIN ASPART 5 UNITS: 100 INJECTION, SOLUTION INTRAVENOUS; SUBCUTANEOUS at 08:45

## 2020-11-26 RX ADMIN — Medication 5 MG: at 21:17

## 2020-11-26 RX ADMIN — APIXABAN 2.5 MG: 2.5 TABLET, FILM COATED ORAL at 14:09

## 2020-11-26 RX ADMIN — DEXAMETHASONE 6 MG: 2 TABLET ORAL at 08:45

## 2020-11-26 RX ADMIN — ROPINIROLE HYDROCHLORIDE 0.5 MG: 0.25 TABLET, FILM COATED ORAL at 21:17

## 2020-11-26 RX ADMIN — METOPROLOL TARTRATE 2.5 MG: 1 INJECTION, SOLUTION INTRAVENOUS at 11:59

## 2020-11-26 RX ADMIN — CARVEDILOL 6.25 MG: 6.25 TABLET, FILM COATED ORAL at 21:17

## 2020-11-26 RX ADMIN — CARVEDILOL 3.12 MG: 3.12 TABLET, FILM COATED ORAL at 08:45

## 2020-11-26 RX ADMIN — INSULIN ASPART 5 UNITS: 100 INJECTION, SOLUTION INTRAVENOUS; SUBCUTANEOUS at 17:22

## 2020-11-26 RX ADMIN — INSULIN ASPART 5 UNITS: 100 INJECTION, SOLUTION INTRAVENOUS; SUBCUTANEOUS at 12:00

## 2020-11-26 RX ADMIN — GABAPENTIN 400 MG: 400 CAPSULE ORAL at 21:16

## 2020-11-26 RX ADMIN — FAMOTIDINE 20 MG: 20 TABLET, FILM COATED ORAL at 07:12

## 2020-11-26 RX ADMIN — Medication 1 TABLET: at 17:20

## 2020-11-26 RX ADMIN — PANTOPRAZOLE SODIUM 40 MG: 40 TABLET, DELAYED RELEASE ORAL at 14:09

## 2020-11-26 RX ADMIN — SODIUM CHLORIDE, PRESERVATIVE FREE 10 ML: 5 INJECTION INTRAVENOUS at 08:46

## 2020-11-26 RX ADMIN — ZIPRASIDONE MESYLATE 20 MG: 20 INJECTION, POWDER, LYOPHILIZED, FOR SOLUTION INTRAMUSCULAR at 22:35

## 2020-11-26 RX ADMIN — INSULIN ASPART 8 UNITS: 100 INJECTION, SOLUTION INTRAVENOUS; SUBCUTANEOUS at 17:21

## 2020-11-26 RX ADMIN — CARBIDOPA AND LEVODOPA 1 TABLET: 25; 100 TABLET ORAL at 08:45

## 2020-11-26 RX ADMIN — INSULIN ASPART 7 UNITS: 100 INJECTION, SOLUTION INTRAVENOUS; SUBCUTANEOUS at 12:01

## 2020-11-26 RX ADMIN — GABAPENTIN 400 MG: 400 CAPSULE ORAL at 08:45

## 2020-11-26 RX ADMIN — ALPRAZOLAM 0.25 MG: 0.25 TABLET ORAL at 21:17

## 2020-11-26 RX ADMIN — INSULIN DETEMIR 30 UNITS: 100 INJECTION, SOLUTION SUBCUTANEOUS at 20:58

## 2020-11-26 RX ADMIN — ATORVASTATIN CALCIUM 40 MG: 40 TABLET, FILM COATED ORAL at 21:16

## 2020-11-26 RX ADMIN — APIXABAN 2.5 MG: 2.5 TABLET, FILM COATED ORAL at 21:16

## 2020-11-26 RX ADMIN — ALBUTEROL SULFATE 2 PUFF: 90 AEROSOL, METERED RESPIRATORY (INHALATION) at 19:35

## 2020-11-26 RX ADMIN — CARBIDOPA AND LEVODOPA 1 TABLET: 25; 100 TABLET ORAL at 21:16

## 2020-11-26 RX ADMIN — ALBUTEROL SULFATE 2 PUFF: 90 AEROSOL, METERED RESPIRATORY (INHALATION) at 12:00

## 2020-11-26 RX ADMIN — CARBIDOPA AND LEVODOPA 1 TABLET: 25; 100 TABLET ORAL at 17:20

## 2020-11-26 RX ADMIN — INSULIN ASPART 10 UNITS: 100 INJECTION, SOLUTION INTRAVENOUS; SUBCUTANEOUS at 21:18

## 2020-11-26 RX ADMIN — Medication 1 CAPSULE: at 21:17

## 2020-11-26 RX ADMIN — ALBUTEROL SULFATE 2 PUFF: 90 AEROSOL, METERED RESPIRATORY (INHALATION) at 08:44

## 2020-11-26 RX ADMIN — INSULIN ASPART 7 UNITS: 100 INJECTION, SOLUTION INTRAVENOUS; SUBCUTANEOUS at 07:12

## 2020-11-26 RX ADMIN — TAMSULOSIN HYDROCHLORIDE 0.4 MG: 0.4 CAPSULE ORAL at 08:45

## 2020-11-27 ENCOUNTER — APPOINTMENT (OUTPATIENT)
Dept: GENERAL RADIOLOGY | Facility: HOSPITAL | Age: 80
End: 2020-11-27

## 2020-11-27 LAB
25(OH)D3 SERPL-MCNC: 23.1 NG/ML (ref 30–100)
ALBUMIN SERPL-MCNC: 2.9 G/DL (ref 3.5–5.2)
ALBUMIN/GLOB SERPL: 0.8 G/DL
ALP SERPL-CCNC: 62 U/L (ref 39–117)
ALT SERPL W P-5'-P-CCNC: <5 U/L (ref 1–41)
ANION GAP SERPL CALCULATED.3IONS-SCNC: 8.3 MMOL/L (ref 5–15)
AST SERPL-CCNC: 18 U/L (ref 1–40)
BASOPHILS # BLD AUTO: 0.03 10*3/MM3 (ref 0–0.2)
BASOPHILS NFR BLD AUTO: 0.3 % (ref 0–1.5)
BILIRUB SERPL-MCNC: 0.8 MG/DL (ref 0–1.2)
BUN SERPL-MCNC: 58 MG/DL (ref 8–23)
BUN/CREAT SERPL: 47.9 (ref 7–25)
CALCIUM SPEC-SCNC: 10.1 MG/DL (ref 8.6–10.5)
CHLORIDE SERPL-SCNC: 100 MMOL/L (ref 98–107)
CO2 SERPL-SCNC: 35.7 MMOL/L (ref 22–29)
CREAT SERPL-MCNC: 1.21 MG/DL (ref 0.76–1.27)
DEPRECATED RDW RBC AUTO: 48.3 FL (ref 37–54)
EOSINOPHIL # BLD AUTO: 0 10*3/MM3 (ref 0–0.4)
EOSINOPHIL NFR BLD AUTO: 0 % (ref 0.3–6.2)
ERYTHROCYTE [DISTWIDTH] IN BLOOD BY AUTOMATED COUNT: 13.8 % (ref 12.3–15.4)
GFR SERPL CREATININE-BSD FRML MDRD: 57 ML/MIN/1.73
GLOBULIN UR ELPH-MCNC: 3.6 GM/DL
GLUCOSE BLDC GLUCOMTR-MCNC: 192 MG/DL (ref 70–130)
GLUCOSE BLDC GLUCOMTR-MCNC: 255 MG/DL (ref 70–130)
GLUCOSE BLDC GLUCOMTR-MCNC: 267 MG/DL (ref 70–130)
GLUCOSE BLDC GLUCOMTR-MCNC: 299 MG/DL (ref 70–130)
GLUCOSE SERPL-MCNC: 317 MG/DL (ref 65–99)
HCT VFR BLD AUTO: 37.8 % (ref 37.5–51)
HGB BLD-MCNC: 12.3 G/DL (ref 13–17.7)
IMM GRANULOCYTES # BLD AUTO: 0.08 10*3/MM3 (ref 0–0.05)
IMM GRANULOCYTES NFR BLD AUTO: 0.7 % (ref 0–0.5)
LYMPHOCYTES # BLD AUTO: 0.54 10*3/MM3 (ref 0.7–3.1)
LYMPHOCYTES NFR BLD AUTO: 5 % (ref 19.6–45.3)
MCH RBC QN AUTO: 30.8 PG (ref 26.6–33)
MCHC RBC AUTO-ENTMCNC: 32.5 G/DL (ref 31.5–35.7)
MCV RBC AUTO: 94.7 FL (ref 79–97)
MONOCYTES # BLD AUTO: 0.59 10*3/MM3 (ref 0.1–0.9)
MONOCYTES NFR BLD AUTO: 5.5 % (ref 5–12)
NEUTROPHILS NFR BLD AUTO: 88.5 % (ref 42.7–76)
NEUTROPHILS NFR BLD AUTO: 9.51 10*3/MM3 (ref 1.7–7)
NRBC BLD AUTO-RTO: 0 /100 WBC (ref 0–0.2)
PLATELET # BLD AUTO: 77 10*3/MM3 (ref 140–450)
PMV BLD AUTO: 11.7 FL (ref 6–12)
POTASSIUM SERPL-SCNC: 3.8 MMOL/L (ref 3.5–5.2)
PROT SERPL-MCNC: 6.5 G/DL (ref 6–8.5)
RBC # BLD AUTO: 3.99 10*6/MM3 (ref 4.14–5.8)
RBC MORPH BLD: NORMAL
SMALL PLATELETS BLD QL SMEAR: NORMAL
SODIUM SERPL-SCNC: 144 MMOL/L (ref 136–145)
WBC # BLD AUTO: 10.75 10*3/MM3 (ref 3.4–10.8)
WBC MORPH BLD: NORMAL

## 2020-11-27 PROCEDURE — 82306 VITAMIN D 25 HYDROXY: CPT | Performed by: FAMILY MEDICINE

## 2020-11-27 PROCEDURE — 63710000001 INSULIN DETEMIR PER 5 UNITS: Performed by: FAMILY MEDICINE

## 2020-11-27 PROCEDURE — 99223 1ST HOSP IP/OBS HIGH 75: CPT | Performed by: INTERNAL MEDICINE

## 2020-11-27 PROCEDURE — 63710000001 DEXAMETHASONE PER 0.25 MG: Performed by: EMERGENCY MEDICINE

## 2020-11-27 PROCEDURE — 94799 UNLISTED PULMONARY SVC/PX: CPT

## 2020-11-27 PROCEDURE — 63710000001 INSULIN ASPART PER 5 UNITS: Performed by: EMERGENCY MEDICINE

## 2020-11-27 PROCEDURE — 82962 GLUCOSE BLOOD TEST: CPT

## 2020-11-27 PROCEDURE — 85007 BL SMEAR W/DIFF WBC COUNT: CPT | Performed by: EMERGENCY MEDICINE

## 2020-11-27 PROCEDURE — 85025 COMPLETE CBC W/AUTO DIFF WBC: CPT | Performed by: EMERGENCY MEDICINE

## 2020-11-27 PROCEDURE — 71045 X-RAY EXAM CHEST 1 VIEW: CPT

## 2020-11-27 PROCEDURE — 80053 COMPREHEN METABOLIC PANEL: CPT | Performed by: EMERGENCY MEDICINE

## 2020-11-27 PROCEDURE — 99497 ADVNCD CARE PLAN 30 MIN: CPT | Performed by: INTERNAL MEDICINE

## 2020-11-27 RX ORDER — METOPROLOL TARTRATE 5 MG/5ML
5 INJECTION INTRAVENOUS EVERY 4 HOURS PRN
Status: DISCONTINUED | OUTPATIENT
Start: 2020-11-27 | End: 2020-12-10 | Stop reason: HOSPADM

## 2020-11-27 RX ADMIN — Medication 5 MG: at 20:28

## 2020-11-27 RX ADMIN — CARVEDILOL 6.25 MG: 6.25 TABLET, FILM COATED ORAL at 09:01

## 2020-11-27 RX ADMIN — ALBUTEROL SULFATE 2 PUFF: 90 AEROSOL, METERED RESPIRATORY (INHALATION) at 07:35

## 2020-11-27 RX ADMIN — Medication 1 CAPSULE: at 09:01

## 2020-11-27 RX ADMIN — METOPROLOL TARTRATE 5 MG: 1 INJECTION, SOLUTION INTRAVENOUS at 10:56

## 2020-11-27 RX ADMIN — INSULIN ASPART 5 UNITS: 100 INJECTION, SOLUTION INTRAVENOUS; SUBCUTANEOUS at 12:39

## 2020-11-27 RX ADMIN — CARVEDILOL 6.25 MG: 6.25 TABLET, FILM COATED ORAL at 20:28

## 2020-11-27 RX ADMIN — ALBUTEROL SULFATE 2 PUFF: 90 AEROSOL, METERED RESPIRATORY (INHALATION) at 19:52

## 2020-11-27 RX ADMIN — GABAPENTIN 400 MG: 400 CAPSULE ORAL at 20:28

## 2020-11-27 RX ADMIN — CARBIDOPA AND LEVODOPA 1 TABLET: 25; 100 TABLET ORAL at 20:28

## 2020-11-27 RX ADMIN — INSULIN ASPART 5 UNITS: 100 INJECTION, SOLUTION INTRAVENOUS; SUBCUTANEOUS at 17:16

## 2020-11-27 RX ADMIN — TAMSULOSIN HYDROCHLORIDE 0.4 MG: 0.4 CAPSULE ORAL at 09:02

## 2020-11-27 RX ADMIN — INSULIN ASPART 6 UNITS: 100 INJECTION, SOLUTION INTRAVENOUS; SUBCUTANEOUS at 06:33

## 2020-11-27 RX ADMIN — CARBIDOPA AND LEVODOPA 1 TABLET: 25; 100 TABLET ORAL at 15:33

## 2020-11-27 RX ADMIN — DEXAMETHASONE 6 MG: 2 TABLET ORAL at 09:00

## 2020-11-27 RX ADMIN — APIXABAN 2.5 MG: 2.5 TABLET, FILM COATED ORAL at 09:02

## 2020-11-27 RX ADMIN — Medication 1 CAPSULE: at 20:28

## 2020-11-27 RX ADMIN — INSULIN ASPART 2 UNITS: 100 INJECTION, SOLUTION INTRAVENOUS; SUBCUTANEOUS at 17:17

## 2020-11-27 RX ADMIN — INSULIN ASPART 5 UNITS: 100 INJECTION, SOLUTION INTRAVENOUS; SUBCUTANEOUS at 09:00

## 2020-11-27 RX ADMIN — GABAPENTIN 400 MG: 400 CAPSULE ORAL at 09:02

## 2020-11-27 RX ADMIN — OXYCODONE HYDROCHLORIDE AND ACETAMINOPHEN 500 MG: 500 TABLET ORAL at 09:02

## 2020-11-27 RX ADMIN — METOPROLOL TARTRATE 5 MG: 1 INJECTION, SOLUTION INTRAVENOUS at 22:20

## 2020-11-27 RX ADMIN — ALBUTEROL SULFATE 2 PUFF: 90 AEROSOL, METERED RESPIRATORY (INHALATION) at 17:18

## 2020-11-27 RX ADMIN — CARBIDOPA AND LEVODOPA 1 TABLET: 25; 100 TABLET ORAL at 09:01

## 2020-11-27 RX ADMIN — INSULIN ASPART 6 UNITS: 100 INJECTION, SOLUTION INTRAVENOUS; SUBCUTANEOUS at 12:30

## 2020-11-27 RX ADMIN — ALBUTEROL SULFATE 2 PUFF: 90 AEROSOL, METERED RESPIRATORY (INHALATION) at 13:30

## 2020-11-27 RX ADMIN — PANTOPRAZOLE SODIUM 40 MG: 40 TABLET, DELAYED RELEASE ORAL at 06:28

## 2020-11-27 RX ADMIN — APIXABAN 2.5 MG: 2.5 TABLET, FILM COATED ORAL at 20:28

## 2020-11-27 RX ADMIN — METOPROLOL TARTRATE 5 MG: 1 INJECTION, SOLUTION INTRAVENOUS at 15:21

## 2020-11-27 RX ADMIN — SODIUM CHLORIDE, PRESERVATIVE FREE 10 ML: 5 INJECTION INTRAVENOUS at 20:29

## 2020-11-27 RX ADMIN — Medication 1 TABLET: at 09:01

## 2020-11-27 RX ADMIN — ATORVASTATIN CALCIUM 40 MG: 40 TABLET, FILM COATED ORAL at 20:29

## 2020-11-27 RX ADMIN — INSULIN DETEMIR 30 UNITS: 100 INJECTION, SOLUTION SUBCUTANEOUS at 22:21

## 2020-11-28 LAB
ALBUMIN SERPL-MCNC: 2.7 G/DL (ref 3.5–5.2)
ALBUMIN/GLOB SERPL: 0.8 G/DL
ALP SERPL-CCNC: 60 U/L (ref 39–117)
ALT SERPL W P-5'-P-CCNC: <5 U/L (ref 1–41)
ANION GAP SERPL CALCULATED.3IONS-SCNC: 8.6 MMOL/L (ref 5–15)
AST SERPL-CCNC: 27 U/L (ref 1–40)
BACTERIA SPEC AEROBE CULT: NORMAL
BACTERIA SPEC AEROBE CULT: NORMAL
BASOPHILS # BLD AUTO: 0.01 10*3/MM3 (ref 0–0.2)
BASOPHILS NFR BLD AUTO: 0.1 % (ref 0–1.5)
BILIRUB SERPL-MCNC: 1.1 MG/DL (ref 0–1.2)
BUN SERPL-MCNC: 52 MG/DL (ref 8–23)
BUN/CREAT SERPL: 44.4 (ref 7–25)
CALCIUM SPEC-SCNC: 9.8 MG/DL (ref 8.6–10.5)
CHLORIDE SERPL-SCNC: 101 MMOL/L (ref 98–107)
CO2 SERPL-SCNC: 35.4 MMOL/L (ref 22–29)
CREAT SERPL-MCNC: 1.17 MG/DL (ref 0.76–1.27)
DEPRECATED RDW RBC AUTO: 47.3 FL (ref 37–54)
EOSINOPHIL # BLD AUTO: 0.01 10*3/MM3 (ref 0–0.4)
EOSINOPHIL NFR BLD AUTO: 0.1 % (ref 0.3–6.2)
ERYTHROCYTE [DISTWIDTH] IN BLOOD BY AUTOMATED COUNT: 13.6 % (ref 12.3–15.4)
GFR SERPL CREATININE-BSD FRML MDRD: 60 ML/MIN/1.73
GLOBULIN UR ELPH-MCNC: 3.3 GM/DL
GLUCOSE BLDC GLUCOMTR-MCNC: 149 MG/DL (ref 70–130)
GLUCOSE BLDC GLUCOMTR-MCNC: 178 MG/DL (ref 70–130)
GLUCOSE BLDC GLUCOMTR-MCNC: 234 MG/DL (ref 70–130)
GLUCOSE BLDC GLUCOMTR-MCNC: 244 MG/DL (ref 70–130)
GLUCOSE SERPL-MCNC: 203 MG/DL (ref 65–99)
HCT VFR BLD AUTO: 35 % (ref 37.5–51)
HGB BLD-MCNC: 11.5 G/DL (ref 13–17.7)
IMM GRANULOCYTES # BLD AUTO: 0.08 10*3/MM3 (ref 0–0.05)
IMM GRANULOCYTES NFR BLD AUTO: 0.7 % (ref 0–0.5)
LYMPHOCYTES # BLD AUTO: 0.54 10*3/MM3 (ref 0.7–3.1)
LYMPHOCYTES NFR BLD AUTO: 4.7 % (ref 19.6–45.3)
MCH RBC QN AUTO: 31 PG (ref 26.6–33)
MCHC RBC AUTO-ENTMCNC: 32.9 G/DL (ref 31.5–35.7)
MCV RBC AUTO: 94.3 FL (ref 79–97)
MONOCYTES # BLD AUTO: 0.56 10*3/MM3 (ref 0.1–0.9)
MONOCYTES NFR BLD AUTO: 4.9 % (ref 5–12)
NEUTROPHILS NFR BLD AUTO: 10.23 10*3/MM3 (ref 1.7–7)
NEUTROPHILS NFR BLD AUTO: 89.5 % (ref 42.7–76)
NRBC BLD AUTO-RTO: 0 /100 WBC (ref 0–0.2)
PLATELET # BLD AUTO: 75 10*3/MM3 (ref 140–450)
PMV BLD AUTO: 11 FL (ref 6–12)
POTASSIUM SERPL-SCNC: 4 MMOL/L (ref 3.5–5.2)
PROT SERPL-MCNC: 6 G/DL (ref 6–8.5)
QT INTERVAL: 336 MS
QTC INTERVAL: 484 MS
RBC # BLD AUTO: 3.71 10*6/MM3 (ref 4.14–5.8)
SODIUM SERPL-SCNC: 145 MMOL/L (ref 136–145)
WBC # BLD AUTO: 11.43 10*3/MM3 (ref 3.4–10.8)

## 2020-11-28 PROCEDURE — 99232 SBSQ HOSP IP/OBS MODERATE 35: CPT | Performed by: INTERNAL MEDICINE

## 2020-11-28 PROCEDURE — 94799 UNLISTED PULMONARY SVC/PX: CPT

## 2020-11-28 PROCEDURE — 80053 COMPREHEN METABOLIC PANEL: CPT | Performed by: EMERGENCY MEDICINE

## 2020-11-28 PROCEDURE — 63710000001 INSULIN ASPART PER 5 UNITS: Performed by: EMERGENCY MEDICINE

## 2020-11-28 PROCEDURE — 94660 CPAP INITIATION&MGMT: CPT

## 2020-11-28 PROCEDURE — 63710000001 INSULIN DETEMIR PER 5 UNITS: Performed by: FAMILY MEDICINE

## 2020-11-28 PROCEDURE — 63710000001 DEXAMETHASONE PER 0.25 MG: Performed by: EMERGENCY MEDICINE

## 2020-11-28 PROCEDURE — 85025 COMPLETE CBC W/AUTO DIFF WBC: CPT | Performed by: EMERGENCY MEDICINE

## 2020-11-28 PROCEDURE — 82962 GLUCOSE BLOOD TEST: CPT

## 2020-11-28 RX ORDER — HALOPERIDOL 1 MG/1
1 TABLET ORAL EVERY 8 HOURS PRN
Status: DISCONTINUED | OUTPATIENT
Start: 2020-11-28 | End: 2020-11-29

## 2020-11-28 RX ORDER — HALOPERIDOL 1 MG/1
1 TABLET ORAL EVERY 8 HOURS SCHEDULED
Status: DISCONTINUED | OUTPATIENT
Start: 2020-11-28 | End: 2020-11-28

## 2020-11-28 RX ADMIN — APIXABAN 2.5 MG: 2.5 TABLET, FILM COATED ORAL at 20:59

## 2020-11-28 RX ADMIN — INSULIN ASPART 5 UNITS: 100 INJECTION, SOLUTION INTRAVENOUS; SUBCUTANEOUS at 08:12

## 2020-11-28 RX ADMIN — ATORVASTATIN CALCIUM 40 MG: 40 TABLET, FILM COATED ORAL at 20:59

## 2020-11-28 RX ADMIN — INSULIN DETEMIR 30 UNITS: 100 INJECTION, SOLUTION SUBCUTANEOUS at 21:02

## 2020-11-28 RX ADMIN — INSULIN ASPART 5 UNITS: 100 INJECTION, SOLUTION INTRAVENOUS; SUBCUTANEOUS at 17:08

## 2020-11-28 RX ADMIN — CARVEDILOL 6.25 MG: 6.25 TABLET, FILM COATED ORAL at 20:59

## 2020-11-28 RX ADMIN — INSULIN ASPART 4 UNITS: 100 INJECTION, SOLUTION INTRAVENOUS; SUBCUTANEOUS at 17:09

## 2020-11-28 RX ADMIN — OXYCODONE HYDROCHLORIDE AND ACETAMINOPHEN 500 MG: 500 TABLET ORAL at 08:13

## 2020-11-28 RX ADMIN — GABAPENTIN 400 MG: 400 CAPSULE ORAL at 08:12

## 2020-11-28 RX ADMIN — GABAPENTIN 400 MG: 400 CAPSULE ORAL at 20:59

## 2020-11-28 RX ADMIN — ALBUTEROL SULFATE 2 PUFF: 90 AEROSOL, METERED RESPIRATORY (INHALATION) at 12:30

## 2020-11-28 RX ADMIN — CARVEDILOL 6.25 MG: 6.25 TABLET, FILM COATED ORAL at 08:13

## 2020-11-28 RX ADMIN — SODIUM CHLORIDE, PRESERVATIVE FREE 10 ML: 5 INJECTION INTRAVENOUS at 20:59

## 2020-11-28 RX ADMIN — APIXABAN 2.5 MG: 2.5 TABLET, FILM COATED ORAL at 08:13

## 2020-11-28 RX ADMIN — CARBIDOPA AND LEVODOPA 1 TABLET: 25; 100 TABLET ORAL at 08:12

## 2020-11-28 RX ADMIN — INSULIN ASPART 2 UNITS: 100 INJECTION, SOLUTION INTRAVENOUS; SUBCUTANEOUS at 07:04

## 2020-11-28 RX ADMIN — PANTOPRAZOLE SODIUM 40 MG: 40 TABLET, DELAYED RELEASE ORAL at 05:23

## 2020-11-28 RX ADMIN — Medication 1 CAPSULE: at 20:59

## 2020-11-28 RX ADMIN — ALBUTEROL SULFATE 2 PUFF: 90 AEROSOL, METERED RESPIRATORY (INHALATION) at 19:27

## 2020-11-28 RX ADMIN — Medication 1 CAPSULE: at 08:12

## 2020-11-28 RX ADMIN — Medication 5 MG: at 20:59

## 2020-11-28 RX ADMIN — ALBUTEROL SULFATE 2 PUFF: 90 AEROSOL, METERED RESPIRATORY (INHALATION) at 07:31

## 2020-11-28 RX ADMIN — CARBIDOPA AND LEVODOPA 1 TABLET: 25; 100 TABLET ORAL at 17:00

## 2020-11-28 RX ADMIN — TAMSULOSIN HYDROCHLORIDE 0.4 MG: 0.4 CAPSULE ORAL at 08:13

## 2020-11-28 RX ADMIN — ALBUTEROL SULFATE 2 PUFF: 90 AEROSOL, METERED RESPIRATORY (INHALATION) at 17:07

## 2020-11-28 RX ADMIN — Medication 1 TABLET: at 08:13

## 2020-11-28 RX ADMIN — CARBIDOPA AND LEVODOPA 1 TABLET: 25; 100 TABLET ORAL at 20:58

## 2020-11-28 RX ADMIN — DEXAMETHASONE 6 MG: 2 TABLET ORAL at 08:13

## 2020-11-29 LAB
ALBUMIN SERPL-MCNC: 2.7 G/DL (ref 3.5–5.2)
ALBUMIN/GLOB SERPL: 0.8 G/DL
ALP SERPL-CCNC: 67 U/L (ref 39–117)
ALT SERPL W P-5'-P-CCNC: <5 U/L (ref 1–41)
ANION GAP SERPL CALCULATED.3IONS-SCNC: 8.3 MMOL/L (ref 5–15)
AST SERPL-CCNC: 25 U/L (ref 1–40)
BASOPHILS # BLD AUTO: 0.02 10*3/MM3 (ref 0–0.2)
BASOPHILS NFR BLD AUTO: 0.2 % (ref 0–1.5)
BILIRUB SERPL-MCNC: 1.3 MG/DL (ref 0–1.2)
BUN SERPL-MCNC: 48 MG/DL (ref 8–23)
BUN/CREAT SERPL: 43.6 (ref 7–25)
CALCIUM SPEC-SCNC: 10 MG/DL (ref 8.6–10.5)
CHLORIDE SERPL-SCNC: 104 MMOL/L (ref 98–107)
CO2 SERPL-SCNC: 35.7 MMOL/L (ref 22–29)
CREAT SERPL-MCNC: 1.1 MG/DL (ref 0.76–1.27)
DEPRECATED RDW RBC AUTO: 48.7 FL (ref 37–54)
EOSINOPHIL # BLD AUTO: 0.04 10*3/MM3 (ref 0–0.4)
EOSINOPHIL NFR BLD AUTO: 0.4 % (ref 0.3–6.2)
ERYTHROCYTE [DISTWIDTH] IN BLOOD BY AUTOMATED COUNT: 14 % (ref 12.3–15.4)
GFR SERPL CREATININE-BSD FRML MDRD: 64 ML/MIN/1.73
GLOBULIN UR ELPH-MCNC: 3.6 GM/DL
GLUCOSE BLDC GLUCOMTR-MCNC: 128 MG/DL (ref 70–130)
GLUCOSE BLDC GLUCOMTR-MCNC: 142 MG/DL (ref 70–130)
GLUCOSE BLDC GLUCOMTR-MCNC: 200 MG/DL (ref 70–130)
GLUCOSE SERPL-MCNC: 149 MG/DL (ref 65–99)
HCT VFR BLD AUTO: 36.9 % (ref 37.5–51)
HGB BLD-MCNC: 11.9 G/DL (ref 13–17.7)
IMM GRANULOCYTES # BLD AUTO: 0.13 10*3/MM3 (ref 0–0.05)
IMM GRANULOCYTES NFR BLD AUTO: 1.3 % (ref 0–0.5)
LYMPHOCYTES # BLD AUTO: 0.57 10*3/MM3 (ref 0.7–3.1)
LYMPHOCYTES NFR BLD AUTO: 5.9 % (ref 19.6–45.3)
MCH RBC QN AUTO: 30.9 PG (ref 26.6–33)
MCHC RBC AUTO-ENTMCNC: 32.2 G/DL (ref 31.5–35.7)
MCV RBC AUTO: 95.8 FL (ref 79–97)
MONOCYTES # BLD AUTO: 0.42 10*3/MM3 (ref 0.1–0.9)
MONOCYTES NFR BLD AUTO: 4.3 % (ref 5–12)
NEUTROPHILS NFR BLD AUTO: 8.5 10*3/MM3 (ref 1.7–7)
NEUTROPHILS NFR BLD AUTO: 87.9 % (ref 42.7–76)
NRBC BLD AUTO-RTO: 0 /100 WBC (ref 0–0.2)
PLATELET # BLD AUTO: 73 10*3/MM3 (ref 140–450)
PMV BLD AUTO: 11.2 FL (ref 6–12)
POTASSIUM SERPL-SCNC: 3.9 MMOL/L (ref 3.5–5.2)
PROT SERPL-MCNC: 6.3 G/DL (ref 6–8.5)
RBC # BLD AUTO: 3.85 10*6/MM3 (ref 4.14–5.8)
SODIUM SERPL-SCNC: 148 MMOL/L (ref 136–145)
WBC # BLD AUTO: 9.68 10*3/MM3 (ref 3.4–10.8)

## 2020-11-29 PROCEDURE — 94799 UNLISTED PULMONARY SVC/PX: CPT

## 2020-11-29 PROCEDURE — 82962 GLUCOSE BLOOD TEST: CPT

## 2020-11-29 PROCEDURE — 63710000001 DEXAMETHASONE PER 0.25 MG: Performed by: EMERGENCY MEDICINE

## 2020-11-29 PROCEDURE — 94660 CPAP INITIATION&MGMT: CPT

## 2020-11-29 PROCEDURE — 99231 SBSQ HOSP IP/OBS SF/LOW 25: CPT | Performed by: INTERNAL MEDICINE

## 2020-11-29 PROCEDURE — 80053 COMPREHEN METABOLIC PANEL: CPT | Performed by: EMERGENCY MEDICINE

## 2020-11-29 PROCEDURE — 85025 COMPLETE CBC W/AUTO DIFF WBC: CPT | Performed by: EMERGENCY MEDICINE

## 2020-11-29 PROCEDURE — 63710000001 INSULIN DETEMIR PER 5 UNITS: Performed by: FAMILY MEDICINE

## 2020-11-29 PROCEDURE — 63710000001 INSULIN ASPART PER 5 UNITS: Performed by: EMERGENCY MEDICINE

## 2020-11-29 RX ORDER — HALOPERIDOL 1 MG/1
1 TABLET ORAL EVERY 8 HOURS SCHEDULED
Status: DISCONTINUED | OUTPATIENT
Start: 2020-11-29 | End: 2020-12-04

## 2020-11-29 RX ADMIN — HALOPERIDOL 1 MG: 1 TABLET ORAL at 15:19

## 2020-11-29 RX ADMIN — CARVEDILOL 6.25 MG: 6.25 TABLET, FILM COATED ORAL at 09:07

## 2020-11-29 RX ADMIN — ALBUTEROL SULFATE 2 PUFF: 90 AEROSOL, METERED RESPIRATORY (INHALATION) at 15:21

## 2020-11-29 RX ADMIN — INSULIN ASPART 5 UNITS: 100 INJECTION, SOLUTION INTRAVENOUS; SUBCUTANEOUS at 17:03

## 2020-11-29 RX ADMIN — SODIUM CHLORIDE, PRESERVATIVE FREE 10 ML: 5 INJECTION INTRAVENOUS at 09:08

## 2020-11-29 RX ADMIN — INSULIN DETEMIR 30 UNITS: 100 INJECTION, SOLUTION SUBCUTANEOUS at 21:35

## 2020-11-29 RX ADMIN — PANTOPRAZOLE SODIUM 40 MG: 40 TABLET, DELAYED RELEASE ORAL at 06:38

## 2020-11-29 RX ADMIN — INSULIN ASPART 5 UNITS: 100 INJECTION, SOLUTION INTRAVENOUS; SUBCUTANEOUS at 09:07

## 2020-11-29 RX ADMIN — APIXABAN 2.5 MG: 2.5 TABLET, FILM COATED ORAL at 09:07

## 2020-11-29 RX ADMIN — CARBIDOPA AND LEVODOPA 1 TABLET: 25; 100 TABLET ORAL at 15:19

## 2020-11-29 RX ADMIN — Medication 1 CAPSULE: at 21:15

## 2020-11-29 RX ADMIN — ATORVASTATIN CALCIUM 40 MG: 40 TABLET, FILM COATED ORAL at 21:15

## 2020-11-29 RX ADMIN — OXYCODONE HYDROCHLORIDE AND ACETAMINOPHEN 500 MG: 500 TABLET ORAL at 09:07

## 2020-11-29 RX ADMIN — CARBIDOPA AND LEVODOPA 1 TABLET: 25; 100 TABLET ORAL at 09:07

## 2020-11-29 RX ADMIN — GABAPENTIN 400 MG: 400 CAPSULE ORAL at 09:06

## 2020-11-29 RX ADMIN — Medication 1 TABLET: at 09:07

## 2020-11-29 RX ADMIN — CARBIDOPA AND LEVODOPA 1 TABLET: 25; 100 TABLET ORAL at 21:15

## 2020-11-29 RX ADMIN — Medication 5 MG: at 21:15

## 2020-11-29 RX ADMIN — TAMSULOSIN HYDROCHLORIDE 0.4 MG: 0.4 CAPSULE ORAL at 09:07

## 2020-11-29 RX ADMIN — ALBUTEROL SULFATE 2 PUFF: 90 AEROSOL, METERED RESPIRATORY (INHALATION) at 19:29

## 2020-11-29 RX ADMIN — ALBUTEROL SULFATE 2 PUFF: 90 AEROSOL, METERED RESPIRATORY (INHALATION) at 07:02

## 2020-11-29 RX ADMIN — CARVEDILOL 6.25 MG: 6.25 TABLET, FILM COATED ORAL at 21:15

## 2020-11-29 RX ADMIN — GABAPENTIN 400 MG: 400 CAPSULE ORAL at 21:15

## 2020-11-29 RX ADMIN — Medication 1 CAPSULE: at 09:06

## 2020-11-29 RX ADMIN — APIXABAN 2.5 MG: 2.5 TABLET, FILM COATED ORAL at 21:15

## 2020-11-29 RX ADMIN — INSULIN ASPART 4 UNITS: 100 INJECTION, SOLUTION INTRAVENOUS; SUBCUTANEOUS at 17:02

## 2020-11-29 RX ADMIN — DEXAMETHASONE 6 MG: 2 TABLET ORAL at 09:06

## 2020-11-29 RX ADMIN — HALOPERIDOL 1 MG: 1 TABLET ORAL at 21:15

## 2020-11-30 LAB
ALBUMIN SERPL-MCNC: 2.7 G/DL (ref 3.5–5.2)
ALBUMIN/GLOB SERPL: 0.8 G/DL
ALP SERPL-CCNC: 63 U/L (ref 39–117)
ALT SERPL W P-5'-P-CCNC: <5 U/L (ref 1–41)
ANION GAP SERPL CALCULATED.3IONS-SCNC: 10.6 MMOL/L (ref 5–15)
AST SERPL-CCNC: 28 U/L (ref 1–40)
BASOPHILS # BLD AUTO: 0.02 10*3/MM3 (ref 0–0.2)
BASOPHILS NFR BLD AUTO: 0.2 % (ref 0–1.5)
BILIRUB SERPL-MCNC: 1.5 MG/DL (ref 0–1.2)
BUN SERPL-MCNC: 43 MG/DL (ref 8–23)
BUN/CREAT SERPL: 37.4 (ref 7–25)
CALCIUM SPEC-SCNC: 9.6 MG/DL (ref 8.6–10.5)
CHLORIDE SERPL-SCNC: 105 MMOL/L (ref 98–107)
CO2 SERPL-SCNC: 30.4 MMOL/L (ref 22–29)
CREAT SERPL-MCNC: 1.15 MG/DL (ref 0.76–1.27)
DEPRECATED RDW RBC AUTO: 47.9 FL (ref 37–54)
EOSINOPHIL # BLD AUTO: 0.05 10*3/MM3 (ref 0–0.4)
EOSINOPHIL NFR BLD AUTO: 0.5 % (ref 0.3–6.2)
ERYTHROCYTE [DISTWIDTH] IN BLOOD BY AUTOMATED COUNT: 14 % (ref 12.3–15.4)
GFR SERPL CREATININE-BSD FRML MDRD: 61 ML/MIN/1.73
GLOBULIN UR ELPH-MCNC: 3.4 GM/DL
GLUCOSE BLDC GLUCOMTR-MCNC: 163 MG/DL (ref 70–130)
GLUCOSE BLDC GLUCOMTR-MCNC: 179 MG/DL (ref 70–130)
GLUCOSE BLDC GLUCOMTR-MCNC: 182 MG/DL (ref 70–130)
GLUCOSE BLDC GLUCOMTR-MCNC: 190 MG/DL (ref 70–130)
GLUCOSE BLDC GLUCOMTR-MCNC: 234 MG/DL (ref 70–130)
GLUCOSE SERPL-MCNC: 142 MG/DL (ref 65–99)
HCT VFR BLD AUTO: 38.1 % (ref 37.5–51)
HGB BLD-MCNC: 12.7 G/DL (ref 13–17.7)
IMM GRANULOCYTES # BLD AUTO: 0.18 10*3/MM3 (ref 0–0.05)
IMM GRANULOCYTES NFR BLD AUTO: 1.7 % (ref 0–0.5)
LYMPHOCYTES # BLD AUTO: 0.65 10*3/MM3 (ref 0.7–3.1)
LYMPHOCYTES NFR BLD AUTO: 6.3 % (ref 19.6–45.3)
MCH RBC QN AUTO: 31.5 PG (ref 26.6–33)
MCHC RBC AUTO-ENTMCNC: 33.3 G/DL (ref 31.5–35.7)
MCV RBC AUTO: 94.5 FL (ref 79–97)
MONOCYTES # BLD AUTO: 0.45 10*3/MM3 (ref 0.1–0.9)
MONOCYTES NFR BLD AUTO: 4.3 % (ref 5–12)
NEUTROPHILS NFR BLD AUTO: 87 % (ref 42.7–76)
NEUTROPHILS NFR BLD AUTO: 9.01 10*3/MM3 (ref 1.7–7)
NRBC BLD AUTO-RTO: 0 /100 WBC (ref 0–0.2)
PLATELET # BLD AUTO: 85 10*3/MM3 (ref 140–450)
PMV BLD AUTO: 11.3 FL (ref 6–12)
POTASSIUM SERPL-SCNC: 4 MMOL/L (ref 3.5–5.2)
PROT SERPL-MCNC: 6.1 G/DL (ref 6–8.5)
RBC # BLD AUTO: 4.03 10*6/MM3 (ref 4.14–5.8)
SODIUM SERPL-SCNC: 146 MMOL/L (ref 136–145)
WBC # BLD AUTO: 10.36 10*3/MM3 (ref 3.4–10.8)

## 2020-11-30 PROCEDURE — 63710000001 DEXAMETHASONE PER 0.25 MG: Performed by: EMERGENCY MEDICINE

## 2020-11-30 PROCEDURE — 63710000001 INSULIN DETEMIR PER 5 UNITS: Performed by: FAMILY MEDICINE

## 2020-11-30 PROCEDURE — 94660 CPAP INITIATION&MGMT: CPT

## 2020-11-30 PROCEDURE — 82962 GLUCOSE BLOOD TEST: CPT

## 2020-11-30 PROCEDURE — 80053 COMPREHEN METABOLIC PANEL: CPT | Performed by: EMERGENCY MEDICINE

## 2020-11-30 PROCEDURE — 63710000001 INSULIN ASPART PER 5 UNITS: Performed by: EMERGENCY MEDICINE

## 2020-11-30 PROCEDURE — 85025 COMPLETE CBC W/AUTO DIFF WBC: CPT | Performed by: EMERGENCY MEDICINE

## 2020-11-30 PROCEDURE — 99233 SBSQ HOSP IP/OBS HIGH 50: CPT | Performed by: EMERGENCY MEDICINE

## 2020-11-30 PROCEDURE — 94799 UNLISTED PULMONARY SVC/PX: CPT

## 2020-11-30 RX ORDER — DEXTROSE MONOHYDRATE 50 MG/ML
100 INJECTION, SOLUTION INTRAVENOUS CONTINUOUS
Status: ACTIVE | OUTPATIENT
Start: 2020-11-30 | End: 2020-11-30

## 2020-11-30 RX ADMIN — HALOPERIDOL 1 MG: 1 TABLET ORAL at 14:36

## 2020-11-30 RX ADMIN — ALBUTEROL SULFATE 2 PUFF: 90 AEROSOL, METERED RESPIRATORY (INHALATION) at 11:25

## 2020-11-30 RX ADMIN — HALOPERIDOL 1 MG: 1 TABLET ORAL at 05:51

## 2020-11-30 RX ADMIN — APIXABAN 2.5 MG: 2.5 TABLET, FILM COATED ORAL at 10:01

## 2020-11-30 RX ADMIN — ALBUTEROL SULFATE 2 PUFF: 90 AEROSOL, METERED RESPIRATORY (INHALATION) at 14:36

## 2020-11-30 RX ADMIN — DEXAMETHASONE 6 MG: 2 TABLET ORAL at 10:00

## 2020-11-30 RX ADMIN — GABAPENTIN 400 MG: 400 CAPSULE ORAL at 10:00

## 2020-11-30 RX ADMIN — ALBUTEROL SULFATE 2 PUFF: 90 AEROSOL, METERED RESPIRATORY (INHALATION) at 06:38

## 2020-11-30 RX ADMIN — TAMSULOSIN HYDROCHLORIDE 0.4 MG: 0.4 CAPSULE ORAL at 10:00

## 2020-11-30 RX ADMIN — INSULIN DETEMIR 30 UNITS: 100 INJECTION, SOLUTION SUBCUTANEOUS at 20:36

## 2020-11-30 RX ADMIN — INSULIN ASPART 5 UNITS: 100 INJECTION, SOLUTION INTRAVENOUS; SUBCUTANEOUS at 11:24

## 2020-11-30 RX ADMIN — INSULIN ASPART 2 UNITS: 100 INJECTION, SOLUTION INTRAVENOUS; SUBCUTANEOUS at 11:23

## 2020-11-30 RX ADMIN — Medication 1 TABLET: at 10:00

## 2020-11-30 RX ADMIN — DEXTROSE MONOHYDRATE 100 ML/HR: 50 INJECTION, SOLUTION INTRAVENOUS at 11:23

## 2020-11-30 RX ADMIN — CARBIDOPA AND LEVODOPA 1 TABLET: 25; 100 TABLET ORAL at 10:00

## 2020-11-30 RX ADMIN — OXYCODONE HYDROCHLORIDE AND ACETAMINOPHEN 500 MG: 500 TABLET ORAL at 10:00

## 2020-11-30 RX ADMIN — INSULIN ASPART 2 UNITS: 100 INJECTION, SOLUTION INTRAVENOUS; SUBCUTANEOUS at 17:24

## 2020-11-30 RX ADMIN — SODIUM CHLORIDE, PRESERVATIVE FREE 10 ML: 5 INJECTION INTRAVENOUS at 10:01

## 2020-11-30 RX ADMIN — CARVEDILOL 6.25 MG: 6.25 TABLET, FILM COATED ORAL at 10:00

## 2020-11-30 RX ADMIN — INSULIN ASPART 5 UNITS: 100 INJECTION, SOLUTION INTRAVENOUS; SUBCUTANEOUS at 17:25

## 2020-11-30 RX ADMIN — ALBUTEROL SULFATE 2 PUFF: 90 AEROSOL, METERED RESPIRATORY (INHALATION) at 20:32

## 2020-11-30 RX ADMIN — INSULIN ASPART 5 UNITS: 100 INJECTION, SOLUTION INTRAVENOUS; SUBCUTANEOUS at 09:59

## 2020-11-30 RX ADMIN — CARBIDOPA AND LEVODOPA 1 TABLET: 25; 100 TABLET ORAL at 17:24

## 2020-11-30 RX ADMIN — PANTOPRAZOLE SODIUM 40 MG: 40 TABLET, DELAYED RELEASE ORAL at 05:51

## 2020-11-30 RX ADMIN — Medication 1 CAPSULE: at 10:00

## 2020-12-01 ENCOUNTER — APPOINTMENT (OUTPATIENT)
Dept: GENERAL RADIOLOGY | Facility: HOSPITAL | Age: 80
End: 2020-12-01

## 2020-12-01 LAB
ALBUMIN SERPL-MCNC: 2.7 G/DL (ref 3.5–5.2)
ANION GAP SERPL CALCULATED.3IONS-SCNC: 8.3 MMOL/L (ref 5–15)
BUN SERPL-MCNC: 39 MG/DL (ref 8–23)
BUN/CREAT SERPL: 35.1 (ref 7–25)
CALCIUM SPEC-SCNC: 9.4 MG/DL (ref 8.6–10.5)
CHLORIDE SERPL-SCNC: 103 MMOL/L (ref 98–107)
CO2 SERPL-SCNC: 28.7 MMOL/L (ref 22–29)
CREAT SERPL-MCNC: 1.11 MG/DL (ref 0.76–1.27)
GFR SERPL CREATININE-BSD FRML MDRD: 63 ML/MIN/1.73
GLUCOSE BLDC GLUCOMTR-MCNC: 132 MG/DL (ref 70–130)
GLUCOSE BLDC GLUCOMTR-MCNC: 163 MG/DL (ref 70–130)
GLUCOSE BLDC GLUCOMTR-MCNC: 255 MG/DL (ref 70–130)
GLUCOSE BLDC GLUCOMTR-MCNC: 337 MG/DL (ref 70–130)
GLUCOSE SERPL-MCNC: 145 MG/DL (ref 65–99)
PHOSPHATE SERPL-MCNC: 3 MG/DL (ref 2.5–4.5)
POTASSIUM SERPL-SCNC: 4 MMOL/L (ref 3.5–5.2)
SODIUM SERPL-SCNC: 140 MMOL/L (ref 136–145)
URATE SERPL-MCNC: 7 MG/DL (ref 3.4–7)

## 2020-12-01 PROCEDURE — 94799 UNLISTED PULMONARY SVC/PX: CPT

## 2020-12-01 PROCEDURE — 84550 ASSAY OF BLOOD/URIC ACID: CPT | Performed by: INTERNAL MEDICINE

## 2020-12-01 PROCEDURE — 63710000001 INSULIN ASPART PER 5 UNITS: Performed by: EMERGENCY MEDICINE

## 2020-12-01 PROCEDURE — 84550 ASSAY OF BLOOD/URIC ACID: CPT | Performed by: EMERGENCY MEDICINE

## 2020-12-01 PROCEDURE — 99233 SBSQ HOSP IP/OBS HIGH 50: CPT | Performed by: INTERNAL MEDICINE

## 2020-12-01 PROCEDURE — 71045 X-RAY EXAM CHEST 1 VIEW: CPT

## 2020-12-01 PROCEDURE — 63710000001 INSULIN DETEMIR PER 5 UNITS: Performed by: FAMILY MEDICINE

## 2020-12-01 PROCEDURE — 63710000001 DEXAMETHASONE PER 0.25 MG: Performed by: EMERGENCY MEDICINE

## 2020-12-01 PROCEDURE — 82962 GLUCOSE BLOOD TEST: CPT

## 2020-12-01 PROCEDURE — 80069 RENAL FUNCTION PANEL: CPT | Performed by: INTERNAL MEDICINE

## 2020-12-01 RX ORDER — DEXTROSE MONOHYDRATE 50 MG/ML
100 INJECTION, SOLUTION INTRAVENOUS CONTINUOUS
Status: ACTIVE | OUTPATIENT
Start: 2020-12-01 | End: 2020-12-01

## 2020-12-01 RX ADMIN — ALBUTEROL SULFATE 2 PUFF: 90 AEROSOL, METERED RESPIRATORY (INHALATION) at 13:08

## 2020-12-01 RX ADMIN — CARBIDOPA AND LEVODOPA 1 TABLET: 25; 100 TABLET ORAL at 20:41

## 2020-12-01 RX ADMIN — INSULIN DETEMIR 30 UNITS: 100 INJECTION, SOLUTION SUBCUTANEOUS at 20:44

## 2020-12-01 RX ADMIN — INSULIN ASPART 7 UNITS: 100 INJECTION, SOLUTION INTRAVENOUS; SUBCUTANEOUS at 17:38

## 2020-12-01 RX ADMIN — ALBUTEROL SULFATE 2 PUFF: 90 AEROSOL, METERED RESPIRATORY (INHALATION) at 17:39

## 2020-12-01 RX ADMIN — PANTOPRAZOLE SODIUM 40 MG: 40 TABLET, DELAYED RELEASE ORAL at 06:31

## 2020-12-01 RX ADMIN — CARVEDILOL 6.25 MG: 6.25 TABLET, FILM COATED ORAL at 08:34

## 2020-12-01 RX ADMIN — INSULIN ASPART 5 UNITS: 100 INJECTION, SOLUTION INTRAVENOUS; SUBCUTANEOUS at 08:37

## 2020-12-01 RX ADMIN — APIXABAN 2.5 MG: 2.5 TABLET, FILM COATED ORAL at 20:41

## 2020-12-01 RX ADMIN — HALOPERIDOL 1 MG: 1 TABLET ORAL at 23:35

## 2020-12-01 RX ADMIN — GABAPENTIN 400 MG: 400 CAPSULE ORAL at 08:34

## 2020-12-01 RX ADMIN — ALBUTEROL SULFATE 2 PUFF: 90 AEROSOL, METERED RESPIRATORY (INHALATION) at 06:32

## 2020-12-01 RX ADMIN — ALBUTEROL SULFATE 2 PUFF: 90 AEROSOL, METERED RESPIRATORY (INHALATION) at 18:30

## 2020-12-01 RX ADMIN — INSULIN ASPART 5 UNITS: 100 INJECTION, SOLUTION INTRAVENOUS; SUBCUTANEOUS at 13:09

## 2020-12-01 RX ADMIN — APIXABAN 2.5 MG: 2.5 TABLET, FILM COATED ORAL at 08:34

## 2020-12-01 RX ADMIN — CARBIDOPA AND LEVODOPA 1 TABLET: 25; 100 TABLET ORAL at 17:38

## 2020-12-01 RX ADMIN — Medication 5 MG: at 20:41

## 2020-12-01 RX ADMIN — INSULIN ASPART 2 UNITS: 100 INJECTION, SOLUTION INTRAVENOUS; SUBCUTANEOUS at 13:08

## 2020-12-01 RX ADMIN — CARVEDILOL 6.25 MG: 6.25 TABLET, FILM COATED ORAL at 20:42

## 2020-12-01 RX ADMIN — SODIUM CHLORIDE, PRESERVATIVE FREE 10 ML: 5 INJECTION INTRAVENOUS at 08:38

## 2020-12-01 RX ADMIN — CARBIDOPA AND LEVODOPA 1 TABLET: 25; 100 TABLET ORAL at 08:34

## 2020-12-01 RX ADMIN — HALOPERIDOL 1 MG: 1 TABLET ORAL at 13:08

## 2020-12-01 RX ADMIN — INSULIN ASPART 5 UNITS: 100 INJECTION, SOLUTION INTRAVENOUS; SUBCUTANEOUS at 17:39

## 2020-12-01 RX ADMIN — DEXTROSE MONOHYDRATE 100 ML/HR: 50 INJECTION, SOLUTION INTRAVENOUS at 11:19

## 2020-12-01 RX ADMIN — HALOPERIDOL 1 MG: 1 TABLET ORAL at 06:31

## 2020-12-01 RX ADMIN — Medication 1 CAPSULE: at 20:40

## 2020-12-01 RX ADMIN — ATORVASTATIN CALCIUM 40 MG: 40 TABLET, FILM COATED ORAL at 20:41

## 2020-12-01 RX ADMIN — OXYCODONE HYDROCHLORIDE AND ACETAMINOPHEN 500 MG: 500 TABLET ORAL at 08:34

## 2020-12-01 RX ADMIN — GABAPENTIN 400 MG: 400 CAPSULE ORAL at 20:40

## 2020-12-01 RX ADMIN — Medication 1 CAPSULE: at 08:34

## 2020-12-01 RX ADMIN — SODIUM CHLORIDE, PRESERVATIVE FREE 10 ML: 5 INJECTION INTRAVENOUS at 11:20

## 2020-12-01 RX ADMIN — DEXAMETHASONE 6 MG: 2 TABLET ORAL at 08:34

## 2020-12-01 RX ADMIN — Medication 1 TABLET: at 08:34

## 2020-12-01 RX ADMIN — TAMSULOSIN HYDROCHLORIDE 0.4 MG: 0.4 CAPSULE ORAL at 08:34

## 2020-12-01 NOTE — PAYOR COMM NOTE
"TO:AETNA  FROM:FIDELINA ASCENCIO, RN PHONE [phone]  UPDATED CLINICALS    Dilan Pierre (80 y.o. Male)     Date of Birth Social Security Number Address Home Phone MRN    1940  [address] [id number] [phone] [id number]    [employer] Marital Status          Unknown        Admission Date Admission Type Admitting Provider Attending Provider Department, Room/Bed    11/23/20 Emergency Skip Raygoza DO Bhatti, Umar, DO [facility] MED SURG  3, 306/1    Discharge Date Discharge Disposition Discharge Destination                       Attending Provider: Skip Raygoza DO    Allergies: No Known Allergies    Isolation: Enh Drop/Con, Contact Air   Infection: COVID (confirmed) (11/09/20)   Code Status: No CPR    Ht: 180.3 cm (71\")   Wt: 97.1 kg (214 lb 1.1 oz)    Admission Cmt: None   Principal Problem: Acute respiratory failure with hypoxia (CMS/HCC) [J96.01]                 Active Insurance as of 11/23/2020     Primary Coverage     Payor Plan Insurance Group Employer/Plan Group    AETNA MEDICARE REPLACEMENT AETNA MEDICARE REPLACEMENT [id number]     Payor Plan Address Payor Plan Phone Number Payor Plan Fax Number Effective Dates    [address] [phone]  1/1/2020 - None Entered    [facility] [id number]       Subscriber Name Subscriber Birth Date Member ID       DILAN PIERRE 1940 [id number]           Secondary Coverage     Payor Plan Insurance Group Employer/Plan Group    MEDICAID PENDING [place]      Payor Plan Address Payor Plan Phone Number Payor Plan Fax Number Effective Dates       11/23/2020 - None Entered    Subscriber Name Subscriber Birth Date Member ID       DILAN PIERRE 1940 PENDING                 Emergency Contacts      (Rel.) Home Phone Work Phone Mobile Phone    Parris Pierre (Spouse) [phone] -- --            Vital Signs (last day)     Date/Time   Temp   Temp src   Pulse   Resp   BP   " Patient Position   SpO2    12/01/20 0747   97.9 (36.6)   Axillary   68   18   137/86   Lying   --    12/01/20 0325   98 (36.7)   Oral   72   18   125/69   --   (!) 87    11/30/20 2352   97.4 (36.3)   Axillary   74   18   105/86   Lying   99    11/30/20 2200   --   --   --   --   --   --   92    11/30/20 2000   97.4 (36.3)   Axillary   63   18   124/65   Lying   98    11/30/20 1700   97.9 (36.6)   Axillary   72   16   128/68   Lying   98    11/30/20 1619   97.9 (36.6)   Axillary   65   16   113/67   Lying   96    11/30/20 1100   97.8 (36.6)   Axillary   84   16   113/63   Lying   97    11/30/20 0800   --   --   --   --   --   Lying   --    11/30/20 0700   --   --   94   18   113/87   Lying   95    11/30/20 0640   --   --   74   --   --   --   97    11/30/20 0500   --   --   66   --   --   --   (!) 88    11/30/20 0341   97.4 (36.3)   Axillary   76   18   132/69   Lying   94              Current Facility-Administered Medications   Medication Dose Route Frequency Provider Last Rate Last Admin   • albuterol sulfate HFA (PROVENTIL HFA;VENTOLIN HFA;PROAIR HFA) inhaler 2 puff  2 puff Inhalation 4x Daily - RT Skip Raygoza DO   2 puff at 12/01/20 0632   • apixaban (ELIQUIS) tablet 2.5 mg  2.5 mg Oral Q12H Carrol Castro DO   2.5 mg at 12/01/20 0834   • atorvastatin (LIPITOR) tablet 40 mg  40 mg Oral Nightly Skip Raygoza DO   40 mg at 11/29/20 2115   • b complex-vitamin c-folic acid (NEPHRO-AGATA) tablet 1 tablet  1 tablet Oral Daily Carrol Casrto DO   1 tablet at 12/01/20 0834   • carbidopa-levodopa (SINEMET)  MG per tablet 1 tablet  1 tablet Oral TID Skip Raygoza DO   1 tablet at 12/01/20 0834   • carvedilol (COREG) tablet 6.25 mg  6.25 mg Oral BID Carrol Castro DO   6.25 mg at 12/01/20 0834   • dexamethasone (DECADRON) tablet 6 mg  6 mg Oral Daily With Breakfast Skip Raygoza DO   6 mg at 12/01/20 0834   • dextrose (D50W) 25 g/ 50mL Intravenous Solution 25 g  25 g Intravenous Q15 Min PRN Skip Raygoza DO        • dextrose (D5W) 5 % infusion  100 mL/hr Intravenous Continuous Rickey Zee MD, FASN       • dextrose (GLUTOSE) oral gel 1 tube  1 tube Oral Q15 Min PRN Skip Raygoza, DO       • gabapentin (NEURONTIN) capsule 400 mg  400 mg Oral Q12H Skip Raygoza, DO   400 mg at 12/01/20 0834   • glucagon (human recombinant) (GLUCAGEN DIAGNOSTIC) injection 1 mg  1 mg Subcutaneous PRN Skip Raygoza, DO       • haloperidol (HALDOL) tablet 1 mg  1 mg Oral Q8H Omar Lakhani MD   1 mg at 12/01/20 0631   • insulin aspart (novoLOG) injection 0-9 Units  0-9 Units Subcutaneous TID AC Skip Raygoza, DO   2 Units at 11/30/20 1724   • insulin aspart (novoLOG) injection 5 Units  5 Units Subcutaneous TID With Meals kSip Raygoza, DO   5 Units at 12/01/20 0837   • insulin detemir (LEVEMIR) injection 30 Units  30 Units Subcutaneous Nightly Myranda Davalos, DO   30 Units at 11/30/20 2036   • lactobacillus acidophilus (RISAQUAD) capsule 1 capsule  1 capsule Oral BID Carrol Castro DO   1 capsule at 12/01/20 0834   • melatonin tablet 5 mg  5 mg Oral Nightly Carrol Castro DO   5 mg at 11/29/20 2115   • metoprolol tartrate (LOPRESSOR) injection 5 mg  5 mg Intravenous Q4H PRN Carrol Castro DO   5 mg at 11/27/20 2220   • pantoprazole (PROTONIX) EC tablet 40 mg  40 mg Oral Daily Carrol Castro DO   40 mg at 12/01/20 0631   • rOPINIRole (REQUIP) tablet 0.5 mg  0.5 mg Oral Nightly PRN Carrol Castro DO   0.5 mg at 11/26/20 2117   • sodium chloride 0.9 % flush 10 mL  10 mL Intravenous PRN Skip Raygoza, DO   10 mL at 12/01/20 0838   • tamsulosin (FLOMAX) 24 hr capsule 0.4 mg  0.4 mg Oral Daily Skip Raygoza, DO   0.4 mg at 12/01/20 0834   • vitamin C (ASCORBIC ACID) tablet 500 mg  500 mg Oral Daily Carrol Castro DO   500 mg at 12/01/20 0834     Lab Results (last 24 hours)     Procedure Component Value Units Date/Time    Renal Function Panel [[id number]]  (Abnormal) Collected: 12/01/20 0938    Specimen: Blood Updated: 12/01/20 1007      Glucose 145 mg/dL      BUN 39 mg/dL      Creatinine 1.11 mg/dL      Sodium 140 mmol/L      Potassium 4.0 mmol/L      Comment: Slight hemolysis detected by analyzer. Results may be affected.        Chloride 103 mmol/L      CO2 28.7 mmol/L      Calcium 9.4 mg/dL      Albumin 2.70 g/dL      Phosphorus 3.0 mg/dL      Anion Gap 8.3 mmol/L      BUN/Creatinine Ratio 35.1     eGFR Non African Amer 63 mL/min/1.73     Narrative:      GFR Normal >60  Chronic Kidney Disease <60  Kidney Failure <15      Uric Acid [[id number]]  (Normal) Collected: 20 0938    Specimen: Blood Updated: 20 1007     Uric Acid 7.0 mg/dL     Uric Acid [[id number]] Collected: 20 0804    Specimen: Blood Updated: 20 0943    Narrative:      The previously reported component URIC is no longer being reported. Previous result was 6.8 mg/dL (Reference Range: 3.4-7.0 mg/dL) on 2020 at 0914 EST.    POC Glucose Once [[id number]]  (Abnormal) Collected: 20 0544    Specimen: Blood Updated: 20 0642     Glucose 132 mg/dL      Comment: Serial Number: [id number]:  [id number]       POC Glucose Once [[id number]]  (Abnormal) Collected: 20 2033    Specimen: Blood Updated: 20 2109     Glucose 234 mg/dL      Comment: Serial Number: [id number]:  [id number]       POC Glucose Once [[id number]]  (Abnormal) Collected: 20 1605    Specimen: Blood Updated: 20 1625     Glucose 190 mg/dL      Comment: Serial Number: [id number]:  [id number]       POC Glucose Once [[id number]]  (Abnormal) Collected: 20 1122    Specimen: Blood Updated: 20 1146     Glucose 182 mg/dL      Comment: Serial Number: [id number]:  [id number]              Physician Progress Notes (last 72 hours) (Notes from 20 1028 through 20 1028)      Skip Raygoza DO at 20 1547                [facility]    PROGRESS NOTE    Name:  Jak Pierre   Age:  80 y.o.  Sex:  male  :  1940  MRN:   [id number]   Visit Number:  [id number]  Admission Date:  11/23/2020  Date Of Service:  11/30/20  Primary Care Physician:  Jason Foy MD     LOS: 7 days :  Patient Care Team:  Jason Foy MD as PCP - General (General Practice):    Subjective / Interval History:   Seen in follow-up for respiratory failure and pneumonia Covid 19    He has been hallucinating today, remains confused, believes it is the 1990s, I am Demetris and he is 'Jerry.' He denies any acute complaints and moves all ext.  I have reviewed the previous physician's documentation    Review of Systems:     Unable to obtain due to patient's clinical condition    Vital Signs:    Temp:  [96.4 °F (35.8 °C)-97.8 °F (36.6 °C)] 97.8 °F (36.6 °C)  Heart Rate:  [50-94] 84  Resp:  [16-22] 16  BP: (113-141)/(63-87) 113/63    Intake and output:    I/O last 3 completed shifts:  In: 120 [P.O.:120]  Out: -   I/O this shift:  In: 220 [P.O.:220]  Out: -     Physical Exam:  General: Breathing is mildly labored, resting in bed  HEENT: EOMI, NC/AT, 4L NC  Heart: Regular on the monitor  Lungs: Mildly labored  Abdomen: Soft, nondistended, nontender  Extremities: moderate pitting edema in the lower extremities bilaterally  Neurological: awake, alert, not oriented, moves all extremities  Psychological: good eye-contact  Skin: warm, dry    Laboratory results:    Results from last 7 days   Lab Units 11/30/20  0550 11/29/20  0554 11/28/20  0522   SODIUM mmol/L 146* 148* 145   POTASSIUM mmol/L 4.0 3.9 4.0   CHLORIDE mmol/L 105 104 101   CO2 mmol/L 30.4* 35.7* 35.4*   BUN mg/dL 43* 48* 52*   CREATININE mg/dL 1.15 1.10 1.17   CALCIUM mg/dL 9.6 10.0 9.8   BILIRUBIN mg/dL 1.5* 1.3* 1.1   ALK PHOS U/L 63 67 60   ALT (SGPT) U/L <5 <5 <5   AST (SGOT) U/L 28 25 27   GLUCOSE mg/dL 142* 149* 203*     Results from last 7 days   Lab Units 11/30/20  0550 11/29/20  0554 11/28/20  0522   WBC 10*3/mm3 10.36 9.68 11.43*   HEMOGLOBIN g/dL 12.7* 11.9* 11.5*   HEMATOCRIT % 38.1 36.9* 35.0*    PLATELETS 10*3/mm3 85* 73* 75*                       I have reviewed the patient's laboratory results.    Radiology results:    Imaging Results (Last 24 Hours)     ** No results found for the last 24 hours. **          I have reviewed the patient's radiology reports.    Medication Review:     I have reviewed the patients active and prn medications.       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  Thrombocytopenia, acute on chronic  Elevated troponins, chronic  Chronic disease  H/O aortic valve replacement, diverticulitis, nephrolithiasis  Chronic: HTN, HLD, tremor, arthritis, restless leg syndrome, debility, BPH     Plan:  -Patient seems to be worsening, suspect steroid-induced psychosis; stop dexamethasone  -if mentation does not improve, may consider LP to r/o COVID encephalitis or meningitis and will have to hold Eliquis  -try to keep it dark/quiet at night, and curtains pulled lights on during the day; have pt oriented by having him converse with family via facetime  -diuresed initially, abx stopped as PCT negative x3 and he has evidence of viral PNA  -received remdesivir on his previous admission for 3d  -on Eliquis now that his platelet count is improved  -A1c 10.2, Levemir 20, Novolog 5 TIDAC, SSI ACHS    Skip Raygoza DO  11/30/20  15:47 EST    Dictated utilizing Dragon dictation.      Electronically signed by Skip Raygoza DO at 11/30/20 5778     Rickey Zee MD, FASN at 11/30/20 4472       "    Nephrology Progress Note.    LOS: 7 days    Patient Care Team:  Jason Foy MD as PCP - General (General Practice)    Chief Complaint:    Chief Complaint   Patient presents with   • Fever   • Shortness of Breath       Subjective:   Follow up for ALEXANDRA and Chronic Kidney disease stage 3 / Anemia.     Interval History:   Patient Complaints: none  Patient seen and examined this morning.  Events from last 24 hours noted.  Patient has COVID-19 pneumonia.  Patient is awake a lot more interactive as compared to before but still confused.  He is on oxygen.  Saturating 92%.  Objective:    Vital Signs  /69 (BP Location: Left arm, Patient Position: Lying)   Pulse 74   Temp 97.4 °F (36.3 °C) (Axillary)   Resp 18   Ht 180.3 cm (71\")   Wt 97.1 kg (214 lb 1.1 oz)   SpO2 97%   BMI 29.86 kg/m²     No intake/output data recorded.    Intake/Output Summary (Last 24 hours) at 11/30/2020 0723  Last data filed at 11/29/2020 0900  Gross per 24 hour   Intake 120 ml   Output --   Net 120 ml       Physical Exam:  General Appearance: no acute distress,   HEENT: Oral mucosa dry, extra occular movements intact. Sclera clear.  Skin: Warm and dry  Neck: supple, no JVD, trachea midline  Lungs:Chest shape is normal. Breath sounds heard scattered rhonchi and crackles, No wheezing.   Heart: Irregular rate and rhythm. normal S1 and S2, no S3, no rub, peripheral pulses weak but palpable.  Abdomen: Obese, soft, non-tender,  present bowel sounds to auscultation  : no palpable bladder.  Extremities: Trace edema, no cyanosis or clubbing.   Neuro: Randomly does move his extremities.     Results Review:   Results from last 7 days   Lab Units 11/30/20  0550 11/29/20  0554 11/28/20  0522   SODIUM mmol/L 146* 148* 145   POTASSIUM mmol/L 4.0 3.9 4.0   CHLORIDE mmol/L 105 104 101   CO2 mmol/L 30.4* 35.7* 35.4*   BUN mg/dL 43* 48* 52*   CREATININE mg/dL 1.15 1.10 1.17   CALCIUM mg/dL 9.6 10.0 9.8   ALBUMIN g/dL 2.70* 2.70* 2.70*   BILIRUBIN " mg/dL 1.5* 1.3* 1.1   ALK PHOS U/L 63 67 60   ALT (SGPT) U/L <5 <5 <5   AST (SGOT) U/L 28 25 27   GLUCOSE mg/dL 142* 149* 203*     Estimated Creatinine Clearance: 58.8 mL/min (by C-G formula based on SCr of 1.15 mg/dL).  Results from last 7 days   Lab Units 11/24/20  0514   PHOSPHORUS mg/dL 3.0     Results from last 7 days   Lab Units 11/25/20  0652   URIC ACID mg/dL 11.8*     Results from last 7 days   Lab Units 11/30/20  0550 11/29/20  0554 11/28/20  0522 11/27/20  0514 11/26/20  0550   WBC 10*3/mm3 10.36 9.68 11.43* 10.75 9.81   HEMOGLOBIN g/dL 12.7* 11.9* 11.5* 12.3* 11.5*   PLATELETS 10*3/mm3 85* 73* 75* 77* 63*         Brief Urine Lab Results  (Last result in the past 365 days)      Color   Clarity   Blood   Leuk Est   Nitrite   Protein   CREAT   Urine HCG        11/23/20 0156 Yellow Clear Small (1+) Negative Negative 30 mg/dL (1+)             No results found for: UTPCR  Imaging Results (Last 24 Hours)     ** No results found for the last 24 hours. **        albuterol sulfate HFA, 2 puff, Inhalation, 4x Daily - RT  apixaban, 2.5 mg, Oral, Q12H  atorvastatin, 40 mg, Oral, Nightly  b complex-vitamin c-folic acid, 1 tablet, Oral, Daily  carbidopa-levodopa, 1 tablet, Oral, TID  carvedilol, 6.25 mg, Oral, BID  dexamethasone, 6 mg, Oral, Daily With Breakfast    Or  dexamethasone, 6 mg, Intravenous, Daily With Breakfast  gabapentin, 400 mg, Oral, Q12H  haloperidol, 1 mg, Oral, Q8H  insulin aspart, 0-9 Units, Subcutaneous, TID AC  insulin aspart, 5 Units, Subcutaneous, TID With Meals  insulin detemir, 30 Units, Subcutaneous, Nightly  lactobacillus acidophilus, 1 capsule, Oral, BID  melatonin, 5 mg, Oral, Nightly  pantoprazole, 40 mg, Oral, Daily  tamsulosin, 0.4 mg, Oral, Daily  vitamin C, 500 mg, Oral, Daily             Medication Review:   Current Facility-Administered Medications   Medication Dose Route Frequency Provider Last Rate Last Admin   • albuterol sulfate HFA (PROVENTIL HFA;VENTOLIN HFA;PROAIR HFA)  inhaler 2 puff  2 puff Inhalation 4x Daily - RT JaretSkip smiley DO   2 puff at 11/30/20 0638   • apixaban (ELIQUIS) tablet 2.5 mg  2.5 mg Oral Q12H Carrol Castro DO   2.5 mg at 11/29/20 2115   • atorvastatin (LIPITOR) tablet 40 mg  40 mg Oral Nightly JaretSkip smiley, DO   40 mg at 11/29/20 2115   • b complex-vitamin c-folic acid (NEPHRO-AGATA) tablet 1 tablet  1 tablet Oral Daily Carrol Castro DO   1 tablet at 11/29/20 0907   • carbidopa-levodopa (SINEMET)  MG per tablet 1 tablet  1 tablet Oral TID [facility]   1 tablet at 11/29/20 2115   • carvedilol (COREG) tablet 6.25 mg  6.25 mg Oral BID Carrol Castro DO   6.25 mg at 11/29/20 2115   • dexamethasone (DECADRON) tablet 6 mg  6 mg Oral Daily With Breakfast [facility], [facility]   6 mg at 11/29/20 0906    Or   • dexamethasone (DECADRON) injection 6 mg (NOTE: IV OR PO)  6 mg Intravenous Daily With Breakfast [facility]       • dextrose (D50W) 25 g/ 50mL Intravenous Solution 25 g  25 g Intravenous Q15 Min PRN [facility], DO       • dextrose (GLUTOSE) oral gel 1 tube  1 tube Oral Q15 Min PRN JaretSkip morgan, DO       • gabapentin (NEURONTIN) capsule 400 mg  400 mg Oral Q12H [facility], DO   400 mg at 11/29/20 2115   • glucagon (human recombinant) (GLUCAGEN DIAGNOSTIC) injection 1 mg  1 mg Subcutaneous PRN [facility], DO       • haloperidol (HALDOL) tablet 1 mg  1 mg Oral Q8H Omar Lakhani MD   1 mg at 11/30/20 0551   • insulin aspart (novoLOG) injection 0-9 Units  0-9 Units Subcutaneous TID AC [facility]   4 Units at 11/29/20 1702   • insulin aspart (novoLOG) injection 5 Units  5 Units Subcutaneous TID With Meals Skip Raygoza, DO   5 Units at 11/29/20 1703   • insulin detemir (LEVEMIR) injection 30 Units  30 Units Subcutaneous Nightly Myranda Davalos, DO   30 Units at 11/29/20 2135   • lactobacillus acidophilus (RISAQUAD) capsule 1 capsule  1 capsule Oral BID Carrol Castro DO   1 capsule at 11/29/20 2115   • melatonin tablet 5 mg  5  mg Oral Nightly Carrol Castro, DO   5 mg at 11/29/20 2115   • metoprolol tartrate (LOPRESSOR) injection 5 mg  5 mg Intravenous Q4H PRN Carrol Castro, DO   5 mg at 11/27/20 2220   • pantoprazole (PROTONIX) EC tablet 40 mg  40 mg Oral Daily Carrol Castro, DO   40 mg at 11/30/20 0551   • rOPINIRole (REQUIP) tablet 0.5 mg  0.5 mg Oral Nightly PRN Carrol Castro, DO   0.5 mg at 11/26/20 2117   • sodium chloride 0.9 % flush 10 mL  10 mL Intravenous PRN Naida Raygozaar, DO   10 mL at 11/29/20 0908   • tamsulosin (FLOMAX) 24 hr capsule 0.4 mg  0.4 mg Oral Daily Jaret Umar, DO   0.4 mg at 11/29/20 0907   • vitamin C (ASCORBIC ACID) tablet 500 mg  500 mg Oral Daily Carrol Castro, DO   500 mg at 11/29/20 0907       Assessment/Plan:  1. Chronic kidney disease stage III: baseline creatinine ~1.3-1.5 fluctuates with volume status.  No further worsening of renal function noted  2. Hypertension in CKD: It is under fair control continue to watch closely.  3. Hyperkalemia: Multifactorial likely secondary to hyperglycemia as well as history of lisinopril use that may be contributing.  It is back to normal.  4. Anasarca: Continue to optimize diuretics  5. Acute respiratory failure with hypoxia  6. Pneumonia due to COVID-19 virus  7. Acute metabolic encephalopathy  8. Type 2 Diabetes Mellitus with hyperglycemia  9. Thrombocytopenia  10. History of aortic valve replacement  11. Dyslipidemia  12. History of DVT  13. Tremor    Plan:  · Serum sodium has gone up, he appears clinically dry with significantly elevated uric acid.  I will go ahead and give him D5W 100 cc an hour for 5 hours and reassess again in the morning.  · Details were discussed with the patient no family in the room.    · Details were also discussed with the hospitalist service.  · Continue with rest of the current treatment plan and surveillance labs.  · Further recommendations will depend on clinical course of the patient during the current hospitalization.     · I also discussed the details with the nursing staff.  · Rest as ordered.    Rickey Zee MD, FASN  20  07:23 EST    Dictated utilizing Dragon dictation.      Electronically signed by Rickey Zee MD, FASN at 20 1906     Omar Lakhani MD at 20 1206                [facility]    [facility]    Name:  Jak Pierre   Age:  80 y.o.  Sex:  male  :  1940  MRN:  [id number]   Visit Number:  [id number]  Admission Date:  2020  Date Of Service:  20  Primary Care Physician:  Jason Foy MD     LOS: 6 days :      Chief Complaint: Follow-up hypoxic respiratory failure and Covid pneumonia        Subjective / Interval History: The patient is an 80-year-old gentleman with chronic anticoagulation, chronic thrombocytopenia, history of aortic valve replacement, diabetes, hypertension, hyperlipidemia, recent renal failure, who had recent admission already 2 weeks ago for Covid pneumonia who had already received 3 days of steroids and remdesivir and multiple antibiotics.  He presented to the emergency room via EMS for recurrent fever and falling.  He had severe hypoxic respiratory failure placed on 6 L oxygen.  He had continued to have mental status changes and confusion.  He was orthopneic and dyspneic as well.  He had been taken off his torsemide recently secondary to acute kidney injury.  Chest x-ray showed bilateral opacities.  Since he flagged sepsis, he was initiated on IV fluids and empiric IV Zosyn and vancomycin were provided.  Sepsis was ruled out.    Today on  patient has been seen and evaluated.  He has been still having some spells of confusion.  Today he had pulled out his oxygen via nasal cannula and room air saturation was about mid 80s.  Oxygen has been restarted and he is slightly confused and not able to answer questions properly.    Review of Systems:     General ROS: Patient denies any fevers, chills or loss of  consciousness.  Positive diffuse generalized weakness.  Agitated  Ophthalmic ROS: Denies any diplopia or transient loss of vision.  ENT ROS: Denies sore throat, nasal congestion or ear pain.   Respiratory ROS: Denies cough or shortness of breath.  Cardiovascular ROS: Denies chest pain or palpitations. No history of exertional chest pain.   Gastrointestinal ROS: Denies nausea and vomiting. Denies any abdominal pain. No diarrhea.  Genitourinary ROS: Denies dysuria or hematuria.  Musculoskeletal ROS: Complains of chronic back pain. No muscle pain. No calf pain.  Neurological ROS: Denies any focal weakness. No loss of consciousness. Denies any numbness. Denies neck pain.   Dermatological ROS: Denies any redness or pruritis.    Vital Signs:    Temp:  [96.4 °F (35.8 °C)-97.9 °F (36.6 °C)] 97.2 °F (36.2 °C)  Heart Rate:  [43-96] 78  Resp:  [16-20] 18  BP: (101-153)/(46-92) 101/66    Intake and output:    I/O last 3 completed shifts:  In: 475 [P.O.:475]  Out: 100 [Urine:100]  I/O this shift:  In: 120 [P.O.:120]  Out: -     Physical Examination:    General Appearance:   Chronically ill elderly man, lying in bed, agitated, alert and minimally cooperative, not in any acute distress.   Head:    Atraumatic and normocephalic   Eyes:            PERRLA, conjunctivae and sclerae normal, no Icterus. No pallor. Extraocular movements are within normal limits. Nose: wearing high flow 15 L NC sat 87%   Throat:   No oral lesions, no thrush, oral mucosa moist.   Neck:   Supple, trachea midline, no thyromegaly   Lungs:     Chest shape is normal. Breath sounds heard bilaterally equally.  Bilateral crackles without wheezing. No Pleural rub or bronchial breathing.    Heart:   Tachycardic, distant heart sounds, no murmur, no gallop, no rub. No JVD   Abdomen:     Normal bowel sounds, no masses, no organomegaly. Soft     nontender, nondistended, no guarding, no rebound                tenderness   Extremities:   Moves all extremities well, 1+  equal bilateral lower edema, no cyanosis   Skin:   No bleeding, bruising or rash.   Neurologic:  No tremor, sensation intact, Motor power is normal and equal bilaterally.       Laboratory results:    Results from last 7 days   Lab Units 11/29/20  0554 11/28/20 0522 11/27/20  0514   SODIUM mmol/L 148* 145 144   POTASSIUM mmol/L 3.9 4.0 3.8   CHLORIDE mmol/L 104 101 100   CO2 mmol/L 35.7* 35.4* 35.7*   BUN mg/dL 48* 52* 58*   CREATININE mg/dL 1.10 1.17 1.21   CALCIUM mg/dL 10.0 9.8 10.1   BILIRUBIN mg/dL 1.3* 1.1 0.8   ALK PHOS U/L 67 60 62   ALT (SGPT) U/L <5 <5 <5   AST (SGOT) U/L 25 27 18   GLUCOSE mg/dL 149* 203* 317*     Results from last 7 days   Lab Units 11/29/20  0554 11/28/20 0522 11/27/20  0514   WBC 10*3/mm3 9.68 11.43* 10.75   HEMOGLOBIN g/dL 11.9* 11.5* 12.3*   HEMATOCRIT % 36.9* 35.0* 37.8   PLATELETS 10*3/mm3 73* 75* 77*             Results from last 7 days   Lab Units 11/23/20  0205 11/23/20  0154   BLOODCX  No growth at 5 days No growth at 5 days           I have reviewed the patient's laboratory results.    Radiology results:    Imaging Results (Last 24 Hours)     ** No results found for the last 24 hours. **          I have reviewed the patient's radiology reports.    Medication Review:     I have reviewed the patients active and prn medications.     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  And CPAP if tolerated.  Continue with the steroids at least for 10 days and supportive care as per orders     Acute renal failure on top of CKD stage III-he is stable improving with hydration    Thrombocytopenia stable no signs of acute bleeding    Delirium he does have signs for delirium with attention span waxing waning and with possible baseline mild early dementia which has not been treated.  Haldol was started yesterday and I will increase the dose and follow-up.      Prognosis poor and consult palliative care has been done and see rest as per orders    Omar Lakhani MD  11/29/20  12:06 EST            Electronically signed by Omar Lakhani MD at 11/29/20 1208     Elsa Sauer MD at 11/29/20 1019          Nephrology Progress Note.    LOS: 6 days    Patient Care Team:  Jason Foy MD as PCP - General (General Practice)    Chief Complaint:    Chief Complaint   Patient presents with   • Fever   • Shortness of Breath       Subjective:   Follow up for ALEXANDRA and Chronic Kidney disease stage 3 / Anemia.     Interval History:   Patient Complaints: none  Patient seen and examined this morning.  Events from last 24 hours noted.  Patient has COVID-19 pneumonia.  Patient is awake but confused.  He is on 5 L high flow oxygen.  Saturating 92%.  Objective:    Vital Signs  /71 (BP Location: Left arm, Patient Position: Lying)   Pulse 78   Temp 97.5 °F (36.4 °C) (Axillary)   Resp 16   Ht 180.3 cm (71\")   Wt 98.4 kg (216 lb 14.9 oz)   SpO2 90%   BMI 30.26 kg/m²     I/O this shift:  In: 120 [P.O.:120]  Out: -     Intake/Output Summary (Last 24 hours) at 11/29/2020 1020  Last data filed at 11/29/2020 0900  Gross per 24 hour   Intake 170 ml   Output --   Net 170 ml       Physical Exam:  General Appearance: no acute distress,   HEENT: Oral mucosa dry, extra occular movements intact. Sclera " clear.  Skin: Warm and dry  Neck: supple, no JVD, trachea midline  Lungs:Chest shape is normal. Breath sounds heard scattered rhonchi and crackles, No wheezing.   Heart: Irregular rate and rhythm. normal S1 and S2, no S3, no rub, peripheral pulses weak but palpable.  Abdomen: Obese, soft, non-tender,  present bowel sounds to auscultation  : no palpable bladder.  Extremities: Trace edema, no cyanosis or clubbing.   Neuro: Randomly does move his extremities.     Results Review:   Results from last 7 days   Lab Units 11/29/20  0554 11/28/20 0522 11/27/20  0514   SODIUM mmol/L 148* 145 144   POTASSIUM mmol/L 3.9 4.0 3.8   CHLORIDE mmol/L 104 101 100   CO2 mmol/L 35.7* 35.4* 35.7*   BUN mg/dL 48* 52* 58*   CREATININE mg/dL 1.10 1.17 1.21   CALCIUM mg/dL 10.0 9.8 10.1   ALBUMIN g/dL 2.70* 2.70* 2.90*   BILIRUBIN mg/dL 1.3* 1.1 0.8   ALK PHOS U/L 67 60 62   ALT (SGPT) U/L <5 <5 <5   AST (SGOT) U/L 25 27 18   GLUCOSE mg/dL 149* 203* 317*     Estimated Creatinine Clearance: 61.9 mL/min (by C-G formula based on SCr of 1.1 mg/dL).  Results from last 7 days   Lab Units 11/24/20  0514 11/23/20  0648   MAGNESIUM mg/dL  --  1.6   PHOSPHORUS mg/dL 3.0  --      Results from last 7 days   Lab Units 11/25/20  0652   URIC ACID mg/dL 11.8*     Results from last 7 days   Lab Units 11/29/20  0554 11/28/20  0522 11/27/20  0514 11/26/20  0550 11/25/20  0652   WBC 10*3/mm3 9.68 11.43* 10.75 9.81 8.52   HEMOGLOBIN g/dL 11.9* 11.5* 12.3* 11.5* 11.4*   PLATELETS 10*3/mm3 73* 75* 77* 63* 48*         Brief Urine Lab Results  (Last result in the past 365 days)      Color   Clarity   Blood   Leuk Est   Nitrite   Protein   CREAT   Urine HCG        11/23/20 0156 Yellow Clear Small (1+) Negative Negative 30 mg/dL (1+)             No results found for: UTPCR  Imaging Results (Last 24 Hours)     ** No results found for the last 24 hours. **        albuterol sulfate HFA, 2 puff, Inhalation, 4x Daily - RT  apixaban, 2.5 mg, Oral, Q12H  atorvastatin, 40  mg, Oral, Nightly  b complex-vitamin c-folic acid, 1 tablet, Oral, Daily  carbidopa-levodopa, 1 tablet, Oral, TID  carvedilol, 6.25 mg, Oral, BID  dexamethasone, 6 mg, Oral, Daily With Breakfast    Or  dexamethasone, 6 mg, Intravenous, Daily With Breakfast  gabapentin, 400 mg, Oral, Q12H  insulin aspart, 0-9 Units, Subcutaneous, TID AC  insulin aspart, 5 Units, Subcutaneous, TID With Meals  insulin detemir, 30 Units, Subcutaneous, Nightly  lactobacillus acidophilus, 1 capsule, Oral, BID  melatonin, 5 mg, Oral, Nightly  pantoprazole, 40 mg, Oral, Daily  tamsulosin, 0.4 mg, Oral, Daily  vitamin C, 500 mg, Oral, Daily             Medication Review:   Current Facility-Administered Medications   Medication Dose Route Frequency Provider Last Rate Last Admin   • albuterol sulfate HFA (PROVENTIL HFA;VENTOLIN HFA;PROAIR HFA) inhaler 2 puff  2 puff [facility]halation 4x Daily - [facility]   2 puff at 11/29/20 0702   • apixaban (ELIQUIS) tablet 2.5 mg  2.5 mg Oral Q12H Carrol Castro DO   2.5 mg at 11/29/20 0907   • atorvastatin (LIPITOR) tablet 40 mg  40 mg Oral Nightly [facility], DO   40 mg at 11/28/20 2059   • b complex-vitamin c-folic acid (NEPHRO-AGATA) tablet 1 tablet  1 tablet Oral Daily Carrol Castro DO   1 tablet at 11/29/20 0907   • carbidopa-levodopa (SINEMET)  MG per tablet 1 tablet  1 tablet Oral TID [facility], DO   1 tablet at 11/29/20 0907   • carvedilol (COREG) tablet 6.25 mg  6.25 mg Oral BID Carrol Castro DO   6.25 mg at 11/29/20 0907   • dexamethasone (DECADRON) tablet 6 mg  6 mg Oral Daily With Breakfast [facility]   6 mg at 11/29/20 0906    Or   • dexamethasone (DECADRON) injection 6 mg (NOTE: IV OR PO)  6 mg Intravenous Daily With Breakfast [facility]       • dextrose (D50W) 25 g/ 50mL Intravenous Solution 25 g  25 g Intravenous Q15 Min PRN Jaret, Naidaar, DO       • dextrose (GLUTOSE) oral gel 1 tube  1 tube Oral Q15 Min PRN Jaret, Naidaar, DO       • gabapentin (NEURONTIN)  capsule 400 mg  400 mg Oral Q12H Skip Raygoza, DO   400 mg at 11/29/20 0906   • glucagon (human recombinant) (GLUCAGEN DIAGNOSTIC) injection 1 mg  1 mg Subcutaneous PRN Skip Raygoza, DO       • haloperidol (HALDOL) tablet 1 mg  1 mg Oral Q8H PRN Omar Lakhani MD       • insulin aspart (novoLOG) injection 0-9 Units  0-9 Units Subcutaneous TID AC Skip Raygoza, DO   4 Units at 11/28/20 1709   • insulin aspart (novoLOG) injection 5 Units  5 Units Subcutaneous TID With Meals JaretSkip smiley, DO   5 Units at 11/29/20 0907   • insulin detemir (LEVEMIR) injection 30 Units  30 Units Subcutaneous Nightly Myranda Davalos, DO   30 Units at 11/28/20 2102   • lactobacillus acidophilus (RISAQUAD) capsule 1 capsule  1 capsule Oral BID Carrol Castro, DO   1 capsule at 11/29/20 0906   • melatonin tablet 5 mg  5 mg Oral Nightly Carrol Castro, DO   5 mg at 11/28/20 2059   • metoprolol tartrate (LOPRESSOR) injection 5 mg  5 mg Intravenous Q4H PRN Carrol Castro, DO   5 mg at 11/27/20 2220   • pantoprazole (PROTONIX) EC tablet 40 mg  40 mg Oral Daily Carrol Castro, DO   40 mg at 11/29/20 0638   • rOPINIRole (REQUIP) tablet 0.5 mg  0.5 mg Oral Nightly PRN Carrol Castro, DO   0.5 mg at 11/26/20 2117   • sodium chloride 0.9 % flush 10 mL  10 mL Intravenous PRN Skip Raygoza, DO   10 mL at 11/29/20 0908   • tamsulosin (FLOMAX) 24 hr capsule 0.4 mg  0.4 mg Oral Daily Skip Raygoza, DO   0.4 mg at 11/29/20 0907   • vitamin C (ASCORBIC ACID) tablet 500 mg  500 mg Oral Daily Carrol Castro, DO   500 mg at 11/29/20 0907       Assessment/Plan:  14. Chronic kidney disease stage III: baseline creatinine ~1.3-1.5 fluctuates with volume status.  No further worsening of renal function noted  15. Hypertension in CKD: It is under fair control continue to watch closely.  16. Hyperkalemia: Multifactorial likely secondary to hyperglycemia as well as history of lisinopril use that may be contributing.  It is back to normal.  17. Anasarca:  Continue to optimize diuretics  18. Acute respiratory failure with hypoxia  19. Pneumonia due to COVID-19 virus  20. Acute metabolic encephalopathy  21. Type 2 Diabetes Mellitus with hyperglycemia  22. Thrombocytopenia  23. History of aortic valve replacement  24. Dyslipidemia  25. History of DVT  26. Tremor    Plan:  · His volume status stable.. Will need to be assessed for diuretic use on daily basis.  No diuretics today.  Plan to start diuretic tomorrow  · Details were discussed with the patient no family in the room.    · Details were also discussed with the hospitalist service and Dr. Malave.  · Continue with rest of the current treatment plan and surveillance labs.  · Further recommendations will depend on clinical course of the patient during the current hospitalization.    · I also discussed the details with the nursing staff.  · Rest as ordered.    Elsa Sauer MD  20  10:20 EST    Dictated utilizing Dragon dictation.      Electronically signed by Elsa Sauer MD at 20 1021     Omar Lakhani MD at 20 1421                [facility]    [facility]    Name:  Jak Pierre   Age:  80 y.o.  Sex:  male  :  1940  MRN:  [id number]   Visit Number:  [id number]  Admission Date:  2020  Date Of Service:  20  Primary Care Physician:  Jason Foy MD     LOS: 5 days :      Chief Complaint: Follow-up hypoxic respiratory failure and Covid pneumonia        Subjective / Interval History: The patient is an 80-year-old gentleman with chronic anticoagulation, chronic thrombocytopenia, history of aortic valve replacement, diabetes, hypertension, hyperlipidemia, recent renal failure, who had recent admission already 2 weeks ago for Covid pneumonia who had already received 3 days of steroids and remdesivir and multiple antibiotics.  He presented to the emergency room via EMS for recurrent fever and falling.  He had severe hypoxic respiratory  failure placed on 6 L oxygen.  He had continued to have mental status changes and confusion.  He was orthopneic and dyspneic as well.  He had been taken off his torsemide recently secondary to acute kidney injury.  Chest x-ray showed bilateral opacities.  Since he flagged sepsis, he was initiated on IV fluids and empiric IV Zosyn and vancomycin were provided.  Sepsis was ruled out.    Today on November 28 patient has been seen and evaluated.  Patient's chart reviewed with history as above.  He has been having some attention deficit and problems which are waxing and waning.  He does have some memory issues from before and having benzodiazepine has not helped.  He was on 15 L oxygen and have tapered it down to 7 L at present with saturation 94%.  He is very sleepy and not able to answer questions appropriately seems to be slightly confused.    Review of Systems:     General ROS: Patient denies any fevers, chills or loss of consciousness.  Positive diffuse generalized weakness.  Agitated  Ophthalmic ROS: Denies any diplopia or transient loss of vision.  ENT ROS: Denies sore throat, nasal congestion or ear pain.   Respiratory ROS: Denies cough or shortness of breath.  Cardiovascular ROS: Denies chest pain or palpitations. No history of exertional chest pain.   Gastrointestinal ROS: Denies nausea and vomiting. Denies any abdominal pain. No diarrhea.  Genitourinary ROS: Denies dysuria or hematuria.  Musculoskeletal ROS: Complains of chronic back pain. No muscle pain. No calf pain.  Neurological ROS: Denies any focal weakness. No loss of consciousness. Denies any numbness. Denies neck pain.   Dermatological ROS: Denies any redness or pruritis.    Vital Signs:    Temp:  [97 °F (36.1 °C)-98 °F (36.7 °C)] 97.6 °F (36.4 °C)  Heart Rate:  [] 56  Resp:  [16-22] 18  BP: (109-154)/(61-98) 124/76    Intake and output:    I/O last 3 completed shifts:  In: 900 [P.O.:900]  Out: 400 [Urine:400]  I/O this shift:  In: 125  [P.O.:125]  Out: 100 [Urine:100]    Physical Examination:    General Appearance:   Chronically ill elderly man, lying in bed, agitated, alert and minimally cooperative, not in any acute distress.   Head:    Atraumatic and normocephalic   Eyes:            PERRLA, conjunctivae and sclerae normal, no Icterus. No pallor. Extraocular movements are within normal limits. Nose: wearing high flow 15 L NC sat 87%   Throat:   No oral lesions, no thrush, oral mucosa moist.   Neck:   Supple, trachea midline, no thyromegaly   Lungs:     Chest shape is normal. Breath sounds heard bilaterally equally.  Bilateral crackles without wheezing. No Pleural rub or bronchial breathing.    Heart:   Tachycardic, distant heart sounds, no murmur, no gallop, no rub. No JVD   Abdomen:     Normal bowel sounds, no masses, no organomegaly. Soft     nontender, nondistended, no guarding, no rebound                tenderness   Extremities:   Moves all extremities well, 1+ equal bilateral lower edema, no cyanosis   Skin:   No bleeding, bruising or rash.   Neurologic:  No tremor, sensation intact, Motor power is normal and equal bilaterally.       Laboratory results:    Results from last 7 days   Lab Units 11/28/20  0522 11/27/20  0514 11/26/20  0550   SODIUM mmol/L 145 144 143   POTASSIUM mmol/L 4.0 3.8 4.1   CHLORIDE mmol/L 101 100 99   CO2 mmol/L 35.4* 35.7* 34.5*   BUN mg/dL 52* 58* 61*   CREATININE mg/dL 1.17 1.21 1.24   CALCIUM mg/dL 9.8 10.1 9.7   BILIRUBIN mg/dL 1.1 0.8 0.5   ALK PHOS U/L 60 62 54   ALT (SGPT) U/L <5 <5 <5   AST (SGOT) U/L 27 18 12   GLUCOSE mg/dL 203* 317* 294*     Results from last 7 days   Lab Units 11/28/20  0522 11/27/20  0514 11/26/20  0550   WBC 10*3/mm3 11.43* 10.75 9.81   HEMOGLOBIN g/dL 11.5* 12.3* 11.5*   HEMATOCRIT % 35.0* 37.8 35.2*   PLATELETS 10*3/mm3 75* 77* 63*             Results from last 7 days   Lab Units 11/23/20  0205 11/23/20  0154   BLOODCX  No growth at 5 days No growth at 5 days           I have  reviewed the patient's laboratory results.    Radiology results:    Imaging Results (Last 24 Hours)     ** No results found for the last 24 hours. **          I have reviewed the patient's radiology reports.    Medication Review:     I have reviewed the patients active and prn medications.     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  Continue with the steroids at least for 10 days and supportive care as per orders    Acute renal failure on top of CKD stage III-he is stable improving with hydration    Thrombocytopenia stable no signs of acute bleeding    Delirium he does have signs for delirium with attention span waxing waning and with possible baseline mild early dementia which has not been treated.  We will try Haldol and see if he can be stabilized      Prognosis poor and consult palliative care has been done and see rest as per orders    Omar Lakhani MD  11/28/20  14:21 EST            Electronically signed by Omar Lakhani MD at 11/28/20 7908

## 2020-12-01 NOTE — PROGRESS NOTES
"Nephrology Progress Note.    LOS: 8 days    Patient Care Team:  Jason Foy MD as PCP - General (General Practice)    Chief Complaint:    Chief Complaint   Patient presents with   • Fever   • Shortness of Breath       Subjective:   Follow up for ALEXANDRA and Chronic Kidney disease stage 3 / Anemia.     Interval History:   Patient Complaints: none  Patient seen and examined this morning.  Events from last 24 hours noted.  Patient has COVID-19 pneumonia.  Patient is awake a lot more interactive as compared to before but still confused.  He is being fed by the nurse and he was able to eat and swallow fairly well.  Does not appear in any distress does not even know that he has restraints on.  Objective:    Vital Signs  /69   Pulse 72   Temp 98 °F (36.7 °C) (Oral)   Resp 18   Ht 180.3 cm (71\")   Wt 97.1 kg (214 lb 1.1 oz)   SpO2 (!) 87%   BMI 29.86 kg/m²     No intake/output data recorded.    Intake/Output Summary (Last 24 hours) at 12/1/2020 0736  Last data filed at 12/1/2020 0325  Gross per 24 hour   Intake 460 ml   Output --   Net 460 ml       Physical Exam:  General Appearance: no acute distress,   HEENT: Oral mucosa dry, extra occular movements intact. Sclera clear.  Skin: Warm and dry  Neck: supple, no JVD, trachea midline  Lungs:Chest shape is normal. Breath sounds heard scattered rhonchi and crackles, No wheezing.   Heart: Irregular rate and rhythm. normal S1 and S2, no S3, no rub, peripheral pulses weak but palpable.  Abdomen: Obese, soft, non-tender,  present bowel sounds to auscultation  : no palpable bladder.  Extremities: Trace edema, no cyanosis or clubbing.   Neuro: Randomly does move his extremities.     Results Review:   Results from last 7 days   Lab Units 11/30/20  0550 11/29/20  0554 11/28/20  0522   SODIUM mmol/L 146* 148* 145   POTASSIUM mmol/L 4.0 3.9 4.0   CHLORIDE mmol/L 105 104 101   CO2 mmol/L 30.4* 35.7* 35.4*   BUN mg/dL 43* 48* 52*   CREATININE mg/dL 1.15 1.10 1.17 "   CALCIUM mg/dL 9.6 10.0 9.8   ALBUMIN g/dL 2.70* 2.70* 2.70*   BILIRUBIN mg/dL 1.5* 1.3* 1.1   ALK PHOS U/L 63 67 60   ALT (SGPT) U/L <5 <5 <5   AST (SGOT) U/L 28 25 27   GLUCOSE mg/dL 142* 149* 203*     Estimated Creatinine Clearance: 58.8 mL/min (by C-G formula based on SCr of 1.15 mg/dL).      Results from last 7 days   Lab Units 11/25/20  0652   URIC ACID mg/dL 11.8*     Results from last 7 days   Lab Units 11/30/20  0550 11/29/20  0554 11/28/20  0522 11/27/20  0514 11/26/20  0550   WBC 10*3/mm3 10.36 9.68 11.43* 10.75 9.81   HEMOGLOBIN g/dL 12.7* 11.9* 11.5* 12.3* 11.5*   PLATELETS 10*3/mm3 85* 73* 75* 77* 63*         Brief Urine Lab Results  (Last result in the past 365 days)      Color   Clarity   Blood   Leuk Est   Nitrite   Protein   CREAT   Urine HCG        11/23/20 0156 Yellow Clear Small (1+) Negative Negative 30 mg/dL (1+)             No results found for: UTPCR  Imaging Results (Last 24 Hours)     ** No results found for the last 24 hours. **        albuterol sulfate HFA, 2 puff, Inhalation, 4x Daily - RT  apixaban, 2.5 mg, Oral, Q12H  atorvastatin, 40 mg, Oral, Nightly  b complex-vitamin c-folic acid, 1 tablet, Oral, Daily  carbidopa-levodopa, 1 tablet, Oral, TID  carvedilol, 6.25 mg, Oral, BID  dexamethasone, 6 mg, Oral, Daily With Breakfast  gabapentin, 400 mg, Oral, Q12H  haloperidol, 1 mg, Oral, Q8H  insulin aspart, 0-9 Units, Subcutaneous, TID AC  insulin aspart, 5 Units, Subcutaneous, TID With Meals  insulin detemir, 30 Units, Subcutaneous, Nightly  lactobacillus acidophilus, 1 capsule, Oral, BID  melatonin, 5 mg, Oral, Nightly  pantoprazole, 40 mg, Oral, Daily  tamsulosin, 0.4 mg, Oral, Daily  vitamin C, 500 mg, Oral, Daily             Medication Review:   Current Facility-Administered Medications   Medication Dose Route Frequency Provider Last Rate Last Admin   • albuterol sulfate HFA (PROVENTIL HFA;VENTOLIN HFA;PROAIR HFA) inhaler 2 puff  2 puff Inhalation 4x Daily - RT Skip Raygoza, DO   2  puff at 12/01/20 0632   • apixaban (ELIQUIS) tablet 2.5 mg  2.5 mg Oral Q12H Carrol Castro DO   2.5 mg at 11/30/20 1001   • atorvastatin (LIPITOR) tablet 40 mg  40 mg Oral Nightly JaretSkip smiley, DO   40 mg at 11/29/20 2115   • b complex-vitamin c-folic acid (NEPHRO-AGATA) tablet 1 tablet  1 tablet Oral Daily Carrol Castro DO   1 tablet at 11/30/20 1000   • carbidopa-levodopa (SINEMET)  MG per tablet 1 tablet  1 tablet Oral TID [facility]   1 tablet at 11/30/20 1724   • carvedilol (COREG) tablet 6.25 mg  6.25 mg Oral BID Carrol Castro DO   6.25 mg at 11/30/20 1000   • dexamethasone (DECADRON) tablet 6 mg  6 mg Oral Daily With Breakfast [facility]   6 mg at 11/30/20 1000   • dextrose (D50W) 25 g/ 50mL Intravenous Solution 25 g  25 g Intravenous Q15 Min PRN [facility]       • dextrose (GLUTOSE) oral gel 1 tube  1 tube Oral Q15 Min PRN JaretSkip morgan, DO       • gabapentin (NEURONTIN) capsule 400 mg  400 mg Oral Q12H [facility]   400 mg at 11/30/20 1000   • glucagon (human recombinant) (GLUCAGEN DIAGNOSTIC) injection 1 mg  1 mg Subcutaneous PRN [facility]       • haloperidol (HALDOL) tablet 1 mg  1 mg Oral Q8H Omar Lakhani MD   1 mg at 12/01/20 0631   • insulin aspart (novoLOG) injection 0-9 Units  0-9 Units Subcutaneous TID AC JaretSkip smiley, DO   2 Units at 11/30/20 1724   • insulin aspart (novoLOG) injection 5 Units  5 Units Subcutaneous TID With Meals JaretSkip smiley, DO   5 Units at 11/30/20 1725   • insulin detemir (LEVEMIR) injection 30 Units  30 Units Subcutaneous Nightly Myranda Davalos, DO   30 Units at 11/30/20 2036   • lactobacillus acidophilus (RISAQUAD) capsule 1 capsule  1 capsule Oral BID Carorl Castro, DO   1 capsule at 11/30/20 1000   • melatonin tablet 5 mg  5 mg Oral Nightly Carrol Castro DO   5 mg at 11/29/20 2115   • metoprolol tartrate (LOPRESSOR) injection 5 mg  5 mg Intravenous Q4H PRN Carrol Castro DO   5 mg at 11/27/20 2220   •  pantoprazole (PROTONIX) EC tablet 40 mg  40 mg Oral Daily Carrol Castro, DO   40 mg at 12/01/20 0631   • rOPINIRole (REQUIP) tablet 0.5 mg  0.5 mg Oral Nightly PRN Carrol Castro, DO   0.5 mg at 11/26/20 2117   • sodium chloride 0.9 % flush 10 mL  10 mL Intravenous PRN Skip Raygoza, DO   10 mL at 11/30/20 1001   • tamsulosin (FLOMAX) 24 hr capsule 0.4 mg  0.4 mg Oral Daily Skip Raygoza, DO   0.4 mg at 11/30/20 1000   • vitamin C (ASCORBIC ACID) tablet 500 mg  500 mg Oral Daily Carrol Castro, DO   500 mg at 11/30/20 1000       Assessment/Plan:  1. Chronic kidney disease stage III: baseline creatinine ~1.3-1.5 fluctuates with volume status.  No further worsening of renal function noted  2. Hypertension in CKD: It is under fair control continue to watch closely.  3. Hyperkalemia: Multifactorial likely secondary to hyperglycemia as well as history of lisinopril use that may be contributing.  It is back to normal.  4. Anasarca: Continue to optimize diuretics  5. Acute respiratory failure with hypoxia  6. Pneumonia due to COVID-19 virus  7. Acute metabolic encephalopathy  8. Type 2 Diabetes Mellitus with hyperglycemia  9. Thrombocytopenia  10. History of aortic valve replacement  11. Dyslipidemia  12. History of DVT  13. Tremor    Plan:  · Although sodium is slightly better, will give more free water in the form of D5W at 100 cc an hour for 24 hours.  · Details were discussed with the patient no family in the room.  Clinically appears to be improving.  · Details were also discussed with the hospitalist service.  · Continue with rest of the current treatment plan and surveillance labs.  · Further recommendations will depend on clinical course of the patient during the current hospitalization.    · I also discussed the details with the nursing staff.  · Rest as ordered.    Rickey Zee MD, FASN  12/01/20  07:36 EST    Dictated utilizing Dragon dictation.

## 2020-12-01 NOTE — PLAN OF CARE
Goal Outcome Evaluation:  Plan of Care Reviewed With: patient  Progress: no change  Outcome Summary: Patient refused medications, and seems to be still confused. He is continued on soft wrist restraints with 2 hour checks to prevent him from pulling his oxygen off. He is still on 4L nasal cannula. Around midnight he started to drink out of a straw which he had forgotten how early.

## 2020-12-01 NOTE — PROGRESS NOTES
[facility]    PROGRESS NOTE   [COVERING FOR HOSPITALIST ATTENDING]    Name:  Jak Pierre   Age:  80 y.o.  Sex:  male  :  1940  MRN:  [id number]   Visit Number:  [id number]  Date Of Service:  20  Primary Care Physician:  Jason Foy MD     LOS: 8 days :  Patient Care Team:  Jason Foy MD as PCP - General (General Practice):    History taken from:    Medical record, RN and Mr. Pierre    Chief Complaint:    Acute hypoxic respiratory failure, COVID-19 infection    Subjective:    Interval History:    Patient evaluated, record reviewed, case discussed with staff.  As reported by staff, patient remained hemodynamically stable, however was noted to be increasingly confused delirious, particularly last p.m. which prompted wrist restraints to ensure patient's safety.  There is a concern that steroids/dexamethasone is contributing to patient's delirium; he will be completing 10-day course today, then discontinued.  During my visit, patient appeared comfortable, pleasant while RN was helping him with lunch.  He continues to think that this is the year of , he however is aware that he is in the [[facility]], was aware that his spouse did not call him earlier today but did in the past.      Past Medical History:    Past Medical History:   Diagnosis Date   • Angina of effort (CMS/[facility])    • Aortic valve replaced    • Arthritis    • Cataract    • CHF (congestive heart failure) (CMS/[facility])    • Colitis    • Diabetes (CMS/[facility])    • Diverticulitis    • Gall stones    • Heart disease    • Hyperlipidemia    • Hypertension    • Hypertension    • Impaired functional mobility, balance, gait, and endurance    • Kidney stones    • Skin cancer     left shoulder   • Tremor         Past Surgical history:    Past Surgical History:   Procedure Laterality Date   • AORTIC VALVE REPAIR/REPLACEMENT  2016   • COLONOSCOPY  2018   • HERNIA REPAIR  1963    x2   • SINUS SURGERY          Review of Systems:    Review of Systems     Objective:    Vital Signs:    Temp:  [97.4 °F (36.3 °C)-98 °F (36.7 °C)] 97.6 °F (36.4 °C)  Heart Rate:  [63-87] 87  Resp:  [16-18] 18  BP: (105-137)/(65-86) 120/66    Physical Exam:    General Appearance:  Alert and cooperative, comfortable, partially oriented   Head:  Atraumatic and normocephalic, without obvious abnormality.   Eyes:          PERRLA, conjunctivae and sclerae normal, no Icterus. No pallor. Extra-occular movements are within normal limits.   Neck: Supple, trachea midline, no thyromegaly, no carotid bruit.   Back:   No kyphoscoliosis. No tenderness to palpation.   Lungs:   Chest shape is normal. Breath sounds heard bilaterally equally.  No wheezing.  Bilateral basal occasional crackles heard. No Pleural rub or bronchial breathing.   Heart:  Normal S1 and S2, no murmur, no gallop, no rub. No JVD.   Abdomen:   Normal bowel sounds, no masses, no organomegaly. Soft, non-tender, non-distended, no guarding    Extremities: Moves all extremities well, edema, cyanosis or clubbing.     Pulses: Pulses palpable and equal bilaterally.   Skin: No bleeding or rash.  Generalized dry skin noted.   Neurologic: Grossly nonfocal, pleasantly confused     Results Review:    Labs:    Results from last 7 days   Lab Units 11/30/20  0550 11/29/20  0554 11/28/20  0522   WBC 10*3/mm3 10.36 9.68 11.43*   HEMOGLOBIN g/dL 12.7* 11.9* 11.5*   HEMATOCRIT % 38.1 36.9* 35.0*   MCV fL 94.5 95.8 94.3   MCHC g/dL 33.3 32.2 32.9   PLATELETS 10*3/mm3 85* 73* 75*         Results from last 7 days   Lab Units 12/01/20  0938 11/30/20  0550 11/29/20  0554 11/28/20  0522   SODIUM mmol/L 140 146* 148* 145   POTASSIUM mmol/L 4.0 4.0 3.9 4.0   CHLORIDE mmol/L 103 105 104 101   CO2 mmol/L 28.7 30.4* 35.7* 35.4*   BUN mg/dL 39* 43* 48* 52*   CREATININE mg/dL 1.11 1.15 1.10 1.17   EGFR IF NONAFRICN AM mL/min/1.73 63 61 64 60*   CALCIUM mg/dL 9.4 9.6 10.0 9.8   GLUCOSE mg/dL 145* 142* 149* 203*    ALBUMIN g/dL 2.70* 2.70* 2.70* 2.70*   BILIRUBIN mg/dL  --  1.5* 1.3* 1.1   ALK PHOS U/L  --  63 67 60   AST (SGOT) U/L  --  28 25 27   ALT (SGPT) U/L  --  <5 <5 <5   Estimated Creatinine Clearance: 61 mL/min (by C-G formula based on SCr of 1.11 mg/dL).  No results found for: AMMONIA          Lab Results   Component Value Date    HGBA1C 10.20 (H) 11/24/2020     Lab Results   Component Value Date    TSH 0.250 (L) 11/23/2020     No results found for: PREGTESTUR, PREGSERUM, HCG, HCGQUANT  Pain Management Panel     There is no flowsheet data to display.        No results found for: BLOODCX  No results found for: URINECX  No results found for: WOUNDCX  No results found for: STOOLCX        Radiology:    Imaging Results (Most Recent)     Procedure Component Value Units Date/Time    XR Chest 1 View [[id number]] Collected: 11/27/20 0805     Updated: 11/27/20 0807    Narrative:      PROCEDURE: XR CHEST 1 VW-     HISTORY: respiratory failure with severe hypoxia, persistent pneumonia  comparison; U07.1-COVID-19; J12.89-Other viral pneumonia;  R73.9-Hyperglycemia, unspecified     COMPARISON: 11/23/2020.     FINDINGS: The heart is normal in size. The mediastinum is unremarkable.  There has been interval improvement in the patient's bilateral airspace  disease. No significant effusions are evident. There is no pneumothorax.   There are no acute osseous abnormalities.       Impression:      Interval improvement in the patient's bilateral airspace  disease.     Continued followup is recommended.     This report was finalized on 11/27/2020 8:05 AM by Carmelina Mooney M.D..    CT Head Without Contrast [[id number]] Collected: 11/23/20 0729     Updated: 11/23/20 0732    Narrative:      PROCEDURE: CT HEAD WO CONTRAST-     HISTORY: Mental status change, unknown cause; U07.1-COVID-19;  J12.89-Other viral pneumonia; R73.9-Hyperglycemia, unspecified     COMPARISON:  None .     TECHNIQUE: Multiple axial CT images were performed from the  foramen  magnum to the vertex without enhancement.      FINDINGS:  There is generalized cerebral volume loss. Patchy hypodensities in the  periventricular white matter consistent with chronic small vessel  ischemic change.  There is no CT evidence of hemorrhage. There is no  mass, mass effect or midline shift.  There is no hydrocephalus. The  paranasal sinuses are clear. Bone windows reveal no acute osseous  abnormalities.       Impression:      No acute intracranial process.           1106.18 mGy.cm        This study was performed with techniques to keep radiation doses as low  as reasonably achievable (ALARA). Individualized dose reduction  techniques using automated exposure control or adjustment of mA and/or  kV according to the patient size were employed.      This report was finalized on 11/23/2020 7:30 AM by Carmelina Mooney M.D..    XR Chest 1 View [[id number]] Collected: 11/23/20 0727     Updated: 11/23/20 0731    Narrative:      PROCEDURE: XR CHEST 1 VW-     HISTORY: sob, fever, covid +     COMPARISON: 11/20/2020.     FINDINGS: The heart is normal in size. The mediastinum is unremarkable.  There is been interval development of bilateral airspace disease  consistent with a pneumonia. No effusions are evident. There is no  pneumothorax.  There are no acute osseous abnormalities.       Impression:      Bilateral airspace disease consistent with a pneumonia.     Continued followup is recommended.     This report was finalized on 11/23/2020 7:29 AM by Carmelina Mooney M.D..          Assessment/Plan:      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)    Acute renal failure superimposed on stage 3 chronic kidney disease (CMS/HCC)    Pneumonia due to COVID-19 virus    Thrombocytopenia (CMS/HCC)      · COVID-19 pneumonia, clinically improving  · Acute hypoxic respiratory failure secondary to above, on 2 to 3 L nasal cannula  · Acute on chronic renal  failure  · Metabolic encephalopathy with periodic hyperactive delirium secondary to above; steroid effect is in the differential  · Thrombocytopenia, etiology unspecified, improved, currently 85K  · Sleep apnea hypopnea syndrome, on home CPAP  · Chronic congestive heart failure, no signs of acute extubation at present  · History of aortic valve replacement  · Chronic anticoagulation on Eliquis, no signs of overt bleed at present  · Type 2 diabetes mellitus  · 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 to follow on renal function, H&H and platelet count  · PT/OT consult to assist for mobility and safety in preparation for anticipated discharge home  · Encourage use of CPAP, as reported by spouse, patient used it regularly at home due to sleep apnea hypopnea syndrome  · Coordinate with case management for discharge plans to home with resumption of home health, PT and OT if clinically appropriate    I had a lengthy and detailed discussion with Mrs. Pierre by phone during which I updated her regarding patient's condition, interval changes since yesterday, current management and discharge plans; she feels that patient would be most comfortable at home rather than skilled nursing facility.  She inquired regarding the use of CPAP, thrombocytopenia, the possibility of heme-onc consult during hospitalization for the same, DME, home health and home PT/OT.  I reassured her that arranging for his home needs will be coordinated by case management.  As of inpatient heme-onc consult, I explained that urgent inpatient consultation is not warranted given patient's  blood counts.  I informed her regarding above documented plan along with close monitoring and hopefully discharge by Thursday, December 3 contingent with stable clinical condition.  Mrs. Pierre expressed understanding and appreciation of the staff who proved excellence during current crisis.    Xochilt Sheffield MD  12/01/20  15:26 EST

## 2020-12-01 NOTE — PROGRESS NOTES
Discharge needs, at this time, are not able to be determined.  Pt remains in restraints.  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  However, steroid order did not get DC.  Steroids to be stopped today.  Palliative services will continue to follow.    Dr. Sheffield f/u with pt's wife via phone call.  Update and pt's clinical status discussed in great detail.  Wife reports pt already had HH prior to admission, but did not require PT/OT services.  Wife wants to take pt home and resume his HH services with therapy.  She will need equipment, but at this time not sure what he will need until he is evaluated by therapy.  Per Dr. Sheffield, possible discharge Thursday, if pt is stable.  Consult for therapy evaluation has been ordered.

## 2020-12-02 LAB
A-A DO2: 175.1 MMHG
ALBUMIN SERPL-MCNC: 2.6 G/DL (ref 3.5–5.2)
ANION GAP SERPL CALCULATED.3IONS-SCNC: 8 MMOL/L (ref 5–15)
ARTERIAL PATENCY WRIST A: POSITIVE
ATMOSPHERIC PRESS: 741 MMHG
BACTERIA UR QL AUTO: ABNORMAL /HPF
BASE EXCESS BLDA CALC-SCNC: 5.2 MMOL/L (ref 0–2)
BASOPHILS # BLD AUTO: 0.04 10*3/MM3 (ref 0–0.2)
BASOPHILS NFR BLD AUTO: 0.2 % (ref 0–1.5)
BDY SITE: ABNORMAL
BILIRUB UR QL STRIP: NEGATIVE
BUN SERPL-MCNC: 34 MG/DL (ref 8–23)
BUN/CREAT SERPL: 35.1 (ref 7–25)
CALCIUM SPEC-SCNC: 9 MG/DL (ref 8.6–10.5)
CHLORIDE SERPL-SCNC: 101 MMOL/L (ref 98–107)
CLARITY UR: CLEAR
CO2 SERPL-SCNC: 27 MMOL/L (ref 22–29)
COHGB MFR BLD: 1.1 % (ref 0–2)
COLOR UR: ABNORMAL
CREAT SERPL-MCNC: 0.97 MG/DL (ref 0.76–1.27)
DEPRECATED RDW RBC AUTO: 47 FL (ref 37–54)
EOSINOPHIL # BLD AUTO: 0.13 10*3/MM3 (ref 0–0.4)
EOSINOPHIL NFR BLD AUTO: 0.8 % (ref 0.3–6.2)
ERYTHROCYTE [DISTWIDTH] IN BLOOD BY AUTOMATED COUNT: 13.9 % (ref 12.3–15.4)
GAS FLOW AIRWAY: 5 LPM
GFR SERPL CREATININE-BSD FRML MDRD: 74 ML/MIN/1.73
GLUCOSE BLDC GLUCOMTR-MCNC: 102 MG/DL (ref 70–130)
GLUCOSE BLDC GLUCOMTR-MCNC: 135 MG/DL (ref 70–130)
GLUCOSE BLDC GLUCOMTR-MCNC: 142 MG/DL (ref 70–130)
GLUCOSE BLDC GLUCOMTR-MCNC: 86 MG/DL (ref 70–130)
GLUCOSE SERPL-MCNC: 158 MG/DL (ref 65–99)
GLUCOSE UR STRIP-MCNC: NEGATIVE MG/DL
HCO3 BLDA-SCNC: 29.1 MMOL/L (ref 22–28)
HCT VFR BLD AUTO: 37.1 % (ref 37.5–51)
HCT VFR BLD CALC: 40.3 %
HGB BLD-MCNC: 12.4 G/DL (ref 13–17.7)
HGB UR QL STRIP.AUTO: ABNORMAL
HYALINE CASTS UR QL AUTO: ABNORMAL /LPF
IMM GRANULOCYTES # BLD AUTO: 0.4 10*3/MM3 (ref 0–0.05)
IMM GRANULOCYTES NFR BLD AUTO: 2.3 % (ref 0–0.5)
INHALED O2 CONCENTRATION: 40 %
KETONES UR QL STRIP: NEGATIVE
LEUKOCYTE ESTERASE UR QL STRIP.AUTO: ABNORMAL
LYMPHOCYTES # BLD AUTO: 1.15 10*3/MM3 (ref 0.7–3.1)
LYMPHOCYTES NFR BLD AUTO: 6.7 % (ref 19.6–45.3)
MCH RBC QN AUTO: 31.1 PG (ref 26.6–33)
MCHC RBC AUTO-ENTMCNC: 33.4 G/DL (ref 31.5–35.7)
MCV RBC AUTO: 93 FL (ref 79–97)
METHGB BLD QL: 0.5 % (ref 0–1.5)
MODALITY: ABNORMAL
MONOCYTES # BLD AUTO: 0.92 10*3/MM3 (ref 0.1–0.9)
MONOCYTES NFR BLD AUTO: 5.4 % (ref 5–12)
NEUTROPHILS NFR BLD AUTO: 14.43 10*3/MM3 (ref 1.7–7)
NEUTROPHILS NFR BLD AUTO: 84.6 % (ref 42.7–76)
NITRITE UR QL STRIP: NEGATIVE
NOTE: ABNORMAL
NRBC BLD AUTO-RTO: 0 /100 WBC (ref 0–0.2)
OXYHGB MFR BLDV: 89.5 % (ref 94–99)
PCO2 BLDA: 39.2 MM HG (ref 35–45)
PCO2 TEMP ADJ BLD: ABNORMAL MM[HG]
PH BLDA: 7.48 PH UNITS (ref 7.3–7.5)
PH UR STRIP.AUTO: 6 [PH] (ref 5–8)
PH, TEMP CORRECTED: ABNORMAL
PHOSPHATE SERPL-MCNC: 3 MG/DL (ref 2.5–4.5)
PLATELET # BLD AUTO: 93 10*3/MM3 (ref 140–450)
PMV BLD AUTO: 11.5 FL (ref 6–12)
PO2 BLDA: 59.6 MM HG (ref 75–100)
PO2 TEMP ADJ BLD: ABNORMAL MM[HG]
POTASSIUM SERPL-SCNC: 4.2 MMOL/L (ref 3.5–5.2)
PROT UR QL STRIP: NEGATIVE
RBC # BLD AUTO: 3.99 10*6/MM3 (ref 4.14–5.8)
RBC # UR: ABNORMAL /HPF
REF LAB TEST METHOD: ABNORMAL
SAO2 % BLDCOA: 91 % (ref 94–100)
SARS-COV-2 RNA PNL SPEC NAA+PROBE: DETECTED
SODIUM SERPL-SCNC: 136 MMOL/L (ref 136–145)
SP GR UR STRIP: 1.02 (ref 1–1.03)
SQUAMOUS #/AREA URNS HPF: ABNORMAL /HPF
UROBILINOGEN UR QL STRIP: ABNORMAL
VENTILATOR MODE: ABNORMAL
WBC # BLD AUTO: 17.07 10*3/MM3 (ref 3.4–10.8)
WBC UR QL AUTO: ABNORMAL /HPF

## 2020-12-02 PROCEDURE — 36600 WITHDRAWAL OF ARTERIAL BLOOD: CPT

## 2020-12-02 PROCEDURE — 82962 GLUCOSE BLOOD TEST: CPT

## 2020-12-02 PROCEDURE — 94799 UNLISTED PULMONARY SVC/PX: CPT

## 2020-12-02 PROCEDURE — 63710000001 INSULIN DETEMIR PER 5 UNITS: Performed by: FAMILY MEDICINE

## 2020-12-02 PROCEDURE — 81001 URINALYSIS AUTO W/SCOPE: CPT | Performed by: INTERNAL MEDICINE

## 2020-12-02 PROCEDURE — 83050 HGB METHEMOGLOBIN QUAN: CPT

## 2020-12-02 PROCEDURE — 63710000001 INSULIN ASPART PER 5 UNITS: Performed by: EMERGENCY MEDICINE

## 2020-12-02 PROCEDURE — 97162 PT EVAL MOD COMPLEX 30 MIN: CPT

## 2020-12-02 PROCEDURE — 82375 ASSAY CARBOXYHB QUANT: CPT

## 2020-12-02 PROCEDURE — 82805 BLOOD GASES W/O2 SATURATION: CPT

## 2020-12-02 PROCEDURE — 85025 COMPLETE CBC W/AUTO DIFF WBC: CPT | Performed by: INTERNAL MEDICINE

## 2020-12-02 PROCEDURE — U0004 COV-19 TEST NON-CDC HGH THRU: HCPCS | Performed by: INTERNAL MEDICINE

## 2020-12-02 PROCEDURE — 94660 CPAP INITIATION&MGMT: CPT

## 2020-12-02 PROCEDURE — 80069 RENAL FUNCTION PANEL: CPT | Performed by: INTERNAL MEDICINE

## 2020-12-02 PROCEDURE — 87635 SARS-COV-2 COVID-19 AMP PRB: CPT | Performed by: INTERNAL MEDICINE

## 2020-12-02 PROCEDURE — 99233 SBSQ HOSP IP/OBS HIGH 50: CPT | Performed by: INTERNAL MEDICINE

## 2020-12-02 RX ADMIN — Medication 1 TABLET: at 08:16

## 2020-12-02 RX ADMIN — Medication 1 CAPSULE: at 20:53

## 2020-12-02 RX ADMIN — PANTOPRAZOLE SODIUM 40 MG: 40 TABLET, DELAYED RELEASE ORAL at 06:36

## 2020-12-02 RX ADMIN — APIXABAN 2.5 MG: 2.5 TABLET, FILM COATED ORAL at 20:37

## 2020-12-02 RX ADMIN — ATORVASTATIN CALCIUM 40 MG: 40 TABLET, FILM COATED ORAL at 20:36

## 2020-12-02 RX ADMIN — CARBIDOPA AND LEVODOPA 1 TABLET: 25; 100 TABLET ORAL at 08:17

## 2020-12-02 RX ADMIN — HALOPERIDOL 1 MG: 1 TABLET ORAL at 06:36

## 2020-12-02 RX ADMIN — ALBUTEROL SULFATE 2 PUFF: 90 AEROSOL, METERED RESPIRATORY (INHALATION) at 19:23

## 2020-12-02 RX ADMIN — OXYCODONE HYDROCHLORIDE AND ACETAMINOPHEN 500 MG: 500 TABLET ORAL at 08:17

## 2020-12-02 RX ADMIN — TAMSULOSIN HYDROCHLORIDE 0.4 MG: 0.4 CAPSULE ORAL at 08:17

## 2020-12-02 RX ADMIN — ALBUTEROL SULFATE 2 PUFF: 90 AEROSOL, METERED RESPIRATORY (INHALATION) at 11:59

## 2020-12-02 RX ADMIN — Medication 5 MG: at 20:36

## 2020-12-02 RX ADMIN — CARBIDOPA AND LEVODOPA 1 TABLET: 25; 100 TABLET ORAL at 20:36

## 2020-12-02 RX ADMIN — APIXABAN 2.5 MG: 2.5 TABLET, FILM COATED ORAL at 08:17

## 2020-12-02 RX ADMIN — GABAPENTIN 400 MG: 400 CAPSULE ORAL at 08:17

## 2020-12-02 RX ADMIN — SODIUM CHLORIDE, PRESERVATIVE FREE 10 ML: 5 INJECTION INTRAVENOUS at 08:18

## 2020-12-02 RX ADMIN — HALOPERIDOL 1 MG: 1 TABLET ORAL at 14:16

## 2020-12-02 RX ADMIN — INSULIN ASPART 5 UNITS: 100 INJECTION, SOLUTION INTRAVENOUS; SUBCUTANEOUS at 11:59

## 2020-12-02 RX ADMIN — ALBUTEROL SULFATE 2 PUFF: 90 AEROSOL, METERED RESPIRATORY (INHALATION) at 06:37

## 2020-12-02 RX ADMIN — GABAPENTIN 400 MG: 400 CAPSULE ORAL at 20:37

## 2020-12-02 RX ADMIN — INSULIN DETEMIR 30 UNITS: 100 INJECTION, SOLUTION SUBCUTANEOUS at 20:52

## 2020-12-02 RX ADMIN — CARVEDILOL 6.25 MG: 6.25 TABLET, FILM COATED ORAL at 08:17

## 2020-12-02 RX ADMIN — INSULIN ASPART 5 UNITS: 100 INJECTION, SOLUTION INTRAVENOUS; SUBCUTANEOUS at 08:17

## 2020-12-02 RX ADMIN — CARVEDILOL 6.25 MG: 6.25 TABLET, FILM COATED ORAL at 20:36

## 2020-12-02 RX ADMIN — Medication 1 CAPSULE: at 08:15

## 2020-12-02 NOTE — PLAN OF CARE
Problem: Adult Inpatient Plan of Care  Goal: Plan of Care Review  Recent Flowsheet Documentation  Taken 12/2/2020 1119 by Fani Prince, PT  Plan of Care Reviewed With: patient  Outcome Summary: PT Evaluation performed bedside this am with pt oriented to self only. Pt was very inconsistent with following commands but with visual cues pt was able to assist with bed mobility with minimal to moderate assist. Pt presents with decreased balance, strength, and endurance. He is expected to improve his functional mobility with continued skilled PT services prior to d/c.

## 2020-12-02 NOTE — PROGRESS NOTES
"Nephrology Progress Note.    LOS: 9 days    Patient Care Team:  Jason Foy MD as PCP - General (General Practice)    Chief Complaint:    Chief Complaint   Patient presents with   • Fever   • Shortness of Breath       Subjective:   Follow up for ALEXANDRA and Chronic Kidney disease stage 3 / Anemia.     Interval History:   Patient Complaints: none  Patient seen and examined this morning.  Events from last 24 hours noted.  Patient has COVID-19 pneumonia.  Patient is awake a lot more interactive as compared to before but still confused.  He is being fed by the nurse and he was able to eat and swallow fairly well.  Does not appear in any distress does not even know that he has restraints on.  Objective:    Vital Signs  /71 (BP Location: Left leg, Patient Position: Lying)   Pulse 69   Temp 97.8 °F (36.6 °C) (Axillary)   Resp 18   Ht 180.3 cm (71\")   Wt 97.1 kg (214 lb 1.1 oz)   SpO2 94%   BMI 29.86 kg/m²     No intake/output data recorded.    Intake/Output Summary (Last 24 hours) at 12/2/2020 0819  Last data filed at 12/1/2020 0900  Gross per 24 hour   Intake 260 ml   Output --   Net 260 ml       Physical Exam:  General Appearance: no acute distress,   HEENT: Oral mucosa dry, extra occular movements intact. Sclera clear.  Skin: Warm and dry  Neck: supple, no JVD, trachea midline  Lungs:Chest shape is normal. Breath sounds heard scattered rhonchi and crackles, No wheezing.   Heart: Irregular rate and rhythm. normal S1 and S2, no S3, no rub, peripheral pulses weak but palpable.  Abdomen: Obese, soft, non-tender,  present bowel sounds to auscultation  : no palpable bladder.  Extremities: Trace edema, no cyanosis or clubbing.   Neuro: Randomly does move his extremities.     Results Review:   Results from last 7 days   Lab Units 12/02/20  0532 12/01/20  0938 11/30/20  0550 11/29/20  0554 11/28/20  0522   SODIUM mmol/L 136 140 146* 148* 145   POTASSIUM mmol/L 4.2 4.0 4.0 3.9 4.0   CHLORIDE mmol/L 101 103 105 " 104 101   CO2 mmol/L 27.0 28.7 30.4* 35.7* 35.4*   BUN mg/dL 34* 39* 43* 48* 52*   CREATININE mg/dL 0.97 1.11 1.15 1.10 1.17   CALCIUM mg/dL 9.0 9.4 9.6 10.0 9.8   ALBUMIN g/dL 2.60* 2.70* 2.70* 2.70* 2.70*   BILIRUBIN mg/dL  --   --  1.5* 1.3* 1.1   ALK PHOS U/L  --   --  63 67 60   ALT (SGPT) U/L  --   --  <5 <5 <5   AST (SGOT) U/L  --   --  28 25 27   GLUCOSE mg/dL 158* 145* 142* 149* 203*     Estimated Creatinine Clearance: 69.8 mL/min (by C-G formula based on SCr of 0.97 mg/dL).  Results from last 7 days   Lab Units 12/02/20  0532 12/01/20  0938   PHOSPHORUS mg/dL 3.0 3.0     Results from last 7 days   Lab Units 12/01/20  0938   URIC ACID mg/dL 7.0     Results from last 7 days   Lab Units 12/02/20  0532 11/30/20  0550 11/29/20  0554 11/28/20  0522 11/27/20  0514   WBC 10*3/mm3 17.07* 10.36 9.68 11.43* 10.75   HEMOGLOBIN g/dL 12.4* 12.7* 11.9* 11.5* 12.3*   PLATELETS 10*3/mm3 93* 85* 73* 75* 77*         Brief Urine Lab Results  (Last result in the past 365 days)      Color   Clarity   Blood   Leuk Est   Nitrite   Protein   CREAT   Urine HCG        11/23/20 0156 Yellow Clear Small (1+) Negative Negative 30 mg/dL (1+)             No results found for: UTPCR  Imaging Results (Last 24 Hours)     Procedure Component Value Units Date/Time    XR Chest 1 View [[id number]] Resulted: 12/01/20 2058     Updated: 12/01/20 2059        albuterol sulfate HFA, 2 puff, Inhalation, 4x Daily - RT  apixaban, 2.5 mg, Oral, Q12H  atorvastatin, 40 mg, Oral, Nightly  b complex-vitamin c-folic acid, 1 tablet, Oral, Daily  carbidopa-levodopa, 1 tablet, Oral, TID  carvedilol, 6.25 mg, Oral, BID  dexamethasone, 6 mg, Oral, Daily With Breakfast  gabapentin, 400 mg, Oral, Q12H  haloperidol, 1 mg, Oral, Q8H  insulin aspart, 0-9 Units, Subcutaneous, TID AC  insulin aspart, 5 Units, Subcutaneous, TID With Meals  insulin detemir, 30 Units, Subcutaneous, Nightly  lactobacillus acidophilus, 1 capsule, Oral, BID  melatonin, 5 mg, Oral,  Nightly  pantoprazole, 40 mg, Oral, Daily  tamsulosin, 0.4 mg, Oral, Daily  vitamin C, 500 mg, Oral, Daily             Medication Review:   Current Facility-Administered Medications   Medication Dose Route Frequency Provider Last Rate Last Admin   • albuterol sulfate HFA (PROVENTIL HFA;VENTOLIN HFA;PROAIR HFA) inhaler 2 puff  2 puff Inhalation 4x Daily - RT JaretSkip smiley DO   2 puff at 12/02/20 0637   • apixaban (ELIQUIS) tablet 2.5 mg  2.5 mg Oral Q12H Carrol Castro DO   2.5 mg at 12/02/20 0817   • atorvastatin (LIPITOR) tablet 40 mg  40 mg Oral Nightly JaretSkip smliey DO   40 mg at 12/01/20 2041   • b complex-vitamin c-folic acid (NEPHRO-AGATA) tablet 1 tablet  1 tablet Oral Daily Carrol Castro DO   1 tablet at 12/02/20 0816   • carbidopa-levodopa (SINEMET)  MG per tablet 1 tablet  1 tablet Oral TID [facility]   1 tablet at 12/02/20 0817   • carvedilol (COREG) tablet 6.25 mg  6.25 mg Oral BID Carrol Castro DO   6.25 mg at 12/02/20 0817   • dexamethasone (DECADRON) tablet 6 mg  6 mg Oral Daily With Breakfast JaretSkip smiley, DO   6 mg at 12/01/20 0834   • dextrose (D50W) 25 g/ 50mL Intravenous Solution 25 g  25 g Intravenous Q15 Min PRN JaretSkip smiley, DO       • dextrose (GLUTOSE) oral gel 1 tube  1 tube Oral Q15 Min PRN JaretSkip smiley, DO       • gabapentin (NEURONTIN) capsule 400 mg  400 mg Oral Q12H [facility], [facility]   400 mg at 12/02/20 0817   • glucagon (human recombinant) (GLUCAGEN DIAGNOSTIC) injection 1 mg  1 mg Subcutaneous PRN JaretSkip morgan, DO       • haloperidol (HALDOL) tablet 1 mg  1 mg Oral Q8H Omar Lakhani MD   1 mg at 12/02/20 0636   • insulin aspart (novoLOG) injection 0-9 Units  0-9 Units Subcutaneous TID AC Skip Raygoza DO   7 Units at 12/01/20 1738   • insulin aspart (novoLOG) injection 5 Units  5 Units Subcutaneous TID With Meals Skip Raygoza DO   5 Units at 12/02/20 0817   • insulin detemir (LEVEMIR) injection 30 Units  30 Units Subcutaneous Nightly Myranda Davalos, DO    30 Units at 12/01/20 2044   • lactobacillus acidophilus (RISAQUAD) capsule 1 capsule  1 capsule Oral BID Carrol Castro, DO   1 capsule at 12/02/20 0815   • melatonin tablet 5 mg  5 mg Oral Nightly Carrol Castro, DO   5 mg at 12/01/20 2041   • metoprolol tartrate (LOPRESSOR) injection 5 mg  5 mg Intravenous Q4H PRN Carrol Castro, DO   5 mg at 11/27/20 2220   • pantoprazole (PROTONIX) EC tablet 40 mg  40 mg Oral Daily Carrol Castro, DO   40 mg at 12/02/20 0636   • rOPINIRole (REQUIP) tablet 0.5 mg  0.5 mg Oral Nightly PRN Carrol Castro, DO   0.5 mg at 11/26/20 2117   • sodium chloride 0.9 % flush 10 mL  10 mL Intravenous PRN Jaret, Umar, DO   10 mL at 12/02/20 0818   • tamsulosin (FLOMAX) 24 hr capsule 0.4 mg  0.4 mg Oral Daily Jaret, Umar, DO   0.4 mg at 12/02/20 0817   • vitamin C (ASCORBIC ACID) tablet 500 mg  500 mg Oral Daily Carrol Castro, DO   500 mg at 12/02/20 0817       Assessment/Plan:  1. Chronic kidney disease stage III: baseline creatinine ~1.3-1.5 fluctuates with volume status.  No further worsening of renal function noted  2. Hypertension in CKD: It is under fair control continue to watch closely.  3. Hyperkalemia: Multifactorial likely secondary to hyperglycemia as well as history of lisinopril use that may be contributing.  It is back to normal.  4. Anasarca: Continue to optimize diuretics  5. Acute respiratory failure with hypoxia  6. Pneumonia due to COVID-19 virus  7. Acute metabolic encephalopathy  8. Type 2 Diabetes Mellitus with hyperglycemia  9. Thrombocytopenia  10. History of aortic valve replacement  11. Dyslipidemia  12. History of DVT  13. Tremor    Plan:  · Stop the IV fluids.  We will let him eat and drink, may be able to go home with home health.  · Details were discussed with the patient no family in the room.  Clinically appears to be improving.  · Details were also discussed with the hospitalist service.  I think this is the best he will get at this point.  · Whenever  he goes home probably will need to go on 20 of torsemide alternating with 25 of spironolactone.  Continue hold any diuretics for now.  · Continue with rest of the current treatment plan and surveillance labs.  · Further recommendations will depend on clinical course of the patient during the current hospitalization.    · I also discussed the details with the nursing staff.  · Rest as ordered.    Rickey Zee MD, DELMY  12/02/20  08:19 EST    Dictated utilizing Dragon dictation.

## 2020-12-02 NOTE — PROGRESS NOTES
Adult Nutrition  Assessment/PES    Patient Name:  Jak Pierre  YOB: 1940  MRN: [id number]  Admit Date:  11/23/2020    Assessment Date:  12/2/2020    Comments:    Recommend:  1. Consider adding renal modification to current diet order as medically appropriate and tolerated.  2. Encourage PO intake. PO intake average ~25% x 9 meals.  3. RD ordered Boost Pudding BID. Continue Boost Glucose Control TID and Arginaid BID.  4. Consider a renal multivitamin with minerals daily.     RD to follow pt and available PRN.    Reason for Assessment     Row Name 12/02/20 1325          Reason for Assessment    Reason For Assessment  follow-up protocol     Diagnosis  cardiac disease;diabetes diagnosis/complications;cancer diagnosis/related complications;infection/sepsis;renal disease;pulmonary disease;gastrointestinal disease;hematological/related complications PNA COVID, acute resp failure, cellulitis, DM 2, CKD 3, CHF, diverticulitis, HTN, HLD, thrombocytopenia, hx skin cancer     Identified At Risk by Screening Criteria  BMI;large or nonhealing wound, burn or pressure injury             Labs/Tests/Procedures/Meds     Row Name 12/02/20 1325          Labs/Procedures/Meds    Lab Results Reviewed  reviewed, pertinent     Lab Results Comments  Low: Alb, Platelets; High: BUN, Gluc, Bilirubin        Medications    Pertinent Medications Reviewed  reviewed, pertinent     Pertinent Medications Comments  Lipitor, Nephro-shereen, Novolog, Levemir, Protonix, Vitamin C         Physical Findings     Row Name 12/02/20 1327          Physical Findings    Overall Physical Appearance  obese     Skin  other (see comments) multiple skin tears           Nutrition Prescription Ordered     Row Name 12/02/20 1323          Nutrition Prescription PO    Current PO Diet  Regular     Supplement  Arginaid;Boost Glucose Control (Glucerna Shake)     Common Modifiers  Consistent Carbohydrate         Evaluation of Received Nutrient/Fluid Intake      Row Name 12/02/20 1328          PO Evaluation    Number of Meals  9     % PO Intake  25               Problem/Interventions:  Problem 1     Row Name 12/02/20 1329          Nutrition Diagnoses Problem 1    Problem 1  Altered Nutrition Related to Labs     Etiology (related to)  Medical Diagnosis     Endocrine  DM2     Hematological  Thrombocytopenia     Renal  CKD     Signs/Symptoms (evidenced by)  Biochemical     Specific Labs Noted  BUN;Glucose;Platelets         Problem 2     Row Name 12/02/20 1329          Nutrition Diagnoses Problem 2    Problem 2  Increased Nutrient Needs     Macronutrient  Kcal;Protein;Fluid     Etiology (related to)  Medical Diagnosis     Pulmonary/Critical Care  Pneumonia;Other (comment) COVID     Signs/Symptoms (evidenced by)  Report/Observation     Reported/Observed By  MD         Problem 3     Row Name 12/02/20 1329          Nutrition Diagnoses Problem 3    Problem 3  Overweight/Obesity     Etiology (related to)  Factors Affecting Nutrition     Reported/Observed By  RN     Signs/Symptoms (evidenced by)  BMI     BMI  25 - 29.9         Problem 4     Row Name 12/02/20 1329          Nutrition Diagnoses Problem 4    Problem 4  Predicted Suboptimal Intake     Etiology (related to)  Factors Affecting Nutrition     Food Habit/Preferences  Small Meals     Signs/Symptoms (evidenced by)  PO Intake     Percent (%) intake recorded  25 %     Over number of meals  9         Intervention Goal     Row Name 12/02/20 1330          Intervention Goal    General  Meet nutritional needs for age/condition;Improved nutrition related lab(s)     PO  Meet estimated needs;Increase intake;PO intake (%)     PO Intake %  50 %     Weight  No significant weight loss         Nutrition Intervention     Row Name 12/02/20 1330          Nutrition Intervention    RD/Tech Action  Follow Tx progress;Encourage intake     Recommended/Ordered  Supplement;Diet         Nutrition Prescription     Row Name 12/02/20 1331          Nutrition  Prescription PO    PO Prescription  Begin/change diet;Begin/change supplement     Begin/Change Diet to  Regular     Supplement  Boost Pudding     Supplement Frequency  2 times a day     Common Modifiers  Cardiac;Renal;Consistent Carbohydrate     New PO Prescription Ordered?  Yes        Other Orders    Obtain Weight  Daily     Obtain Weight Ordered?  No, recommended     Supplement  Vitamin mineral supplement     Supplement Ordered?  No, recommended         Education/Evaluation     Row Name 12/02/20 1332          Education    Education  Education not appropriate at this time     Please explain  Other (comment) isolation status        Monitor/Evaluation    Monitor  Per protocol;I&O;PO intake;Supplement intake;Pertinent labs;Skin status;Weight           Electronically signed by:  Ayah Pope RD  12/02/20 13:33 EST

## 2020-12-02 NOTE — PLAN OF CARE
Goal Outcome Evaluation:  Plan of Care Reviewed With: patient  Progress: no change  Outcome Summary: Patient is still confused. he took all medications with no difficulties. I had restraints discontinued around 245 am to see how he would do. He has done well at this point. Will get reinstated if necessary. He is currently on 5L nc as of 2am, will titrate down as needed. no overnight events to report.

## 2020-12-02 NOTE — PLAN OF CARE
Goal Outcome Evaluation:  Plan of Care Reviewed With: patient, spouse  Progress: no change  Outcome Summary: VSS.  Pt. very confused, alert to self only.  He pulled his telemetry, oxygen and O2 saturation monitoring off this morning, and was at foot of bed.  Restraints were re-initiated.  Dr. Sheffield notified of these things.  Patient mumbles to himself.  Will monitor.

## 2020-12-02 NOTE — DISCHARGE PLACEMENT REQUEST
"Short Term Rehab  Unsure when will be ready        Ignacio Dilan ELVER (80 y.o. Male)     Date of Birth Social Security Number Address Home Phone MRN    1940  [id number] [id number] [phone] [id number]    Advent[employer] Marital Status          Unknown        Admission Date Admission Type Admitting Provider Attending Provider Department, Room/Bed    11/23/20 Emergency Carrol Castro DO Gandee, Julie G, DO [facility] MED SURG  3, 306/1    Discharge Date Discharge Disposition Discharge Destination                       Attending Provider: Carrol Castro DO    Allergies: No Known Allergies    Isolation: Enh Drop/Con, Contact Air   Infection: COVID (confirmed) (11/09/20)   Code Status: No CPR    Ht: 180.3 cm (71\")   Wt: 97.1 kg (214 lb 1.1 oz)    Admission Cmt: None   Principal Problem: Acute respiratory failure with hypoxia (CMS/HCC) [J96.01]                 Active Insurance as of 11/23/2020     Primary Coverage     Payor Plan Insurance Group Employer/Plan Group    AETNA MEDICARE REPLACEMENT AETNA MEDICARE REPLACEMENT [id number]     Payor Plan Address Payor Plan Phone Number Payor Plan Fax Number Effective Dates    [address] [phone]  1/1/2020 - None Entered    [place] [address]       Subscriber Name Subscriber Birth Date Member ID       DILAN MCBRIDE 1940 [id number]           Secondary Coverage     Payor Plan Insurance Group Employer/Plan Group    MEDICAID PENDING [place]      Payor Plan Address Payor Plan Phone Number Payor Plan Fax Number Effective Dates       11/23/2020 - None Entered    Subscriber Name Subscriber Birth Date Member ID       DILAN MCBRIDE 1940 PENDING                 Emergency Contacts      (Rel.) Home Phone Work Phone Mobile Phone    IgnacioParris (Spouse) [phone] -- --               History & Physical      Skip Raygoza DO at 11/23/20 0424              [facility] HOSPITALIST "   HISTORY AND PHYSICAL      Name:  Jak Pierre   Age:  80 y.o.  Sex:  male  :  1940  MRN:  [id number]   Visit Number:  [id number]  Admission Date:  2020  Date Of Service:  20  Primary Care Physician:  Jason Foy MD    Chief Complaint:     Fever    History Of Presenting Illness:      80 M recent admission to this facility 2 weeks ago for COVID-19 PNA where he received 3-day course of steroids and remdesivir and since then has been on multiple antibiotics was brought to the ED via EMS after he was found to be spiking fevers and falling.  He has been seen in the ER multiple times in the past few days and sent home with oxygen most recently. The wife at bedside and reports that the patient has had fevers repetitively over the past few days.  He has also been falling more often, and today hit his head on the right forehead.  His mentation has also been deteriorating. The patient is oriented x2, and is unable to contribute reliable hx, it was obtained from chart review and wife at bedside.  The pt does admit to some orthopnea and dyspnea.  He has associated leg swelling. He has also been dribbling urine and a bladder scan is being obtained. Pt was taken off his torsemide recently 2/2 ALEXANDRA. In the ED, CXR revealed bilateral infiltrates and he flagged sepsis due to which he was given IVF and empiric IV vancomycin/Zosyn.      He has h/o aortic valve replacement, diabetes, HTN, HLD, DVT on Xarelto.  Wife reports that he is adherent with his therapy.  Patient's oxygen requirement in the ER was up to 6 L. Patient's wife was diagnosed with Covid 13 days ago and it is believed that the patient caught it from his son-in-law 2 weeks ago. He was also diagnosed with LE cellulitis 2 weeks ago.    Review Of Systems:     Unable obtain due to clinical condition     Past Medical History:    Past Medical History:   Diagnosis Date   • Angina of effort (CMS/HCC)    • Aortic valve replaced    • Arthritis    •  Cataract    • CHF (congestive heart failure) (CMS/HCC)    • Colitis    • Diabetes (CMS/HCC)    • Diverticulitis    • Gall stones    • Heart disease    • Hyperlipidemia    • Hypertension    • Hypertension    • Impaired functional mobility, balance, gait, and endurance    • Kidney stones    • Skin cancer     left shoulder   • Tremor        Past Surgical history:    Past Surgical History:   Procedure Laterality Date   • AORTIC VALVE REPAIR/REPLACEMENT  2016   • COLONOSCOPY  2018   • HERNIA REPAIR  1963    x2   • SINUS SURGERY  1978       Social History:    Social History     Socioeconomic History   • Marital status:      Spouse name: Not on file   • Number of children: Not on file   • Years of education: Not on file   • Highest education level: Not on file   Tobacco Use   • Smoking status: Never Smoker   • Smokeless tobacco: Current User     Types: Chew   Substance and Sexual Activity   • Alcohol use: No     Frequency: Never   • Drug use: Never       Family History:    Family History   Problem Relation Age of Onset   • Cancer Brother    • Diabetes Brother    • Heart disease Brother    • Hypertension Brother        Allergies:      Patient has no known allergies.    Home Medications:    Prior to Admission Medications     Prescriptions Last Dose Informant Patient Reported? Taking?    azithromycin (ZITHROMAX) 250 MG tablet   No No    Take 2 tablets the first day, then 1 tablet daily for 4 days.    carbidopa-levodopa (SINEMET)  MG per tablet   No No    TAKE 1 TABLET BY MOUTH 3 (THREE) TIMES A DAY. WITH FOOD    carvedilol (COREG) 6.25 MG tablet   Yes No    Take 3.125 mg by mouth 2 (Two) Times a Day. One-half tablet twice daily    cefuroxime (CEFTIN) 500 MG tablet   No No    Take 1 tablet by mouth 2 (Two) Times a Day.    CINNAMON PO   Yes No    Take 1 tablet by mouth Daily.    clotrimazole (LOTRIMIN) 1 % external solution   No No    Apply  topically to the appropriate area as directed 2 (Two) Times a Day for 7  days. Apply two times per day until resolution of symptoms    cyanocobalamin (CYANOCOBALAMIN) 100 MCG tablet tablet   Yes No    Take 100 mcg by mouth Daily.    dexamethasone (DECADRON) 4 MG tablet   No No    Take 1.5 tablets by mouth Daily.    Dulaglutide (Trulicity) 1.5 MG/0.5ML solution pen-injector   Yes No    Inject 1.5 mg under the skin into the appropriate area as directed 1 (One) Time Per Week. Takes on Fridays    famotidine (PEPCID) 20 MG tablet   Yes No    Take 20 mg by mouth 2 (Two) Times a Day As Needed.    gabapentin (NEURONTIN) 400 MG capsule   Yes No    TAKE ONE CAPSULE BY MOUTH EVERY MORNING, 1 IN THE EVENING, AND 2 AT NIGHT    glimepiride (AMARYL) 4 MG tablet   Yes No    TAKE 1 TABLET BY MOUTH DAILY WITH BREAKFAST OR THE FIRST MAIN MEAL OF THE DAY    Multiple Minerals-Vitamins (CALCIUM-MAGNESIUM-ZINC-D3) tablet   Yes No    Take 1 tablet by mouth Daily.    pantoprazole (PROTONIX) 40 MG EC tablet   Yes No    Take 40 mg by mouth Daily.    predniSONE (DELTASONE) 20 MG tablet   No No    Take 1 tablet by mouth 2 (Two) Times a Day.    rivaroxaban (XARELTO) 20 MG tablet   Yes No    Take 20 mg by mouth Daily.    rOPINIRole (REQUIP) 0.5 MG tablet   Yes No    Take 0.5 mg by mouth At Night As Needed. Take 1 hour before bedtime.    tamsulosin (FLOMAX) 0.4 MG capsule 24 hr capsule   Yes No    Take 1 capsule by mouth Daily.             ED Medications:    Medications   sodium chloride 0.9 % flush 10 mL (has no administration in time range)   vancomycin 2000 mg in sodium chloride 0.9% 500 mL IVPB (2,000 mg Intravenous New Bag 11/23/20 0324)   furosemide (LASIX) injection 60 mg (has no administration in time range)   sodium chloride 0.9 % bolus 500 mL (0 mL Intravenous Stopped 11/23/20 0248)   piperacillin-tazobactam (ZOSYN) 3.375 g in iso-osmotic dextrose 50 ml (premix) (0 g Intravenous Stopped 11/23/20 0249)   acetaminophen (TYLENOL) tablet 1,000 mg (1,000 mg Oral Given 11/23/20 0327)   insulin regular (humuLIN  R,novoLIN R) injection 10 Units (10 Units Intravenous Given 11/23/20 0410)       Vital Signs:    Temp:  [102 °F (38.9 °C)] 102 °F (38.9 °C)  Heart Rate:  [] 115  Resp:  [22] 22  BP: (117-154)/() 117/66        11/23/20  0115   Weight: 106 kg (234 lb)       Body mass index is 32.64 kg/m².    Physical Exam:  General: Breathing is labored, asleep, arousable, resting in bed  HEENT: EOMI, NC/AT, 6 L nasal cannula  Heart: Tachycardic  Lungs: Mildly labored  Abdomen: Soft, nondistended, nontender  Extremities: Severe pitting edema in the lower extremities bilaterally  Neurological: A& oriented to person and place, not time, moves all extremities  Psychological: Confused  Skin: warm, dry     laboratory data:    I have reviewed the labs done in the emergency room.    Results from last 7 days   Lab Units 11/23/20  0205 11/20/20 2039   SODIUM mmol/L 136 134*   POTASSIUM mmol/L 5.7* 4.2   CHLORIDE mmol/L 99 98   CO2 mmol/L 27.2 25.8   BUN mg/dL 36* 31*   CREATININE mg/dL 1.66* 1.65*   CALCIUM mg/dL 9.3 8.5*   BILIRUBIN mg/dL 0.5 0.7   ALK PHOS U/L 60 62   ALT (SGPT) U/L <5 6   AST (SGOT) U/L 18 17   GLUCOSE mg/dL 512* 358*     Results from last 7 days   Lab Units 11/23/20  0205 11/20/20 2039   WBC 10*3/mm3 12.96* 7.48   HEMOGLOBIN g/dL 12.3* 11.1*   HEMATOCRIT % 38.2 34.1*   PLATELETS 10*3/mm3 60* 64*         Results from last 7 days   Lab Units 11/21/20  0257 11/20/20 2346 11/20/20 2039   TROPONIN T ng/mL 0.057* 0.066* 0.053*     Results from last 7 days   Lab Units 11/20/20 2039   PROBNP pg/mL 1,040.0                 Results from last 7 days   Lab Units 11/23/20  0156   COLOR UA  Yellow   GLUCOSE UA  >=1000 mg/dL (3+)*   KETONES UA  Negative   LEUKOCYTES UA  Negative   PH, URINE  <=5.0   BILIRUBIN UA  Negative   UROBILINOGEN UA  0.2 E.U./dL     Pain Management Panel     There is no flowsheet data to display.        Results from last 7 days   Lab Units 11/20/20 2108 11/20/20 2104 11/20/20 2040   BLOODCX  No  growth at 2 days No growth at 2 days  --    URINECX   --   --  No growth       EKG:      Personally reviewed and interpreted myself reveals sinus tachycardia, QTc 349, no ischemic changes, abnormal EKG    Radiology:    Imaging Results (Last 72 Hours)     Procedure Component Value Units Date/Time    XR Chest 1 View [[id number]] Resulted: 11/23/20 0149     Updated: 11/23/20 0149            Pneumonia due to COVID-19 virus    COVID-19      Assessment:  Acute respiratory failure with hypoxia   COVID-19 pneumonia   Rule out superimposed bacterial pneumonia  Anasarca, suspect secondary to acute on chronic systolic heart failure exacerbation  Hyperkalemia in the setting of CKD 3B  Acute metabolic encephalopathy  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  Thrombocytopenia, acute on chronic  Elevated troponins, chronic  History of aortic valve replacement, diverticulitis, nephrolithiasis  Chronic: HTN, HLD, tremor, arthritis    Plan:  -consult renal with regards to remdesivir usage in his CKD; he did receive 3d course last time  -continue IV vanco/ceftazidime for PNA coverage  -Obtain echocardiogram, BNP, start IV Lasix, monitor daily intake/output, weights  -Obtain CT head, ammonia, B12, TSH, PCT, dimer, LDH, ferritin  -CT abdomen pelvis did not reveal any liver pathology and UA does not reveal significant proteinuria which leads me to suspect that heart failure may be primary etiology  -consider cardio consult  -Continue home Xarelto for now while his platelet count is above 50k, may need to hold if it falls below that level  -A1c, Levemir, Novolog, SSI ACHS    Skip Raygoza DO  11/23/20  04:24 EST    Dictated utilizing Dragon dictation.      Electronically signed by Skip Raygoza DO at 11/23/20 3264         Current Facility-Administered Medications   Medication Dose Route Frequency Provider Last Rate Last Admin   • albuterol sulfate HFA (PROVENTIL HFA;VENTOLIN HFA;PROAIR HFA) inhaler 2 puff  2 puff Inhalation 4x Daily  - RT JaretSkip smiley,    2 puff at 12/02/20 0637   • apixaban (ELIQUIS) tablet 2.5 mg  2.5 mg Oral Q12H Carrol Castro DO   2.5 mg at 12/02/20 0817   • atorvastatin (LIPITOR) tablet 40 mg  40 mg Oral Nightly JaretSkip smiley DO   40 mg at 12/01/20 2041   • b complex-vitamin c-folic acid (NEPHRO-AGATA) tablet 1 tablet  1 tablet Oral Daily Carrol Castro DO   1 tablet at 12/02/20 0816   • carbidopa-levodopa (SINEMET)  MG per tablet 1 tablet  1 tablet Oral TID JaretSkip smiley DO   1 tablet at 12/02/20 0817   • carvedilol (COREG) tablet 6.25 mg  6.25 mg Oral BID Carrol Castro DO   6.25 mg at 12/02/20 0817   • dexamethasone (DECADRON) tablet 6 mg  6 mg Oral Daily With Breakfast JaretSkip DO   6 mg at 12/01/20 0834   • dextrose (D50W) 25 g/ 50mL Intravenous Solution 25 g  25 g Intravenous Q15 Min PRN JaretSkip smiley, DO       • dextrose (GLUTOSE) oral gel 1 tube  1 tube Oral Q15 Min PRN JaretSkip smiley, DO       • gabapentin (NEURONTIN) capsule 400 mg  400 mg Oral Q12H JaretSkip, DO   400 mg at 12/02/20 0817   • glucagon (human recombinant) (GLUCAGEN DIAGNOSTIC) injection 1 mg  1 mg Subcutaneous PRN JaretSkip smiley, DO       • haloperidol (HALDOL) tablet 1 mg  1 mg Oral Q8H Omar Lakhani MD   1 mg at 12/02/20 0636   • insulin aspart (novoLOG) injection 0-9 Units  0-9 Units Subcutaneous TID AC JaretSkip smiley DO   7 Units at 12/01/20 1738   • insulin aspart (novoLOG) injection 5 Units  5 Units Subcutaneous TID With Meals Skip Raygoza DO   5 Units at 12/02/20 0817   • insulin detemir (LEVEMIR) injection 30 Units  30 Units Subcutaneous Nightly Myranda Davalos DO   30 Units at 12/01/20 2044   • lactobacillus acidophilus (RISAQUAD) capsule 1 capsule  1 capsule Oral BID Carrol Castro DO   1 capsule at 12/02/20 0815   • melatonin tablet 5 mg  5 mg Oral Nightly Carrol Castro DO   5 mg at 12/01/20 2041   • metoprolol tartrate (LOPRESSOR) injection 5 mg  5 mg Intravenous Q4H PRN Carrol Castro DO   5 mg at  11/27/20 2220   • pantoprazole (PROTONIX) EC tablet 40 mg  40 mg Oral Daily Carrol Castro, DO   40 mg at 12/02/20 0636   • rOPINIRole (REQUIP) tablet 0.5 mg  0.5 mg Oral Nightly PRN Carrol Castro, DO   0.5 mg at 11/26/20 2117   • sodium chloride 0.9 % flush 10 mL  10 mL Intravenous PRN Skip Raygoza, DO   10 mL at 12/02/20 0818   • tamsulosin (FLOMAX) 24 hr capsule 0.4 mg  0.4 mg Oral Daily Skip Raygoza, DO   0.4 mg at 12/02/20 0817   • vitamin C (ASCORBIC ACID) tablet 500 mg  500 mg Oral Daily Carrol Castro, DO   500 mg at 12/02/20 0817     Consult Notes (last 24 hours) (Notes from 12/01/20 0847 through 12/02/20 0847)    No notes of this type exist for this encounter.         Physical Therapy Notes (last 24 hours) (Notes from 12/01/20 0847 through 12/02/20 0847)    No notes exist for this encounter.         Occupational Therapy Notes (last 24 hours) (Notes from 12/01/20 0847 through 12/02/20 0847)    No notes exist for this encounter.

## 2020-12-02 NOTE — PROGRESS NOTES
Continued Stay Note   [facility]ung     Patient Name: Jak Pierre  MRN: [id number]  Today's Date: 12/2/2020    Admit Date: 11/23/2020    Discharge Plan     Row Name 12/02/20 0835       Plan    Plan Received call from wife Parris and wants pt to go to [facility] for Rehab.  Wife is a former rehab nurse and states the goal is for pt to come home after he leaves [facility] as she will have plenty of help to care for him.  Explained would submit the referral and continue to follow.  Not sure when pt wouid be medically ready, wife verbalized understanding.  Referral sent to [facility]. Message left for Noemy Admission Coordinator at         Discharge Codes    No documentation.       Expected Discharge Date and Time     Expected Discharge Date Expected Discharge Time    Dec 3, 2020             Maricarmen Ribeiro RN

## 2020-12-02 NOTE — PROGRESS NOTES
Dr Sheffield contacted pt's spouse with update on condition.  She reported pt's spouse requesting a repeat COVID test and if negative have pt moved to non-isolation room so she could visit.

## 2020-12-02 NOTE — THERAPY EVALUATION
Patient Name: Jak Pierre  : 1940    MRN: [id number]                              Today's Date: 2020       Admit Date: 2020    Visit Dx:     ICD-10-CM ICD-9-CM   1. Pneumonia due to COVID-19 virus  U07.1 480.8    J12.89    2. 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)   • Acute renal failure superimposed on stage 3 chronic kidney disease (CMS/HCC)   • Chronic kidney disease, stage III (moderate)   • Pneumonia due to COVID-19 virus   • COVID-19   • Thrombocytopenia (CMS/HCC)     Past Medical History:   Diagnosis Date   • Angina of effort (CMS/HCC)    • Aortic valve replaced    • Arthritis    • Cataract    • CHF (congestive heart failure) (CMS/HCC)    • Colitis    • Diabetes (CMS/HCC)    • Diverticulitis    • Gall stones    • Heart disease    • Hyperlipidemia    • Hypertension    • Hypertension    • Impaired functional mobility, balance, gait, and endurance    • Kidney stones    • Skin cancer     left shoulder   • Tremor      Past Surgical History:   Procedure Laterality Date   • AORTIC VALVE REPAIR/REPLACEMENT  2016   • COLONOSCOPY  2018   • HERNIA REPAIR  1963    x2   • SINUS SURGERY  1978     General Information     Row Name 20 1119          Physical Therapy Time and Intention    Document Type  evaluation  -TW     Mode of Treatment  physical therapy  -TW     Row Name 20 1119          General Information    Patient Profile Reviewed  yes  -TW     Prior Level of Function  -- Pt was not able to provide prior level of function due to confusion and oriented to self only. Per chart review pt has had several falls at home.  -TW      Existing Precautions/Restrictions  fall;oxygen therapy device and L/min  -TW     Barriers to Rehab  previous functional deficit;cognitive status  -     Row Name 12/02/20 1119          Living Environment    Lives With  spouse  -     Row Name 12/02/20 1119          Home Main Entrance    Number of Stairs, Main Entrance  -- Pt not able to provide this imformation and was alone in room.  -     Row Name 12/02/20 1119          Cognition    Orientation Status (Cognition)  oriented to;person  -     Row Name 12/02/20 1119          Safety Issues, Functional Mobility    Safety Issues Affecting Function (Mobility)  ability to follow commands;awareness of need for assistance;friction/shear risk;problem-solving;safety precaution awareness;safety precautions follow-through/compliance  -TW     Impairments Affecting Function (Mobility)  balance;endurance/activity tolerance;shortness of breath;strength;motor planning;cognition  -TW     Cognitive Impairments, Mobility Safety/Performance  attention;awareness, need for assistance;insight into deficits/self-awareness;judgment;problem-solving/reasoning;safety precaution awareness  -TW       User Key  (r) = Recorded By, (t) = Taken By, (c) = Cosigned By    Initials Name Provider Type    TW Fani Prince, PT Physical Therapist        Mobility     Row Name 12/02/20 1119          Bed Mobility    Bed Mobility  rolling left;rolling right;scooting/bridging  -TW     Rolling Left [place] (Bed Mobility)  nonverbal cues (demo/gesture);verbal cues;minimum assist (75% patient effort)  -TW     Rolling Right [place] (Bed Mobility)  nonverbal cues (demo/gesture);verbal cues;minimum assist (75% patient effort)  -TW     Scooting/Bridging [place] (Bed Mobility)  moderate assist (50% patient effort);verbal cues  -TW     Comment (Bed Mobility)  Getting pt to understand what was required assisted by tactile cues. Once pt hands on bedrail pt could assist with rolling and did push with  LE's to assist with boosting up in bed.  -TW     Row Name 12/02/20 1119          Transfers    Comment (Transfers)  Pt very confused, repeating after therapist but only occasionally following of commands or answering questions.  -TW     Row Name 12/02/20 1119          Bed-Chair Transfer    Bed-Chair [place] (Transfers)  unable to assess  -TW     Row Name 12/02/20 1119          Sit-Stand Transfer    Sit-Stand [place] (Transfers)  unable to assess  -     Row Name 12/02/20 1119          Gait/Stairs (Locomotion)    [place] Level (Gait)  unable to assess  -TW       User Key  (r) = Recorded By, (t) = Taken By, (c) = Cosigned By    Initials Name Provider Type    TW Niki, Fani, PT Physical Therapist        Obj/Interventions     Row Name 12/02/20 1119          Range of Motion Comprehensive    Comment, General Range of Motion  BLE grossly WFLs  -     Row Name 12/02/20 1119          Strength Comprehensive (MMT)    Comment, General Manual Muscle Testing (MMT) Assessment  Pt could not follow commands for MMT. Therapist did observe pt move all four extremities against gravity partial range.  -TW       User Key  (r) = Recorded By, (t) = Taken By, (c) = Cosigned By    Initials Name Provider Type    TW Niki, Fani, PT Physical Therapist        Goals/Plan     Row Name 12/02/20 1119          Bed Mobility Goal 1 (PT)    Activity/Assistive Device (Bed Mobility Goal 1, PT)  bed mobility activities, all  -TW     [place] Level/Cues Needed (Bed Mobility Goal 1, PT)  standby assist  -TW     Time Frame (Bed Mobility Goal 1, PT)  10 days  -TW     Strategies/Barriers (Bed Mobility Goal 1, PT)  decreased cognitive status and ability to follow commands.  -TW     Progress/Outcomes (Bed Mobility Goal 1, PT)  goal ongoing  -TW     Row Name 12/02/20 1119          Transfer Goal 1 (PT)    Activity/Assistive Device (Transfer Goal 1, PT)  sit-to-stand/stand-to-sit;bed-to-chair/chair-to-bed;toilet  -TW     [place]  Level/Cues Needed (Transfer Goal 1, PT)  supervision required  -TW     Time Frame (Transfer Goal 1, PT)  10 days  -TW     Progress/Outcome (Transfer Goal 1, PT)  goal ongoing  -TW     Row Name 12/02/20 1119          Gait Training Goal 1 (PT)    Activity/Assistive Device (Gait Training Goal 1, PT)  assistive device use;decrease fall risk;increase endurance/gait distance;improve balance and speed;walker, rolling  -TW     [place] Level (Gait Training Goal 1, PT)  contact guard assist  -TW     Distance (Gait Training Goal 1, PT)  50 ft  -TW     Time Frame (Gait Training Goal 1, PT)  10 days  -TW     Progress/Outcome (Gait Training Goal 1, PT)  goal ongoing  -TW     Row Name 12/02/20 1119          Patient Education Goal (PT)    Barriers (Patient Education Goal, PT)  LE ther ex 1 x 10  -TW     Progress/Outcome (Patient Education Goal, PT)  goal ongoing  -TW       User Key  (r) = Recorded By, (t) = Taken By, (c) = Cosigned By    Initials Name Provider Type    TW Fani Prince, PT Physical Therapist        Clinical Impression     Row Name 12/02/20 1119          Pain    Additional Documentation  Pain Scale: FACES Pre/Post-Treatment (Group)  -TW     Row Name 12/02/20 1119          Pain Scale: FACES Pre/Post-Treatment    Pain: FACES Scale, Pretreatment  2-->hurts little bit  -TW     Posttreatment Pain Rating  0-->no hurt  -TW     Pre/Posttreatment Pain Comment  Pt did not verbalize that he was in pain but was very restless and after being changed and repositioned pt was calmer and closed eyes.  -TW     Row Name 12/02/20 1119          Plan of Care Review    Plan of Care Reviewed With  patient  -TW     Outcome Summary  PT Evaluation performed bedside this am with pt oriented to self only. Pt was very inconsistent with following commands but with visual cues pt was able to assist with bed mobility with minimal to moderate assist. Pt presents with decreased balance, strength, and endurance. He is expected to improve his  functional mobility with continued skilled PT services prior to d/c.  -TW     Row Name 12/02/20 1119          Therapy Assessment/Plan (PT)    Patient/Family Therapy Goals Statement (PT)  None stated.  -TW     Rehab Potential (PT)  fair, will monitor progress closely  -TW     Criteria for Skilled Interventions Met (PT)  yes;meets criteria;skilled treatment is necessary  -TW     Predicted Duration of Therapy Intervention (PT)  10 days  -TW     Row Name 12/02/20 1119          Vital Signs    Pre SpO2 (%)  88  -TW     O2 Delivery Pre Treatment  supplemental O2 5 L  -TW     Post SpO2 (%)  87  -TW     O2 Delivery Post Treatment  supplemental O2  -TW     Pre Patient Position  Supine  -TW     Intra Patient Position  Side Lying  -TW     Post Patient Position  Supine  -TW     Row Name 12/02/20 1119          Positioning and Restraints    Pre-Treatment Position  in bed  -TW     Post Treatment Position  bed  -TW     In Bed  supine;exit alarm on  -TW     Restraints  released:;reapplied:;notified nsg:;soft limb  -TW       User Key  (r) = Recorded By, (t) = Taken By, (c) = Cosigned By    Initials Name Provider Type    TW Fani Prince, PT Physical Therapist        Outcome Measures     Row Name 12/02/20 1119          How much help from another person do you currently need...    Turning from your back to your side while in flat bed without using bedrails?  3  -TW     Moving from lying on back to sitting on the side of a flat bed without bedrails?  2  -TW     Moving to and from a bed to a chair (including a wheelchair)?  1  -TW     Standing up from a chair using your arms (e.g., wheelchair, bedside chair)?  1  -TW     Climbing 3-5 steps with a railing?  1  -TW     To walk in hospital room?  1  -TW     AM-PAC 6 Clicks Score (PT)  9  -TW     Row Name 12/02/20 1119          Functional Assessment    Outcome Measure Options  AM-PAC 6 Clicks Basic Mobility (PT)  -TW       User Key  (r) = Recorded By, (t) = Taken By, (c) = Cosigned By     Initials Name Provider Type    TW Fani Prince PT Physical Therapist        Physical Therapy Education                 Title: PT OT SLP Therapies (In Progress)     Topic: Physical Therapy (In Progress)     Point: Mobility training (In Progress)     Learning Progress Summary           Patient Acceptance, E,D, NR by TW at 12/2/2020 1119    Comment: PT education for technique/safety with rolling and boosting in bed.                   Point: Home exercise program (Not Started)     Learner Progress:  Not documented in this visit.          Point: Body mechanics (Not Started)     Learner Progress:  Not documented in this visit.                      User Key     Initials Effective Dates Name Provider Type Discipline    TW 03/26/19 -  Fani Prince PT Physical Therapist PT              PT Recommendation and Plan  Planned Therapy Interventions (PT): balance training, bed mobility training, gait training, home exercise program, patient/family education, strengthening, transfer training  Plan of Care Reviewed With: patient  Outcome Summary: PT Evaluation performed bedside this am with pt oriented to self only. Pt was very inconsistent with following commands but with visual cues pt was able to assist with bed mobility with minimal to moderate assist. Pt presents with decreased balance, strength, and endurance. He is expected to improve his functional mobility with continued skilled PT services prior to d/c.     Time Calculation:   PT Charges     Row Name 12/02/20 1119             Time Calculation    Stop Time  1119  -TW      PT Received On  12/02/20 -TW      PT Goal Re-Cert Due Date  12/12/20 -TW        User Key  (r) = Recorded By, (t) = Taken By, (c) = Cosigned By    Initials Name Provider Type    TW Fani Prince PT Physical Therapist        Therapy Charges for Today     Code Description Service Date Service Provider Modifiers Qty    [id number] HC PT EVAL MOD COMPLEXITY 2 12/2/2020 Fani Prince PT GP 1          PT  G-Codes  Outcome Measure Options: AM-PAC 6 Clicks Basic Mobility (PT)  AM-PAC 6 Clicks Score (PT): 9    Fani Prince, PT  12/2/2020

## 2020-12-02 NOTE — PROGRESS NOTES
[facility]    Name:  Jak Pierre   Age:  80 y.o.  Sex:  male  :  1940  MRN:  [id number]   Visit Number:  [id number]  Date Of Service:  20  Primary Care Physician:  Jason Foy MD     LOS: 9 days :  Patient Care Team:  Jason Foy MD as PCP - General (General Practice):    History taken from:    Medical record, staff    Chief Complaint:    Covid infection, acute on chronic respiratory failure    Subjective:    Interval History:    Patient evaluated, record reviewed, case discussed with staff.  Since yesterday, patient was noted to be increasingly confused and required soft wrist restraints to ensure his safety and placement of BiPAP and nasal cannula.  There is no reported fever, chills or other acute systemic issues.  I have been in contact with patient's spouse through the day, summary of our conversations as listed in the body of this document.    Past Medical History:    Past Medical History:   Diagnosis Date   • Angina of effort (CMS/HCC)    • Aortic valve replaced    • Arthritis    • Cataract    • CHF (congestive heart failure) (CMS/HCC)    • Colitis    • Diabetes (CMS/HCC)    • Diverticulitis    • Gall stones    • Heart disease    • Hyperlipidemia    • Hypertension    • Hypertension    • Impaired functional mobility, balance, gait, and endurance    • Kidney stones    • Skin cancer     left shoulder   • Tremor         Past Surgical history:    Past Surgical History:   Procedure Laterality Date   • AORTIC VALVE REPAIR/REPLACEMENT  2016   • COLONOSCOPY  2018   • HERNIA REPAIR  1963    x2   • SINUS SURGERY         Review of Systems:    Review of Systems     Patient is unable to provide information regarding review of systems due to confusion     Objective:    Vital Signs:    Temp:  [96.4 °F (35.8 °C)-98.1 °F (36.7 °C)] 96.7 °F (35.9 °C)  Heart Rate:  [] 137  Resp:  [18-20] 18  BP: (102-135)/(63-99) 135/78  [Note that  during my repeat assessments till 7:30 PM, heart rate was 60s-80s]; above documented heart rate of 137 was noted at 8:30 PM.    Physical Exam:    General Appearance:  Alert with no signs of distress, confused   Head:  Atraumatic and normocephalic, without obvious abnormality.   Eyes:          PERRLA, conjunctivae and sclerae normal, no Icterus. No pallor. Extra-occular movements are within normal limits.   Ears:  Ears appear intact with no abnormalities noted.   Throat: No oral lesions, no thrush, oral mucosa moist.   Neck: Supple, trachea midline, no thyromegaly, no carotid bruit.   Back:   No kyphoscoliosis. No tenderness to palpation.   Lungs:   Chest shape is normal. Breath sounds heard bilaterally equally.  No wheezing.  Bilateral basal crackles heard. No Pleural rub or bronchial breathing.   Heart:  Normal S1 and S2, no murmur, no gallop, no rub. No JVD.   Abdomen:   Normal bowel sounds, no masses, no organomegaly. Soft, non-tender, non-distended, no guarding    Extremities: Moves all extremities well, edema, cyanosis or clubbing.     Pulses: Pulses palpable and equal bilaterally.   Skin: No bleeding or rash.  Generalized dry skin noted.   Neurologic: Alert and oriented x 3. Moves all four limbs equally. No tremors. No facial asymetry.     Results Review:    Labs:    Results from last 7 days   Lab Units 12/02/20  0532 11/30/20  0550 11/29/20  0554   WBC 10*3/mm3 17.07* 10.36 9.68   HEMOGLOBIN g/dL 12.4* 12.7* 11.9*   HEMATOCRIT % 37.1* 38.1 36.9*   MCV fL 93.0 94.5 95.8   MCHC g/dL 33.4 33.3 32.2   PLATELETS 10*3/mm3 93* 85* 73*     Results from last 7 days   Lab Units 12/02/20  1812   PH, ARTERIAL pH units 7.479   PO2 ART mm Hg 59.6*   PCO2, ARTERIAL mm Hg 39.2   HCO3 ART mmol/L 29.1*     Results from last 7 days   Lab Units 12/02/20  0532 12/01/20  0938 11/30/20  0550 11/29/20  0554 11/28/20  0522   SODIUM mmol/L 136 140 146* 148* 145   POTASSIUM mmol/L 4.2 4.0 4.0 3.9 4.0   CHLORIDE mmol/L 101 103 105 104  101   CO2 mmol/L 27.0 28.7 30.4* 35.7* 35.4*   BUN mg/dL 34* 39* 43* 48* 52*   CREATININE mg/dL 0.97 1.11 1.15 1.10 1.17   EGFR IF NONAFRICN AM mL/min/1.73 74 63 61 64 60*   CALCIUM mg/dL 9.0 9.4 9.6 10.0 9.8   GLUCOSE mg/dL 158* 145* 142* 149* 203*   ALBUMIN g/dL 2.60* 2.70* 2.70* 2.70* 2.70*   BILIRUBIN mg/dL  --   --  1.5* 1.3* 1.1   ALK PHOS U/L  --   --  63 67 60   AST (SGOT) U/L  --   --  28 25 27   ALT (SGPT) U/L  --   --  <5 <5 <5   Estimated Creatinine Clearance: 69.8 mL/min (by C-G formula based on SCr of 0.97 mg/dL).  No results found for: AMMONIA          Lab Results   Component Value Date    HGBA1C 10.20 (H) 11/24/2020     Lab Results   Component Value Date    TSH 0.250 (L) 11/23/2020     No results found for: PREGTESTUR, PREGSERUM, HCG, HCGQUANT  Pain Management Panel     There is no flowsheet data to display.        No results found for: BLOODCX  No results found for: URINECX  No results found for: WOUNDCX  No results found for: STOOLCX        Radiology:    Imaging Results (Most Recent)     Procedure Component Value Units Date/Time    XR Chest 1 View [[id number]] Collected: 12/02/20 1100     Updated: 12/02/20 1201    Narrative:      PROCEDURE: XR CHEST 1 VW-        HISTORY: FOLLOW UP ON COVID PNEUMONIA; U07.1-COVID-19; J12.89-Other  viral pneumonia; R73.9-Hyperglycemia, unspecified     COMPARISON: 11/27/2020.     FINDINGS: The heart is normal in size. The mediastinum is unremarkable.  There is worsening bilateral airspace disease. There is no pneumothorax.  There are no acute osseous abnormalities.       Impression:      Worsening bilateral airspace disease.           Images were reviewed, interpreted, and dictated by Dr. Carmelina Mooney M.D.  Transcribed by Flores Harley PA-C.     This report was finalized on 12/2/2020 11:59 AM by Carmelina Mooney M.D..    XR Chest 1 View [[id number]] Collected: 11/27/20 0805     Updated: 11/27/20 0807    Narrative:      PROCEDURE: XR CHEST 1 VW-     HISTORY:  respiratory failure with severe hypoxia, persistent pneumonia  comparison; U07.1-COVID-19; J12.89-Other viral pneumonia;  R73.9-Hyperglycemia, unspecified     COMPARISON: 11/23/2020.     FINDINGS: The heart is normal in size. The mediastinum is unremarkable.  There has been interval improvement in the patient's bilateral airspace  disease. No significant effusions are evident. There is no pneumothorax.   There are no acute osseous abnormalities.       Impression:      Interval improvement in the patient's bilateral airspace  disease.     Continued followup is recommended.     This report was finalized on 11/27/2020 8:05 AM by Carmelina Mooney M.D..    CT Head Without Contrast [[id number]] Collected: 11/23/20 0729     Updated: 11/23/20 0732    Narrative:      PROCEDURE: CT HEAD WO CONTRAST-     HISTORY: Mental status change, unknown cause; U07.1-COVID-19;  J12.89-Other viral pneumonia; R73.9-Hyperglycemia, unspecified     COMPARISON:  None .     TECHNIQUE: Multiple axial CT images were performed from the foramen  magnum to the vertex without enhancement.      FINDINGS:  There is generalized cerebral volume loss. Patchy hypodensities in the  periventricular white matter consistent with chronic small vessel  ischemic change.  There is no CT evidence of hemorrhage. There is no  mass, mass effect or midline shift.  There is no hydrocephalus. The  paranasal sinuses are clear. Bone windows reveal no acute osseous  abnormalities.       Impression:      No acute intracranial process.           1106.18 mGy.cm        This study was performed with techniques to keep radiation doses as low  as reasonably achievable (ALARA). Individualized dose reduction  techniques using automated exposure control or adjustment of mA and/or  kV according to the patient size were employed.      This report was finalized on 11/23/2020 7:30 AM by Carmelina Mooney M.D..    XR Chest 1 View [[id number]] Collected: 11/23/20 0727     Updated:  11/23/20 0731    Narrative:      PROCEDURE: XR CHEST 1 VW-     HISTORY: sob, fever, covid +     COMPARISON: 11/20/2020.     FINDINGS: The heart is normal in size. The mediastinum is unremarkable.  There is been interval development of bilateral airspace disease  consistent with a pneumonia. No effusions are evident. There is no  pneumothorax.  There are no acute osseous abnormalities.       Impression:      Bilateral airspace disease consistent with a pneumonia.     Continued followup is recommended.     This report was finalized on 11/23/2020 7:29 AM by Carmelina Mooney M.D..          Assessment/Plan:      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)    Acute renal failure superimposed on stage 3 chronic kidney disease (CMS/HCC)    Pneumonia due to COVID-19 virus    Thrombocytopenia (CMS/HCC)      · COVID-19 pneumonia with persistent bilateral infiltrates  · Leukocytosis, new compared to prior; no current clinical signs of sepsis; etiology unspecified, effect of Decadron is in the differential  · Acute hypoxic respiratory failure secondary to above requiring BiPAP and FiO2 by nasal cannula  · Acute on chronic renal failure, improved  · Metabolic encephalopathy with periodic hyperactive delirium secondary to above; steroid effect is in the differential  · Thrombocytopenia, etiology unspecified, improved, currently 93K, improved from prior  · Sleep apnea hypopnea syndrome, on home CPAP  · Chronic congestive heart failure, no signs of acute extubation at present  · History of aortic valve replacement  · Chronic anticoagulation on Eliquis, no signs of overt bleed at present   · Hypoalbuminemia  · Type 2 diabetes mellitus  · 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 to follow on leukocytosis and thrombocytopenia  · Urine analysis with reflex culture if indicated  · Sepsis protocol in the event  of clinical signs of sepsis; and current, patient is afebrile and hemodynamically stable  · Pulmonary consult in a.m., appreciate input on COVID-19 pneumonia and hypoxia  · Repeat COVID-19 by PCR.  If negative, will transfer to non-Covid floor which would allow his spouse to visit  · Monitor closely with FiO2 adjustments as clinically indicated; encourage BiPAP  · Continue PT/OT   · Primary discharge plan is to home under the care of spouse contingent with patient's ability to transition out of the bed, stand, pivot and take few steps.    I had repeated phone discussions with Mrs. Pierre regarding patient's condition, progress and management plan.  I described his increased confusion and reported restlessness in p.m.  Spouse expressed her wishes to continue all possible medical interventions, she is not prepared to shift focus to conservative measures.  She is also hopeful for patient to be more independent in order to be able to attend to his needs.  She wishes for him to be transferred to [facility] for rehab; I explained that rehab will be possible only if patient is capable of participating and engaging with the same.  At present, patient is confused and his ability to participate with rehab is diminished.  She stated that [if at least he is able to transition out of the bed, pivot and walk few steps] she will be able to attend to his needs at home with the support of home health and home PT/OT.      I empathized with her and reassured her that we are exploring all possible interventions to ensure patient's recovery and regaining baseline function.  However, post Covid clinical course is variable and unpredictable particularly regarding psychological and at times cognitive wellbeing.  In response to my question regarding availability of help if/when discharged home, she stated that her son lives with her; he is suffering from post Covid complications but will be able to participate in attending to patient's  needs.  As we discussed details of patient's current mental status, we concluded to the decision of repeating Covid test with the hope of a negative result which will allow for transferring him to non-Covid floor, therefore she would be able to visit which will be helpful in redirecting him.  Unfortunately, Covid result was positive; I reordered a repeat by PCR/Lexar.  I also presented further plans including following on leukocytosis, close monitoring of O2 sats and hemodynamic status, urine analysis and pulmonary consultation.  Spouse expressed understanding, agreement and appreciation for the ongoing communication.      Xochilt Sheffield MD  12/02/20  20:45 EST      Time: More than 50% of time spent in counseling and coordination of care:  Total face-to-face/floor time 60 min.  Time spent in counseling 40 min. Counseling included the following topics: Per above documentationmay be helpful with delirium

## 2020-12-03 LAB
ALBUMIN SERPL-MCNC: 2.6 G/DL (ref 3.5–5.2)
ANION GAP SERPL CALCULATED.3IONS-SCNC: 11 MMOL/L (ref 5–15)
BASOPHILS # BLD AUTO: 0.04 10*3/MM3 (ref 0–0.2)
BASOPHILS NFR BLD AUTO: 0.3 % (ref 0–1.5)
BUN SERPL-MCNC: 40 MG/DL (ref 8–23)
BUN/CREAT SERPL: 29.4 (ref 7–25)
CALCIUM SPEC-SCNC: 9.2 MG/DL (ref 8.6–10.5)
CHLORIDE SERPL-SCNC: 103 MMOL/L (ref 98–107)
CO2 SERPL-SCNC: 25 MMOL/L (ref 22–29)
CREAT SERPL-MCNC: 1.36 MG/DL (ref 0.76–1.27)
DEPRECATED RDW RBC AUTO: 49 FL (ref 37–54)
EOSINOPHIL # BLD AUTO: 0.17 10*3/MM3 (ref 0–0.4)
EOSINOPHIL NFR BLD AUTO: 1.1 % (ref 0.3–6.2)
ERYTHROCYTE [DISTWIDTH] IN BLOOD BY AUTOMATED COUNT: 14.3 % (ref 12.3–15.4)
GFR SERPL CREATININE-BSD FRML MDRD: 50 ML/MIN/1.73
GLUCOSE BLDC GLUCOMTR-MCNC: 140 MG/DL (ref 70–130)
GLUCOSE BLDC GLUCOMTR-MCNC: 278 MG/DL (ref 70–130)
GLUCOSE BLDC GLUCOMTR-MCNC: 315 MG/DL (ref 70–130)
GLUCOSE BLDC GLUCOMTR-MCNC: 59 MG/DL (ref 70–130)
GLUCOSE SERPL-MCNC: 64 MG/DL (ref 65–99)
HCT VFR BLD AUTO: 37.3 % (ref 37.5–51)
HGB BLD-MCNC: 12.1 G/DL (ref 13–17.7)
IMM GRANULOCYTES # BLD AUTO: 0.29 10*3/MM3 (ref 0–0.05)
IMM GRANULOCYTES NFR BLD AUTO: 1.9 % (ref 0–0.5)
LYMPHOCYTES # BLD AUTO: 1.47 10*3/MM3 (ref 0.7–3.1)
LYMPHOCYTES NFR BLD AUTO: 9.7 % (ref 19.6–45.3)
MCH RBC QN AUTO: 30.6 PG (ref 26.6–33)
MCHC RBC AUTO-ENTMCNC: 32.4 G/DL (ref 31.5–35.7)
MCV RBC AUTO: 94.4 FL (ref 79–97)
MONOCYTES # BLD AUTO: 1.06 10*3/MM3 (ref 0.1–0.9)
MONOCYTES NFR BLD AUTO: 7 % (ref 5–12)
NEUTROPHILS NFR BLD AUTO: 12.11 10*3/MM3 (ref 1.7–7)
NEUTROPHILS NFR BLD AUTO: 80 % (ref 42.7–76)
NRBC BLD AUTO-RTO: 0 /100 WBC (ref 0–0.2)
PHOSPHATE SERPL-MCNC: 4.1 MG/DL (ref 2.5–4.5)
PLATELET # BLD AUTO: 75 10*3/MM3 (ref 140–450)
PMV BLD AUTO: 10.7 FL (ref 6–12)
POTASSIUM SERPL-SCNC: 4 MMOL/L (ref 3.5–5.2)
RBC # BLD AUTO: 3.95 10*6/MM3 (ref 4.14–5.8)
SARS-COV-2 RNA RESP QL NAA+PROBE: DETECTED
SODIUM SERPL-SCNC: 139 MMOL/L (ref 136–145)
WBC # BLD AUTO: 15.14 10*3/MM3 (ref 3.4–10.8)

## 2020-12-03 PROCEDURE — 97110 THERAPEUTIC EXERCISES: CPT

## 2020-12-03 PROCEDURE — 97530 THERAPEUTIC ACTIVITIES: CPT

## 2020-12-03 PROCEDURE — 99222 1ST HOSP IP/OBS MODERATE 55: CPT | Performed by: INTERNAL MEDICINE

## 2020-12-03 PROCEDURE — 94799 UNLISTED PULMONARY SVC/PX: CPT

## 2020-12-03 PROCEDURE — 82962 GLUCOSE BLOOD TEST: CPT

## 2020-12-03 PROCEDURE — 85025 COMPLETE CBC W/AUTO DIFF WBC: CPT | Performed by: INTERNAL MEDICINE

## 2020-12-03 PROCEDURE — 94660 CPAP INITIATION&MGMT: CPT

## 2020-12-03 PROCEDURE — 80069 RENAL FUNCTION PANEL: CPT | Performed by: INTERNAL MEDICINE

## 2020-12-03 PROCEDURE — 99232 SBSQ HOSP IP/OBS MODERATE 35: CPT | Performed by: INTERNAL MEDICINE

## 2020-12-03 PROCEDURE — 63710000001 INSULIN DETEMIR PER 5 UNITS: Performed by: FAMILY MEDICINE

## 2020-12-03 PROCEDURE — 63710000001 INSULIN ASPART PER 5 UNITS: Performed by: EMERGENCY MEDICINE

## 2020-12-03 RX ORDER — DEXTROSE AND SODIUM CHLORIDE 5; .45 G/100ML; G/100ML
50 INJECTION, SOLUTION INTRAVENOUS CONTINUOUS
Status: DISCONTINUED | OUTPATIENT
Start: 2020-12-03 | End: 2020-12-04

## 2020-12-03 RX ADMIN — ALBUTEROL SULFATE 2 PUFF: 90 AEROSOL, METERED RESPIRATORY (INHALATION) at 07:22

## 2020-12-03 RX ADMIN — CARVEDILOL 6.25 MG: 6.25 TABLET, FILM COATED ORAL at 08:13

## 2020-12-03 RX ADMIN — APIXABAN 2.5 MG: 2.5 TABLET, FILM COATED ORAL at 22:07

## 2020-12-03 RX ADMIN — HALOPERIDOL 1 MG: 1 TABLET ORAL at 14:14

## 2020-12-03 RX ADMIN — INSULIN DETEMIR 30 UNITS: 100 INJECTION, SOLUTION SUBCUTANEOUS at 22:10

## 2020-12-03 RX ADMIN — APIXABAN 2.5 MG: 2.5 TABLET, FILM COATED ORAL at 08:13

## 2020-12-03 RX ADMIN — PANTOPRAZOLE SODIUM 40 MG: 40 TABLET, DELAYED RELEASE ORAL at 05:59

## 2020-12-03 RX ADMIN — Medication 1 CAPSULE: at 22:07

## 2020-12-03 RX ADMIN — INSULIN ASPART 5 UNITS: 100 INJECTION, SOLUTION INTRAVENOUS; SUBCUTANEOUS at 19:20

## 2020-12-03 RX ADMIN — TAMSULOSIN HYDROCHLORIDE 0.4 MG: 0.4 CAPSULE ORAL at 08:13

## 2020-12-03 RX ADMIN — Medication 1 TABLET: at 08:13

## 2020-12-03 RX ADMIN — GABAPENTIN 400 MG: 400 CAPSULE ORAL at 22:07

## 2020-12-03 RX ADMIN — OXYCODONE HYDROCHLORIDE AND ACETAMINOPHEN 500 MG: 500 TABLET ORAL at 08:13

## 2020-12-03 RX ADMIN — ALBUTEROL SULFATE 2 PUFF: 90 AEROSOL, METERED RESPIRATORY (INHALATION) at 12:35

## 2020-12-03 RX ADMIN — DEXTROSE AND SODIUM CHLORIDE 50 ML/HR: 5; 450 INJECTION, SOLUTION INTRAVENOUS at 11:40

## 2020-12-03 RX ADMIN — GABAPENTIN 400 MG: 400 CAPSULE ORAL at 08:13

## 2020-12-03 RX ADMIN — CARBIDOPA AND LEVODOPA 1 TABLET: 25; 100 TABLET ORAL at 17:12

## 2020-12-03 RX ADMIN — INSULIN ASPART 6 UNITS: 100 INJECTION, SOLUTION INTRAVENOUS; SUBCUTANEOUS at 17:13

## 2020-12-03 RX ADMIN — Medication 1 CAPSULE: at 08:13

## 2020-12-03 RX ADMIN — ALBUTEROL SULFATE 2 PUFF: 90 AEROSOL, METERED RESPIRATORY (INHALATION) at 19:30

## 2020-12-03 RX ADMIN — ATORVASTATIN CALCIUM 40 MG: 40 TABLET, FILM COATED ORAL at 22:07

## 2020-12-03 RX ADMIN — ALBUTEROL SULFATE 2 PUFF: 90 AEROSOL, METERED RESPIRATORY (INHALATION) at 17:13

## 2020-12-03 RX ADMIN — Medication 5 MG: at 22:06

## 2020-12-03 RX ADMIN — CARVEDILOL 6.25 MG: 6.25 TABLET, FILM COATED ORAL at 22:06

## 2020-12-03 RX ADMIN — HALOPERIDOL 1 MG: 1 TABLET ORAL at 05:59

## 2020-12-03 RX ADMIN — CARBIDOPA AND LEVODOPA 1 TABLET: 25; 100 TABLET ORAL at 22:07

## 2020-12-03 RX ADMIN — CARBIDOPA AND LEVODOPA 1 TABLET: 25; 100 TABLET ORAL at 08:13

## 2020-12-03 RX ADMIN — HALOPERIDOL 1 MG: 1 TABLET ORAL at 22:06

## 2020-12-03 NOTE — PLAN OF CARE
"Goal Outcome Evaluation:  Plan of Care Reviewed With: patient  Progress: improving    Problem: Adult Inpatient Plan of Care  Goal: Plan of Care Review  Recent Flowsheet Documentation  Taken 12/3/2020 1519 by Uche Flores PTA  Progress: improving  Plan of Care Reviewed With: patient  Outcome Summary: Pt much more alert this treatment. Pt presents with imporved ability to follow verbal directions. Pt also was able to perform increased activity.  Pt performed supine TE as well as sit to stands x 3 with mod/min A. Pt also was able to side step to HOB with rw and min a . Pt also was able to perform two reps of static standing of 30\" with cga and rw. Pt was transferred back to bed and bed was placed in chair position. Restraints were reapplied L by PTA and R by nurse.  Bed alram was activated.  See flowsheet for details     "

## 2020-12-03 NOTE — PROGRESS NOTES
Pt remains in Covid isolation room.  Updated covid test from 12/2/20 is pending.  Family wants pt to go to rehab at [facility].  Unfortunately, pt still requires the use of restraints and unable to go to a facility until he is able to maintain 24 hrs off restraints.  Palliative services will continue to follow.    15:15 - received update from PT.  Pt has had some improvement today.  He was able to stand up from bed x3 and side stepped to move up in the bed.  Pt's gait was noted to be 3 feet this date.  Additionally reported was that pt was able to follow commands better during his therapy session.  Pt remains in restraints and was reapplied after therapy completed, per note.    Dr. Sheffield updated and will plan to speak to wife via phone call.

## 2020-12-03 NOTE — PROGRESS NOTES
"Nephrology Progress Note.    LOS: 10 days    Patient Care Team:  Jason Foy MD as PCP - General (General Practice)    Chief Complaint:    Chief Complaint   Patient presents with   • Fever   • Shortness of Breath       Subjective:   Follow up for ALEXANDRA and Chronic Kidney disease stage 3 / Anemia.     Interval History:   Patient Complaints: none  Patient seen and examined this morning.  Events from last 24 hours noted.  Patient has COVID-19 pneumonia.  Patient is awake a lot more interactive as compared to before but still confused.    Objective:    Vital Signs  BP 99/69 (BP Location: Left arm, Patient Position: Lying)   Pulse 84   Temp 96.8 °F (36 °C) (Axillary)   Resp 18   Ht 180.3 cm (71\")   Wt 97.1 kg (214 lb 1.1 oz)   SpO2 91%   BMI 29.86 kg/m²     No intake/output data recorded.    Intake/Output Summary (Last 24 hours) at 12/3/2020 0746  Last data filed at 12/2/2020 1800  Gross per 24 hour   Intake 120 ml   Output --   Net 120 ml       Physical Exam:  General Appearance: no acute distress,   HEENT: Oral mucosa dry, extra occular movements intact. Sclera clear.  Skin: Warm and dry  Neck: supple, no JVD, trachea midline  Lungs:Chest shape is normal. Breath sounds heard scattered rhonchi and crackles, No wheezing.   Heart: Irregular rate and rhythm. normal S1 and S2, no S3, no rub, peripheral pulses weak but palpable.  Abdomen: Obese, soft, non-tender,  present bowel sounds to auscultation  : no palpable bladder.  Extremities: Trace edema, no cyanosis or clubbing.   Neuro: Randomly does move his extremities.     Results Review:   Results from last 7 days   Lab Units 12/03/20  0528 12/02/20  0532 12/01/20  0938 11/30/20  0550 11/29/20  0554 11/28/20  0522   SODIUM mmol/L 139 136 140 146* 148* 145   POTASSIUM mmol/L 4.0 4.2 4.0 4.0 3.9 4.0   CHLORIDE mmol/L 103 101 103 105 104 101   CO2 mmol/L 25.0 27.0 28.7 30.4* 35.7* 35.4*   BUN mg/dL 40* 34* 39* 43* 48* 52*   CREATININE mg/dL 1.36* 0.97 1.11 1.15 " 1.10 1.17   CALCIUM mg/dL 9.2 9.0 9.4 9.6 10.0 9.8   ALBUMIN g/dL 2.60* 2.60* 2.70* 2.70* 2.70* 2.70*   BILIRUBIN mg/dL  --   --   --  1.5* 1.3* 1.1   ALK PHOS U/L  --   --   --  63 67 60   ALT (SGPT) U/L  --   --   --  <5 <5 <5   AST (SGOT) U/L  --   --   --  28 25 27   GLUCOSE mg/dL 64* 158* 145* 142* 149* 203*     Estimated Creatinine Clearance: 49.8 mL/min (A) (by C-G formula based on SCr of 1.36 mg/dL (H)).  Results from last 7 days   Lab Units 12/03/20  0528 12/02/20  0532 12/01/20  0938   PHOSPHORUS mg/dL 4.1 3.0 3.0     Results from last 7 days   Lab Units 12/01/20  0938   URIC ACID mg/dL 7.0     Results from last 7 days   Lab Units 12/03/20  0528 12/02/20  0532 11/30/20  0550 11/29/20  0554 11/28/20  0522   WBC 10*3/mm3 15.14* 17.07* 10.36 9.68 11.43*   HEMOGLOBIN g/dL 12.1* 12.4* 12.7* 11.9* 11.5*   PLATELETS 10*3/mm3 75* 93* 85* 73* 75*         Brief Urine Lab Results  (Last result in the past 365 days)      Color   Clarity   Blood   Leuk Est   Nitrite   Protein   CREAT   Urine HCG        12/02/20 1603 Dark Yellow Clear Trace Trace Negative Negative             No results found for: UTPCR  Imaging Results (Last 24 Hours)     Procedure Component Value Units Date/Time    XR Chest 1 View [[id number]] Collected: 12/02/20 1100     Updated: 12/02/20 1201    Narrative:      PROCEDURE: XR CHEST 1 VW-        HISTORY: FOLLOW UP ON COVID PNEUMONIA; U07.1-COVID-19; J12.89-Other  viral pneumonia; R73.9-Hyperglycemia, unspecified     COMPARISON: 11/27/2020.     FINDINGS: The heart is normal in size. The mediastinum is unremarkable.  There is worsening bilateral airspace disease. There is no pneumothorax.  There are no acute osseous abnormalities.       Impression:      Worsening bilateral airspace disease.           Images were reviewed, interpreted, and dictated by Dr. Carmelina Mooney M.D.  Transcribed by Flores Harley PA-C.     This report was finalized on 12/2/2020 11:59 AM by Carmelina Mooney M.D..         albuterol sulfate HFA, 2 puff, Inhalation, 4x Daily - RT  apixaban, 2.5 mg, Oral, Q12H  atorvastatin, 40 mg, Oral, Nightly  b complex-vitamin c-folic acid, 1 tablet, Oral, Daily  carbidopa-levodopa, 1 tablet, Oral, TID  carvedilol, 6.25 mg, Oral, BID  gabapentin, 400 mg, Oral, Q12H  haloperidol, 1 mg, Oral, Q8H  insulin aspart, 0-9 Units, Subcutaneous, TID AC  insulin aspart, 5 Units, Subcutaneous, TID With Meals  insulin detemir, 30 Units, Subcutaneous, Nightly  lactobacillus acidophilus, 1 capsule, Oral, BID  melatonin, 5 mg, Oral, Nightly  pantoprazole, 40 mg, Oral, Daily  tamsulosin, 0.4 mg, Oral, Daily  vitamin C, 500 mg, Oral, Daily             Medication Review:   Current Facility-Administered Medications   Medication Dose Route Frequency Provider Last Rate Last Admin   • albuterol sulfate HFA (PROVENTIL HFA;VENTOLIN HFA;PROAIR HFA) inhaler 2 puff  2 puff Inhalation 4x Daily - RT Skip Raygoza, DO   2 puff at 12/03/20 0722   • apixaban (ELIQUIS) tablet 2.5 mg  2.5 mg Oral Q12H Carrol Castro DO   2.5 mg at 12/02/20 2037   • atorvastatin (LIPITOR) tablet 40 mg  40 mg Oral Nightly JaretSkip smiley, DO   40 mg at 12/02/20 2036   • b complex-vitamin c-folic acid (NEPHRO-AGATA) tablet 1 tablet  1 tablet Oral Daily Carrol Castro DO   1 tablet at 12/02/20 0816   • carbidopa-levodopa (SINEMET)  MG per tablet 1 tablet  1 tablet Oral TID Skip Raygoza, DO   1 tablet at 12/02/20 2036   • carvedilol (COREG) tablet 6.25 mg  6.25 mg Oral BID Carrol Castro DO   6.25 mg at 12/02/20 2036   • dextrose (D50W) 25 g/ 50mL Intravenous Solution 25 g  25 g Intravenous Q15 Min PRN JaretSkip smiley, DO       • dextrose (GLUTOSE) oral gel 1 tube  1 tube Oral Q15 Min PRN Skip Raygoza, DO       • gabapentin (NEURONTIN) capsule 400 mg  400 mg Oral Q12H Skip Raygoza,    400 mg at 12/02/20 2037   • glucagon (human recombinant) (GLUCAGEN DIAGNOSTIC) injection 1 mg  1 mg Subcutaneous PRN Skip Raygoza, DO       • haloperidol (HALDOL)  tablet 1 mg  1 mg Oral Q8H Xochilt Sheffield MD   1 mg at 12/03/20 0559   • insulin aspart (novoLOG) injection 0-9 Units  0-9 Units Subcutaneous TID AC Skip Raygoza, DO   7 Units at 12/01/20 1738   • insulin aspart (novoLOG) injection 5 Units  5 Units Subcutaneous TID With Meals JaretSkip smiley, DO   5 Units at 12/02/20 1159   • insulin detemir (LEVEMIR) injection 30 Units  30 Units Subcutaneous Nightly Myranda Davalos, DO   30 Units at 12/02/20 2052   • lactobacillus acidophilus (RISAQUAD) capsule 1 capsule  1 capsule Oral BID Carrol Castro, DO   1 capsule at 12/02/20 2053   • melatonin tablet 5 mg  5 mg Oral Nightly Carrol Castro, DO   5 mg at 12/02/20 2036   • metoprolol tartrate (LOPRESSOR) injection 5 mg  5 mg Intravenous Q4H PRN Carrol Castro, DO   5 mg at 11/27/20 2220   • pantoprazole (PROTONIX) EC tablet 40 mg  40 mg Oral Daily Carrol Castro, DO   40 mg at 12/03/20 0559   • rOPINIRole (REQUIP) tablet 0.5 mg  0.5 mg Oral Nightly PRN Carrol Castro, DO   0.5 mg at 11/26/20 2117   • sodium chloride 0.9 % flush 10 mL  10 mL Intravenous PRN Skip Raygoza, DO   10 mL at 12/02/20 0818   • tamsulosin (FLOMAX) 24 hr capsule 0.4 mg  0.4 mg Oral Daily Skip Raygoza, DO   0.4 mg at 12/02/20 0817   • vitamin C (ASCORBIC ACID) tablet 500 mg  500 mg Oral Daily Carrol Castro, DO   500 mg at 12/02/20 0817       Assessment/Plan:  1. Chronic kidney disease stage III: baseline creatinine ~1.3-1.5 fluctuates with volume status.  No further worsening of renal function noted  2. Hypertension in CKD: It is under fair control continue to watch closely.  3. Hyperkalemia: Multifactorial likely secondary to hyperglycemia as well as history of lisinopril use that may be contributing.  It is back to normal.  4. Anasarca: Continue to optimize diuretics  5. Acute respiratory failure with hypoxia  6. Pneumonia due to COVID-19 virus  7. Acute metabolic encephalopathy  8. Type 2 Diabetes Mellitus with  hyperglycemia  9. Thrombocytopenia  10. History of aortic valve replacement  11. Dyslipidemia  12. History of DVT  13. Tremor    Plan:  · Worsening renal function noted, not eating and drinking much.  Will restart IV at a slower rate 50 cc an hour.  · Details were discussed with the patient no family in the room.  Clinically appears to be improving.  · Details were also discussed with the hospitalist service.  I think this is the best he will get at this point.  · Whenever he goes home probably will need to go on 20 of torsemide alternating with 25 of spironolactone.  Continue hold any diuretics for now.  · Continue with rest of the current treatment plan and surveillance labs.  · Further recommendations will depend on clinical course of the patient during the current hospitalization.    · I also discussed the details with the nursing staff.  · Rest as ordered.    Rickey Zee MD, FASN  12/03/20  07:46 EST    Dictated utilizing Dragon dictation.

## 2020-12-03 NOTE — PLAN OF CARE
Goal Outcome Evaluation:  Plan of Care Reviewed With: patient  Progress: no change  Outcome Summary: Patient remains in  restraints. no problems. He slept well through the night. O2 has been turned down to 3L nc. Patient still confused, but took all medications and covid swab sent out. no overnight events to report.

## 2020-12-03 NOTE — PLAN OF CARE
Goal Outcome Evaluation:  Plan of Care Reviewed With: patient  Progress: no change   Patient continues to be confused, remains in restraints today, attempted to remove restraints, patient pulling at lines and tubes, will continue to monitor and notify MD for any issues or concerns

## 2020-12-03 NOTE — CONSULTS
Date of consultation:   December 3, 2020    Requested by:   Dr. Sheffield    PCP: Jason Foy MD    Reason:  Acute respiratory failure.  CoVid 19 infection.    History of Present Illness:  80 y.o. male   with past medical history significant for Covid pneumonia which was diagnosed in November 2020 and was initially admitted for 3 days and able to be discharged home November 12, 2020.  He was treated with dexamethasone, remdesivir and Augmentin.  At that point he did not qualify for home oxygen.  Patient presented back to the emergency room on November 23 secondary to fevers and falls and worsening mentation.  Initial chest x-ray revealed bilateral infiltrates and was originally placed on vancomycin and Zosyn.  During this admission, he has completed another 10-day course of dexamethasone and initially was requiring significant supplemental oxygen all the way up to 10 to 15 L, but this has been weaned down to 3 L nasal cannula on my exam today.    Patient is lying comfortably in bed, but has waxing and waning mental clarity.  Unable to obtain much history.    Dr. Sheffield has been in contact with patient's wife and it appears that plan will be either for rehab or home with home health.    Review of System: Difficult to obtain secondary to patient's mental status.    Past Medical History:  Past Medical History:   Diagnosis Date   • Angina of effort (CMS/HCC)    • Aortic valve replaced    • Arthritis    • Cataract    • CHF (congestive heart failure) (CMS/HCC)    • Colitis    • Diabetes (CMS/HCC)    • Diverticulitis    • Gall stones    • Heart disease    • Hyperlipidemia    • Hypertension    • Hypertension    • Impaired functional mobility, balance, gait, and endurance    • Kidney stones    • Skin cancer     left shoulder   • Tremor          Past Surgical History:  Past Surgical History:   Procedure Laterality Date   • AORTIC VALVE REPAIR/REPLACEMENT  2016   • COLONOSCOPY  2018   • HERNIA REPAIR  1963    x2   •  "SINUS SURGERY  1978         Family History:  Family History   Problem Relation Age of Onset   • Cancer Brother    • Diabetes Brother    • Heart disease Brother    • Hypertension Brother          Social History:  Social History     Socioeconomic History   • Marital status:      Spouse name: Not on file   • Number of children: Not on file   • Years of education: Not on file   • Highest education level: Not on file   Tobacco Use   • Smoking status: Never Smoker   • Smokeless tobacco: Current User     Types: Chew   Substance and Sexual Activity   • Alcohol use: No     Frequency: Never   • Drug use: Never         Physical Exam:  /70 (BP Location: Left arm, Patient Position: Lying)   Pulse 84   Temp 96.8 °F (36 °C) (Axillary)   Resp 18   Ht 180.3 cm (71\")   Wt 97.1 kg (214 lb 1.1 oz)   SpO2 91%   BMI 29.86 kg/m²   Constitutional:Vital signs reviewed.  No respiratory distress noted, on 3 L nasal cannula.  Intermittent confusion.         General: No significant respiratory distress noted.  Eyes: Extraocular movement was intact, non-icteric sclera  ENT:No nasal discharge noted. Oropharynx was moist.   Neck: Supple.  No JVD noted.Trachea midline  Cardiovascular: S1 + S2.  Regular rate  Lungs/Respiratory:Respiratory effort was normal with no accessory muscle use.  No wheezing or rhonchi appreciated minimal bibasilar crackles   abdomen/GI: Soft.  Bowel sounds were positive.  Nontender to palpation  MSK/Extremities: no significant edema noted.  No cyanosis noted no clubbing noted  Neurologic: Intermittently confused, but able to follow simple commands.  Psychiatric: Difficult to assess given his mental status, but does not appear anxious on exam  Skin: Thin skin.  Dry skin noted    Labs: Reviewed. Pertinent labs were noted.     Results from last 7 days   Lab Units 12/03/20  0528 12/02/20  0532 11/30/20  0550 11/29/20  0554 11/28/20  0522   WBC 10*3/mm3 15.14* 17.07* 10.36 9.68 11.43*   HEMOGLOBIN g/dL 12.1* " 12.4* 12.7* 11.9* 11.5*   HEMATOCRIT % 37.3* 37.1* 38.1 36.9* 35.0*   PLATELETS 10*3/mm3 75* 93* 85* 73* 75*   NEUTROPHIL % % 80.0* 84.6* 87.0* 87.9* 89.5*   NEUTROS ABS 10*3/mm3 12.11* 14.43* 9.01* 8.50* 10.23*   EOSINOPHIL % % 1.1 0.8 0.5 0.4 0.1*   EOS ABS 10*3/mm3 0.17 0.13 0.05 0.04 0.01   LYMPHOCYTE % % 9.7* 6.7* 6.3* 5.9* 4.7*   LYMPHS ABS 10*3/mm3 1.47 1.15 0.65* 0.57* 0.54*       Results from last 7 days   Lab Units 12/03/20  0528 12/02/20  0532 12/01/20  0938 11/30/20  0550 11/29/20  0554 11/28/20  0522   SODIUM mmol/L 139 136 140 146* 148* 145   POTASSIUM mmol/L 4.0 4.2 4.0 4.0 3.9 4.0   CHLORIDE mmol/L 103 101 103 105 104 101   CO2 mmol/L 25.0 27.0 28.7 30.4* 35.7* 35.4*   BUN mg/dL 40* 34* 39* 43* 48* 52*   CREATININE mg/dL 1.36* 0.97 1.11 1.15 1.10 1.17   CALCIUM mg/dL 9.2 9.0 9.4 9.6 10.0 9.8   EGFR IF NONAFRICN AM mL/min/1.73 50* 74 63 61 64 60*   ANION GAP mmol/L 11.0 8.0 8.3 10.6 8.3 8.6   BILIRUBIN mg/dL  --   --   --  1.5* 1.3* 1.1   ALK PHOS U/L  --   --   --  63 67 60   ALT (SGPT) U/L  --   --   --  <5 <5 <5   AST (SGOT) U/L  --   --   --  28 25 27   GLUCOSE mg/dL 64* 158* 145* 142* 149* 203*   TOTAL PROTEIN g/dL  --   --   --  6.1 6.3 6.0   ALBUMIN g/dL 2.60* 2.60* 2.70* 2.70* 2.70* 2.70*       Results from last 7 days   Lab Units 12/03/20  0528 12/02/20  0532 12/01/20  0938   PHOSPHORUS mg/dL 4.1 3.0 3.0       Lab Results   Component Value Date    PROBNP 1,717.0 11/23/2020    PROBNP 1,040.0 11/20/2020    PROBNP 264.6 11/09/2020       No results found for: INR    Lab Results   Component Value Date    PROCALCITO 0.22 11/25/2020    PROCALCITO 0.29 (H) 11/23/2020    PROCALCITO 0.12 11/20/2020       No results found for: CRP    No results found for: SEDRATE    ABG:   Lab Results   Component Value Date    PHART 7.479 12/02/2020    BXL1UNN 39.2 12/02/2020    PO2ART 59.6 (L) 12/02/2020    E9JRATLO 91.0 (L) 12/02/2020    CARBOXYHGB 1.1 12/02/2020         Micro: As of December 3, 2020   No results  found for: RESPCX  Lab Results   Component Value Date    BLOODCX No growth at 5 days 11/23/2020    BLOODCX No growth at 5 days 11/23/2020    BLOODCX No growth at 5 days 11/20/2020     Lab Results   Component Value Date    URINECX No growth 11/20/2020     No results found for: MRSACX  Lab Results   Component Value Date    MRSAPCR No MRSA Detected 11/25/2020    MRSAPCR No MRSA Detected 11/10/2020     Lab Results   Component Value Date    FLU Negative 11/20/2020    FLU Negative 11/20/2020     COVID 19:  Lab Results   Component Value Date    COVID19 Detected (C) 12/02/2020       Echo:   Results for orders placed during the hospital encounter of 11/23/20   Adult Transthoracic Echo Complete W/ Cont if Necessary Per Protocol    Narrative 1.  Technically difficult study.  2.  Normal left ventricular size with normal LV systolic function, LVEF   55-60%.  3.  Mild concentric LVH.  4.  Indeterminate LV diastolic filling pattern.  5.  Normal left atrial volume index.  6.  Normal right ventricular size and systolic function.  7.  Aortic sclerosis without significant stenosis.          Imaging Study: Latest imaging studies was reviewed personally.     Imaging Results (Last 72 Hours)     Procedure Component Value Units Date/Time    XR Chest 1 View [[id number]] Collected: 12/02/20 1100     Updated: 12/02/20 1201    Narrative:      PROCEDURE: XR CHEST 1 VW-        HISTORY: FOLLOW UP ON COVID PNEUMONIA; U07.1-COVID-19; J12.89-Other  viral pneumonia; R73.9-Hyperglycemia, unspecified     COMPARISON: 11/27/2020.     FINDINGS: The heart is normal in size. The mediastinum is unremarkable.  There is worsening bilateral airspace disease. There is no pneumothorax.  There are no acute osseous abnormalities.       Impression:      Worsening bilateral airspace disease.           Images were reviewed, interpreted, and dictated by Dr. Carmelina Mooney M.D.  Transcribed by Flores Harley PA-C.     This report was finalized on 12/2/2020 11:59  AM by Carmelina Mooney M.D..            ECHO:  Results for orders placed during the hospital encounter of 11/23/20   Adult Transthoracic Echo Complete W/ Cont if Necessary Per Protocol    Narrative 1.  Technically difficult study.  2.  Normal left ventricular size with normal LV systolic function, LVEF   55-60%.  3.  Mild concentric LVH.  4.  Indeterminate LV diastolic filling pattern.  5.  Normal left atrial volume index.  6.  Normal right ventricular size and systolic function.  7.  Aortic sclerosis without significant stenosis.          Assessment:  1.  Acute toxic respiratory failure.   2.  CoVid 19 infection with pneumonia  3.  Acute on chronic renal insufficiency  4.  Delirium  5.  Leukocytosis  6.  DAPHNE on home CPAP  7.  Progressive functional decline    Discussion/Recommendations:   Patient with COVID-19 pneumonia status post steroids and remdesivir on initial presentation.  He is now requiring minimal oxygenation, but does have some worsening lung infiltrates.  He had a white count of 17 yesterday and down to 15 today.    Steroids have been discontinued and has completed more than 10 days of steroids for his Covid.  Will check procalcitonin level in a.m. if elevated or he fights a fever would consider starting antibiotics for possible secondary bacterial pneumonia.  However clinically I do not think we need to at this time.    We will continue oxygen supplementation.  The goal is tomaintain his O2 saturation above 90% or so, we will use nasal cannula.  If he begins needing more oxygen or becomes tachypneic or has refractory hypoxemia, then would consider BiPAP.    I suspect patient will require supplemental oxygen for quite some time given his prolonged hospitalization with COVID-19 as well as multiple comorbidities.  It may be months before patient's respiratory status will no longer require supplemental oxygen.      This document was electronically signed by Diane Pino MD on 12/03/20 at  08:39 EST      Dictated utilizing Dragon dictation.

## 2020-12-03 NOTE — PROGRESS NOTES
[facility]    [facility]    Name:  Jak Pierre   Age:  80 y.o.  Sex:  male  :  1940  MRN:  [id number]   Visit Number:  [id number]  Date Of Service:  20  Primary Care Physician:  Jason Foy MD     LOS: 10 days :  Patient Care Team:  Jason Foy MD as PCP - General (General Practice):    History taken from:    Medical record, staff, patient    Chief Complaint:    Covid infection, acute on chronic respiratory failure    Subjective:    Interval History:    Patient evaluated, record reviewed, case discussed with staff.  Since yesterday, patient remained hemodynamically stable with no fever, chills or acute systemic issues.  However was noted to have hyperactive delirium last p.m. with no signs of agitation, therefore continued to require soft wrist restraints to ensure placement of nasal cannula and BiPAP.  During my visit today, patient.  Calm, comfortable and less confused.  As reported by physical therapy, patient was able to briefly ambulate and was more engaging which is an improvement compared to prior.  Dr. Pino, pulmonologist evaluated Mr. Pierre earlier today, her input is appreciated.  She recommended continuing current supportive management.  On another note, leukocytosis improved from 17K to 15K, platelets however dropped again to 75K; patient does not have signs of bleeding at present.  Patient continues to be on the Covid floor as repeat Covid 19 by [facility] was positive.    Past Medical History:    Past Medical History:   Diagnosis Date   • Angina of effort (CMS/HCC)    • Aortic valve replaced    • Arthritis    • Cataract    • CHF (congestive heart failure) (CMS/HCC)    • Colitis    • Diabetes (CMS/HCC)    • Diverticulitis    • Gall stones    • Heart disease    • Hyperlipidemia    • Hypertension    • Hypertension    • Impaired functional mobility, balance, gait, and endurance    • Kidney stones    • Skin cancer     left shoulder   •  Tremor         Past Surgical history:    Past Surgical History:   Procedure Laterality Date   • AORTIC VALVE REPAIR/REPLACEMENT  2016   • COLONOSCOPY  2018   • HERNIA REPAIR  1963    x2   • SINUS SURGERY  1978        Objective:    Vital Signs:    Temp:  [96.2 °F (35.7 °C)-97.8 °F (36.6 °C)] 96.7 °F (35.9 °C)  Heart Rate:  [] 99  Resp:  [16-18] 16  BP: ()/(57-89) 131/89    Physical Exam:    General Appearance:  Alert, comfortable, nasal cannula in place, less confused than yesterday, recalled physical therapy and his ability to ambulate few steps   Eyes:          PERRLA, conjunctivae and sclerae normal, no Icterus. No pallor. Extra-occular movements are within normal limits.   Lungs:   Chest shape is normal. Breath sounds heard bilaterally equally.  No wheezing.  Bilateral basal crackles heard. No Pleural rub or bronchial breathing.   Heart:  Normal S1 and S2, no murmur, no gallop, no rub. No JVD.   Abdomen:   Normal bowel sounds, no masses, no organomegaly. Soft, non-tender, non-distended, no guarding    Extremities: Moves all extremities well, edema, cyanosis or clubbing.     Pulses: Pulses palpable and equal bilaterally.   Skin: No bleeding or rash.  Generalized dry skin noted.   Neurologic: Grossly nonfocal     Results Review:    Labs:    Results from last 7 days   Lab Units 12/03/20 0528 12/02/20  0532 11/30/20  0550   WBC 10*3/mm3 15.14* 17.07* 10.36   HEMOGLOBIN g/dL 12.1* 12.4* 12.7*   HEMATOCRIT % 37.3* 37.1* 38.1   MCV fL 94.4 93.0 94.5   MCHC g/dL 32.4 33.4 33.3   PLATELETS 10*3/mm3 75* 93* 85*     Results from last 7 days   Lab Units 12/02/20  1812   PH, ARTERIAL pH units 7.479   PO2 ART mm Hg 59.6*   PCO2, ARTERIAL mm Hg 39.2   HCO3 ART mmol/L 29.1*     Results from last 7 days   Lab Units 12/03/20  0528 12/02/20  0532 12/01/20  0938 11/30/20  0550 11/29/20  0554 11/28/20  0522   SODIUM mmol/L 139 136 140 146* 148* 145   POTASSIUM mmol/L 4.0 4.2 4.0 4.0 3.9 4.0   CHLORIDE mmol/L 103 101 103  105 104 101   CO2 mmol/L 25.0 27.0 28.7 30.4* 35.7* 35.4*   BUN mg/dL 40* 34* 39* 43* 48* 52*   CREATININE mg/dL 1.36* 0.97 1.11 1.15 1.10 1.17   EGFR IF NONAFRICN AM mL/min/1.73 50* 74 63 61 64 60*   CALCIUM mg/dL 9.2 9.0 9.4 9.6 10.0 9.8   GLUCOSE mg/dL 64* 158* 145* 142* 149* 203*   ALBUMIN g/dL 2.60* 2.60* 2.70* 2.70* 2.70* 2.70*   BILIRUBIN mg/dL  --   --   --  1.5* 1.3* 1.1   ALK PHOS U/L  --   --   --  63 67 60   AST (SGOT) U/L  --   --   --  28 25 27   ALT (SGPT) U/L  --   --   --  <5 <5 <5   Estimated Creatinine Clearance: 49.8 mL/min (A) (by C-G formula based on SCr of 1.36 mg/dL (H)).  No results found for: AMMONIA          Lab Results   Component Value Date    HGBA1C 10.20 (H) 11/24/2020     Lab Results   Component Value Date    TSH 0.250 (L) 11/23/2020     No results found for: PREGTESTUR, PREGSERUM, HCG, HCGQUANT  Pain Management Panel     There is no flowsheet data to display.        No results found for: BLOODCX  No results found for: URINECX  No results found for: WOUNDCX  No results found for: STOOLCX        Radiology:    Imaging Results (Most Recent)     Procedure Component Value Units Date/Time    XR Chest 1 View [[id number]] Collected: 12/02/20 1100     Updated: 12/02/20 1201    Narrative:      PROCEDURE: XR CHEST 1 VW-        HISTORY: FOLLOW UP ON COVID PNEUMONIA; U07.1-COVID-19; J12.89-Other  viral pneumonia; R73.9-Hyperglycemia, unspecified     COMPARISON: 11/27/2020.     FINDINGS: The heart is normal in size. The mediastinum is unremarkable.  There is worsening bilateral airspace disease. There is no pneumothorax.  There are no acute osseous abnormalities.       Impression:      Worsening bilateral airspace disease.           Images were reviewed, interpreted, and dictated by Dr. Carmelina Mooney M.D.  Transcribed by Flores Harley PA-C.     This report was finalized on 12/2/2020 11:59 AM by Carmelina Mooney M.D..    XR Chest 1 View [[id number]] Collected: 11/27/20 0805     Updated:  11/27/20 0807    Narrative:      PROCEDURE: XR CHEST 1 VW-     HISTORY: respiratory failure with severe hypoxia, persistent pneumonia  comparison; U07.1-COVID-19; J12.89-Other viral pneumonia;  R73.9-Hyperglycemia, unspecified     COMPARISON: 11/23/2020.     FINDINGS: The heart is normal in size. The mediastinum is unremarkable.  There has been interval improvement in the patient's bilateral airspace  disease. No significant effusions are evident. There is no pneumothorax.   There are no acute osseous abnormalities.       Impression:      Interval improvement in the patient's bilateral airspace  disease.     Continued followup is recommended.     This report was finalized on 11/27/2020 8:05 AM by Carmelina Mooney M.D..    CT Head Without Contrast [[id number]] Collected: 11/23/20 0729     Updated: 11/23/20 0732    Narrative:      PROCEDURE: CT HEAD WO CONTRAST-     HISTORY: Mental status change, unknown cause; U07.1-COVID-19;  J12.89-Other viral pneumonia; R73.9-Hyperglycemia, unspecified     COMPARISON:  None .     TECHNIQUE: Multiple axial CT images were performed from the foramen  magnum to the vertex without enhancement.      FINDINGS:  There is generalized cerebral volume loss. Patchy hypodensities in the  periventricular white matter consistent with chronic small vessel  ischemic change.  There is no CT evidence of hemorrhage. There is no  mass, mass effect or midline shift.  There is no hydrocephalus. The  paranasal sinuses are clear. Bone windows reveal no acute osseous  abnormalities.       Impression:      No acute intracranial process.           1106.18 mGy.cm        This study was performed with techniques to keep radiation doses as low  as reasonably achievable (ALARA). Individualized dose reduction  techniques using automated exposure control or adjustment of mA and/or  kV according to the patient size were employed.      This report was finalized on 11/23/2020 7:30 AM by Carmelina Mooney M.D..     XR Chest 1 View [[id number]] Collected: 11/23/20 0727     Updated: 11/23/20 0731    Narrative:      PROCEDURE: XR CHEST 1 VW-     HISTORY: sob, fever, covid +     COMPARISON: 11/20/2020.     FINDINGS: The heart is normal in size. The mediastinum is unremarkable.  There is been interval development of bilateral airspace disease  consistent with a pneumonia. No effusions are evident. There is no  pneumothorax.  There are no acute osseous abnormalities.       Impression:      Bilateral airspace disease consistent with a pneumonia.     Continued followup is recommended.     This report was finalized on 11/23/2020 7:29 AM by Carmelina Mooney M.D..          Assessment/Plan:      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)    Acute renal failure superimposed on stage 3 chronic kidney disease (CMS/HCC)    Pneumonia due to COVID-19 virus    Thrombocytopenia (CMS/HCC)      · COVID-19 pneumonia with persistent bilateral infiltrates  · Leukocytosis, new compared to prior; no current clinical signs of sepsis; etiology unspecified, effect of Decadron is in the differential  · Acute hypoxic respiratory failure secondary to above requiring BiPAP and FiO2 by nasal cannula  · Acute on chronic renal failure, improved  · Metabolic encephalopathy with periodic hyperactive delirium secondary to above; steroid effect is in the differential  · Thrombocytopenia, etiology unspecified, improved, currently 93K, improved from prior  · Sleep apnea hypopnea syndrome, on home CPAP  · Chronic congestive heart failure, no signs of acute extubation at present  · History of aortic valve replacement  · History of DVT  · Chronic anticoagulation on Eliquis, no signs of overt bleed at present   · Hypoalbuminemia  · Type 2 diabetes mellitus  · 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 to follow on leukocytosis and  thrombocytopenia  · Sepsis protocol in the event of clinical signs of sepsis; at current, patient continues to be afebrile and hemodynamically stable  · Monitor closely with FiO2 adjustments as clinically indicated; encourage BiPAP in p.m.  · Continue anticoagulation due to history of DVT, monitor platelets and clinical signs of bleed or drop in H&H.    · Continue PT/OT   · Discharge plan to [facility] if approved for rehab, otherwise discharge home under the care of spouse and her son contingent with patient's ability to transition out of the bed, stand, pivot and take few steps.    I contacted Mrs. Pierre by phone, updated her regarding patient's clinical condition, work-up and pulmonary consultation/recommendations.  She continues to be hopeful for transfer to [facility] for rehab prior to discharge home but understands that this may not be possible given patient's confusion and continued requirement for restraints.  She was appreciative for the care provided and our conversations.      Xochilt Sheffield MD  12/03/20  20:05 EST

## 2020-12-03 NOTE — PROGRESS NOTES
Continued Stay Note   [facility]     Patient Name: Jak Pierre  MRN: [id number]  Today's Date: 12/3/2020    Admit Date: 11/23/2020    Discharge Plan     Row Name 12/03/20 0936       Plan    Plan Referral pending at [facility] they are keeping updates through Epic.  Was placed back in restraints yesterday and will not be elgible to go until out of restraints greater then 24 hours.        Discharge Codes    No documentation.       Expected Discharge Date and Time     Expected Discharge Date Expected Discharge Time    Dec 3, 2020             Maricarmen Ribeiro RN

## 2020-12-04 LAB
ALBUMIN SERPL-MCNC: 2.5 G/DL (ref 3.5–5.2)
ANION GAP SERPL CALCULATED.3IONS-SCNC: 7.6 MMOL/L (ref 5–15)
BASOPHILS # BLD AUTO: 0.05 10*3/MM3 (ref 0–0.2)
BASOPHILS NFR BLD AUTO: 0.4 % (ref 0–1.5)
BUN SERPL-MCNC: 39 MG/DL (ref 8–23)
BUN/CREAT SERPL: 29.8 (ref 7–25)
CALCIUM SPEC-SCNC: 8.8 MG/DL (ref 8.6–10.5)
CHLORIDE SERPL-SCNC: 99 MMOL/L (ref 98–107)
CO2 SERPL-SCNC: 27.4 MMOL/L (ref 22–29)
CREAT SERPL-MCNC: 1.31 MG/DL (ref 0.76–1.27)
DEPRECATED RDW RBC AUTO: 47.8 FL (ref 37–54)
EOSINOPHIL # BLD AUTO: 0.18 10*3/MM3 (ref 0–0.4)
EOSINOPHIL NFR BLD AUTO: 1.4 % (ref 0.3–6.2)
ERYTHROCYTE [DISTWIDTH] IN BLOOD BY AUTOMATED COUNT: 14 % (ref 12.3–15.4)
GFR SERPL CREATININE-BSD FRML MDRD: 52 ML/MIN/1.73
GLUCOSE BLDC GLUCOMTR-MCNC: 191 MG/DL (ref 70–130)
GLUCOSE BLDC GLUCOMTR-MCNC: 200 MG/DL (ref 70–130)
GLUCOSE BLDC GLUCOMTR-MCNC: 204 MG/DL (ref 70–130)
GLUCOSE BLDC GLUCOMTR-MCNC: 235 MG/DL (ref 70–130)
GLUCOSE SERPL-MCNC: 224 MG/DL (ref 65–99)
HCT VFR BLD AUTO: 35.8 % (ref 37.5–51)
HGB BLD-MCNC: 11.9 G/DL (ref 13–17.7)
IMM GRANULOCYTES # BLD AUTO: 0.29 10*3/MM3 (ref 0–0.05)
IMM GRANULOCYTES NFR BLD AUTO: 2.2 % (ref 0–0.5)
LYMPHOCYTES # BLD AUTO: 1.26 10*3/MM3 (ref 0.7–3.1)
LYMPHOCYTES NFR BLD AUTO: 9.6 % (ref 19.6–45.3)
MCH RBC QN AUTO: 31.2 PG (ref 26.6–33)
MCHC RBC AUTO-ENTMCNC: 33.2 G/DL (ref 31.5–35.7)
MCV RBC AUTO: 93.7 FL (ref 79–97)
MONOCYTES # BLD AUTO: 0.95 10*3/MM3 (ref 0.1–0.9)
MONOCYTES NFR BLD AUTO: 7.2 % (ref 5–12)
NEUTROPHILS NFR BLD AUTO: 10.43 10*3/MM3 (ref 1.7–7)
NEUTROPHILS NFR BLD AUTO: 79.2 % (ref 42.7–76)
NRBC BLD AUTO-RTO: 0 /100 WBC (ref 0–0.2)
PHOSPHATE SERPL-MCNC: 3 MG/DL (ref 2.5–4.5)
PLATELET # BLD AUTO: 67 10*3/MM3 (ref 140–450)
PMV BLD AUTO: 10.7 FL (ref 6–12)
POTASSIUM SERPL-SCNC: 4.2 MMOL/L (ref 3.5–5.2)
PROCALCITONIN SERPL-MCNC: 0.06 NG/ML (ref 0–0.25)
RBC # BLD AUTO: 3.82 10*6/MM3 (ref 4.14–5.8)
SODIUM SERPL-SCNC: 134 MMOL/L (ref 136–145)
WBC # BLD AUTO: 13.16 10*3/MM3 (ref 3.4–10.8)

## 2020-12-04 PROCEDURE — 94660 CPAP INITIATION&MGMT: CPT

## 2020-12-04 PROCEDURE — 94799 UNLISTED PULMONARY SVC/PX: CPT

## 2020-12-04 PROCEDURE — 82962 GLUCOSE BLOOD TEST: CPT

## 2020-12-04 PROCEDURE — 80069 RENAL FUNCTION PANEL: CPT | Performed by: INTERNAL MEDICINE

## 2020-12-04 PROCEDURE — 84145 PROCALCITONIN (PCT): CPT | Performed by: INTERNAL MEDICINE

## 2020-12-04 PROCEDURE — 99232 SBSQ HOSP IP/OBS MODERATE 35: CPT | Performed by: INTERNAL MEDICINE

## 2020-12-04 PROCEDURE — 63710000001 INSULIN ASPART PER 5 UNITS: Performed by: EMERGENCY MEDICINE

## 2020-12-04 PROCEDURE — 63710000001 INSULIN DETEMIR PER 5 UNITS: Performed by: FAMILY MEDICINE

## 2020-12-04 PROCEDURE — 85025 COMPLETE CBC W/AUTO DIFF WBC: CPT | Performed by: INTERNAL MEDICINE

## 2020-12-04 PROCEDURE — 97116 GAIT TRAINING THERAPY: CPT

## 2020-12-04 RX ORDER — SODIUM CHLORIDE 9 MG/ML
50 INJECTION, SOLUTION INTRAVENOUS CONTINUOUS
Status: ACTIVE | OUTPATIENT
Start: 2020-12-04 | End: 2020-12-05

## 2020-12-04 RX ORDER — QUETIAPINE FUMARATE 25 MG/1
25 TABLET, FILM COATED ORAL EVERY 12 HOURS SCHEDULED
Status: DISCONTINUED | OUTPATIENT
Start: 2020-12-04 | End: 2020-12-10 | Stop reason: HOSPADM

## 2020-12-04 RX ADMIN — Medication 5 MG: at 22:13

## 2020-12-04 RX ADMIN — QUETIAPINE FUMARATE 25 MG: 25 TABLET ORAL at 12:11

## 2020-12-04 RX ADMIN — ATORVASTATIN CALCIUM 40 MG: 40 TABLET, FILM COATED ORAL at 22:14

## 2020-12-04 RX ADMIN — SODIUM CHLORIDE 50 ML/HR: 9 INJECTION, SOLUTION INTRAVENOUS at 08:16

## 2020-12-04 RX ADMIN — INSULIN ASPART 4 UNITS: 100 INJECTION, SOLUTION INTRAVENOUS; SUBCUTANEOUS at 06:47

## 2020-12-04 RX ADMIN — GABAPENTIN 400 MG: 400 CAPSULE ORAL at 22:13

## 2020-12-04 RX ADMIN — Medication 1 CAPSULE: at 22:13

## 2020-12-04 RX ADMIN — Medication 1 TABLET: at 08:13

## 2020-12-04 RX ADMIN — APIXABAN 2.5 MG: 2.5 TABLET, FILM COATED ORAL at 22:13

## 2020-12-04 RX ADMIN — OXYCODONE HYDROCHLORIDE AND ACETAMINOPHEN 500 MG: 500 TABLET ORAL at 08:13

## 2020-12-04 RX ADMIN — INSULIN ASPART 5 UNITS: 100 INJECTION, SOLUTION INTRAVENOUS; SUBCUTANEOUS at 12:10

## 2020-12-04 RX ADMIN — QUETIAPINE FUMARATE 25 MG: 25 TABLET ORAL at 22:13

## 2020-12-04 RX ADMIN — PANTOPRAZOLE SODIUM 40 MG: 40 TABLET, DELAYED RELEASE ORAL at 06:10

## 2020-12-04 RX ADMIN — INSULIN ASPART 5 UNITS: 100 INJECTION, SOLUTION INTRAVENOUS; SUBCUTANEOUS at 08:15

## 2020-12-04 RX ADMIN — CARBIDOPA AND LEVODOPA 1 TABLET: 25; 100 TABLET ORAL at 16:19

## 2020-12-04 RX ADMIN — INSULIN ASPART 2 UNITS: 100 INJECTION, SOLUTION INTRAVENOUS; SUBCUTANEOUS at 12:11

## 2020-12-04 RX ADMIN — ALBUTEROL SULFATE 2 PUFF: 90 AEROSOL, METERED RESPIRATORY (INHALATION) at 16:19

## 2020-12-04 RX ADMIN — HALOPERIDOL 1 MG: 1 TABLET ORAL at 06:10

## 2020-12-04 RX ADMIN — TAMSULOSIN HYDROCHLORIDE 0.4 MG: 0.4 CAPSULE ORAL at 08:13

## 2020-12-04 RX ADMIN — ALBUTEROL SULFATE 2 PUFF: 90 AEROSOL, METERED RESPIRATORY (INHALATION) at 20:06

## 2020-12-04 RX ADMIN — INSULIN ASPART 4 UNITS: 100 INJECTION, SOLUTION INTRAVENOUS; SUBCUTANEOUS at 18:04

## 2020-12-04 RX ADMIN — CARBIDOPA AND LEVODOPA 1 TABLET: 25; 100 TABLET ORAL at 22:13

## 2020-12-04 RX ADMIN — Medication 1 CAPSULE: at 08:13

## 2020-12-04 RX ADMIN — ALBUTEROL SULFATE 2 PUFF: 90 AEROSOL, METERED RESPIRATORY (INHALATION) at 06:48

## 2020-12-04 RX ADMIN — CARVEDILOL 6.25 MG: 6.25 TABLET, FILM COATED ORAL at 22:13

## 2020-12-04 RX ADMIN — INSULIN ASPART 5 UNITS: 100 INJECTION, SOLUTION INTRAVENOUS; SUBCUTANEOUS at 18:04

## 2020-12-04 RX ADMIN — DEXTROSE AND SODIUM CHLORIDE 50 ML/HR: 5; 450 INJECTION, SOLUTION INTRAVENOUS at 06:47

## 2020-12-04 RX ADMIN — INSULIN DETEMIR 30 UNITS: 100 INJECTION, SOLUTION SUBCUTANEOUS at 22:15

## 2020-12-04 RX ADMIN — APIXABAN 2.5 MG: 2.5 TABLET, FILM COATED ORAL at 08:13

## 2020-12-04 RX ADMIN — ROPINIROLE HYDROCHLORIDE 0.5 MG: 0.25 TABLET, FILM COATED ORAL at 22:13

## 2020-12-04 RX ADMIN — CARBIDOPA AND LEVODOPA 1 TABLET: 25; 100 TABLET ORAL at 08:14

## 2020-12-04 RX ADMIN — GABAPENTIN 400 MG: 400 CAPSULE ORAL at 08:13

## 2020-12-04 RX ADMIN — ALBUTEROL SULFATE 2 PUFF: 90 AEROSOL, METERED RESPIRATORY (INHALATION) at 12:11

## 2020-12-04 NOTE — PROGRESS NOTES
Continued Stay Note   [facility]     Patient Name: Jak Pierre  MRN: [id number]  Today's Date: 12/4/2020    Admit Date: 11/23/2020    Discharge Plan     Row Name 12/04/20 0812       Plan    Plan Received call from wife Parris still wants pt to go to [facility] for Rehab.  Message left with Noemy at [facility]  to reevaluate for admission.  IMM explained to wife and verbalized understanding.  11:31 EST  Spoke with Noemy at [facility] and pt is still in wrist restraints, is not appropriate for them to accept until out of wrist restraints x 24.  [facility]emy will reevaluate on Monday, cannot start precert until pt is out of restraints and they are sure they can accept.          Discharge Codes    No documentation.       Expected Discharge Date and Time     Expected Discharge Date Expected Discharge Time    Dec 5, 2020             Maricarmen Ribeiro RN

## 2020-12-04 NOTE — PROGRESS NOTES
Continued Stay Note   [facility]     Patient Name: Jak Pierre  MRN: [id number]  Today's Date: 12/4/2020    Admit Date: 11/23/2020    Discharge Plan     Row Name 12/04/20 0812       Plan    Plan Received call from wife Parris still wants pt to go to [facility] for Rehab.  Message left with Noemy at [facility]  to reevaluate for admission.  IMM explained to wife and verbalized understanding.        Discharge Codes    No documentation.       Expected Discharge Date and Time     Expected Discharge Date Expected Discharge Time    Dec 5, 2020             Maricarmen Ribeiro RN

## 2020-12-04 NOTE — PROGRESS NOTES
[facility]    Name:  Jak Pierre   Age:  80 y.o.  Sex:  male  :  1940  MRN:  [id number]   Visit Number:  [id number]  Admission Date:  2020  Date Of Service:  20  Primary Care Physician:  Jason Foy MD     LOS: 11 days :  Patient Care Team:  Jason Foy MD as PCP - General (General Practice):    Chief Complaint:      Follow-up of COVID-19 infection.    Subjective / Interval History:     Mr. Pierre was seen and examined this morning.  He seems to be pleasant but confused at times.  Denies any chest pain and states that he is feeling better with regards to his shortness of breath.  Denies any nausea or vomiting.  Patient has been confused on and off requiring wrist restraints.  After starting Seroquel this morning his confusion seems to be improved.  He was able to sit up on the chair.  Previous physician documentation, laboratory and imaging data have been reviewed.    Review of Systems:     General ROS: Patient denies any fevers, chills or loss of consciousness.  Generalized weakness.  Respiratory ROS: Denies cough or shortness of breath.  Cardiovascular ROS: Denies chest pain or palpitations.  Gastrointestinal ROS: Denies nausea and vomiting. Denies any abdominal pain. No diarrhea.  Neurological ROS: Denies any focal weakness.  Confusion at times.  Dermatological ROS: Denies any redness or pruritis.    Vital Signs:    Temp:  [96.7 °F (35.9 °C)-97.8 °F (36.6 °C)] 97.6 °F (36.4 °C)  Heart Rate:  [51-99] 73  Resp:  [16-18] 18  BP: ()/(57-91) 124/69    Intake and output:    I/O last 3 completed shifts:  In: 1071.9 [P.O.:120; I.V.:951.9]  Out: 300 [Urine:300]  I/O this shift:  In: 240 [P.O.:240]  Out: -     Physical Examination:    General Appearance:  Alert and cooperative, not in any acute distress.   Head:  Old appearing ecchymotic patch noted on the right forehead.   Eyes:          Conjunctivae and sclerae normal, no  Icterus. No pallor. Extraocular movements are within normal limits.   Neck: Supple, trachea midline, no thyromegaly, no carotid bruit.   Lungs:   Chest shape is normal. Breath sounds heard bilaterally equally.  No crackles or wheezing. No pleural rub or bronchial breathing.   Heart:  Normal S1 and S2, no murmur, no gallop, no rub. No JVD   Abdomen:   Normal bowel sounds, no masses, no organomegaly. Soft, nontender, nondistended, no guarding, no rebound tenderness.   Extremities: Moves all extremities, no ankle edema, no cyanosis, no clubbing.  2+ pitting edema noted in bilateral upper extremities especially on the left upper extremity.   Skin: No bleeding or rash.  Ecchymotic patches noted in bilateral forearms.   Neurologic: Awake, alert and oriented times 3. Moves all 4 extremities equally.     Laboratory results:    Results from last 7 days   Lab Units 12/04/20 0533 12/03/20 0528 12/02/20  0532  11/30/20  0550 11/29/20  0554 11/28/20  0522   SODIUM mmol/L 134* 139 136   < > 146* 148* 145   POTASSIUM mmol/L 4.2 4.0 4.2   < > 4.0 3.9 4.0   CHLORIDE mmol/L 99 103 101   < > 105 104 101   CO2 mmol/L 27.4 25.0 27.0   < > 30.4* 35.7* 35.4*   BUN mg/dL 39* 40* 34*   < > 43* 48* 52*   CREATININE mg/dL 1.31* 1.36* 0.97   < > 1.15 1.10 1.17   CALCIUM mg/dL 8.8 9.2 9.0   < > 9.6 10.0 9.8   BILIRUBIN mg/dL  --   --   --   --  1.5* 1.3* 1.1   ALK PHOS U/L  --   --   --   --  63 67 60   ALT (SGPT) U/L  --   --   --   --  <5 <5 <5   AST (SGOT) U/L  --   --   --   --  28 25 27   GLUCOSE mg/dL 224* 64* 158*   < > 142* 149* 203*    < > = values in this interval not displayed.     Results from last 7 days   Lab Units 12/04/20 0533 12/03/20 0528 12/02/20  0532   WBC 10*3/mm3 13.16* 15.14* 17.07*   HEMOGLOBIN g/dL 11.9* 12.1* 12.4*   HEMATOCRIT % 35.8* 37.3* 37.1*   PLATELETS 10*3/mm3 67* 75* 93*                 Results from last 7 days   Lab Units 12/02/20  1812   PH, ARTERIAL pH units 7.479   PO2 ART mm Hg 59.6*   PCO2, ARTERIAL  mm Hg 39.2   HCO3 ART mmol/L 29.1*     I have reviewed the patient's laboratory results.    Radiology results:    Imaging Results (Last 24 Hours)     ** No results found for the last 24 hours. **        I have reviewed the patient's radiology reports.    Medication Review:     I have reviewed the patient's active and prn medications.     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  Acute hypoxic respiratory failure, present on admission secondary to #2.  2.  Bilateral pneumonia secondary to COVID-19.  3.  Acute renal failure, present on admission.  4.  Chronic thrombocytopenia.  5.  History of DVT on Eliquis.  6.  Sleep apnea syndrome.  7.  Diabetes mellitus type 2.    Plan:    Mr. Pierre is currently doing better with regards to his respiratory status and COVID-19 infection.  He received dexamethasone and ceftazidime initially.  He had received remdesivir during his last admission.  He does have chronic thrombocytopenia but unfortunately he is on apixaban due to his history of DVTs.  We will continue to monitor the platelets which are stable.    Due to the patient's agitation and confusion, we will start him on Seroquel and discontinue the Haldol.  We will try to take his wrist restraints off and manage him without wrist restraints.  I have discussed the patient's condition with case management and he would benefit from going to short-term rehabilitation.  Discussed with nursing staff and multidisciplinary team.    Juan Alberto Franco MD  12/04/20  11:48 EST    Dictated utilizing Dragon dictation.

## 2020-12-04 NOTE — PROGRESS NOTES
Pt continues to work with therapy. He is noted to walk 5 feet this date.  Pt was continuing the use of soft restraints.  [facility] not able to accept until pt has been out of restraints for 24 hrs.  Wife wants to continue with plan for pt to go to [facility].  Medications are being changed today.  Haldol is being stopped and Seroquel is to be started.  Palliative services will continue to follow.

## 2020-12-04 NOTE — PLAN OF CARE
Problem: Adult Inpatient Plan of Care  Goal: Plan of Care Review  Recent Flowsheet Documentation  Taken 12/4/2020 1553 by Uche Flores, PTA  Progress: improving  Plan of Care Reviewed With: patient  Outcome Summary: Pt tolerates treatment well. Pt presents with significant improvement in ability to follow directions. Pt with decreased assistance from supine to sit, pt continues to require mod a sit to stand, and was able to perform limited ambulation of 5' min a with rw.  See flowsheet for details   Goal Outcome Evaluation:  Plan of Care Reviewed With: patient  Progress: improving  Outcome Summary: Pt tolerates treatment well. Pt presents with significant improvement in ability to follow directions. Pt with decreased assistance from suoine to sit, pt continues to require mod a sit to stand, and was able to perform limited ambulation of 5' min a with rw.  see flowsheet for details

## 2020-12-04 NOTE — PROGRESS NOTES
"Nephrology Progress Note.    LOS: 11 days    Patient Care Team:  Jason Foy MD as PCP - General (General Practice)    Chief Complaint:    Chief Complaint   Patient presents with   • Fever   • Shortness of Breath       Subjective:   Follow up for ALEXANDRA and Chronic Kidney disease stage 3 / Anemia.     Interval History:   Patient Complaints: none  Patient seen and examined this morning.  Events from last 24 hours noted.  Patient has COVID-19 pneumonia.  Patient is not as awake as yesterday, still confused.    Objective:    Vital Signs  /75 (BP Location: Left arm, Patient Position: Lying)   Pulse 65   Temp 97 °F (36.1 °C) (Oral)   Resp 17   Ht 180.3 cm (71\")   Wt 97.1 kg (214 lb 1.1 oz)   SpO2 98%   BMI 29.86 kg/m²     No intake/output data recorded.    Intake/Output Summary (Last 24 hours) at 12/4/2020 0732  Last data filed at 12/4/2020 0647  Gross per 24 hour   Intake 1071.93 ml   Output 300 ml   Net 771.93 ml       Physical Exam:  General Appearance: no acute distress,   HEENT: Oral mucosa dry, extra occular movements intact. Sclera clear.  Skin: Warm and dry  Neck: supple, no JVD, trachea midline  Lungs:Chest shape is normal. Breath sounds heard scattered rhonchi and crackles, No wheezing.   Heart: Irregular rate and rhythm. normal S1 and S2, no S3, no rub, peripheral pulses weak but palpable.  Abdomen: Obese, soft, non-tender,  present bowel sounds to auscultation  : no palpable bladder.  Extremities: Trace edema, no cyanosis or clubbing.   Neuro: Randomly does move his extremities.     Results Review:   Results from last 7 days   Lab Units 12/04/20  0533 12/03/20  0528 12/02/20  0532  11/30/20  0550 11/29/20  0554 11/28/20  0522   SODIUM mmol/L 134* 139 136   < > 146* 148* 145   POTASSIUM mmol/L 4.2 4.0 4.2   < > 4.0 3.9 4.0   CHLORIDE mmol/L 99 103 101   < > 105 104 101   CO2 mmol/L 27.4 25.0 27.0   < > 30.4* 35.7* 35.4*   BUN mg/dL 39* 40* 34*   < > 43* 48* 52*   CREATININE mg/dL 1.31* " 1.36* 0.97   < > 1.15 1.10 1.17   CALCIUM mg/dL 8.8 9.2 9.0   < > 9.6 10.0 9.8   ALBUMIN g/dL 2.50* 2.60* 2.60*   < > 2.70* 2.70* 2.70*   BILIRUBIN mg/dL  --   --   --   --  1.5* 1.3* 1.1   ALK PHOS U/L  --   --   --   --  63 67 60   ALT (SGPT) U/L  --   --   --   --  <5 <5 <5   AST (SGOT) U/L  --   --   --   --  28 25 27   GLUCOSE mg/dL 224* 64* 158*   < > 142* 149* 203*    < > = values in this interval not displayed.     Estimated Creatinine Clearance: 51.7 mL/min (A) (by C-G formula based on SCr of 1.31 mg/dL (H)).  Results from last 7 days   Lab Units 12/04/20  0533 12/03/20  0528 12/02/20  0532   PHOSPHORUS mg/dL 3.0 4.1 3.0     Results from last 7 days   Lab Units 12/01/20  0938   URIC ACID mg/dL 7.0     Results from last 7 days   Lab Units 12/04/20  0533 12/03/20  0528 12/02/20  0532 11/30/20  0550 11/29/20  0554   WBC 10*3/mm3 13.16* 15.14* 17.07* 10.36 9.68   HEMOGLOBIN g/dL 11.9* 12.1* 12.4* 12.7* 11.9*   PLATELETS 10*3/mm3 67* 75* 93* 85* 73*         Brief Urine Lab Results  (Last result in the past 365 days)      Color   Clarity   Blood   Leuk Est   Nitrite   Protein   CREAT   Urine HCG        12/02/20 1603 Dark Yellow Clear Trace Trace Negative Negative             No results found for: UTPCR  Imaging Results (Last 24 Hours)     ** No results found for the last 24 hours. **        albuterol sulfate HFA, 2 puff, Inhalation, 4x Daily - RT  apixaban, 2.5 mg, Oral, Q12H  atorvastatin, 40 mg, Oral, Nightly  b complex-vitamin c-folic acid, 1 tablet, Oral, Daily  carbidopa-levodopa, 1 tablet, Oral, TID  carvedilol, 6.25 mg, Oral, BID  gabapentin, 400 mg, Oral, Q12H  haloperidol, 1 mg, Oral, Q8H  insulin aspart, 0-9 Units, Subcutaneous, TID AC  insulin aspart, 5 Units, Subcutaneous, TID With Meals  insulin detemir, 30 Units, Subcutaneous, Nightly  lactobacillus acidophilus, 1 capsule, Oral, BID  melatonin, 5 mg, Oral, Nightly  pantoprazole, 40 mg, Oral, Daily  tamsulosin, 0.4 mg, Oral, Daily  vitamin C, 500 mg,  Oral, Daily      dextrose 5 % and sodium chloride 0.45 %, 50 mL/hr, Last Rate: 50 mL/hr (12/04/20 0647)          Medication Review:   Current Facility-Administered Medications   Medication Dose Route Frequency Provider Last Rate Last Admin   • albuterol sulfate HFA (PROVENTIL HFA;VENTOLIN HFA;PROAIR HFA) inhaler 2 puff  2 puff Inhalation 4x Daily - RT Skip Raygoza DO   2 puff at 12/04/20 0648   • apixaban (ELIQUIS) tablet 2.5 mg  2.5 mg Oral Q12H Carrol Castro DO   2.5 mg at 12/03/20 2207   • atorvastatin (LIPITOR) tablet 40 mg  40 mg Oral Nightly Skip Raygoza DO   40 mg at 12/03/20 2207   • b complex-vitamin c-folic acid (NEPHRO-AGATA) tablet 1 tablet  1 tablet Oral Daily Carrol Castro DO   1 tablet at 12/03/20 0813   • carbidopa-levodopa (SINEMET)  MG per tablet 1 tablet  1 tablet Oral TID Skip Raygoza DO   1 tablet at 12/03/20 2207   • carvedilol (COREG) tablet 6.25 mg  6.25 mg Oral BID Carrol Castro DO   6.25 mg at 12/03/20 2206   • dextrose (D50W) 25 g/ 50mL Intravenous Solution 25 g  25 g Intravenous Q15 Min PRN Skip Raygoza,        • dextrose (GLUTOSE) oral gel 1 tube  1 tube Oral Q15 Min PRN Skip aRygoza DO       • dextrose 5 % and sodium chloride 0.45 % infusion  50 mL/hr Intravenous Continuous Rickey Zee MD, FASN 50 mL/hr at 12/04/20 0647 50 mL/hr at 12/04/20 0647   • gabapentin (NEURONTIN) capsule 400 mg  400 mg Oral Q12H Skip Raygoza DO   400 mg at 12/03/20 2207   • glucagon (human recombinant) (GLUCAGEN DIAGNOSTIC) injection 1 mg  1 mg Subcutaneous PRN Skip Raygoza DO       • haloperidol (HALDOL) tablet 1 mg  1 mg Oral Q8H Xochilt Sheffield MD   1 mg at 12/04/20 0610   • insulin aspart (novoLOG) injection 0-9 Units  0-9 Units Subcutaneous TID AC Skip Raygoza DO   4 Units at 12/04/20 0647   • insulin aspart (novoLOG) injection 5 Units  5 Units Subcutaneous TID With Meals Skip Raygoza DO   5 Units at 12/03/20 1920   • insulin detemir (LEVEMIR) injection 30 Units  30 Units  Subcutaneous Nightly Myranda Davalos, DO   30 Units at 12/03/20 2210   • lactobacillus acidophilus (RISAQUAD) capsule 1 capsule  1 capsule Oral BID PaulaCarrol coughlin, DO   1 capsule at 12/03/20 2207   • melatonin tablet 5 mg  5 mg Oral Nightly Joshuadeced Carrol AYALA, DO   5 mg at 12/03/20 2206   • metoprolol tartrate (LOPRESSOR) injection 5 mg  5 mg Intravenous Q4H PRN Matthew Carrol AYALA, DO   5 mg at 11/27/20 2220   • pantoprazole (PROTONIX) EC tablet 40 mg  40 mg Oral Daily JoshuadeCarrol coughlin, DO   40 mg at 12/04/20 0610   • rOPINIRole (REQUIP) tablet 0.5 mg  0.5 mg Oral Nightly PRN Matthew Carrol AYALA, DO   0.5 mg at 11/26/20 2117   • sodium chloride 0.9 % flush 10 mL  10 mL Intravenous PRN Skip Raygoza, DO   10 mL at 12/02/20 0818   • tamsulosin (FLOMAX) 24 hr capsule 0.4 mg  0.4 mg Oral Daily Jaret, Umar, DO   0.4 mg at 12/03/20 0813   • vitamin C (ASCORBIC ACID) tablet 500 mg  500 mg Oral Daily MatthewCarrol, DO   500 mg at 12/03/20 0813       Assessment/Plan:  1. Chronic kidney disease stage III: baseline creatinine ~1.3-1.5 fluctuates with volume status.  No further worsening of renal function noted  2. Hypertension in CKD: It is under fair control continue to watch closely.  3. Hyperkalemia: Multifactorial likely secondary to hyperglycemia as well as history of lisinopril use that may be contributing.  It is back to normal.  4. Anasarca: Continue to optimize diuretics  5. Acute respiratory failure with hypoxia  6. Pneumonia due to COVID-19 virus  7. Acute metabolic encephalopathy  8. Type 2 Diabetes Mellitus with hyperglycemia  9. Thrombocytopenia  10. History of aortic valve replacement  11. Dyslipidemia  12. History of DVT  13. Tremor    Plan:  · Worsening renal function noted, not eating and drinking much.  Will continue with IV at a slower rate 50 cc an hour.  · Details were discussed with the patient no family in the room.  Clinically appears to be improving.  · Details were also discussed with the hospitalist  service.  I think this is the best he will get at this point.  · As he is not eating or drinking much he likely will not need any diuretics when he goes home.  · Continue with rest of the current treatment plan and surveillance labs.  · Further recommendations will depend on clinical course of the patient during the current hospitalization.    · I also discussed the details with the nursing staff.  · Rest as ordered.    Rickey Zee MD, FASN  12/04/20  07:32 EST    Dictated utilizing Dragon dictation.

## 2020-12-04 NOTE — PLAN OF CARE
Goal Outcome Evaluation:  Plan of Care Reviewed With: patient  Progress: improving   Removed restraints around noon today, patient had a med change from haldol to seroquel and seems to be improving, patient no longer attempting to pull at IV and cooperative with staff, patient wife updated, patient worked with PT today and got up to chair today, will continue to monitor and notify MD for any issues or concerns

## 2020-12-04 NOTE — THERAPY TREATMENT NOTE
Patient Name: Jak Pierre  : 1940    MRN: [id number]                              Today's Date: 2020       Admit Date: 2020    Visit Dx:     ICD-10-CM ICD-9-CM   1. Pneumonia due to COVID-19 virus  U07.1 480.8    J12.89    2. 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)   • Acute renal failure superimposed on stage 3 chronic kidney disease (CMS/HCC)   • Chronic kidney disease, stage III (moderate)   • Pneumonia due to COVID-19 virus   • COVID-19   • Thrombocytopenia (CMS/HCC)     Past Medical History:   Diagnosis Date   • Angina of effort (CMS/HCC)    • Aortic valve replaced    • Arthritis    • Cataract    • CHF (congestive heart failure) (CMS/HCC)    • Colitis    • Diabetes (CMS/HCC)    • Diverticulitis    • Gall stones    • Heart disease    • Hyperlipidemia    • Hypertension    • Hypertension    • Impaired functional mobility, balance, gait, and endurance    • Kidney stones    • Skin cancer     left shoulder   • Tremor      Past Surgical History:   Procedure Laterality Date   • AORTIC VALVE REPAIR/REPLACEMENT  2016   • COLONOSCOPY  2018   • HERNIA REPAIR  1963    x2   • SINUS SURGERY  1978     General Information     Row Name 20 9870          Physical Therapy Time and Intention    Document Type  therapy note (daily note)  -RM     Mode of Treatment  physical therapy  -RM     Row Name 20 5767          General Information    Patient Profile Reviewed  yes  -RM     Existing Precautions/Restrictions  fall  -RM     Row Name 20 5256          Safety Issues, Functional Mobility    Safety Issues Affecting Function (Mobility)   insight into deficits/self-awareness;positioning of assistive device;safety precaution awareness;safety precautions follow-through/compliance  -RM     Impairments Affecting Function (Mobility)  balance;endurance/activity tolerance;cognition;strength  -RM       User Key  (r) = Recorded By, (t) = Taken By, (c) = Cosigned By    Initials Name Provider Type    Uche Yates, ROGER Physical Therapy Assistant        Mobility     Row Name 12/04/20 1551          Bed Mobility    Supine-Sit [place] (Bed Mobility)  minimum assist (75% patient effort);verbal cues  -RM     Assistive Device (Bed Mobility)  bed rails;head of bed elevated  -RM     Row Name 12/04/20 1551          Sit-Stand Transfer    Sit-Stand [place] (Transfers)  moderate assist (50% patient effort);verbal cues  -RM     Assistive Device (Sit-Stand Transfers)  walker, front-wheeled  -RM     Row Name 12/04/20 1551          Gait/Stairs (Locomotion)    [place] Level (Gait)  minimum assist (75% patient effort);verbal cues  -RM     Assistive Device (Gait)  walker, front-wheeled  -RM     Distance in Feet (Gait)  5'  -RM     Deviations/Abnormal Patterns (Gait)  weight shifting decreased;base of support, wide;gait speed decreased;stride length decreased  -RM       User Key  (r) = Recorded By, (t) = Taken By, (c) = Cosigned By    Initials Name Provider Type    Uche Yates, ROGER Physical Therapy Assistant        Obj/Interventions    No documentation.       Goals/Plan    No documentation.       Clinical Impression     Row Name 12/04/20 8063          Pain    Additional Documentation  Pain Scale: Numbers Pre/Post-Treatment (Group)  -RM     Row Name 12/04/20 6059          Pain Scale: Numbers Pre/Post-Treatment    Pretreatment Pain Rating  0/10 - no pain  -RM     Posttreatment Pain Rating  0/10 - no pain  -RM     Row Name 12/04/20 7483          Plan of Care Review    Plan of Care Reviewed With  patient  -RM     Progress  improving  -RM     Outcome  Summary  Pt tolerates treatment well. Pt presents with significant improvement in ability to follow directions. Pt with decreased assistance from suoine to sit, pt continues to require mod a sit to stand, and was able to perform limited ambulation of 5' min a with rw.  see flowsheet for details  -RM     Row Name 12/04/20 1553          Vital Signs    Pre SpO2 (%)  94  -RM     O2 Delivery Pre Treatment  room air  -RM     Intra SpO2 (%)  95  -RM     O2 Delivery Intra Treatment  room air  -RM     Post SpO2 (%)  97  -RM     O2 Delivery Post Treatment  room air  -RM     Pre Patient Position  Supine  -RM     Intra Patient Position  Standing  -RM     Post Patient Position  Sitting  -RM     Row Name 12/04/20 1558          Positioning and Restraints    Pre-Treatment Position  in bed  -RM     Post Treatment Position  chair  -RM     In Chair  reclined;call light within reach;encouraged to call for assist;exit alarm on;notified nsg  -RM       User Key  (r) = Recorded By, (t) = Taken By, (c) = Cosigned By    Initials Name Provider Type    Uche Yates, ROGER Physical Therapy Assistant        Outcome Measures     Row Name 12/04/20 1557          How much help from another person do you currently need...    Turning from your back to your side while in flat bed without using bedrails?  3  -RM     Moving from lying on back to sitting on the side of a flat bed without bedrails?  3  -RM     Moving to and from a bed to a chair (including a wheelchair)?  3  -RM     Standing up from a chair using your arms (e.g., wheelchair, bedside chair)?  2  -RM     Climbing 3-5 steps with a railing?  1  -RM     To walk in hospital room?  3  -RM     AM-PAC 6 Clicks Score (PT)  15  -RM     Row Name 12/04/20 1554          Functional Assessment    Outcome Measure Options  AM-PAC 6 Clicks Basic Mobility (PT)  -RM       User Key  (r) = Recorded By, (t) = Taken By, (c) = Cosigned By    Initials Name Provider Type    Uche Yates, ROGER Physical  Therapy Assistant        Physical Therapy Education                 Title: PT OT SLP Therapies (In Progress)     Topic: Physical Therapy (In Progress)     Point: Mobility training (Done)     Learning Progress Summary           Patient Acceptance, E,TB,D, VU,NR by  at 12/4/2020 1557    Comment: safety with mobility and sequencing of rw.    Acceptance, E,TB,D, VU,NR by  at 12/3/2020 1525    Comment: Exercise techniques and safety with mobility    Acceptance, E,D, NR by  at 12/2/2020 1119    Comment: PT education for technique/safety with rolling and boosting in bed.                   Point: Home exercise program (Done)     Learning Progress Summary           Patient Acceptance, E,TB,D, VU,NR by  at 12/3/2020 1525    Comment: Exercise techniques and safety with mobility                   Point: Body mechanics (Not Started)     Learner Progress:  Not documented in this visit.                      User Key     Initials Effective Dates Name Provider Type Discipline     03/07/18 -  Uche Flores, PTA Physical Therapy Assistant PT     03/26/19 -  Fani Prince, ERICKA Physical Therapist PT              PT Recommendation and Plan     Plan of Care Reviewed With: patient  Progress: improving  Outcome Summary: Pt tolerates treatment well. Pt presents with significant improvement in ability to follow directions. Pt with decreased assistance from suoine to sit, pt continues to require mod a sit to stand, and was able to perform limited ambulation of 5' min a with rw.  see flowsheet for details     Time Calculation:   PT Charges     Row Name 12/04/20 1559             Time Calculation    Start Time  1428  -RM      Stop Time  1448  -RM      Time Calculation (min)  20 min  -RM      PT Received On  12/04/20  -RM      PT Goal Re-Cert Due Date  12/12/20  -RM         Time Calculation- PT    Total Timed Code Minutes- PT  20 minute(s)  -RM         Timed Charges    83045 - Gait Training Minutes   10  -RM      22432 - PT  Therapeutic Activity Minutes  10  -RM        User Key  (r) = Recorded By, (t) = Taken By, (c) = Cosigned By    Initials Name Provider Type    Uche Yates, PTA Physical Therapy Assistant        Therapy Charges for Today     Code Description Service Date Service Provider Modifiers Qty    19957651210 HC PT THER PROC EA 15 MIN 12/3/2020 Uche Flores, PTA GP 1    16873585461 HC PT THERAPEUTIC ACT EA 15 MIN 12/3/2020 Uche Flores, PTA GP 1    62250847848 HC GAIT TRAINING EA 15 MIN 12/4/2020 Uche Flores, PTA GP 1          PT G-Codes  Outcome Measure Options: AM-PAC 6 Clicks Basic Mobility (PT)  AM-PAC 6 Clicks Score (PT): 15    Uche Flores PTA  12/4/2020

## 2020-12-05 LAB
ALBUMIN SERPL-MCNC: 2.5 G/DL (ref 3.5–5.2)
ANION GAP SERPL CALCULATED.3IONS-SCNC: 5.7 MMOL/L (ref 5–15)
BUN SERPL-MCNC: 31 MG/DL (ref 8–23)
BUN/CREAT SERPL: 27 (ref 7–25)
CALCIUM SPEC-SCNC: 8.8 MG/DL (ref 8.6–10.5)
CHLORIDE SERPL-SCNC: 103 MMOL/L (ref 98–107)
CO2 SERPL-SCNC: 27.3 MMOL/L (ref 22–29)
CREAT SERPL-MCNC: 1.15 MG/DL (ref 0.76–1.27)
DEPRECATED RDW RBC AUTO: 48.6 FL (ref 37–54)
ERYTHROCYTE [DISTWIDTH] IN BLOOD BY AUTOMATED COUNT: 14.3 % (ref 12.3–15.4)
GFR SERPL CREATININE-BSD FRML MDRD: 61 ML/MIN/1.73
GLUCOSE BLDC GLUCOMTR-MCNC: 102 MG/DL (ref 70–130)
GLUCOSE BLDC GLUCOMTR-MCNC: 130 MG/DL (ref 70–130)
GLUCOSE BLDC GLUCOMTR-MCNC: 200 MG/DL (ref 70–130)
GLUCOSE BLDC GLUCOMTR-MCNC: 83 MG/DL (ref 70–130)
GLUCOSE SERPL-MCNC: 106 MG/DL (ref 65–99)
HCT VFR BLD AUTO: 38.6 % (ref 37.5–51)
HGB BLD-MCNC: 12.7 G/DL (ref 13–17.7)
MCH RBC QN AUTO: 31.1 PG (ref 26.6–33)
MCHC RBC AUTO-ENTMCNC: 32.9 G/DL (ref 31.5–35.7)
MCV RBC AUTO: 94.6 FL (ref 79–97)
PHOSPHATE SERPL-MCNC: 2.6 MG/DL (ref 2.5–4.5)
PLATELET # BLD AUTO: 70 10*3/MM3 (ref 140–450)
PMV BLD AUTO: 11.4 FL (ref 6–12)
POTASSIUM SERPL-SCNC: 4.1 MMOL/L (ref 3.5–5.2)
RBC # BLD AUTO: 4.08 10*6/MM3 (ref 4.14–5.8)
SODIUM SERPL-SCNC: 136 MMOL/L (ref 136–145)
WBC # BLD AUTO: 12.09 10*3/MM3 (ref 3.4–10.8)

## 2020-12-05 PROCEDURE — 63710000001 INSULIN DETEMIR PER 5 UNITS: Performed by: FAMILY MEDICINE

## 2020-12-05 PROCEDURE — 80069 RENAL FUNCTION PANEL: CPT | Performed by: INTERNAL MEDICINE

## 2020-12-05 PROCEDURE — 99232 SBSQ HOSP IP/OBS MODERATE 35: CPT | Performed by: INTERNAL MEDICINE

## 2020-12-05 PROCEDURE — 94799 UNLISTED PULMONARY SVC/PX: CPT

## 2020-12-05 PROCEDURE — 85027 COMPLETE CBC AUTOMATED: CPT | Performed by: INTERNAL MEDICINE

## 2020-12-05 PROCEDURE — 63710000001 INSULIN ASPART PER 5 UNITS: Performed by: EMERGENCY MEDICINE

## 2020-12-05 PROCEDURE — 82962 GLUCOSE BLOOD TEST: CPT

## 2020-12-05 RX ORDER — GABAPENTIN 400 MG/1
400 CAPSULE ORAL 2 TIMES DAILY
Qty: 10 CAPSULE | Refills: 0 | Status: SHIPPED | OUTPATIENT
Start: 2020-12-05 | End: 2021-05-11 | Stop reason: HOSPADM

## 2020-12-05 RX ORDER — QUETIAPINE FUMARATE 25 MG/1
25 TABLET, FILM COATED ORAL EVERY 12 HOURS SCHEDULED
Start: 2020-12-05 | End: 2020-12-10

## 2020-12-05 RX ORDER — FOLIC ACID/VIT B COMPLEX AND C 0.8 MG
1 TABLET ORAL DAILY
Qty: 30 TABLET
Start: 2020-12-06

## 2020-12-05 RX ADMIN — CARBIDOPA AND LEVODOPA 1 TABLET: 25; 100 TABLET ORAL at 08:08

## 2020-12-05 RX ADMIN — CARVEDILOL 6.25 MG: 6.25 TABLET, FILM COATED ORAL at 08:12

## 2020-12-05 RX ADMIN — Medication 1 CAPSULE: at 21:31

## 2020-12-05 RX ADMIN — INSULIN DETEMIR 30 UNITS: 100 INJECTION, SOLUTION SUBCUTANEOUS at 21:34

## 2020-12-05 RX ADMIN — PANTOPRAZOLE SODIUM 40 MG: 40 TABLET, DELAYED RELEASE ORAL at 06:49

## 2020-12-05 RX ADMIN — ALBUTEROL SULFATE 2 PUFF: 90 AEROSOL, METERED RESPIRATORY (INHALATION) at 19:37

## 2020-12-05 RX ADMIN — QUETIAPINE FUMARATE 25 MG: 25 TABLET ORAL at 21:31

## 2020-12-05 RX ADMIN — ALBUTEROL SULFATE 2 PUFF: 90 AEROSOL, METERED RESPIRATORY (INHALATION) at 15:59

## 2020-12-05 RX ADMIN — APIXABAN 2.5 MG: 2.5 TABLET, FILM COATED ORAL at 08:08

## 2020-12-05 RX ADMIN — GABAPENTIN 400 MG: 400 CAPSULE ORAL at 21:31

## 2020-12-05 RX ADMIN — Medication 1 CAPSULE: at 08:08

## 2020-12-05 RX ADMIN — CARBIDOPA AND LEVODOPA 1 TABLET: 25; 100 TABLET ORAL at 16:00

## 2020-12-05 RX ADMIN — GABAPENTIN 400 MG: 400 CAPSULE ORAL at 08:08

## 2020-12-05 RX ADMIN — TAMSULOSIN HYDROCHLORIDE 0.4 MG: 0.4 CAPSULE ORAL at 08:08

## 2020-12-05 RX ADMIN — Medication 5 MG: at 21:31

## 2020-12-05 RX ADMIN — Medication 1 TABLET: at 08:08

## 2020-12-05 RX ADMIN — CARVEDILOL 6.25 MG: 6.25 TABLET, FILM COATED ORAL at 21:31

## 2020-12-05 RX ADMIN — ROPINIROLE HYDROCHLORIDE 0.5 MG: 0.25 TABLET, FILM COATED ORAL at 21:31

## 2020-12-05 RX ADMIN — ALBUTEROL SULFATE 2 PUFF: 90 AEROSOL, METERED RESPIRATORY (INHALATION) at 11:42

## 2020-12-05 RX ADMIN — ALBUTEROL SULFATE 2 PUFF: 90 AEROSOL, METERED RESPIRATORY (INHALATION) at 06:49

## 2020-12-05 RX ADMIN — QUETIAPINE FUMARATE 25 MG: 25 TABLET ORAL at 08:08

## 2020-12-05 RX ADMIN — APIXABAN 2.5 MG: 2.5 TABLET, FILM COATED ORAL at 21:31

## 2020-12-05 RX ADMIN — ATORVASTATIN CALCIUM 40 MG: 40 TABLET, FILM COATED ORAL at 21:31

## 2020-12-05 RX ADMIN — CARBIDOPA AND LEVODOPA 1 TABLET: 25; 100 TABLET ORAL at 21:31

## 2020-12-05 RX ADMIN — OXYCODONE HYDROCHLORIDE AND ACETAMINOPHEN 500 MG: 500 TABLET ORAL at 08:08

## 2020-12-05 NOTE — PLAN OF CARE
Pt. Admitted to the hosp. R/T diagnosis COVID and acute resp. Failure. Vital signs have remained stable so far this shift. No c/o pain or discomfort. No acute distress noted at this time.     Problem: Fall Injury Risk  Goal: Absence of Fall and Fall-Related Injury  Outcome: Ongoing, Progressing  Intervention: Identify and Manage Contributors to Fall Injury Risk  Recent Flowsheet Documentation  Taken 12/5/2020 1200 by Diane Jarquin RN  Medication Review/Management: medications reviewed  Taken 12/5/2020 0810 by Diane Jarquin RN  Medication Review/Management: medications reviewed  Intervention: Promote Injury-Free Environment  Recent Flowsheet Documentation  Taken 12/5/2020 1400 by Diane Jarquin RN  Safety Promotion/Fall Prevention:   activity supervised   assistive device/personal items within reach   clutter free environment maintained  Taken 12/5/2020 1200 by Diane Jarquin RN  Safety Promotion/Fall Prevention:   activity supervised   assistive device/personal items within reach   clutter free environment maintained  Taken 12/5/2020 1005 by Diane Jarquin RN  Safety Promotion/Fall Prevention:   activity supervised   assistive device/personal items within reach   clutter free environment maintained   nonskid shoes/slippers when out of bed  Taken 12/5/2020 0810 by Diane Jarquin RN  Safety Promotion/Fall Prevention:   activity supervised   assistive device/personal items within reach   clutter free environment maintained     Problem: Skin Injury Risk Increased  Goal: Skin Health and Integrity  Outcome: Ongoing, Progressing  Intervention: Optimize Skin Protection  Recent Flowsheet Documentation  Taken 12/5/2020 1400 by Diane Jarquin RN  Pressure Reduction Techniques: weight shift assistance provided  Head of Bed (HOB): [facility] elevated  Pressure Reduction Devices: pressure-redistributing mattress utilized  Taken 12/5/2020 1200 by Diane Jarquin RN  Pressure Reduction Techniques: weight shift assistance provided  Head of Bed (HOB): [facility]  elevated  Pressure Reduction Devices: pressure-redistributing mattress utilized  Skin Protection: incontinence pads utilized  Taken 12/5/2020 1005 by Diane Jarquin RN  Pressure Reduction Techniques: weight shift assistance provided  Head of Bed (HOB): [facility] elevated  Pressure Reduction Devices: pressure-redistributing mattress utilized  Taken 12/5/2020 0810 by Diane Jarquin RN  Pressure Reduction Techniques: frequent weight shift encouraged  Head of Bed (HOB): [facility] elevated  Pressure Reduction Devices: pressure-redistributing mattress utilized  Skin Protection: incontinence pads utilized     Problem: Diabetes Comorbidity  Goal: Blood Glucose Level Within Desired Range  Outcome: Ongoing, Progressing  Intervention: Maintain Glycemic Control  Recent Flowsheet Documentation  Taken 12/5/2020 1200 by Diane Jarquin RN  Glycemic Management: blood glucose monitoring  Taken 12/5/2020 0810 by Diane Jarquin RN  Glycemic Management: blood glucose monitoring     Problem: Wound  Goal: Optimal Wound Healing  Outcome: Ongoing, Progressing  Intervention: Promote Effective Wound Healing  Recent Flowsheet Documentation  Taken 12/5/2020 1200 by Diane Jarquin RN  Sleep/Rest Enhancement:   noise level reduced   regular sleep/rest pattern promoted  Taken 12/5/2020 0810 by Diane Jarquin RN  Sleep/Rest Enhancement:   noise level reduced   regular sleep/rest pattern promoted     Problem: Fluid Imbalance (Pneumonia)  Goal: Fluid Balance  Outcome: Ongoing, Progressing  Intervention: Monitor and Manage Fluid Balance  Recent Flowsheet Documentation  Taken 12/5/2020 1200 by Diane Jarquin RN  Fluid/Electrolyte Management: fluids provided  Taken 12/5/2020 0810 by Diane Jarquin RN  Fluid/Electrolyte Management: fluids provided     Problem: Infection (Pneumonia)  Goal: Resolution of Infection Signs and Symptoms  Outcome: Ongoing, Progressing  Intervention: Prevent Infection Progression  Recent Flowsheet Documentation  Taken 12/5/2020 1400 by Diane Jarquin  RN  Infection Management: aseptic technique maintained  Isolation Precautions: airborne precautions maintained  Taken 12/5/2020 1200 by Diane Jarquin RN  Isolation Precautions: airborne precautions maintained  Taken 12/5/2020 1005 by Diane Jarquin RN  Infection Management: aseptic technique maintained  Isolation Precautions: airborne precautions maintained  Taken 12/5/2020 0810 by Diane Jarquin RN  Infection Management: aseptic technique maintained  Isolation Precautions: airborne precautions maintained

## 2020-12-05 NOTE — DISCHARGE SUMMARY
[facility]   DISCHARGE SUMMARY      Name:  Jak Pierre   Age:  80 y.o.  Sex:  male  :  1940  MRN:  [id number]   Visit Number:  [id number]    Admission Date:  2020  Date of Discharge:  2020  Primary Care Physician:  Jason Foy MD    Discharge Diagnoses:     1.  Acute hypoxic respiratory failure, present on admission secondary to #2.  2.  Bilateral pneumonia secondary to COVID-19.  3.  Acute renal failure, present on admission.  4.  Chronic thrombocytopenia.  5.  History of DVT on Eliquis.  6.  Sleep apnea syndrome.  7.  Diabetes mellitus type 2.  8.  No evidence of heart failure.    Problem List:       Acute respiratory failure with hypoxia (CMS/HCC)    Type 2 diabetes mellitus with nephropathy (CMS/[facility])    Acute renal failure superimposed on stage 3 chronic kidney disease (CMS/HCC)    Pneumonia due to COVID-19 virus    Thrombocytopenia (CMS/[facility])    Presenting Problem:    Pneumonia due to COVID-19 virus [U07.1, J12.89]  COVID-19 [U07.1]     Consults:     Consulting Physician(s)       Provider Relationship Specialty    Rickey Zee MD, DELMY Consulting Physician Nephrology    Xochilt Sheffield MD Consulting Physician Internal Medicine    Diane Pino MD Consulting Physician Pulmonary Disease          Procedures Performed:    None.    History of presenting illness/Hospital Course:    This is an 80-year-old male with history of hypertension, diabetes, history of DVT on anticoagulation, history of aortic valve replacement, recent history of COVID-19 infection was brought to the emergency room by EMS with shortness of breath and fevers over the last 3 days prior to arrival.  Patient was also noted to have urinary incontinence.  He was recently in the hospital about 2 weeks ago and was treated with 3 days course of remdesivir and dexamethasone.  Subsequently he has been on oral antibiotics therapy for cough and chest congestion.  He was noted to  have a fever of 102 in the emergency room.  Chest x-ray done in the emergency room revealed bilateral infiltrates.  He was given IV fluid bolus in the emergency room and was started on empiric antibiotics therapy with ceftazidime and vancomycin and was subsequently admitted to the medical floor with telemetry.  His initial creatinine was 1.66 and he was consulted by Dr. Gamez from nephrology.  He was given IV Lasix as he also had hyperkalemia.     Patient did not receive remdesivir due to renal failure but was continued on dexamethasone.  He was continued on his home medications including anticoagulation.  He was placed on subcutaneous insulin protocol and Levemir for blood sugar control.  He was noted to have thrombocytopenia but this has been chronic.  Platelet count remained stable during the hospital stay.  Patient was initially on high flow oxygen but was subsequently was able to get down to 2 L of nasal cannula oxygen.  His course during the hospital stay was complicated by significant agitation and pulling on IV line requiring bilateral wrist restraints.  He was given Haldol as needed but was subsequently started on Seroquel which seems to have helped his agitation and he was able to calm down with Seroquel.  His wrist restraints were stopped.  He also had an episode of atrial fibrillation with rapid ventricular rate that improved with IV metoprolol.      His blood cultures and nasal swab for MRSA remain negative and his antibiotics were discontinued.  He also completed a course of dexamethasone.  He was also seen by Dr. Pino from pulmonology during this admission.    Patient was also seen by Dr. Sheffield from palliative care service.  Advanced directives were discussed with the patient's family and the patient's CODE STATUS was changed to DNR.  Patient was also seen by nutritional service and was continued on nutritional supplements.  Due to generalized weakness, case management was consulted for  short-term rehab placement.  Patient will be transferred to short-term rehabilitation when bed becomes available.    Vital Signs:    Temp:  [97 °F (36.1 °C)-98.2 °F (36.8 °C)] 98.2 °F (36.8 °C)  Heart Rate:  [61-92] 61  Resp:  [17-20] 17  BP: (100-121)/(49-69) 112/56    Physical Exam:    General Appearance:  Alert and cooperative, not in any acute distress.   Head:  Atraumatic and normocephalic, without obvious abnormality.   Eyes:          PERRLA, conjunctivae and sclerae normal, no icterus. No pallor. Extraocular movements are within normal limits.   Ears:  Ears appear intact with no abnormalities noted.   Throat: No oral lesions, no thrush, oral mucosa moist.   Neck: Supple, trachea midline, no thyromegaly, no carotid bruit.   Back:   No kyphoscoliosis present. No tenderness to palpation,   range of motion normal.   Lungs:   Chest shape is normal. Breath sounds heard bilaterally equally.  No crackles or wheezing. No Pleural rub or bronchial breathing.   Heart:  Normal S1 and S2, no murmur, no gallop, no rub. No JVD.   Abdomen:   Normal bowel sounds, no masses, no organomegaly. Soft, nontender, nondistended, no guarding, no rebound tenderness.   Extremities: Moves all extremities, no edema, no cyanosis, no clubbing. 1+ pitting edema noted in bilateral upper extremities especially on the left upper extremity.   Pulses: Pulses palpable and equal bilaterally.   Skin: No bleeding or rash.  Ecchymotic patches noted on bilateral forearms.   Neurologic: Alert and oriented x 3. Moves all four limbs equally. No tremors. No facial asymmetry.     Pertinent Lab Results:     Results from last 7 days   Lab Units 12/05/20  0834 12/04/20  0533 12/03/20  0528  11/30/20  0550 11/29/20  0554   SODIUM mmol/L 136 134* 139   < > 146* 148*   POTASSIUM mmol/L 4.1 4.2 4.0   < > 4.0 3.9   CHLORIDE mmol/L 103 99 103   < > 105 104   CO2 mmol/L 27.3 27.4 25.0   < > 30.4* 35.7*   BUN mg/dL 31* 39* 40*   < > 43* 48*   CREATININE mg/dL 1.15  1.31* 1.36*   < > 1.15 1.10   CALCIUM mg/dL 8.8 8.8 9.2   < > 9.6 10.0   BILIRUBIN mg/dL  --   --   --   --  1.5* 1.3*   ALK PHOS U/L  --   --   --   --  63 67   ALT (SGPT) U/L  --   --   --   --  <5 <5   AST (SGOT) U/L  --   --   --   --  28 25   GLUCOSE mg/dL 106* 224* 64*   < > 142* 149*    < > = values in this interval not displayed.     Results from last 7 days   Lab Units 12/05/20  0558 12/04/20  0533 12/03/20  0528   WBC 10*3/mm3 12.09* 13.16* 15.14*   HEMOGLOBIN g/dL 12.7* 11.9* 12.1*   HEMATOCRIT % 38.6 35.8* 37.3*   PLATELETS 10*3/mm3 70* 67* 75*       Results from last 7 days   Lab Units 12/02/20  1812   PH, ARTERIAL pH units 7.479   PO2 ART mm Hg 59.6*   PCO2, ARTERIAL mm Hg 39.2   HCO3 ART mmol/L 29.1*     Results from last 7 days   Lab Units 12/02/20  1603   COLOR UA  Dark Yellow*   GLUCOSE UA  Negative   KETONES UA  Negative   LEUKOCYTES UA  Trace*   PH, URINE  6.0   BILIRUBIN UA  Negative   UROBILINOGEN UA  2.0 E.U./dL*     Pertinent Radiology Results:    Imaging Results (All)       Procedure Component Value Units Date/Time    XR Chest 1 View [[id number]] Collected: 12/02/20 1100     Updated: 12/02/20 1201    Narrative:      PROCEDURE: XR CHEST 1 VW-        HISTORY: FOLLOW UP ON COVID PNEUMONIA; U07.1-COVID-19; J12.89-Other  viral pneumonia; R73.9-Hyperglycemia, unspecified     COMPARISON: 11/27/2020.     FINDINGS: The heart is normal in size. The mediastinum is unremarkable.  There is worsening bilateral airspace disease. There is no pneumothorax.  There are no acute osseous abnormalities.       Impression:      Worsening bilateral airspace disease.     Images were reviewed, interpreted, and dictated by Dr. Carmelina Mooney M.D.  Transcribed by Flores Harley PA-C.     This report was finalized on 12/2/2020 11:59 AM by Carmelina Mooney M.D..    XR Chest 1 View [[id number]] Collected: 11/27/20 0805     Updated: 11/27/20 0807    Narrative:      PROCEDURE: XR CHEST 1 VW-     HISTORY: respiratory  failure with severe hypoxia, persistent pneumonia  comparison; U07.1-COVID-19; J12.89-Other viral pneumonia;  R73.9-Hyperglycemia, unspecified     COMPARISON: 11/23/2020.     FINDINGS: The heart is normal in size. The mediastinum is unremarkable.  There has been interval improvement in the patient's bilateral airspace  disease. No significant effusions are evident. There is no pneumothorax.   There are no acute osseous abnormalities.       Impression:      Interval improvement in the patient's bilateral airspace  disease.     Continued followup is recommended.     This report was finalized on 11/27/2020 8:05 AM by Carmelina Mooney M.D..    CT Head Without Contrast [[id number]] Collected: 11/23/20 0729     Updated: 11/23/20 0732    Narrative:      PROCEDURE: CT HEAD WO CONTRAST-     HISTORY: Mental status change, unknown cause; U07.1-COVID-19;  J12.89-Other viral pneumonia; R73.9-Hyperglycemia, unspecified     COMPARISON:  None .     TECHNIQUE: Multiple axial CT images were performed from the foramen  magnum to the vertex without enhancement.      FINDINGS:  There is generalized cerebral volume loss. Patchy hypodensities in the  periventricular white matter consistent with chronic small vessel  ischemic change.  There is no CT evidence of hemorrhage. There is no  mass, mass effect or midline shift.  There is no hydrocephalus. The  paranasal sinuses are clear. Bone windows reveal no acute osseous  abnormalities.       Impression:      No acute intracranial process.     1106.18 mGy.cm        This study was performed with techniques to keep radiation doses as low  as reasonably achievable (ALARA). Individualized dose reduction  techniques using automated exposure control or adjustment of mA and/or  kV according to the patient size were employed.      This report was finalized on 11/23/2020 7:30 AM by Carmelina Mooney M.D..    XR Chest 1 View [[id number]] Collected: 11/23/20 0727     Updated: 11/23/20 0731     Narrative:      PROCEDURE: XR CHEST 1 VW-     HISTORY: sob, fever, covid +     COMPARISON: 11/20/2020.     FINDINGS: The heart is normal in size. The mediastinum is unremarkable.  There is been interval development of bilateral airspace disease  consistent with a pneumonia. No effusions are evident. There is no  pneumothorax.  There are no acute osseous abnormalities.       Impression:      Bilateral airspace disease consistent with a pneumonia.     Continued followup is recommended.     This report was finalized on 11/23/2020 7:29 AM by Carmelina Mooney M.D..          Condition on Discharge:      Stable.    Code status during the hospital stay:    Code Status and Medical Interventions:   Ordered at: 11/27/20 1547     Limited Support to NOT Include:    Intubation     Code Status:    No CPR     Medical Interventions (Level of Support Prior to Arrest):    Limited     Comments:    Discussed with spouse, daughter and son by conference call.     Discharge Disposition:    Rehab Facility or Unit (DC - External)    Discharge Medications:       Discharge Medications        New Medications        Instructions Start Date   b complex-vitamin c-folic acid 0.8 MG tablet tablet   1 tablet, Oral, Daily   Start Date: December 6, 2020     QUEtiapine 25 MG tablet  Commonly known as: SEROquel   25 mg, Oral, Every 12 Hours Scheduled             Changes to Medications        Instructions Start Date   carbidopa-levodopa  MG per tablet  Commonly known as: SINEMET  What changed: additional instructions   1 tablet, Oral, 3 Times Daily, With food      gabapentin 400 MG capsule  Commonly known as: NEURONTIN  What changed: See the new instructions.   400 mg, Oral, 2 times daily             Continue These Medications        Instructions Start Date   atorvastatin 40 MG tablet  Commonly known as: LIPITOR   40 mg, Oral, Daily      Calcium-Magnesium-Zinc-D3 tablet   1 tablet, Oral, Daily      carvedilol 6.25 MG tablet  Commonly known as:  COREG   6.25 mg, Oral, 2 Times Daily, One-half tablet twice daily      CINNAMON PO   1 tablet, Oral, Daily      cyanocobalamin 100 MCG tablet tablet  Commonly known as: CYANOCOBALAMIN   100 mcg, Oral, Daily      famotidine 20 MG tablet  Commonly known as: PEPCID   20 mg, Oral, 2 Times Daily PRN      glimepiride 4 MG tablet  Commonly known as: AMARYL   TAKE 1 TABLET BY MOUTH DAILY WITH BREAKFAST OR THE FIRST MAIN MEAL OF THE DAY      pantoprazole 40 MG EC tablet  Commonly known as: PROTONIX   40 mg, Oral, Daily      rivaroxaban 20 MG tablet  Commonly known as: XARELTO   20 mg, Oral, Daily      rOPINIRole 0.5 MG tablet  Commonly known as: REQUIP   0.5 mg, Oral, Nightly PRN, Take 1 hour before bedtime.       tamsulosin 0.4 MG capsule 24 hr capsule  Commonly known as: FLOMAX   1 capsule, Oral, Daily      Trulicity 1.5 MG/0.5ML solution pen-injector  Generic drug: Dulaglutide   1.5 mg, Subcutaneous, Weekly, Takes on Fridays              Stop These Medications      azithromycin 250 MG tablet  Commonly known as: ZITHROMAX     cefuroxime 500 MG tablet  Commonly known as: CEFTIN     clotrimazole 1 % external solution  Commonly known as: LOTRIMIN     dexamethasone 4 MG tablet  Commonly known as: DECADRON     Entresto 24-26 MG tablet  Generic drug: sacubitril-valsartan     predniSONE 20 MG tablet  Commonly known as: DELTASONE              Discharge Diet:     Diet Instructions       Diet: Consistent Carbohydrate, Renal; Thin      Discharge Diet:  Consistent Carbohydrate  Renal       Fluid Consistency: Thin          Activity at Discharge:     Activity Instructions       Activity as Tolerated            Follow-up Appointments:     Contact information for follow-up providers       Jason Foy MD .    Specialty: General Practice  Contact information:  [address]  [address]  [facility] [address] [phone]                       Contact information for after-discharge care       Durable Medical Equipment       [facility]  [facility] .    Service: Durable Medical Equipment  Contact information:  [address]  [facility] [address] [phone]                     Home Medical Care       [facility] .    Service: Home Health Services  Contact information:  [address]  [facility] [address] [phone]                                 Test Results Pending at Discharge:    None.       Juan Alberto Franco MD  12/05/20  12:16 EST    Time: I spent 25 minutes on this discharge activity which included: face-to-face encounter with the patient, reviewing the data in the system, coordination of the care with the nursing staff as well as consultants, documentation, and entering orders.     Dictated utilizing Dragon dictation.

## 2020-12-05 NOTE — PROGRESS NOTES
Discharge Planning Assessment  [facility]     Patient Name: Jak Pierre  MRN: [id number]  Today's Date: 12/5/2020    Admit Date: 11/23/2020    Discharge Needs Assessment    No documentation.       Discharge Plan     Row Name 12/05/20 1226       Plan    Plan Comments  patient has discharge order  to go to rehab,  explained to Dr Franco that  [facility]a Jarquin has not given us a bed nor gotten the rehab precerted        Continued Care and Services - Admitted Since 11/23/2020     Destination     Service Provider Request Status Selected Services Address Phone Fax Patient Preferred    [facility]  Pending - Request Sent N/A [address] [facility] [address] [phone] --          Durable Medical Equipment Coordination complete    Service Provider Request Status Selected Services Address Phone Fax Patient Preferred    [facility]   Selected Durable Medical Equipment [address] [place] [address] [place] [address] [phone] --          Home Medical Care Coordination complete    Service Provider Request Status Selected Services Address Phone Fax Patient Preferred    [facility]   Selected Home Health Services [address] [facility] [address] [phone] --            Selected Continued Care - Prior Encounters Includes selections from prior encounters from 8/25/2020 to 12/5/2020    Discharged on 11/12/2020 Admission date: 11/9/2020 - Discharge disposition: Home-Health Care c    Home Medical Care     Service Provider Selected Services Address Phone Fax Patient Preferred    [facility]  Home Health Services [address] [facility] [address] [phone] --                    Expected Discharge Date and Time     Expected Discharge Date Expected Discharge Time    Dec 5, 2020         Demographic Summary    No documentation.       Functional Status    No documentation.        Psychosocial    No documentation.       Abuse/Neglect    No documentation.       Legal    No documentation.       Substance Abuse    No documentation.       Patient Forms    No documentation.           Charlette Clark RN

## 2020-12-05 NOTE — PLAN OF CARE
Goal Outcome Evaluation:  Plan of Care Reviewed With: patient  Progress: improving  Outcome Summary: Pt resting well this shift.  Utilizing oxygen at 2 lpm/nc.  No restraints this shift and patient calm at this time.  Telemetry monitoring continued.  Labs monitored daily.  Spoke with wife this shift with updates.

## 2020-12-05 NOTE — PLAN OF CARE
Problem: Noninvasive Ventilation Acute  Goal: Effective Unassisted Ventilation and Oxygenation  Outcome: Ongoing, Progressing     Problem: Restraint, Nonbehavioral (Nonviolent)  Goal: Discontinuation Criteria Achieved  Outcome: Ongoing, Progressing   Goal Outcome Evaluation:  Plan of Care Reviewed With: patient  Progress: improving

## 2020-12-05 NOTE — PROGRESS NOTES
[facility]    Name:  Jak Pierre   Age:  80 y.o.  Sex:  male  :  1940  MRN:  [id number]   Visit Number:  [id number]  Admission Date:  2020  Date Of Service:  20  Primary Care Physician:  Jason Foy MD     LOS: 12 days :  Patient Care Team:  Jason Foy MD as PCP - General (General Practice):    Chief Complaint:      Follow-up of COVID-19 infection.    Subjective / Interval History:     Mr. Pierre was seen and examined this morning.  He has been off wrist restraints since yesterday noon.  He states that he slept well and is answering questions appropriately.  Denies any shortness of breath.  He is currently saturating at 95% on 2 L of nasal cannula oxygen.  No significant overnight events.    Review of Systems:     General ROS: Patient denies any fevers, chills or loss of consciousness.  Generalized weakness.  Respiratory ROS: Denies cough or shortness of breath.  Cardiovascular ROS: Denies chest pain or palpitations.  Gastrointestinal ROS: Denies nausea and vomiting. Denies any abdominal pain. No diarrhea.  Neurological ROS: Denies any focal weakness.  Confusion at times.  Dermatological ROS: Denies any redness or pruritis.    Vital Signs:    Temp:  [97 °F (36.1 °C)-98.2 °F (36.8 °C)] 98.2 °F (36.8 °C)  Heart Rate:  [61-92] 61  Resp:  [17-20] 17  BP: (100-121)/(49-69) 112/56    Intake and output:    I/O last 3 completed shifts:  In: 1157.9 [P.O.:480; I.V.:677.9]  Out: 900 [Urine:900]  I/O this shift:  In: 120 [P.O.:120]  Out: -     Physical Examination:    General Appearance:  Alert and cooperative, not in any acute distress.   Head:  Old appearing ecchymotic patch noted on the right forehead.   Eyes:          Conjunctivae and sclerae normal, no Icterus. No pallor. Extraocular movements are within normal limits.   Neck: Supple, trachea midline, no thyromegaly, no carotid bruit.   Lungs:   Chest shape is normal. Breath sounds  heard bilaterally equally.  No crackles or wheezing. No pleural rub or bronchial breathing.   Heart:  Normal S1 and S2, no murmur, no gallop, no rub. No JVD   Abdomen:   Normal bowel sounds, no masses, no organomegaly. Soft, nontender, nondistended, no guarding, no rebound tenderness.   Extremities: Moves all extremities, no ankle edema, no cyanosis, no clubbing.  1+ pitting edema noted in bilateral upper extremities especially on the left upper extremity.   Skin: No bleeding or rash.  Ecchymotic patches noted in bilateral forearms.   Neurologic: Awake, alert and oriented times 3. Moves all 4 extremities equally.     Laboratory results:    Results from last 7 days   Lab Units 12/05/20  0834 12/04/20  0533 12/03/20  0528  11/30/20  0550 11/29/20  0554   SODIUM mmol/L 136 134* 139   < > 146* 148*   POTASSIUM mmol/L 4.1 4.2 4.0   < > 4.0 3.9   CHLORIDE mmol/L 103 99 103   < > 105 104   CO2 mmol/L 27.3 27.4 25.0   < > 30.4* 35.7*   BUN mg/dL 31* 39* 40*   < > 43* 48*   CREATININE mg/dL 1.15 1.31* 1.36*   < > 1.15 1.10   CALCIUM mg/dL 8.8 8.8 9.2   < > 9.6 10.0   BILIRUBIN mg/dL  --   --   --   --  1.5* 1.3*   ALK PHOS U/L  --   --   --   --  63 67   ALT (SGPT) U/L  --   --   --   --  <5 <5   AST (SGOT) U/L  --   --   --   --  28 25   GLUCOSE mg/dL 106* 224* 64*   < > 142* 149*    < > = values in this interval not displayed.     Results from last 7 days   Lab Units 12/05/20  0558 12/04/20 0533 12/03/20 0528   WBC 10*3/mm3 12.09* 13.16* 15.14*   HEMOGLOBIN g/dL 12.7* 11.9* 12.1*   HEMATOCRIT % 38.6 35.8* 37.3*   PLATELETS 10*3/mm3 70* 67* 75*                 Results from last 7 days   Lab Units 12/02/20  1812   PH, ARTERIAL pH units 7.479   PO2 ART mm Hg 59.6*   PCO2, ARTERIAL mm Hg 39.2   HCO3 ART mmol/L 29.1*     I have reviewed the patient's laboratory results.    Radiology results:    Imaging Results (Last 24 Hours)     ** No results found for the last 24 hours. **        Medication Review:     I have reviewed the  patient's active and prn medications.     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  Acute hypoxic respiratory failure, present on admission secondary to #2.  2.  Bilateral pneumonia secondary to COVID-19.  3.  Acute renal failure, present on admission, improving.  4.  Chronic thrombocytopenia.  5.  History of DVT on Eliquis.  6.  Sleep apnea syndrome.  7.  Diabetes mellitus type 2.    Plan:    Mr. Pierre is currently doing better with regards to his respiratory status and COVID-19 infection.  He is currently not on any antibiotics or steroids.  His renal function has improved and is back to baseline.  He is being followed by Dr. Gamez and I have discussed the patient's condition with him.  He is currently on apixaban but at home he is on rivaroxaban.  He does have chronic thrombocytopenia and the platelet count is currently stable.    Patient has improved with regards to his confusion and we will continue Seroquel 25 mg twice daily.  He has been off wrist restraints for the last 24 hours.  I have discussed the patient's condition with case management and they are currently working on getting him to short-term rehabilitation when bed becomes available.  Discussed with nursing staff.      Juan Alberto Franco MD  12/05/20  12:50 EST    Dictated utilizing Dragon dictation.

## 2020-12-05 NOTE — PROGRESS NOTES
"Nephrology Progress Note.    LOS: 12 days    Patient Care Team:  Jason Foy MD as PCP - General (General Practice)    Chief Complaint:    Chief Complaint   Patient presents with   • Fever   • Shortness of Breath       Subjective:   Follow up for ALEXANDRA and Chronic Kidney disease stage 3 / Anemia.     Interval History:   Patient Complaints: none  Patient seen and examined this morning.  Events from last 24 hours noted.  Patient has COVID-19 pneumonia.  Patient is not as awake as yesterday, still confused.  It appears he is working fair with the physical therapy.  Objective:    Vital Signs  /49 (BP Location: Left arm, Patient Position: Lying)   Pulse 81   Temp 97 °F (36.1 °C) (Axillary)   Resp 18   Ht 180.3 cm (71\")   Wt 97.1 kg (214 lb 1.1 oz)   SpO2 92%   BMI 29.86 kg/m²     I/O this shift:  In: 120 [P.O.:120]  Out: -     Intake/Output Summary (Last 24 hours) at 12/5/2020 1025  Last data filed at 12/5/2020 0900  Gross per 24 hour   Intake 360 ml   Output 600 ml   Net -240 ml       Physical Exam:  General Appearance: no acute distress,   HEENT: Oral mucosa dry, extra occular movements intact. Sclera clear.  Skin: Warm and dry  Neck: supple, no JVD, trachea midline  Lungs:Chest shape is normal. Breath sounds heard scattered rhonchi and crackles, No wheezing.   Heart: Irregular rate and rhythm. normal S1 and S2, no S3, no rub, peripheral pulses weak but palpable.  Abdomen: Obese, soft, non-tender,  present bowel sounds to auscultation  : no palpable bladder.  Extremities: Trace edema, no cyanosis or clubbing.   Neuro: Randomly does move his extremities.     Results Review:   Results from last 7 days   Lab Units 12/05/20  0834 12/04/20  0533 12/03/20  0528  11/30/20  0550 11/29/20  0554   SODIUM mmol/L 136 134* 139   < > 146* 148*   POTASSIUM mmol/L 4.1 4.2 4.0   < > 4.0 3.9   CHLORIDE mmol/L 103 99 103   < > 105 104   CO2 mmol/L 27.3 27.4 25.0   < > 30.4* 35.7*   BUN mg/dL 31* 39* 40*   < > 43* 48* "   CREATININE mg/dL 1.15 1.31* 1.36*   < > 1.15 1.10   CALCIUM mg/dL 8.8 8.8 9.2   < > 9.6 10.0   ALBUMIN g/dL 2.50* 2.50* 2.60*   < > 2.70* 2.70*   BILIRUBIN mg/dL  --   --   --   --  1.5* 1.3*   ALK PHOS U/L  --   --   --   --  63 67   ALT (SGPT) U/L  --   --   --   --  <5 <5   AST (SGOT) U/L  --   --   --   --  28 25   GLUCOSE mg/dL 106* 224* 64*   < > 142* 149*    < > = values in this interval not displayed.     Estimated Creatinine Clearance: 58.8 mL/min (by C-G formula based on SCr of 1.15 mg/dL).  Results from last 7 days   Lab Units 12/05/20  0834 12/04/20  0533 12/03/20  0528   PHOSPHORUS mg/dL 2.6 3.0 4.1     Results from last 7 days   Lab Units 12/01/20  0938   URIC ACID mg/dL 7.0     Results from last 7 days   Lab Units 12/05/20  0558 12/04/20  0533 12/03/20  0528 12/02/20  0532 11/30/20  0550   WBC 10*3/mm3 12.09* 13.16* 15.14* 17.07* 10.36   HEMOGLOBIN g/dL 12.7* 11.9* 12.1* 12.4* 12.7*   PLATELETS 10*3/mm3 70* 67* 75* 93* 85*         Brief Urine Lab Results  (Last result in the past 365 days)      Color   Clarity   Blood   Leuk Est   Nitrite   Protein   CREAT   Urine HCG        12/02/20 1603 Dark Yellow Clear Trace Trace Negative Negative             No results found for: UTPCR  Imaging Results (Last 24 Hours)     ** No results found for the last 24 hours. **        albuterol sulfate HFA, 2 puff, Inhalation, 4x Daily - RT  apixaban, 2.5 mg, Oral, Q12H  atorvastatin, 40 mg, Oral, Nightly  b complex-vitamin c-folic acid, 1 tablet, Oral, Daily  carbidopa-levodopa, 1 tablet, Oral, TID  carvedilol, 6.25 mg, Oral, BID  gabapentin, 400 mg, Oral, Q12H  insulin aspart, 0-9 Units, Subcutaneous, TID AC  insulin aspart, 5 Units, Subcutaneous, TID With Meals  insulin detemir, 30 Units, Subcutaneous, Nightly  lactobacillus acidophilus, 1 capsule, Oral, BID  melatonin, 5 mg, Oral, Nightly  pantoprazole, 40 mg, Oral, Daily  QUEtiapine, 25 mg, Oral, Q12H  tamsulosin, 0.4 mg, Oral, Daily  vitamin C, 500 mg, Oral,  Daily             Medication Review:   Current Facility-Administered Medications   Medication Dose Route Frequency Provider Last Rate Last Admin   • albuterol sulfate HFA (PROVENTIL HFA;VENTOLIN HFA;PROAIR HFA) inhaler 2 puff  2 puff Inhalation 4x Daily - RT Skip Raygoza DO   2 puff at 12/05/20 0649   • apixaban (ELIQUIS) tablet 2.5 mg  2.5 mg Oral Q12H Carrol Castro DO   2.5 mg at 12/05/20 0808   • atorvastatin (LIPITOR) tablet 40 mg  40 mg Oral Nightly Skip Raygoza DO   40 mg at 12/04/20 2214   • b complex-vitamin c-folic acid (NEPHRO-AGATA) tablet 1 tablet  1 tablet Oral Daily Carrol Castro DO   1 tablet at 12/05/20 0808   • carbidopa-levodopa (SINEMET)  MG per tablet 1 tablet  1 tablet Oral TID JaretSkip smiley,    1 tablet at 12/05/20 0808   • carvedilol (COREG) tablet 6.25 mg  6.25 mg Oral BID Carrol Castro DO   6.25 mg at 12/05/20 0812   • dextrose (D50W) 25 g/ 50mL Intravenous Solution 25 g  25 g Intravenous Q15 Min PRN JaretSkip smiley, DO       • dextrose (GLUTOSE) oral gel 1 tube  1 tube Oral Q15 Min PRN JaretSkip smiley, DO       • gabapentin (NEURONTIN) capsule 400 mg  400 mg Oral Q12H JaretSkip smiley, DO   400 mg at 12/05/20 0808   • glucagon (human recombinant) (GLUCAGEN DIAGNOSTIC) injection 1 mg  1 mg Subcutaneous PRN JaretSkip smiley, DO       • insulin aspart (novoLOG) injection 0-9 Units  0-9 Units Subcutaneous TID AC JaretSkip smiley,    4 Units at 12/04/20 1804   • insulin aspart (novoLOG) injection 5 Units  5 Units Subcutaneous TID With Meals JaretSkip smiley, DO   5 Units at 12/04/20 1804   • insulin detemir (LEVEMIR) injection 30 Units  30 Units Subcutaneous Nightly Myranda Davalos,    30 Units at 12/04/20 2215   • lactobacillus acidophilus (RISAQUAD) capsule 1 capsule  1 capsule Oral BID Carrol Castro DO   1 capsule at 12/05/20 0808   • melatonin tablet 5 mg  5 mg Oral Nightly Carrol Castro DO   5 mg at 12/04/20 2213   • metoprolol tartrate (LOPRESSOR) injection 5 mg  5 mg  Intravenous Q4H PRN Carrol Castro, DO   5 mg at 11/27/20 2220   • pantoprazole (PROTONIX) EC tablet 40 mg  40 mg Oral Daily Carrol Castro, DO   40 mg at 12/05/20 0649   • QUEtiapine (SEROquel) tablet 25 mg  25 mg Oral Q12H Juan Alberto Franco MD   25 mg at 12/05/20 0808   • rOPINIRole (REQUIP) tablet 0.5 mg  0.5 mg Oral Nightly PRN Carrol Castro, DO   0.5 mg at 12/04/20 2213   • sodium chloride 0.9 % flush 10 mL  10 mL Intravenous PRN Skip Raygoza, DO   10 mL at 12/02/20 0818   • tamsulosin (FLOMAX) 24 hr capsule 0.4 mg  0.4 mg Oral Daily Naida Raygozaar, DO   0.4 mg at 12/05/20 0808   • vitamin C (ASCORBIC ACID) tablet 500 mg  500 mg Oral Daily Carrol Castro, DO   500 mg at 12/05/20 0808       Assessment/Plan:  1. Chronic kidney disease stage III: baseline creatinine ~1.3-1.5 fluctuates with volume status.  No further worsening of renal function noted  2. Hypertension in CKD: It is under fair control continue to watch closely.  3. Hyperkalemia: Multifactorial likely secondary to hyperglycemia as well as history of lisinopril use that may be contributing.  It is back to normal.  4. Anasarca: Continue to optimize diuretics  5. Acute respiratory failure with hypoxia  6. Pneumonia due to COVID-19 virus  7. Acute metabolic encephalopathy  8. Type 2 Diabetes Mellitus with hyperglycemia  9. Thrombocytopenia  10. History of aortic valve replacement  11. Dyslipidemia  12. History of DVT  13. Tremor    Plan:  · Renal function improved significantly with some hydration.  · Details were discussed with the patient no family in the room.  Clinically appears to be improving.  · Details were also discussed with the hospitalist service.  I think this is the best he will get at this point.  Likely transfer to [facility] today.  · As he is not eating or drinking much he likely will not need any diuretics when he goes home.  · Continue with rest of the current treatment plan and surveillance labs.  · Further recommendations will  depend on clinical course of the patient during the current hospitalization.    · I also discussed the details with the nursing staff.  · Rest as ordered.    Rickey Zee MD, FASN  12/05/20  10:25 EST    Dictated utilizing Dragon dictation.

## 2020-12-06 LAB
ALBUMIN SERPL-MCNC: 2.5 G/DL (ref 3.5–5.2)
ANION GAP SERPL CALCULATED.3IONS-SCNC: 5 MMOL/L (ref 5–15)
BUN SERPL-MCNC: 26 MG/DL (ref 8–23)
BUN/CREAT SERPL: 22.6 (ref 7–25)
CALCIUM SPEC-SCNC: 8.9 MG/DL (ref 8.6–10.5)
CHLORIDE SERPL-SCNC: 106 MMOL/L (ref 98–107)
CO2 SERPL-SCNC: 28 MMOL/L (ref 22–29)
CREAT SERPL-MCNC: 1.15 MG/DL (ref 0.76–1.27)
GFR SERPL CREATININE-BSD FRML MDRD: 61 ML/MIN/1.73
GLUCOSE BLDC GLUCOMTR-MCNC: 107 MG/DL (ref 70–130)
GLUCOSE BLDC GLUCOMTR-MCNC: 128 MG/DL (ref 70–130)
GLUCOSE BLDC GLUCOMTR-MCNC: 183 MG/DL (ref 70–130)
GLUCOSE BLDC GLUCOMTR-MCNC: 211 MG/DL (ref 70–130)
GLUCOSE SERPL-MCNC: 128 MG/DL (ref 65–99)
PHOSPHATE SERPL-MCNC: 2.5 MG/DL (ref 2.5–4.5)
POTASSIUM SERPL-SCNC: 4.5 MMOL/L (ref 3.5–5.2)
SODIUM SERPL-SCNC: 139 MMOL/L (ref 136–145)

## 2020-12-06 PROCEDURE — 97110 THERAPEUTIC EXERCISES: CPT

## 2020-12-06 PROCEDURE — 94660 CPAP INITIATION&MGMT: CPT

## 2020-12-06 PROCEDURE — 94799 UNLISTED PULMONARY SVC/PX: CPT

## 2020-12-06 PROCEDURE — 82962 GLUCOSE BLOOD TEST: CPT

## 2020-12-06 PROCEDURE — 97530 THERAPEUTIC ACTIVITIES: CPT

## 2020-12-06 PROCEDURE — 63710000001 INSULIN DETEMIR PER 5 UNITS: Performed by: FAMILY MEDICINE

## 2020-12-06 PROCEDURE — 80069 RENAL FUNCTION PANEL: CPT | Performed by: INTERNAL MEDICINE

## 2020-12-06 PROCEDURE — 99232 SBSQ HOSP IP/OBS MODERATE 35: CPT | Performed by: INTERNAL MEDICINE

## 2020-12-06 PROCEDURE — 63710000001 INSULIN ASPART PER 5 UNITS: Performed by: EMERGENCY MEDICINE

## 2020-12-06 RX ADMIN — GABAPENTIN 400 MG: 400 CAPSULE ORAL at 09:58

## 2020-12-06 RX ADMIN — CARBIDOPA AND LEVODOPA 1 TABLET: 25; 100 TABLET ORAL at 09:58

## 2020-12-06 RX ADMIN — Medication 1 CAPSULE: at 09:58

## 2020-12-06 RX ADMIN — ALBUTEROL SULFATE 2 PUFF: 90 AEROSOL, METERED RESPIRATORY (INHALATION) at 20:45

## 2020-12-06 RX ADMIN — ROPINIROLE HYDROCHLORIDE 0.5 MG: 0.25 TABLET, FILM COATED ORAL at 20:44

## 2020-12-06 RX ADMIN — TAMSULOSIN HYDROCHLORIDE 0.4 MG: 0.4 CAPSULE ORAL at 09:58

## 2020-12-06 RX ADMIN — Medication 1 TABLET: at 09:58

## 2020-12-06 RX ADMIN — Medication 1 CAPSULE: at 20:44

## 2020-12-06 RX ADMIN — PANTOPRAZOLE SODIUM 40 MG: 40 TABLET, DELAYED RELEASE ORAL at 06:55

## 2020-12-06 RX ADMIN — ALBUTEROL SULFATE 2 PUFF: 90 AEROSOL, METERED RESPIRATORY (INHALATION) at 15:30

## 2020-12-06 RX ADMIN — CARBIDOPA AND LEVODOPA 1 TABLET: 25; 100 TABLET ORAL at 17:14

## 2020-12-06 RX ADMIN — Medication 5 MG: at 20:44

## 2020-12-06 RX ADMIN — ALBUTEROL SULFATE 2 PUFF: 90 AEROSOL, METERED RESPIRATORY (INHALATION) at 11:57

## 2020-12-06 RX ADMIN — OXYCODONE HYDROCHLORIDE AND ACETAMINOPHEN 500 MG: 500 TABLET ORAL at 09:58

## 2020-12-06 RX ADMIN — GABAPENTIN 400 MG: 400 CAPSULE ORAL at 20:44

## 2020-12-06 RX ADMIN — ATORVASTATIN CALCIUM 40 MG: 40 TABLET, FILM COATED ORAL at 20:44

## 2020-12-06 RX ADMIN — APIXABAN 2.5 MG: 2.5 TABLET, FILM COATED ORAL at 20:45

## 2020-12-06 RX ADMIN — CARBIDOPA AND LEVODOPA 1 TABLET: 25; 100 TABLET ORAL at 20:44

## 2020-12-06 RX ADMIN — INSULIN ASPART 4 UNITS: 100 INJECTION, SOLUTION INTRAVENOUS; SUBCUTANEOUS at 17:15

## 2020-12-06 RX ADMIN — QUETIAPINE FUMARATE 25 MG: 25 TABLET ORAL at 09:58

## 2020-12-06 RX ADMIN — CARVEDILOL 6.25 MG: 6.25 TABLET, FILM COATED ORAL at 20:44

## 2020-12-06 RX ADMIN — INSULIN ASPART 5 UNITS: 100 INJECTION, SOLUTION INTRAVENOUS; SUBCUTANEOUS at 17:15

## 2020-12-06 RX ADMIN — ALBUTEROL SULFATE 2 PUFF: 90 AEROSOL, METERED RESPIRATORY (INHALATION) at 06:55

## 2020-12-06 RX ADMIN — APIXABAN 2.5 MG: 2.5 TABLET, FILM COATED ORAL at 09:58

## 2020-12-06 RX ADMIN — CARVEDILOL 6.25 MG: 6.25 TABLET, FILM COATED ORAL at 09:59

## 2020-12-06 RX ADMIN — QUETIAPINE FUMARATE 25 MG: 25 TABLET ORAL at 20:44

## 2020-12-06 RX ADMIN — INSULIN DETEMIR 30 UNITS: 100 INJECTION, SOLUTION SUBCUTANEOUS at 20:45

## 2020-12-06 NOTE — PROGRESS NOTES
"Nephrology Progress Note.    LOS: 13 days    Patient Care Team:  Jason Foy MD as PCP - General (General Practice)    Chief Complaint:    Chief Complaint   Patient presents with   • Fever   • Shortness of Breath       Subjective:   Follow up for ALEXANDRA and Chronic Kidney disease stage 3 / Anemia.     Interval History:   Patient Complaints: none  Patient seen and examined this morning.  Events from last 24 hours noted.  Patient has COVID-19 pneumonia.  Patient is not as awake as yesterday, still confused.  It appears he is working fair with the physical therapy.  Objective:    Vital Signs  /64 (BP Location: Left arm, Patient Position: Lying)   Pulse 68   Temp 98 °F (36.7 °C) (Oral)   Resp 18   Ht 180.3 cm (71\")   Wt 97.1 kg (214 lb 1.1 oz)   SpO2 (!) 87%   BMI 29.86 kg/m²     No intake/output data recorded.    Intake/Output Summary (Last 24 hours) at 12/6/2020 0822  Last data filed at 12/6/2020 0653  Gross per 24 hour   Intake 595 ml   Output --   Net 595 ml       Physical Exam:  General Appearance: no acute distress,   HEENT: Oral mucosa dry, extra occular movements intact. Sclera clear.  Skin: Warm and dry  Neck: supple, no JVD, trachea midline  Lungs:Chest shape is normal. Breath sounds heard scattered rhonchi and crackles, No wheezing.   Heart: Irregular rate and rhythm. normal S1 and S2, no S3, no rub, peripheral pulses weak but palpable.  Abdomen: Obese, soft, non-tender,  present bowel sounds to auscultation  : no palpable bladder.  Extremities: Trace edema, no cyanosis or clubbing.   Neuro: Randomly does move his extremities.     Results Review:   Results from last 7 days   Lab Units 12/06/20  0529 12/05/20  0834 12/04/20  0533  11/30/20  0550   SODIUM mmol/L 139 136 134*   < > 146*   POTASSIUM mmol/L 4.5 4.1 4.2   < > 4.0   CHLORIDE mmol/L 106 103 99   < > 105   CO2 mmol/L 28.0 27.3 27.4   < > 30.4*   BUN mg/dL 26* 31* 39*   < > 43*   CREATININE mg/dL 1.15 1.15 1.31*   < > 1.15   CALCIUM " mg/dL 8.9 8.8 8.8   < > 9.6   ALBUMIN g/dL 2.50* 2.50* 2.50*   < > 2.70*   BILIRUBIN mg/dL  --   --   --   --  1.5*   ALK PHOS U/L  --   --   --   --  63   ALT (SGPT) U/L  --   --   --   --  <5   AST (SGOT) U/L  --   --   --   --  28   GLUCOSE mg/dL 128* 106* 224*   < > 142*    < > = values in this interval not displayed.     Estimated Creatinine Clearance: 58.8 mL/min (by C-G formula based on SCr of 1.15 mg/dL).  Results from last 7 days   Lab Units 12/06/20  0529 12/05/20  0834 12/04/20  0533   PHOSPHORUS mg/dL 2.5 2.6 3.0     Results from last 7 days   Lab Units 12/01/20  0938   URIC ACID mg/dL 7.0     Results from last 7 days   Lab Units 12/05/20  0558 12/04/20  0533 12/03/20  0528 12/02/20  0532 11/30/20  0550   WBC 10*3/mm3 12.09* 13.16* 15.14* 17.07* 10.36   HEMOGLOBIN g/dL 12.7* 11.9* 12.1* 12.4* 12.7*   PLATELETS 10*3/mm3 70* 67* 75* 93* 85*         Brief Urine Lab Results  (Last result in the past 365 days)      Color   Clarity   Blood   Leuk Est   Nitrite   Protein   CREAT   Urine HCG        12/02/20 1603 Dark Yellow Clear Trace Trace Negative Negative             No results found for: UTPCR  Imaging Results (Last 24 Hours)     ** No results found for the last 24 hours. **        albuterol sulfate HFA, 2 puff, Inhalation, 4x Daily - RT  apixaban, 2.5 mg, Oral, Q12H  atorvastatin, 40 mg, Oral, Nightly  b complex-vitamin c-folic acid, 1 tablet, Oral, Daily  carbidopa-levodopa, 1 tablet, Oral, TID  carvedilol, 6.25 mg, Oral, BID  gabapentin, 400 mg, Oral, Q12H  insulin aspart, 0-9 Units, Subcutaneous, TID AC  insulin aspart, 5 Units, Subcutaneous, TID With Meals  insulin detemir, 30 Units, Subcutaneous, Nightly  lactobacillus acidophilus, 1 capsule, Oral, BID  melatonin, 5 mg, Oral, Nightly  pantoprazole, 40 mg, Oral, Daily  QUEtiapine, 25 mg, Oral, Q12H  tamsulosin, 0.4 mg, Oral, Daily  vitamin C, 500 mg, Oral, Daily             Medication Review:   Current Facility-Administered Medications   Medication  Dose Route Frequency Provider Last Rate Last Admin   • albuterol sulfate HFA (PROVENTIL HFA;VENTOLIN HFA;PROAIR HFA) inhaler 2 puff  2 puff Inhalation 4x Daily - RT Skip Raygoza DO   2 puff at 12/06/20 0655   • apixaban (ELIQUIS) tablet 2.5 mg  2.5 mg Oral Q12H Carrol Castro, DO   2.5 mg at 12/05/20 2131   • atorvastatin (LIPITOR) tablet 40 mg  40 mg Oral Nightly Skip Raygoza DO   40 mg at 12/05/20 2131   • b complex-vitamin c-folic acid (NEPHRO-AGATA) tablet 1 tablet  1 tablet Oral Daily Carrol Castro DO   1 tablet at 12/05/20 0808   • carbidopa-levodopa (SINEMET)  MG per tablet 1 tablet  1 tablet Oral TID Skip Raygoza, DO   1 tablet at 12/05/20 2131   • carvedilol (COREG) tablet 6.25 mg  6.25 mg Oral BID Carrol Castro DO   6.25 mg at 12/05/20 2131   • dextrose (D50W) 25 g/ 50mL Intravenous Solution 25 g  25 g Intravenous Q15 Min PRN Skip Raygoza, DO       • dextrose (GLUTOSE) oral gel 1 tube  1 tube Oral Q15 Min PRN JaretSkip smiley, DO       • gabapentin (NEURONTIN) capsule 400 mg  400 mg Oral Q12H Skip Raygoza, DO   400 mg at 12/05/20 2131   • glucagon (human recombinant) (GLUCAGEN DIAGNOSTIC) injection 1 mg  1 mg Subcutaneous PRN Skip Raygoza, DO       • insulin aspart (novoLOG) injection 0-9 Units  0-9 Units Subcutaneous TID AC JaretSkip smiley, DO   4 Units at 12/04/20 1804   • insulin aspart (novoLOG) injection 5 Units  5 Units Subcutaneous TID With Meals Skip Raygoza, DO   5 Units at 12/04/20 1804   • insulin detemir (LEVEMIR) injection 30 Units  30 Units Subcutaneous Nightly Myranda Davalos, DO   30 Units at 12/05/20 2134   • lactobacillus acidophilus (RISAQUAD) capsule 1 capsule  1 capsule Oral BID Carrol Castro DO   1 capsule at 12/05/20 2131   • melatonin tablet 5 mg  5 mg Oral Nightly Carrol Castro DO   5 mg at 12/05/20 2131   • metoprolol tartrate (LOPRESSOR) injection 5 mg  5 mg Intravenous Q4H PRN Carrol Castro DO   5 mg at 11/27/20 2220   • pantoprazole (PROTONIX) EC tablet  40 mg  40 mg Oral Daily Carrol Castro, DO   40 mg at 12/06/20 0655   • QUEtiapine (SEROquel) tablet 25 mg  25 mg Oral Q12H Juan Alberto Franco MD   25 mg at 12/05/20 2131   • rOPINIRole (REQUIP) tablet 0.5 mg  0.5 mg Oral Nightly PRN Carrol Castro, DO   0.5 mg at 12/05/20 2131   • sodium chloride 0.9 % flush 10 mL  10 mL Intravenous PRN Skip Raygoza, DO   10 mL at 12/02/20 0818   • tamsulosin (FLOMAX) 24 hr capsule 0.4 mg  0.4 mg Oral Daily Skip Raygoza, DO   0.4 mg at 12/05/20 0808   • vitamin C (ASCORBIC ACID) tablet 500 mg  500 mg Oral Daily Carrol Castro, DO   500 mg at 12/05/20 0808       Assessment/Plan:  1. Chronic kidney disease stage III: baseline creatinine ~1.3-1.5 fluctuates with volume status.  No further worsening of renal function noted  2. Hypertension in CKD: It is under fair control continue to watch closely.  3. Hyperkalemia: Multifactorial likely secondary to hyperglycemia as well as history of lisinopril use that may be contributing.  It is back to normal.  4. Anasarca: Continue to optimize diuretics  5. Acute respiratory failure with hypoxia  6. Pneumonia due to COVID-19 virus  7. Acute metabolic encephalopathy  8. Type 2 Diabetes Mellitus with hyperglycemia  9. Thrombocytopenia  10. History of aortic valve replacement  11. Dyslipidemia  12. History of DVT  13. Tremor    Plan:  · Renal function electrolytes are within normal limits.  · Details were discussed with the patient no family in the room.  Clinically appears to be improving.  Hold off giving any diuretics at discharge.  · Details were also discussed with the hospitalist service.  I think this is the best he will get at this point.  Likely transfer to [facility] when possible.  · Today he had significantly more interaction and asked me if he will see the physical therapist today.  He is actively participating in physical therapy.  · Continue with rest of the current treatment plan and surveillance labs.  · Further recommendations  will depend on clinical course of the patient during the current hospitalization.    · I also discussed the details with the nursing staff.  · Rest as ordered.    Rickey Zee MD, ALYSONN  12/06/20  08:22 EST    Dictated utilizing Dragon dictation.

## 2020-12-06 NOTE — PLAN OF CARE
Goal Outcome Evaluation:  Plan of Care Reviewed With: patient  Progress: improving  Outcome Summary: Pt tolerating oxygen at 2 lpm/nc.  Mentation much better today.   Spoke with his wife on the phone tonight.  Cooperative with care.  Telemetry monitoring continued.  Labs monitored daily.

## 2020-12-06 NOTE — PLAN OF CARE
Pt. Admitted to the hosp. R/T diagnosis resp. Failure with COVID. Vital signs have remained stable so far this shift. No c\o pain or discomfort. No acute distress noted at this time.     Problem: Fall Injury Risk  Goal: Absence of Fall and Fall-Related Injury  Intervention: Identify and Manage Contributors to Fall Injury Risk  Recent Flowsheet Documentation  Taken 12/6/2020 1610 by Diane Jarquin RN  Medication Review/Management: medications reviewed  Taken 12/6/2020 1200 by Diane Jarquin RN  Medication Review/Management: medications reviewed  Taken 12/6/2020 0950 by Diane Jarquin RN  Medication Review/Management: medications reviewed  Intervention: Promote Injury-Free Environment  Recent Flowsheet Documentation  Taken 12/6/2020 1610 by Diane Jarquin RN  Safety Promotion/Fall Prevention:   activity supervised   assistive device/personal items within reach   clutter free environment maintained  Taken 12/6/2020 1400 by Diane Jarquin RN  Safety Promotion/Fall Prevention:   activity supervised   assistive device/personal items within reach   clutter free environment maintained   nonskid shoes/slippers when out of bed  Taken 12/6/2020 1200 by Diane Jarquin RN  Safety Promotion/Fall Prevention:   activity supervised   assistive device/personal items within reach   clutter free environment maintained  Taken 12/6/2020 1005 by Diane Jarquin RN  Safety Promotion/Fall Prevention:   activity supervised   assistive device/personal items within reach   clutter free environment maintained  Taken 12/6/2020 0950 by Diane Jarquin RN  Safety Promotion/Fall Prevention:   activity supervised   assistive device/personal items within reach   clutter free environment maintained     Problem: Adult Inpatient Plan of Care  Goal: Absence of Hospital-Acquired Illness or Injury  Intervention: Identify and Manage Fall Risk  Recent Flowsheet Documentation  Taken 12/6/2020 1610 by Diane Jarquin RN  Safety Promotion/Fall Prevention:   activity supervised    assistive device/personal items within reach   clutter free environment maintained  Taken 12/6/2020 1400 by Diane Jarquin RN  Safety Promotion/Fall Prevention:   activity supervised   assistive device/personal items within reach   clutter free environment maintained   nonskid shoes/slippers when out of bed  Taken 12/6/2020 1200 by Diane Jarquin RN  Safety Promotion/Fall Prevention:   activity supervised   assistive device/personal items within reach   clutter free environment maintained  Taken 12/6/2020 1005 by Diane Jarquin RN  Safety Promotion/Fall Prevention:   activity supervised   assistive device/personal items within reach   clutter free environment maintained  Taken 12/6/2020 0950 by Diane Jarquin RN  Safety Promotion/Fall Prevention:   activity supervised   assistive device/personal items within reach   clutter free environment maintained  Intervention: Prevent Skin Injury  Recent Flowsheet Documentation  Taken 12/6/2020 1610 by Diane Jarquin RN  Body Position: position changed independently  Taken 12/6/2020 1400 by Diane Jarquin RN  Body Position: position changed independently  Taken 12/6/2020 1200 by Diane Jarquin RN  Body Position: position changed independently  Taken 12/6/2020 1005 by Diane Jarquin RN  Body Position: position changed independently  Taken 12/6/2020 0950 by Diane Jarquin RN  Body Position: position changed independently  Intervention: Prevent and Manage VTE (venous thromboembolism) Risk  Recent Flowsheet Documentation  Taken 12/6/2020 0950 by Diane Jarquin RN  VTE Prevention/Management:   bilateral   dorsiflexion/plantar flexion performed  Intervention: Prevent Infection  Recent Flowsheet Documentation  Taken 12/6/2020 1400 by Diane Jarquin RN  Infection Prevention: rest/sleep promoted  Taken 12/6/2020 1005 by Diane Jarquin RN  Infection Prevention: rest/sleep promoted  Taken 12/6/2020 0950 by Diane Jarquin RN  Infection Prevention: rest/sleep promoted  Goal: Optimal Comfort and  Wellbeing  Intervention: Provide Person-Centered Care  Recent Flowsheet Documentation  Taken 12/6/2020 1610 by Diane Jarquin RN  Trust Relationship/Rapport:   care explained   reassurance provided  Taken 12/6/2020 1200 by Diane Jarquin RN  Trust Relationship/Rapport:   care explained   reassurance provided  Taken 12/6/2020 0950 by Diane Jarquin RN  Trust Relationship/Rapport:   care explained   reassurance provided     Problem: Skin Injury Risk Increased  Goal: Skin Health and Integrity  Intervention: Optimize Skin Protection  Recent Flowsheet Documentation  Taken 12/6/2020 1610 by Diane Jarquin RN  Pressure Reduction Techniques: weight shift assistance provided  Head of Bed ([facility]): [facility] elevated  Pressure Reduction Devices: pressure-redistributing mattress utilized  Skin Protection: incontinence pads utilized  Taken 12/6/2020 1400 by Diane Jarquin RN  Pressure Reduction Techniques: weight shift assistance provided  Head of Bed ([facility]): [facility] elevated  Pressure Reduction Devices: pressure-redistributing mattress utilized  Taken 12/6/2020 1200 by Diane Jarquin RN  Pressure Reduction Techniques: weight shift assistance provided  Head of Bed ([facility]): [facility] elevated  Pressure Reduction Devices: pressure-redistributing mattress utilized  Skin Protection: incontinence pads utilized  Taken 12/6/2020 1005 by Diane Jarquin RN  Pressure Reduction Techniques: weight shift assistance provided  Head of Bed ([facility]): [facility] elevated  Pressure Reduction Devices: pressure-redistributing mattress utilized  Taken 12/6/2020 0950 by Diane Jarquin RN  Pressure Reduction Techniques: weight shift assistance provided  Head of Bed ([facility]): [facility] elevated  Pressure Reduction Devices: pressure-redistributing mattress utilized  Skin Protection: incontinence pads utilized     Problem: Diabetes Comorbidity  Goal: Blood Glucose Level Within Desired Range  Intervention: Maintain Glycemic Control  Recent Flowsheet Documentation  Taken 12/6/2020 1610 by Diane Jarquin  RN  Glycemic Management: blood glucose monitoring  Taken 12/6/2020 1200 by Diane Jarquin RN  Glycemic Management: blood glucose monitoring  Taken 12/6/2020 0950 by Diane Jarquin RN  Glycemic Management: blood glucose monitoring     Problem: Wound  Goal: Optimal Wound Healing  Intervention: Promote Effective Wound Healing  Recent Flowsheet Documentation  Taken 12/6/2020 1610 by Diane Jarquin RN  Sleep/Rest Enhancement:   noise level reduced   regular sleep/rest pattern promoted  Taken 12/6/2020 1200 by Diane Jarquin RN  Sleep/Rest Enhancement:   noise level reduced   regular sleep/rest pattern promoted  Taken 12/6/2020 0950 by Diane Jarquin RN  Sleep/Rest Enhancement:   noise level reduced   regular sleep/rest pattern promoted     Problem: Fluid Imbalance (Pneumonia)  Goal: Fluid Balance  Intervention: Monitor and Manage Fluid Balance  Recent Flowsheet Documentation  Taken 12/6/2020 1610 by Diane Jarquin RN  Fluid/Electrolyte Management: fluids provided  Taken 12/6/2020 1200 by Diane Jarquin RN  Fluid/Electrolyte Management: fluids provided  Taken 12/6/2020 0950 by Diane Jarquin RN  Fluid/Electrolyte Management: fluids provided     Problem: Infection (Pneumonia)  Goal: Resolution of Infection Signs and Symptoms  Intervention: Prevent Infection Progression  Recent Flowsheet Documentation  Taken 12/6/2020 1610 by Diane Jarquin RN  Infection Management: aseptic technique maintained  Isolation Precautions: airborne precautions maintained  Taken 12/6/2020 1400 by Diane Jarquin RN  Infection Management: aseptic technique maintained  Isolation Precautions: airborne precautions maintained  Taken 12/6/2020 1200 by Diane Jarquin RN  Infection Management: aseptic technique maintained  Isolation Precautions: airborne precautions maintained  Taken 12/6/2020 1005 by Diane Jarquin RN  Infection Management: aseptic technique maintained  Isolation Precautions: airborne precautions maintained  Taken 12/6/2020 0950 by Diane Jarquin RN  Infection  Management: aseptic technique maintained  Isolation Precautions: airborne precautions maintained     Problem: Respiratory Compromise (Pneumonia)  Goal: Effective Oxygenation and Ventilation  Intervention: Promote Airway Secretion Clearance  Recent Flowsheet Documentation  Taken 12/6/2020 1200 by Diane Jarquin RN  Cough And Deep Breathing: done independently per patient  Taken 12/6/2020 0950 by Diane Jarquin RN  Cough And Deep Breathing: done independently per patient  Intervention: Optimize Oxygenation and Ventilation  Recent Flowsheet Documentation  Taken 12/6/2020 1610 by Diane Jarquin RN  Head of Bed (HOB): HOB elevated  Taken 12/6/2020 1400 by Diane Jarquin RN  Head of Bed (HOB): HOB elevated  Taken 12/6/2020 1200 by Diane Jarquin RN  Head of Bed (HOB): HOB elevated  Taken 12/6/2020 1005 by Diane Jarquin RN  Head of Bed (HOB): HOB elevated  Taken 12/6/2020 0950 by Diane Jarquin RN  Head of Bed (HOB): HOB elevated   Goal Outcome Evaluation:  Plan of Care Reviewed With: patient  Progress: improving

## 2020-12-06 NOTE — PLAN OF CARE
Problem: Noninvasive Ventilation Acute  Goal: Effective Unassisted Ventilation and Oxygenation  Outcome: Ongoing, Not Progressing   Goal Outcome Evaluation:  Plan of Care Reviewed With: patient  Progress: improving   Patient refuses to wear bipap

## 2020-12-06 NOTE — PLAN OF CARE
Goal Outcome Evaluation:  Plan of Care Reviewed With: patient  Progress: improving  Outcome Summary: PT treatment completed with good pt participation. Pt is demonstrating increased understanding for LE ther ex as well as improved following of directions for mobility. Pt's B knee pain did limit standing with rolling walke this date with max assist x 20 sec. Pt performed LE ther ex per flowsheet and sitting balance activities EOB. Cont current PT POC.

## 2020-12-06 NOTE — PROGRESS NOTES
[facility]    Name:  Jak Pierre   Age:  80 y.o.  Sex:  male  :  1940  MRN:  [id number]   Visit Number:  [id number]  Admission Date:  2020  Date Of Service:  20  Primary Care Physician:  Jason Foy MD     LOS: 13 days :  Patient Care Team:  Jason Foy MD as PCP - General (General Practice):    Chief Complaint:      Follow-up of COVID-19 infection.    Subjective / Interval History:     Mr. Pierre was seen and examined this morning.  He has been off wrist restraints since 2 days.  He states that he slept well and is answering questions appropriately.  Denies any shortness of breath.  He is currently saturating at 95% on 2 L of nasal cannula oxygen.  No significant overnight events.  He is eager to work with physical therapy.    Please see discharge summary done on 2020 for hospital course.    Review of Systems:     General ROS: Patient denies any fevers, chills or loss of consciousness.  Generalized weakness.  Respiratory ROS: Denies cough or shortness of breath.  Cardiovascular ROS: Denies chest pain or palpitations.  Gastrointestinal ROS: Denies nausea and vomiting. Denies any abdominal pain. No diarrhea.  Neurological ROS: Denies any focal weakness.    Vital Signs:    Temp:  [97.4 °F (36.3 °C)-98.3 °F (36.8 °C)] 97.4 °F (36.3 °C)  Heart Rate:  [66-96] 66  Resp:  [16-18] 18  BP: (107-125)/(57-75) 108/58    Intake and output:    I/O last 3 completed shifts:  In: 595 [P.O.:485; I.V.:110]  Out: 250 [Urine:250]  I/O this shift:  In: 120 [P.O.:120]  Out: 150 [Urine:150]    Physical Examination:    General Appearance:  Alert and cooperative, not in any acute distress.   Head:  Old appearing ecchymotic patch noted on the right forehead.   Eyes:          Conjunctivae and sclerae normal, no Icterus. No pallor. Extraocular movements are within normal limits.   Neck: Supple, trachea midline, no thyromegaly, no carotid bruit.    Lungs:   Chest shape is normal. Breath sounds heard bilaterally equally.  No crackles or wheezing. No pleural rub or bronchial breathing.   Heart:  Normal S1 and S2, no murmur, no gallop, no rub. No JVD   Abdomen:   Normal bowel sounds, no masses, no organomegaly. Soft, nontender, nondistended, no guarding, no rebound tenderness.   Extremities: Moves all extremities, no ankle edema, no cyanosis, no clubbing.  1+ pitting edema noted in bilateral upper extremities especially on the left upper extremity.   Skin: No bleeding or rash.  Ecchymotic patches noted in bilateral forearms.   Neurologic: Awake, alert and oriented times 3. Moves all 4 extremities equally.     Laboratory results:    Results from last 7 days   Lab Units 12/06/20  0529 12/05/20  0834 12/04/20  0533  11/30/20  0550   SODIUM mmol/L 139 136 134*   < > 146*   POTASSIUM mmol/L 4.5 4.1 4.2   < > 4.0   CHLORIDE mmol/L 106 103 99   < > 105   CO2 mmol/L 28.0 27.3 27.4   < > 30.4*   BUN mg/dL 26* 31* 39*   < > 43*   CREATININE mg/dL 1.15 1.15 1.31*   < > 1.15   CALCIUM mg/dL 8.9 8.8 8.8   < > 9.6   BILIRUBIN mg/dL  --   --   --   --  1.5*   ALK PHOS U/L  --   --   --   --  63   ALT (SGPT) U/L  --   --   --   --  <5   AST (SGOT) U/L  --   --   --   --  28   GLUCOSE mg/dL 128* 106* 224*   < > 142*    < > = values in this interval not displayed.     Results from last 7 days   Lab Units 12/05/20  0558 12/04/20  0533 12/03/20  0528   WBC 10*3/mm3 12.09* 13.16* 15.14*   HEMOGLOBIN g/dL 12.7* 11.9* 12.1*   HEMATOCRIT % 38.6 35.8* 37.3*   PLATELETS 10*3/mm3 70* 67* 75*                 Results from last 7 days   Lab Units 12/02/20  1812   PH, ARTERIAL pH units 7.479   PO2 ART mm Hg 59.6*   PCO2, ARTERIAL mm Hg 39.2   HCO3 ART mmol/L 29.1*     I have reviewed the patient's laboratory results.    Radiology results:    Imaging Results (Last 24 Hours)     ** No results found for the last 24 hours. **        Medication Review:     I have reviewed the patient's active and  prn medications.     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  Acute hypoxic respiratory failure, present on admission secondary to #2, improved.  2.  Bilateral pneumonia secondary to COVID-19, improved.  3.  Acute renal failure, present on admission, resolved.  4.  Chronic thrombocytopenia.  5.  History of DVT on Eliquis.  6.  Sleep apnea syndrome.  7.  Diabetes mellitus type 2.    Plan:    Mr. Pierre is currently doing better with regards to his respiratory status and COVID-19 infection.  He is currently not on any antibiotics or steroids.  His renal function has improved and is back to baseline.  He is being followed by Dr. Gamez and I have discussed the patient's condition with him.  He is currently on apixaban (at home he is on rivaroxaban).  He does have chronic thrombocytopenia and the platelet count has been stable.    Patient has improved with regards to his confusion and we will continue Seroquel 25 mg twice daily.  He will be continued on physical and Occupational Therapy.  I have discussed the patient's condition with case management and they are currently working on getting him to short-term rehabilitation when bed becomes available.  Discussed with nursing staff.      Juan Alberto Franco MD  12/06/20  14:36 EST    Dictated utilizing Dragon dictation.

## 2020-12-06 NOTE — THERAPY TREATMENT NOTE
Patient Name: Jak Pierre  : 1940    MRN: [id number]                              Today's Date: 2020       Admit Date: 2020    Visit Dx:     ICD-10-CM ICD-9-CM   1. Pneumonia due to COVID-19 virus  U07.1 480.8    J12.89    2. 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)   • Acute renal failure superimposed on stage 3 chronic kidney disease (CMS/HCC)   • Chronic kidney disease, stage III (moderate)   • Pneumonia due to COVID-19 virus   • COVID-19   • Thrombocytopenia (CMS/HCC)     Past Medical History:   Diagnosis Date   • Angina of effort (CMS/HCC)    • Aortic valve replaced    • Arthritis    • Cataract    • CHF (congestive heart failure) (CMS/HCC)    • Colitis    • Diabetes (CMS/HCC)    • Diverticulitis    • Gall stones    • Heart disease    • Hyperlipidemia    • Hypertension    • Hypertension    • Impaired functional mobility, balance, gait, and endurance    • Kidney stones    • Skin cancer     left shoulder   • Tremor      Past Surgical History:   Procedure Laterality Date   • AORTIC VALVE REPAIR/REPLACEMENT  2016   • COLONOSCOPY  2018   • HERNIA REPAIR  1963    x2   • SINUS SURGERY  1978     General Information     Row Name 20 1215          Physical Therapy Time and Intention    Document Type  therapy note (daily note)  -TW     Mode of Treatment  physical therapy  -TW     Row Name 20 1215          General Information    Existing Precautions/Restrictions  fall;oxygen therapy device and L/min  -TW     Barriers to Rehab  cognitive status;previous functional deficit  -TW     Row Name 20 1215          Cognition     Orientation Status (Cognition)  oriented to;person;place  -     Row Name 12/06/20 1215          Safety Issues, Functional Mobility    Safety Issues Affecting Function (Mobility)  awareness of need for assistance;insight into deficits/self-awareness;safety precaution awareness;safety precautions follow-through/compliance;sequencing abilities  -TW     Impairments Affecting Function (Mobility)  balance;endurance/activity tolerance;cognition;strength;pain;motor planning  -TW     Cognitive Impairments, Mobility Safety/Performance  attention;awareness, need for assistance;insight into deficits/self-awareness;problem-solving/reasoning;safety precaution awareness;safety precaution follow-through;sequencing abilities  -TW       User Key  (r) = Recorded By, (t) = Taken By, (c) = Cosigned By    Initials Name Provider Type    TW Fani Prince, PT Physical Therapist        Mobility     Row Name 12/06/20 1215          Bed Mobility    Bed Mobility  scooting/bridging;supine-sit;sit-supine  -TW     Scooting/Bridging [place] (Bed Mobility)  moderate assist (50% patient effort);verbal cues;nonverbal cues (demo/gesture)  -TW     Supine-Sit [place] (Bed Mobility)  minimum assist (75% patient effort);verbal cues  -TW     Sit-Supine [place] (Bed Mobility)  minimum assist (75% patient effort);verbal cues  -TW     Assistive Device (Bed Mobility)  bed rails;draw sheet;head of bed elevated  -     Comment (Bed Mobility)  Pt was able to follow simple 1 to 2 step directions for bed mobility. Pt had c/o B knee pain when using LE's to boost up in bed.  -     Row Name 12/06/20 1215          Transfers    Comment (Transfers)  Attempted to stand with rolling walker x3. Successful x 1 with max assist, pt maintaining with flexed posture x 20 sec. before fatigue and knee pain required him to sit back on EOB.  -     Row Name 12/06/20 1215          Bed-Chair Transfer    Bed-Chair [place] (Transfers)  not tested  -     Row Name  12/06/20 1215          Sit-Stand Transfer    Sit-Stand [place] (Transfers)  maximum assist (25% patient effort);1 person assist  -TW     Assistive Device (Sit-Stand Transfers)  walker, front-wheeled  -     Row Name 12/06/20 1215          Gait/Stairs (Locomotion)    [place] Level (Gait)  unable to assess  -       User Key  (r) = Recorded By, (t) = Taken By, (c) = Cosigned By    Initials Name Provider Type     Fani Prince PT Physical Therapist        Obj/Interventions     Row Name 12/06/20 1244          Motor Skills    Therapeutic Exercise  hip;knee;ankle  -     Row Name 12/06/20 1244          Hip (Therapeutic Exercise)    Hip (Therapeutic Exercise)  strengthening exercise  -     Hip Strengthening (Therapeutic Exercise)  bilateral;heel slides;aBduction;aDduction;10 repetitions bridging.  -     Row Name 12/06/20 1244          Knee (Therapeutic Exercise)    Knee (Therapeutic Exercise)  strengthening exercise;isometric exercises  -     Knee Isometrics (Therapeutic Exercise)  bilateral;quad sets;3 second hold;10 repetitions hip add sets  -     Knee Strengthening (Therapeutic Exercise)  SAQ (short arc quad);bilateral;10 repetitions trouble controlling eccentric control of SAQs B  -     Row Name 12/06/20 1244          Ankle (Therapeutic Exercise)    Ankle AROM (Therapeutic Exercise)  plantarflexion;dorsiflexion;bilateral;10 repetitions;2 sets  -     Row Name 12/06/20 1244          Balance    Balance Assessment  sitting static balance;sitting dynamic balance;standing static balance  -     Static Sitting Balance  mild impairment;unsupported;sitting, edge of bed  -     Dynamic Sitting Balance  mild impairment;unsupported;sitting, edge of bed  -     Static Standing Balance  severe impairment;supported  -TW     Comment, Balance  Pt practiced scooting along EOB to get hips higher up in bed prior to returning to supine with min assist and verbal cues.  -       User Key  (r) = Recorded By,  (t) = Taken By, (c) = Cosigned By    Initials Name Provider Type    TW Fani Prince, PT Physical Therapist        Goals/Plan    No documentation.       Clinical Impression     Row Name 12/06/20 1215          Pain    Additional Documentation  Pain Scale: Numbers Pre/Post-Treatment (Group)  -     Row Name 12/06/20 1215          Pain Scale: Numbers Pre/Post-Treatment    Pretreatment Pain Rating  5/10  -TW     Pain Location - Side  Bilateral  -TW     Pain Location  knee  -TW     Pain Intervention(s)  Repositioned  -     Row Name 12/06/20 1215          Pain Scale: FACES Pre/Post-Treatment    Pre/Posttreatment Pain Comment  Pt had c/o B knee pain thoughout session with increased pain if knees blocked during standing.  -     Row Name 12/06/20 1215          Plan of Care Review    Plan of Care Reviewed With  patient  -TW     Progress  improving  -TW     Outcome Summary  PT treatment completed with good pt participation. Pt is demonstrating increased understanding for LE ther ex as well as improved following of directions for mobility. Pt's B knee pain did limit standing with rolling walke this date with max assist x 20 sec. Pt performed LE ther ex per flowsheet and sitting balance activities EOB. Cont current PT POC.  -TW     Row Name 12/06/20 1215          Vital Signs    Pretreatment Heart Rate (beats/min)  73  -TW     Posttreatment Heart Rate (beats/min)  79  -TW     Pre SpO2 (%)  97  -TW     O2 Delivery Pre Treatment  -- 3 L  -TW     Intra SpO2 (%)  95  -TW     O2 Delivery Intra Treatment  supplemental O2 3 L  -TW     Post SpO2 (%)  97  -TW     O2 Delivery Post Treatment  supplemental O2 3  -TW     Pre Patient Position  Supine  -TW     Intra Patient Position  Sitting  -TW     Post Patient Position  Supine  -TW     Row Name 12/06/20 1218          Positioning and Restraints    Pre-Treatment Position  in bed  -TW     Post Treatment Position  bed  -TW     In Bed  call light within reach;encouraged to call for  assist;exit alarm on;with other staff;fowlers  -TW       User Key  (r) = Recorded By, (t) = Taken By, (c) = Cosigned By    Initials Name Provider Type    Fani Carlson PT Physical Therapist        Outcome Measures     Row Name 12/06/20 1215          How much help from another person do you currently need...    Turning from your back to your side while in flat bed without using bedrails?  3  -TW     Moving from lying on back to sitting on the side of a flat bed without bedrails?  3  -TW     Moving to and from a bed to a chair (including a wheelchair)?  2  -TW     Standing up from a chair using your arms (e.g., wheelchair, bedside chair)?  2  -TW     Climbing 3-5 steps with a railing?  1  -TW     To walk in hospital room?  2  -TW     AM-PAC 6 Clicks Score (PT)  13  -TW     Row Name 12/06/20 1215          Functional Assessment    Outcome Measure Options  AM-PAC 6 Clicks Basic Mobility (PT)  -TW       User Key  (r) = Recorded By, (t) = Taken By, (c) = Cosigned By    Initials Name Provider Type    Fani Carlson PT Physical Therapist        Physical Therapy Education                 Title: PT OT SLP Therapies (In Progress)     Topic: Physical Therapy (In Progress)     Point: Mobility training (Done)     Learning Progress Summary           Patient Acceptance, E,D, DU by  at 12/6/2020 1215    Comment: Pt education for LE ther ex technique as well as technique for transfers using rolling walker.    Acceptance, E,TB,D, VU,NR by  at 12/4/2020 1557    Comment: safety with mobility and sequencing of rw.    Acceptance, E,TB,D, VU,NR by  at 12/3/2020 1525    Comment: Exercise techniques and safety with mobility    Acceptance, E,D, NR by  at 12/2/2020 1119    Comment: PT education for technique/safety with rolling and boosting in bed.                   Point: Home exercise program (Done)     Learning Progress Summary           Patient Acceptance, E,D, DU by  at 12/6/2020 1215    Comment: Pt education for LE ther  ex technique as well as technique for transfers using rolling walker.    Acceptance, E,TB,D, VU,NR by  at 12/3/2020 1525    Comment: Exercise techniques and safety with mobility                   Point: Body mechanics (Not Started)     Learner Progress:  Not documented in this visit.                      User Key     Initials Effective Dates Name Provider Type Discipline     03/07/18 -  Uche Flores, PTA Physical Therapy Assistant PT     03/26/19 -  Fani Prince PT Physical Therapist PT              PT Recommendation and Plan  Planned Therapy Interventions (PT): balance training, bed mobility training, gait training, home exercise program, patient/family education, strengthening, transfer training  Plan of Care Reviewed With: patient  Progress: improving  Outcome Summary: PT treatment completed with good pt participation. Pt is demonstrating increased understanding for LE ther ex as well as improved following of directions for mobility. Pt's B knee pain did limit standing with rolling walke this date with max assist x 20 sec. Pt performed LE ther ex per flowsheet and sitting balance activities EOB. Cont current PT POC.     Time Calculation:   PT Charges     Row Name 12/06/20 1215             Time Calculation    Start Time  1146  -TW      Stop Time  1215  -TW      Time Calculation (min)  29 min  -TW      PT Received On  12/06/20  -TW         Timed Charges    67197 - PT Therapeutic Exercise Minutes  15  -TW      47514 - PT Therapeutic Activity Minutes  14  -TW        User Key  (r) = Recorded By, (t) = Taken By, (c) = Cosigned By    Initials Name Provider Type     Fani Prince PT Physical Therapist        Therapy Charges for Today     Code Description Service Date Service Provider Modifiers Qty    16142975628 HC PT THER PROC EA 15 MIN 12/6/2020 Fani Prince, PT GP 1    23103317259 HC PT THERAPEUTIC ACT EA 15 MIN 12/6/2020 Fani Prince, PT GP 1          PT G-Codes  Outcome Measure Options: AM-PAC 6  Clicks Basic Mobility (PT)  AM-PAC 6 Clicks Score (PT): 13    Fani Prince, PT  12/6/2020

## 2020-12-07 LAB
GLUCOSE BLDC GLUCOMTR-MCNC: 123 MG/DL (ref 70–130)
GLUCOSE BLDC GLUCOMTR-MCNC: 134 MG/DL (ref 70–130)
GLUCOSE BLDC GLUCOMTR-MCNC: 155 MG/DL (ref 70–130)
GLUCOSE BLDC GLUCOMTR-MCNC: 214 MG/DL (ref 70–130)

## 2020-12-07 PROCEDURE — 63710000001 INSULIN DETEMIR PER 5 UNITS: Performed by: FAMILY MEDICINE

## 2020-12-07 PROCEDURE — 99232 SBSQ HOSP IP/OBS MODERATE 35: CPT | Performed by: FAMILY MEDICINE

## 2020-12-07 PROCEDURE — 97530 THERAPEUTIC ACTIVITIES: CPT

## 2020-12-07 PROCEDURE — 63710000001 INSULIN ASPART PER 5 UNITS: Performed by: EMERGENCY MEDICINE

## 2020-12-07 PROCEDURE — 82962 GLUCOSE BLOOD TEST: CPT

## 2020-12-07 PROCEDURE — 97116 GAIT TRAINING THERAPY: CPT

## 2020-12-07 RX ADMIN — INSULIN ASPART 4 UNITS: 100 INJECTION, SOLUTION INTRAVENOUS; SUBCUTANEOUS at 06:40

## 2020-12-07 RX ADMIN — APIXABAN 2.5 MG: 2.5 TABLET, FILM COATED ORAL at 20:09

## 2020-12-07 RX ADMIN — CARVEDILOL 6.25 MG: 6.25 TABLET, FILM COATED ORAL at 20:09

## 2020-12-07 RX ADMIN — ALBUTEROL SULFATE 2 PUFF: 90 AEROSOL, METERED RESPIRATORY (INHALATION) at 12:43

## 2020-12-07 RX ADMIN — INSULIN ASPART 5 UNITS: 100 INJECTION, SOLUTION INTRAVENOUS; SUBCUTANEOUS at 12:43

## 2020-12-07 RX ADMIN — INSULIN ASPART 5 UNITS: 100 INJECTION, SOLUTION INTRAVENOUS; SUBCUTANEOUS at 17:00

## 2020-12-07 RX ADMIN — Medication 1 TABLET: at 09:40

## 2020-12-07 RX ADMIN — INSULIN ASPART 5 UNITS: 100 INJECTION, SOLUTION INTRAVENOUS; SUBCUTANEOUS at 09:00

## 2020-12-07 RX ADMIN — ALBUTEROL SULFATE 2 PUFF: 90 AEROSOL, METERED RESPIRATORY (INHALATION) at 16:19

## 2020-12-07 RX ADMIN — ROPINIROLE HYDROCHLORIDE 0.5 MG: 0.25 TABLET, FILM COATED ORAL at 20:09

## 2020-12-07 RX ADMIN — Medication 1 CAPSULE: at 20:09

## 2020-12-07 RX ADMIN — GABAPENTIN 400 MG: 400 CAPSULE ORAL at 20:09

## 2020-12-07 RX ADMIN — ALBUTEROL SULFATE 2 PUFF: 90 AEROSOL, METERED RESPIRATORY (INHALATION) at 20:09

## 2020-12-07 RX ADMIN — CARBIDOPA AND LEVODOPA 1 TABLET: 25; 100 TABLET ORAL at 20:09

## 2020-12-07 RX ADMIN — QUETIAPINE FUMARATE 25 MG: 25 TABLET ORAL at 09:40

## 2020-12-07 RX ADMIN — ATORVASTATIN CALCIUM 40 MG: 40 TABLET, FILM COATED ORAL at 20:09

## 2020-12-07 RX ADMIN — ALBUTEROL SULFATE 2 PUFF: 90 AEROSOL, METERED RESPIRATORY (INHALATION) at 06:42

## 2020-12-07 RX ADMIN — CARVEDILOL 6.25 MG: 6.25 TABLET, FILM COATED ORAL at 09:40

## 2020-12-07 RX ADMIN — APIXABAN 2.5 MG: 2.5 TABLET, FILM COATED ORAL at 09:40

## 2020-12-07 RX ADMIN — TAMSULOSIN HYDROCHLORIDE 0.4 MG: 0.4 CAPSULE ORAL at 09:40

## 2020-12-07 RX ADMIN — Medication 1 CAPSULE: at 09:40

## 2020-12-07 RX ADMIN — SODIUM CHLORIDE, PRESERVATIVE FREE 10 ML: 5 INJECTION INTRAVENOUS at 09:39

## 2020-12-07 RX ADMIN — GABAPENTIN 400 MG: 400 CAPSULE ORAL at 09:40

## 2020-12-07 RX ADMIN — Medication 5 MG: at 20:09

## 2020-12-07 RX ADMIN — QUETIAPINE FUMARATE 25 MG: 25 TABLET ORAL at 20:09

## 2020-12-07 RX ADMIN — CARBIDOPA AND LEVODOPA 1 TABLET: 25; 100 TABLET ORAL at 16:19

## 2020-12-07 RX ADMIN — INSULIN ASPART 2 UNITS: 100 INJECTION, SOLUTION INTRAVENOUS; SUBCUTANEOUS at 12:42

## 2020-12-07 RX ADMIN — CARBIDOPA AND LEVODOPA 1 TABLET: 25; 100 TABLET ORAL at 09:40

## 2020-12-07 RX ADMIN — OXYCODONE HYDROCHLORIDE AND ACETAMINOPHEN 500 MG: 500 TABLET ORAL at 09:40

## 2020-12-07 RX ADMIN — PANTOPRAZOLE SODIUM 40 MG: 40 TABLET, DELAYED RELEASE ORAL at 06:41

## 2020-12-07 RX ADMIN — INSULIN DETEMIR 30 UNITS: 100 INJECTION, SOLUTION SUBCUTANEOUS at 20:09

## 2020-12-07 NOTE — THERAPY TREATMENT NOTE
Patient Name: Jak Pierre  : 1940    MRN: [id number]                              Today's Date: 2020       Admit Date: 2020    Visit Dx:     ICD-10-CM ICD-9-CM   1. Pneumonia due to COVID-19 virus  U07.1 480.8    J12.89    2. 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)   • Acute renal failure superimposed on stage 3 chronic kidney disease (CMS/HCC)   • Chronic kidney disease, stage III (moderate)   • Pneumonia due to COVID-19 virus   • COVID-19   • Thrombocytopenia (CMS/HCC)     Past Medical History:   Diagnosis Date   • Angina of effort (CMS/HCC)    • Aortic valve replaced    • Arthritis    • Cataract    • CHF (congestive heart failure) (CMS/HCC)    • Colitis    • Diabetes (CMS/HCC)    • Diverticulitis    • Gall stones    • Heart disease    • Hyperlipidemia    • Hypertension    • Hypertension    • Impaired functional mobility, balance, gait, and endurance    • Kidney stones    • Skin cancer     left shoulder   • Tremor      Past Surgical History:   Procedure Laterality Date   • AORTIC VALVE REPAIR/REPLACEMENT  2016   • COLONOSCOPY  2018   • HERNIA REPAIR  1963    x2   • SINUS SURGERY  1978     General Information     Row Name 20 8205          Physical Therapy Time and Intention    Document Type  therapy note (daily note)  -RM     Mode of Treatment  physical therapy  -RM     Row Name 20 2733          General Information    Patient Profile Reviewed  yes  -RM     Existing Precautions/Restrictions  fall;oxygen therapy device and L/min 2 lpm  -RM     Row Name 20 2851          Safety Issues, Functional Mobility    Safety  Issues Affecting Function (Mobility)  awareness of need for assistance;insight into deficits/self-awareness;positioning of assistive device;safety precaution awareness;safety precautions follow-through/compliance  -RM     Impairments Affecting Function (Mobility)  balance;endurance/activity tolerance;cognition;strength;pain;motor planning  -RM     Cognitive Impairments, Mobility Safety/Performance  attention;awareness, need for assistance;impulsivity;insight into deficits/self-awareness;safety precaution awareness;safety precaution follow-through  -RM       User Key  (r) = Recorded By, (t) = Taken By, (c) = Cosigned By    Initials Name Provider Type     Uche Flores, ROGER Physical Therapy Assistant        Mobility     Row Name 12/07/20 1644          Bed Mobility    Supine-Sit [place] (Bed Mobility)  minimum assist (75% patient effort);verbal cues  -RM     Assistive Device (Bed Mobility)  bed rails;draw sheet;head of bed elevated  -     Row Name 12/07/20 1644          Sit-Stand Transfer    Sit-Stand [place] (Transfers)  minimum assist (75% patient effort);moderate assist (50% patient effort);verbal cues  -RM     Assistive Device (Sit-Stand Transfers)  walker, front-wheeled  -RM     Row Name 12/07/20 1644          Gait/Stairs (Locomotion)    [place] Level (Gait)  contact guard;minimum assist (75% patient effort);verbal cues  -RM     Assistive Device (Gait)  walker, front-wheeled  -RM     Distance in Feet (Gait)  12' and 24' with one seated rest.  -RM     Deviations/Abnormal Patterns (Gait)  weight shifting decreased;base of support, wide;gait speed decreased;stride length decreased  -RM       User Key  (r) = Recorded By, (t) = Taken By, (c) = Cosigned By    Initials Name Provider Type     Uche Flores, ROGER Physical Therapy Assistant        Obj/Interventions    No documentation.       Goals/Plan    No documentation.       Clinical Impression     Row Name 12/07/20 1650          Pain     Additional Documentation  Pain Scale: FACES Pre/Post-Treatment (Group)  -RM     Row Name 12/07/20 1650          Pain Scale: FACES Pre/Post-Treatment    Pain: FACES Scale, Pretreatment  2-->hurts little bit  -RM     Posttreatment Pain Rating  2-->hurts little bit  -RM     Pain Location - Side  Bilateral  -RM     Pain Location  knee  -RM     Row Name 12/07/20 1650          Plan of Care Review    Plan of Care Reviewed With  patient  -RM     Progress  improving  -RM     Outcome Summary  Pt tolerated treatment well. Pt was able to ambulate 12' and 24' with one seated rest with rw cga/min a.  See flowsheet for details.  -RM     Row Name 12/07/20 1650          Vital Signs    Pre SpO2 (%)  96  -RM     O2 Delivery Pre Treatment  supplemental O2  -RM     Intra SpO2 (%)  94  -RM     O2 Delivery Intra Treatment  supplemental O2  -RM     Post SpO2 (%)  95  -RM     O2 Delivery Post Treatment  supplemental O2  -RM     Pre Patient Position  Supine  -RM     Intra Patient Position  Standing  -RM     Post Patient Position  Sitting  -RM     Row Name 12/07/20 1650          Positioning and Restraints    Pre-Treatment Position  in bed  -RM     Post Treatment Position  chair  -RM     In Chair  reclined;call light within reach;encouraged to call for assist;exit alarm on;notified nsg  -RM       User Key  (r) = Recorded By, (t) = Taken By, (c) = Cosigned By    Initials Name Provider Type    RM Uche Flores, PTA Physical Therapy Assistant        Outcome Measures     Row Name 12/07/20 1655          How much help from another person do you currently need...    Turning from your back to your side while in flat bed without using bedrails?  3  -RM     Moving from lying on back to sitting on the side of a flat bed without bedrails?  3  -RM     Moving to and from a bed to a chair (including a wheelchair)?  3  -RM     Standing up from a chair using your arms (e.g., wheelchair, bedside chair)?  3  -RM     Climbing 3-5 steps with a railing?  1  -RM      To walk in hospital room?  3  -RM     AM-PAC 6 Clicks Score (PT)  16  -RM     Row Name 12/07/20 1656          Functional Assessment    Outcome Measure Options  AM-PAC 6 Clicks Basic Mobility (PT)  -       User Key  (r) = Recorded By, (t) = Taken By, (c) = Cosigned By    Initials Name Provider Type     Uche Flores, PTA Physical Therapy Assistant        Physical Therapy Education                 Title: PT OT SLP Therapies (In Progress)     Topic: Physical Therapy (In Progress)     Point: Mobility training (Done)     Learning Progress Summary           Patient Acceptance, E,TB,D, VU,NR by  at 12/7/2020 1658    Comment: safety with mobility    Acceptance, E,D, DU by  at 12/6/2020 1215    Comment: Pt education for LE ther ex technique as well as technique for transfers using rolling walker.    Acceptance, E,TB,D, VU,NR by  at 12/4/2020 1557    Comment: safety with mobility and sequencing of rw.    Acceptance, E,TB,D, VU,NR by  at 12/3/2020 1525    Comment: Exercise techniques and safety with mobility    Acceptance, E,D, NR by  at 12/2/2020 1119    Comment: PT education for technique/safety with rolling and boosting in bed.                   Point: Home exercise program (Done)     Learning Progress Summary           Patient Acceptance, E,D, DU by  at 12/6/2020 1215    Comment: Pt education for LE ther ex technique as well as technique for transfers using rolling walker.    Acceptance, E,TB,D, VU,NR by  at 12/3/2020 1525    Comment: Exercise techniques and safety with mobility                   Point: Body mechanics (Not Started)     Learner Progress:  Not documented in this visit.                      User Key     Initials Effective Dates Name Provider Type Discipline     03/07/18 -  Uche Flores, ROGER Physical Therapy Assistant PT     03/26/19 -  Fani Prince, ERICKA Physical Therapist PT              PT Recommendation and Plan     Plan of Care Reviewed With: patient  Progress:  improving  Outcome Summary: Pt tolerated treatment well. Pt was able to ambulate 12' and 24' with one seated rest with rw cga/min a.  See flowsheet for details.     Time Calculation:   PT Charges     Row Name 12/07/20 1659             Time Calculation    Start Time  1417  -RM      Stop Time  1442  -RM      Time Calculation (min)  25 min  -RM      PT Received On  12/07/20  -RM      PT Goal Re-Cert Due Date  12/12/20  -RM         Time Calculation- PT    Total Timed Code Minutes- PT  25 minute(s)  -RM         Timed Charges    85572 - Gait Training Minutes   17  -RM      45218 - PT Therapeutic Activity Minutes  8  -RM        User Key  (r) = Recorded By, (t) = Taken By, (c) = Cosigned By    Initials Name Provider Type    Uche Yates, ROGER Physical Therapy Assistant        Therapy Charges for Today     Code Description Service Date Service Provider Modifiers Qty    51208343509 HC GAIT TRAINING EA 15 MIN 12/7/2020 Uche Flores, PTA GP 1    64675152586 HC PT THERAPEUTIC ACT EA 15 MIN 12/7/2020 Uche Flores, PTA GP 1          PT G-Codes  Outcome Measure Options: AM-PAC 6 Clicks Basic Mobility (PT)  AM-PAC 6 Clicks Score (PT): 16    Uche Flores PTA  12/7/2020

## 2020-12-07 NOTE — PLAN OF CARE
Goal Outcome Evaluation:  Plan of Care Reviewed With: patient  Progress: improving  Outcome Summary: Pt tolerated treatment well. Pt was able to ambulate 12' and 24' with one seated rest with rw cga/min a.  See flowsheet for details.

## 2020-12-07 NOTE — PROGRESS NOTES
[facility]    Name:  Jak Pierre   Age:  80 y.o.  Sex:  male  :  1940  MRN:  [id number]   Visit Number:  [id number]  Admission Date:  2020  Date Of Service:  20  Primary Care Physician:  Jason Foy MD     LOS: 14 days :      Chief Complaint:  Follow up covid pneumonia        Subjective / Interval History: The patient was seen this morning. He is no longer confused. He is talkative and awake and alert. He denies chest pain,shortness of breath, abdominal pain.   No acute events occurred overnight.      Review of Systems:     General ROS: Patient denies any fevers, chills or loss of consciousness. Positive generalized weakness  Ophthalmic ROS: Denies any diplopia or transient loss of vision.  ENT ROS: Denies sore throat, nasal congestion or ear pain.   Respiratory ROS: Positive cough and shortness of breath.  Cardiovascular ROS: Denies chest pain or palpitations. No history of exertional chest pain.   Gastrointestinal ROS: Denies nausea and vomiting. Denies any abdominal pain. No diarrhea.  Genitourinary ROS: Denies dysuria or hematuria.  Musculoskeletal ROS: Denies back pain. No muscle pain. No calf pain.  Neurological ROS: Denies any focal weakness. No loss of consciousness. Denies any numbness. Denies neck pain.   Dermatological ROS: Denies any redness or pruritis.    Vital Signs:    Temp:  [97.1 °F (36.2 °C)-98.1 °F (36.7 °C)] 97.8 °F (36.6 °C)  Heart Rate:  [] 74  Resp:  [18-20] 20  BP: ()/(57-73) 115/73    Intake and output:    I/O last 3 completed shifts:  In: 470 [P.O.:360; I.V.:110]  Out: 550 [Urine:550]  I/O this shift:  In: 720 [P.O.:720]  Out: 550 [Urine:550]    Physical Examination:    General Appearance:    Elderly man. Alert and cooperative, not in any acute distress.   Head:    Atraumatic and normocephalic, without obvious abnormality.   Eyes:            PERRLA, conjunctivae and sclerae normal, no Icterus. No  pallor. Extraocular movements are within normal limits.   Throat:   No oral lesions, no thrush, oral mucosa moist.   Neck:   Supple, trachea midline, no thyromegaly, no carotid bruit.   Lungs:     Chest shape is normal. Breath sounds heard bilaterally equally.  No crackles or wheezing. No Pleural rub or bronchial breathing.    Heart:    Normal S1 and S2, no murmur, no gallop, no rub. No JVD   Abdomen:     Normal bowel sounds, no masses, no organomegaly. Soft     nontender, nondistended, no guarding, no rebound                tenderness   Extremities:   Moves all extremities well, no edema, no cyanosis, no           clubbing   Skin:   No bleeding, bruising or rash.   Neurologic:  No tremor, sensation intact, Motor power is normal and equal bilaterally.   Laboratory results:    Results from last 7 days   Lab Units 12/06/20  0529 12/05/20  0834 12/04/20  0533   SODIUM mmol/L 139 136 134*   POTASSIUM mmol/L 4.5 4.1 4.2   CHLORIDE mmol/L 106 103 99   CO2 mmol/L 28.0 27.3 27.4   BUN mg/dL 26* 31* 39*   CREATININE mg/dL 1.15 1.15 1.31*   CALCIUM mg/dL 8.9 8.8 8.8   GLUCOSE mg/dL 128* 106* 224*     Results from last 7 days   Lab Units 12/05/20  0558 12/04/20  0533 12/03/20  0528   WBC 10*3/mm3 12.09* 13.16* 15.14*   HEMOGLOBIN g/dL 12.7* 11.9* 12.1*   HEMATOCRIT % 38.6 35.8* 37.3*   PLATELETS 10*3/mm3 70* 67* 75*                 Results from last 7 days   Lab Units 12/02/20  1812   PH, ARTERIAL pH units 7.479   PO2 ART mm Hg 59.6*   PCO2, ARTERIAL mm Hg 39.2   HCO3 ART mmol/L 29.1*       I have reviewed the patient's laboratory results.    Radiology results:    Imaging Results (Last 24 Hours)     ** No results found for the last 24 hours. **          I have reviewed the patient's radiology reports.    Medication Review:     I have reviewed the patient's active and prn medications.     Assessment:    1.  Acute hypoxic respiratory failure, present on admission secondary to #2, improved.  2.  Bilateral pneumonia secondary to  COVID-19, improved.  3.  Acute renal failure, present on admission, resolved.  4.  Chronic thrombocytopenia.  5.  History of DVT on Eliquis.  6.  Sleep apnea syndrome.  7.  Diabetes mellitus type 2.      Plan:  Continue to monitor inpatient. Await approval for acute rehab. He is not strong enough to return home yet with his wife.  Titrate oxygen. Patient happy with improvement. He states feeling very satisfied he has improvement.  Palliative consulted and had been following in his care.     Medication risks and benefits were discussed in detail. Patient reported satisfaction with care delivered and treatment plan.     Carrol Castro DO  12/07/20  18:05 EST

## 2020-12-07 NOTE — PROGRESS NOTES
Adult Nutrition  Assessment/PES    Patient Name:  Jak Pierre  YOB: 1940  MRN: [id number]  Admit Date:  11/23/2020    Assessment Date:  12/7/2020    Comments:    Recommend:  1. Consider adding cardiac and renal modifications to current diet order as medically appropriate and tolerated.  2. Encourage PO intake. Average intake ~75% x 9 meals.   3. Continue Boost Pudding BID,  Boost Glucose Control TID and Arginaid BID.  4. Continue a renal multivitamin with minerals daily.     RD to follow pt and available PRN.      Reason for Assessment     Row Name 12/07/20 1544          Reason for Assessment    Reason For Assessment  follow-up protocol     Diagnosis  cardiac disease;diabetes diagnosis/complications;cancer diagnosis/related complications;infection/sepsis;renal disease;pulmonary disease;gastrointestinal disease;hematological/related complications PNA COVID, acute resp failure, cellulitis, DM 2, CKD 3, CHF, diverticulitis, HTN, HLD, thrombocytopenia, hx skin cancer     Identified At Risk by Screening Criteria  BMI;large or nonhealing wound, burn or pressure injury             Labs/Tests/Procedures/Meds     Row Name 12/07/20 154          Labs/Procedures/Meds    Lab Results Reviewed  reviewed, pertinent     Lab Results Comments  Low: Alb, Platelets; High: BUN, Gluc,        Medications    Pertinent Medications Reviewed  reviewed, pertinent     Pertinent Medications Comments  Lipitor, Nephro-shereen, Novolog, Levemir, Protonix, vitamin C         Physical Findings     Row Name 12/07/20 3502          Physical Findings    Overall Physical Appearance  obese     Skin  other (see comments) multiple skin tears           Nutrition Prescription Ordered     Row Name 12/07/20 1540          Nutrition Prescription PO    Current PO Diet  Regular     Supplement  Arginaid;Boost Glucose Control (Glucerna Shake);Boost Pudding (Ensure Pudding)     Common Modifiers  Consistent Carbohydrate         Evaluation of Received  Nutrient/Fluid Intake     Row Name 12/07/20 1548          PO Evaluation    Number of Days PO Intake Evaluated  3 days     Number of Meals  9     % PO Intake  75               Problem/Interventions:  Problem 1     Row Name 12/07/20 1549          Nutrition Diagnoses Problem 1    Problem 1  Altered Nutrition Related to Labs     Etiology (related to)  Medical Diagnosis     Endocrine  DM2     Hematological  Thrombocytopenia     Renal  CKD     Signs/Symptoms (evidenced by)  Biochemical     Specific Labs Noted  BUN;Glucose;Platelets         Problem 2     Row Name 12/07/20 1549          Nutrition Diagnoses Problem 2    Problem 2  Increased Nutrient Needs     Macronutrient  Kcal;Protein;Fluid     Etiology (related to)  Medical Diagnosis     Pulmonary/Critical Care  Pneumonia;Other (comment) COVID     Signs/Symptoms (evidenced by)  Report/Observation     Reported/Observed By  MD         Problem 3     Row Name 12/07/20 1549          Nutrition Diagnoses Problem 3    Problem 3  Overweight/Obesity     Etiology (related to)  Factors Affecting Nutrition     Reported/Observed By  RN     Signs/Symptoms (evidenced by)  BMI     BMI  25 - 29.9         Problem 4     Row Name 12/07/20 1549          Nutrition Diagnoses Problem 4    Problem 4  Predicted Suboptimal Intake     Etiology (related to)  Factors Affecting Nutrition     Food Habit/Preferences  Small Meals     Signs/Symptoms (evidenced by)  PO Intake     Percent (%) intake recorded  75 %     Over number of meals  9     Resolved?  Yes         Intervention Goal     Row Name 12/07/20 1549          Intervention Goal    General  Improved nutrition related lab(s);Meet nutritional needs for age/condition     PO  Meet estimated needs;Maintain intake     Weight  No significant weight loss         Nutrition Intervention     Row Name 12/07/20 1549          Nutrition Intervention    RD/Tech Action  Follow Tx progress;Encourage intake     Recommended/Ordered  Diet         Nutrition Prescription      Row Name 12/07/20 1550          Nutrition Prescription PO    PO Prescription  Begin/change diet     Begin/Change Diet to  Regular     Common Modifiers  Cardiac;Consistent Carbohydrate;Renal     New PO Prescription Ordered?  No, recommended        Other Orders    Obtain Weight  Daily     Obtain Weight Ordered?  No, recommended     Supplement  Vitamin mineral supplement Continue renal     Supplement Ordered?  No, recommended         Education/Evaluation     Row Name 12/07/20 1550          Education    Education  Education not appropriate at this time     Please explain  Other (comment) isolation status        Monitor/Evaluation    Monitor  Per protocol;I&O;PO intake;Supplement intake;Pertinent labs;Weight;Skin status           Electronically signed by:  Ayah Pope RD  12/07/20 15:50 EST

## 2020-12-07 NOTE — PROGRESS NOTES
Continued Stay Note   [facility]     Patient Name: Jak Pierre  MRN: [id number]  Today's Date: 12/7/2020    Admit Date: 11/23/2020    Discharge Plan     Row Name 12/07/20 0830       Plan    Plan Called to Noemy at [facility] and will submit to MD and start precert if appropriate.  Will not need a Covid.  CM will continue to follow  11:11 EST  Received message from Noemy at [facility] and is starting precert.  Currently has no bed but some discharges are scheduled.  She is hoping to have a bed by the time they get precert.          Discharge Codes    No documentation.       Expected Discharge Date and Time     Expected Discharge Date Expected Discharge Time    Dec 5, 2020             Maricarmen Ribeiro RN

## 2020-12-07 NOTE — PLAN OF CARE
Goal Outcome Evaluation:  Plan of Care Reviewed With: patient  Progress: improving     VSS, no complaints noted throughout the night, alert and oriented x3. On 2LNC throughout the night, rested well.

## 2020-12-07 NOTE — PROGRESS NOTES
Pt remains off wrist restraints x3 days.  [facility] plans to start precert for pt today.  They do not have a bed today, but anticipate a bed by time precert is received.  Palliative will continue to follow.

## 2020-12-08 LAB
GLUCOSE BLDC GLUCOMTR-MCNC: 103 MG/DL (ref 70–130)
GLUCOSE BLDC GLUCOMTR-MCNC: 165 MG/DL (ref 70–130)
GLUCOSE BLDC GLUCOMTR-MCNC: 210 MG/DL (ref 70–130)
GLUCOSE BLDC GLUCOMTR-MCNC: 221 MG/DL (ref 70–130)

## 2020-12-08 PROCEDURE — 99231 SBSQ HOSP IP/OBS SF/LOW 25: CPT | Performed by: FAMILY MEDICINE

## 2020-12-08 PROCEDURE — 63710000001 INSULIN DETEMIR PER 5 UNITS: Performed by: FAMILY MEDICINE

## 2020-12-08 PROCEDURE — 82962 GLUCOSE BLOOD TEST: CPT

## 2020-12-08 PROCEDURE — 63710000001 INSULIN ASPART PER 5 UNITS: Performed by: EMERGENCY MEDICINE

## 2020-12-08 PROCEDURE — 97165 OT EVAL LOW COMPLEX 30 MIN: CPT

## 2020-12-08 RX ADMIN — APIXABAN 2.5 MG: 2.5 TABLET, FILM COATED ORAL at 21:07

## 2020-12-08 RX ADMIN — Medication 5 MG: at 21:07

## 2020-12-08 RX ADMIN — CARBIDOPA AND LEVODOPA 1 TABLET: 25; 100 TABLET ORAL at 15:24

## 2020-12-08 RX ADMIN — TAMSULOSIN HYDROCHLORIDE 0.4 MG: 0.4 CAPSULE ORAL at 09:32

## 2020-12-08 RX ADMIN — QUETIAPINE FUMARATE 25 MG: 25 TABLET ORAL at 21:07

## 2020-12-08 RX ADMIN — OXYCODONE HYDROCHLORIDE AND ACETAMINOPHEN 500 MG: 500 TABLET ORAL at 09:32

## 2020-12-08 RX ADMIN — CARBIDOPA AND LEVODOPA 1 TABLET: 25; 100 TABLET ORAL at 21:07

## 2020-12-08 RX ADMIN — INSULIN DETEMIR 30 UNITS: 100 INJECTION, SOLUTION SUBCUTANEOUS at 21:16

## 2020-12-08 RX ADMIN — PANTOPRAZOLE SODIUM 40 MG: 40 TABLET, DELAYED RELEASE ORAL at 06:29

## 2020-12-08 RX ADMIN — Medication 1 CAPSULE: at 21:07

## 2020-12-08 RX ADMIN — INSULIN ASPART 2 UNITS: 100 INJECTION, SOLUTION INTRAVENOUS; SUBCUTANEOUS at 12:24

## 2020-12-08 RX ADMIN — ALBUTEROL SULFATE 2 PUFF: 90 AEROSOL, METERED RESPIRATORY (INHALATION) at 12:24

## 2020-12-08 RX ADMIN — INSULIN ASPART 5 UNITS: 100 INJECTION, SOLUTION INTRAVENOUS; SUBCUTANEOUS at 09:00

## 2020-12-08 RX ADMIN — INSULIN ASPART 5 UNITS: 100 INJECTION, SOLUTION INTRAVENOUS; SUBCUTANEOUS at 12:25

## 2020-12-08 RX ADMIN — CARVEDILOL 6.25 MG: 6.25 TABLET, FILM COATED ORAL at 09:32

## 2020-12-08 RX ADMIN — INSULIN ASPART 4 UNITS: 100 INJECTION, SOLUTION INTRAVENOUS; SUBCUTANEOUS at 17:20

## 2020-12-08 RX ADMIN — GABAPENTIN 400 MG: 400 CAPSULE ORAL at 09:32

## 2020-12-08 RX ADMIN — ALBUTEROL SULFATE 2 PUFF: 90 AEROSOL, METERED RESPIRATORY (INHALATION) at 08:00

## 2020-12-08 RX ADMIN — Medication 1 TABLET: at 09:32

## 2020-12-08 RX ADMIN — CARVEDILOL 6.25 MG: 6.25 TABLET, FILM COATED ORAL at 21:07

## 2020-12-08 RX ADMIN — QUETIAPINE FUMARATE 25 MG: 25 TABLET ORAL at 09:32

## 2020-12-08 RX ADMIN — INSULIN ASPART 5 UNITS: 100 INJECTION, SOLUTION INTRAVENOUS; SUBCUTANEOUS at 17:24

## 2020-12-08 RX ADMIN — ALBUTEROL SULFATE 2 PUFF: 90 AEROSOL, METERED RESPIRATORY (INHALATION) at 15:24

## 2020-12-08 RX ADMIN — CARBIDOPA AND LEVODOPA 1 TABLET: 25; 100 TABLET ORAL at 09:32

## 2020-12-08 RX ADMIN — GABAPENTIN 400 MG: 400 CAPSULE ORAL at 21:07

## 2020-12-08 RX ADMIN — ATORVASTATIN CALCIUM 40 MG: 40 TABLET, FILM COATED ORAL at 21:07

## 2020-12-08 RX ADMIN — Medication 1 CAPSULE: at 09:32

## 2020-12-08 RX ADMIN — APIXABAN 2.5 MG: 2.5 TABLET, FILM COATED ORAL at 09:32

## 2020-12-08 NOTE — PLAN OF CARE
Goal Outcome Evaluation:  Plan of Care Reviewed With: patient  Progress: improving     Vss, no changes noted throughout the night, on 2LNC. Rested well.

## 2020-12-08 NOTE — THERAPY EVALUATION
Patient Name: Jak Pierre  : 1940    MRN: [id number]                              Today's Date: 2020       Admit Date: 2020    Visit Dx:     ICD-10-CM ICD-9-CM   1. Pneumonia due to COVID-19 virus  U07.1 480.8    J12.89    2. 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)   • Acute renal failure superimposed on stage 3 chronic kidney disease (CMS/HCC)   • Chronic kidney disease, stage III (moderate)   • Pneumonia due to COVID-19 virus   • COVID-19   • Thrombocytopenia (CMS/HCC)     Past Medical History:   Diagnosis Date   • Angina of effort (CMS/HCC)    • Aortic valve replaced    • Arthritis    • Cataract    • CHF (congestive heart failure) (CMS/HCC)    • Colitis    • Diabetes (CMS/HCC)    • Diverticulitis    • Gall stones    • Heart disease    • Hyperlipidemia    • Hypertension    • Hypertension    • Impaired functional mobility, balance, gait, and endurance    • Kidney stones    • Skin cancer     left shoulder   • Tremor      Past Surgical History:   Procedure Laterality Date   • AORTIC VALVE REPAIR/REPLACEMENT  2016   • COLONOSCOPY  2018   • HERNIA REPAIR  1963    x2   • SINUS SURGERY  1978     General Information     Row Name 20 1323          OT Time and Intention    Document Type  evaluation  -SD     Mode of Treatment  occupational therapy  -SD     Row Name 20 1323          General Information    Patient Profile Reviewed  yes  -SD     Prior Level of Function  independent:;all household mobility  -SD     Existing Precautions/Restrictions  fall;oxygen therapy device and L/min  -SD     Row Name 20 1323          Living  Environment    Lives With  spouse  -SD     Row Name 12/08/20 1323          Cognition    Orientation Status (Cognition)  oriented x 4  -SD     Row Name 12/08/20 1323          Safety Issues, Functional Mobility    Safety Issues Affecting Function (Mobility)  safety precautions follow-through/compliance;safety precaution awareness;positioning of assistive device;insight into deficits/self-awareness;awareness of need for assistance;sequencing abilities  -SD     Impairments Affecting Function (Mobility)  balance;coordination;endurance/activity tolerance;shortness of breath;strength  -SD       User Key  (r) = Recorded By, (t) = Taken By, (c) = Cosigned By    Initials Name Provider Type    SD Sonia Castillo OT Occupational Therapist        Mobility/ADL's     Row Name 12/08/20 1324          Bed Mobility    Bed Mobility  sit-supine  -SD     Rolling Left [place] (Bed Mobility)  minimum assist (75% patient effort)  -SD     Assistive Device (Bed Mobility)  head of bed elevated  -SD     Row Name 12/08/20 1324          Transfers    Transfers  sit-stand transfer  -SD     Sit-Stand [place] (Transfers)  minimum assist (75% patient effort)  -SD     Row Name 12/08/20 1324          Sit-Stand Transfer    Assistive Device (Sit-Stand Transfers)  walker, front-wheeled  -[facility] Name 12/08/20 1324          Functional Mobility    Functional Mobility- Ind. Level  minimum assist (75% patient effort)  -SD     Functional Mobility- Device  rolling walker  -SD     Functional Mobility-Distance (Feet)  12  -SD     Functional Mobility- Safety Issues  balance decreased during turns;sequencing ability decreased;step length decreased;weight-shifting ability decreased;supplemental O2  -SD     Row Name 12/08/20 1324          Activities of Daily Living    BADL Assessment/Intervention  bathing;upper body dressing;lower body dressing;grooming;feeding;toileting  -SD     Row Name 12/08/20 1324          Bathing Assessment/Intervention     [place] Level (Bathing)  moderate assist (50% patient effort)  -SD     Row Name 12/08/20 1324          Upper Body Dressing Assessment/Training    [place] Level (Upper Body Dressing)  supervision  -SD     Row Name 12/08/20 1324          Lower Body Dressing Assessment/Training    [place] Level (Lower Body Dressing)  don;socks;minimum assist (75% patient effort)  -SD     [facility] Name 12/08/20 1324          Grooming Assessment/Training    [place] Level (Grooming)  supervision  -SD     [facility] Name 12/08/20 1324          Self-Feeding Assessment/Training    [place] Level (Feeding)  supervision  -SD     Row Name 12/08/20 1324          Toileting Assessment/Training    [place] Level (Toileting)  minimum assist (75% patient effort)  -SD       User Key  (r) = Recorded By, (t) = Taken By, (c) = Cosigned By    Initials Name Provider Type    Sonia Mcwilliams OT Occupational Therapist        Obj/Interventions     [facility] Name 12/08/20 1325          Range of Motion Comprehensive    General Range of Motion  bilateral upper extremity ROM WFL  -SD     Row Name 12/08/20 1325          Strength Comprehensive (MMT)    General Manual Muscle Testing (MMT) Assessment  upper extremity strength deficits identified  -SD     Comment, General Manual Muscle Testing (MMT) Assessment  UB 3+/5  -SD       User Key  (r) = Recorded By, (t) = Taken By, (c) = Cosigned By    Initials Name Provider Type    Sonia Mcwilliams OT Occupational Therapist        Goals/Plan     Row Name 12/08/20 1328          Transfer Goal 1 (OT)    Activity/Assistive Device (Transfer Goal 1, OT)  sit-to-stand/stand-to-sit;walker, rolling  -SD     [place] Level/Cues Needed (Transfer Goal 1, OT)  standby assist  -SD     Time Frame (Transfer Goal 1, OT)  long term goal (LTG)  -SD     Progress/Outcome (Transfer Goal 1, OT)  goal ongoing  -SD     [facility] Name 12/08/20 1328          Dressing Goal 1 (OT)    Activity/Device (Dressing Goal 1, OT)  lower body  dressing  -SD     [place]/Cues Needed (Dressing Goal 1, OT)  contact guard assist  -SD     Time Frame (Dressing Goal 1, OT)  2 weeks  -SD     Progress/Outcome (Dressing Goal 1, OT)  goal ongoing  -SD     Row Name 12/08/20 1328          Toileting Goal 1 (OT)    Activity/Device (Toileting Goal 1, OT)  toileting skills, all;commode  -SD     [place] Level/Cues Needed (Toileting Goal 1, OT)  contact guard assist  -SD     Time Frame (Toileting Goal 1, OT)  2 weeks  -SD     Progress/Outcome (Toileting Goal 1, OT)  goal ongoing  -SD     Row Name 12/08/20 1328          Strength Goal 1 (OT)    Strength Goal 1 (OT)  Patient to perform UB ther ex as tolerated  -SD     Time Frame (Strength Goal 1, OT)  long term goal (LTG)  -SD     Progress/Outcome (Strength Goal 1, OT)  goal ongoing  -SD     Row Name 12/08/20 1328          Therapy Assessment/Plan (OT)    Planned Therapy Interventions (OT)  activity tolerance training;adaptive equipment training;BADL retraining;patient/caregiver education/training;strengthening exercise;transfer/mobility retraining  -SD       User Key  (r) = Recorded By, (t) = Taken By, (c) = Cosigned By    Initials Name Provider Type    Sonia Mcwilliams OT Occupational Therapist        Clinical Impression     Row Name 12/08/20 1326          Pain Scale: Numbers Pre/Post-Treatment    Pretreatment Pain Rating  0/10 - no pain  -SD     Posttreatment Pain Rating  0/10 - no pain  -SD     Row Name 12/08/20 1326          Plan of Care Review    Plan of Care Reviewed With  patient  -SD     Progress  no change  -SD     Outcome Summary  OT eval completed. Patient presents deficits in strength, endurance, balance, mobility and ADL performance. Patient is expected to benefit from continued OT services prior to DC.  -SD     Row Name 12/08/20 1326          Therapy Assessment/Plan (OT)    Patient/Family Therapy Goal Statement (OT)  Increase strength and mobility  -SD     Rehab Potential (OT)  good, to achieve  stated therapy goals  -SD     Criteria for Skilled Therapeutic Interventions Met (OT)  skilled treatment is necessary  -SD     Therapy Frequency (OT)  3 times/wk 5 times if indicated  -SD     Row Name 12/08/20 1326          Therapy Plan Review/Discharge Plan (OT)    Anticipated Discharge Disposition (OT)  inpatient rehabilitation facility  -SD     Row Name 12/08/20 1326          Vital Signs    Pre SpO2 (%)  95  -SD     O2 Delivery Pre Treatment  supplemental O2  -SD     Intra SpO2 (%)  94  -SD     O2 Delivery Intra Treatment  supplemental O2  -SD     Post SpO2 (%)  96  -SD     O2 Delivery Post Treatment  supplemental O2  -SD     Pre Patient Position  Sitting  -SD     Intra Patient Position  Standing  -SD     Post Patient Position  Supine  -SD     Row Name 12/08/20 1326          Positioning and Restraints    Pre-Treatment Position  sitting in chair/recliner  -SD     Post Treatment Position  bed  -SD     In Bed  supine;call light within reach;encouraged to call for assist;exit alarm on  -SD       User Key  (r) = Recorded By, (t) = Taken By, (c) = Cosigned By    Initials Name Provider Type    Sonia Mcwilliams OT Occupational Therapist        Outcome Measures     Row Name 12/08/20 1329          How much help from another is currently needed...    Putting on and taking off regular lower body clothing?  3  -SD     Bathing (including washing, rinsing, and drying)  2  -SD     Toileting (which includes using toilet bed pan or urinal)  3  -SD     Putting on and taking off regular upper body clothing  3  -SD     Taking care of personal grooming (such as brushing teeth)  3  -SD     Eating meals  3  -SD     AM-PAC 6 Clicks Score (OT)  17  -SD     Row Name 12/08/20 1329          Functional Assessment    Outcome Measure Options  AM-PAC 6 Clicks Daily Activity (OT)  -SD       User Key  (r) = Recorded By, (t) = Taken By, (c) = Cosigned By    Initials Name Provider Type    Sonia Mcwilliams OT Occupational Therapist         Occupational Therapy Education                 Title: PT OT SLP Therapies (In Progress)     Topic: Occupational Therapy (In Progress)     Point: ADL training (Done)     Description:   Instruct learner(s) on proper safety adaptation and remediation techniques during self care or transfers.   Instruct in proper use of assistive devices.              Learning Progress Summary           Patient Acceptance, E,TB, VU by SD at 12/8/2020 4520    Comment: Benefit of OT; OT POC                   Point: Home exercise program (Not Started)     Description:   Instruct learner(s) on appropriate technique for monitoring, assisting and/or progressing therapeutic exercises/activities.              Learner Progress:  Not documented in this visit.          Point: Precautions (Not Started)     Description:   Instruct learner(s) on prescribed precautions during self-care and functional transfers.              Learner Progress:  Not documented in this visit.          Point: Body mechanics (Not Started)     Description:   Instruct learner(s) on proper positioning and spine alignment during self-care, functional mobility activities and/or exercises.              Learner Progress:  Not documented in this visit.                      User Key     Initials Effective Dates Name Provider Type Discipline    SD 03/07/18 -  Sonia Castillo OT Occupational Therapist OT              OT Recommendation and Plan  Planned Therapy Interventions (OT): activity tolerance training, adaptive equipment training, BADL retraining, patient/caregiver education/training, strengthening exercise, transfer/mobility retraining  Therapy Frequency (OT): 3 times/wk(5 times if indicated)  Plan of Care Review  Plan of Care Reviewed With: patient  Progress: no change  Outcome Summary: OT eval completed. Patient presents deficits in strength, endurance, balance, mobility and ADL performance. Patient is expected to benefit from continued OT services prior to DC.     Time  Calculation:   Time Calculation- OT     Row Name 12/08/20 1330             Time Calculation- OT    OT Start Time  1308  -SD      OT Received On  12/08/20  -SD      OT Goal Re-Cert Due Date  12/18/20  -SD        User Key  (r) = Recorded By, (t) = Taken By, (c) = Cosigned By    Initials Name Provider Type    Sonia Mcwilliams, OT Occupational Therapist        Therapy Charges for Today     Code Description Service Date Service Provider Modifiers Qty    [id number]  OT EVAL LOW COMPLEXITY 2 12/8/2020 Sonia Castillo OT GO 1               Sonia Castillo OT  12/8/2020

## 2020-12-08 NOTE — PROGRESS NOTES
"Nephrology Progress Note.    LOS: 15 days    Patient Care Team:  Jason Foy MD as PCP - General (General Practice)    Chief Complaint:    Chief Complaint   Patient presents with   • Fever   • Shortness of Breath       Subjective:   Follow up for ALEXANDRA and Chronic Kidney disease stage 3 / Anemia.     Interval History:   Patient Complaints: none  Patient seen and examined this morning.  Events from last 24 hours noted.  No significant change, patient is up in the chair sleeping comfortably, easily arousable and in no acute distress.  Objective:    Vital Signs  /65 (BP Location: Left arm, Patient Position: Lying)   Pulse 76   Temp 97.9 °F (36.6 °C) (Oral)   Resp 20   Ht 180.3 cm (71\")   Wt 97.1 kg (214 lb 1.1 oz)   SpO2 90%   BMI 29.86 kg/m²     No intake/output data recorded.    Intake/Output Summary (Last 24 hours) at 12/8/2020 0845  Last data filed at 12/8/2020 0514  Gross per 24 hour   Intake 1080 ml   Output 700 ml   Net 380 ml       Physical Exam:  General Appearance: no acute distress,   HEENT: Oral mucosa dry, extra occular movements intact. Sclera clear.  Skin: Warm and dry  Neck: supple, no JVD, trachea midline  Lungs:Chest shape is normal. Breath sounds heard scattered rhonchi and crackles, No wheezing.   Heart: Irregular rate and rhythm. normal S1 and S2, no S3, no rub, peripheral pulses weak but palpable.  Abdomen: Obese, soft, non-tender,  present bowel sounds to auscultation  : no palpable bladder.  Extremities: Trace edema, no cyanosis or clubbing.   Neuro: Randomly does move his extremities.     Results Review:   Results from last 7 days   Lab Units 12/06/20  0529 12/05/20  0834 12/04/20  0533   SODIUM mmol/L 139 136 134*   POTASSIUM mmol/L 4.5 4.1 4.2   CHLORIDE mmol/L 106 103 99   CO2 mmol/L 28.0 27.3 27.4   BUN mg/dL 26* 31* 39*   CREATININE mg/dL 1.15 1.15 1.31*   CALCIUM mg/dL 8.9 8.8 8.8   ALBUMIN g/dL 2.50* 2.50* 2.50*   GLUCOSE mg/dL 128* 106* 224*     Estimated Creatinine " Clearance: 58.8 mL/min (by C-G formula based on SCr of 1.15 mg/dL).  Results from last 7 days   Lab Units 12/06/20  0529 12/05/20  0834 12/04/20  0533   PHOSPHORUS mg/dL 2.5 2.6 3.0     Results from last 7 days   Lab Units 12/01/20  0938   URIC ACID mg/dL 7.0     Results from last 7 days   Lab Units 12/05/20  0558 12/04/20  0533 12/03/20  0528 12/02/20  0532   WBC 10*3/mm3 12.09* 13.16* 15.14* 17.07*   HEMOGLOBIN g/dL 12.7* 11.9* 12.1* 12.4*   PLATELETS 10*3/mm3 70* 67* 75* 93*         Brief Urine Lab Results  (Last result in the past 365 days)      Color   Clarity   Blood   Leuk Est   Nitrite   Protein   CREAT   Urine HCG        12/02/20 1603 Dark Yellow Clear Trace Trace Negative Negative             No results found for: UTPCR  Imaging Results (Last 24 Hours)     ** No results found for the last 24 hours. **        albuterol sulfate HFA, 2 puff, Inhalation, 4x Daily - RT  apixaban, 2.5 mg, Oral, Q12H  atorvastatin, 40 mg, Oral, Nightly  b complex-vitamin c-folic acid, 1 tablet, Oral, Daily  carbidopa-levodopa, 1 tablet, Oral, TID  carvedilol, 6.25 mg, Oral, BID  gabapentin, 400 mg, Oral, Q12H  insulin aspart, 0-9 Units, Subcutaneous, TID AC  insulin aspart, 5 Units, Subcutaneous, TID With Meals  insulin detemir, 30 Units, Subcutaneous, Nightly  lactobacillus acidophilus, 1 capsule, Oral, BID  melatonin, 5 mg, Oral, Nightly  pantoprazole, 40 mg, Oral, Daily  QUEtiapine, 25 mg, Oral, Q12H  tamsulosin, 0.4 mg, Oral, Daily  vitamin C, 500 mg, Oral, Daily             Medication Review:   Current Facility-Administered Medications   Medication Dose Route Frequency Provider Last Rate Last Admin   • albuterol sulfate HFA (PROVENTIL HFA;VENTOLIN HFA;PROAIR HFA) inhaler 2 puff  2 puff Inhalation 4x Daily - RT Skip Raygoza DO   2 puff at 12/07/20 2009   • apixaban (ELIQUIS) tablet 2.5 mg  2.5 mg Oral Q12H Rickey Zee MD, FASN   2.5 mg at 12/07/20 2009   • atorvastatin (LIPITOR) tablet 40 mg  40 mg Oral Nightly Jaret  Skip, DO   40 mg at 12/07/20 2009   • b complex-vitamin c-folic acid (NEPHRO-AGATA) tablet 1 tablet  1 tablet Oral Daily Carrol Castro DO   1 tablet at 12/07/20 0940   • carbidopa-levodopa (SINEMET)  MG per tablet 1 tablet  [facility]   1 tablet at 12/07/20 2009   • carvedilol (COREG) tablet 6.25 mg  6.25 mg Oral BID Carrol Castro DO   6.25 mg at 12/07/20 2009   • dextrose (D50W) 25 g/ 50mL Intravenous Solution 25 g  25 g Intravenous Q15 Min PRN [facility], DO       • dextrose (GLUTOSE) oral gel 1 tube  1 tube Oral Q15 Min PRN [facility], DO       • gabapentin (NEURONTIN) capsule 400 mg  400 mg Oral Q12H [facility], DO   400 mg at 12/07/20 2009   • glucagon (human recombinant) (GLUCAGEN DIAGNOSTIC) injection 1 mg  1 mg Subcutaneous PRN [facility], DO       • insulin aspart (novoLOG) injection 0-9 Units  0-9 Units Subcutaneous TID AC [facility], DO   2 Units at 12/07/20 1242   • insulin aspart (novoLOG) injection 5 Units  5 Units Subcutaneous TID With Meals [facility], DO   5 Units at 12/07/20 1700   • insulin detemir (LEVEMIR) injection 30 Units  30 Units Subcutaneous Nightly Myranda Davalos, DO   30 Units at 12/07/20 2009   • lactobacillus acidophilus (RISAQUAD) capsule 1 capsule  1 capsule Oral BID Carrol Castro DO   1 capsule at 12/07/20 2009   • melatonin tablet 5 mg  5 mg Oral Nightly Carrol Castro DO   5 mg at 12/07/20 2009   • metoprolol tartrate (LOPRESSOR) injection 5 mg  5 mg Intravenous Q4H PRN Carrol Castro DO   5 mg at 11/27/20 2220   • pantoprazole (PROTONIX) EC tablet 40 mg  40 mg Oral Daily Carrol Castro DO   40 mg at 12/08/20 0629   • QUEtiapine (SEROquel) tablet 25 mg  25 mg Oral Q12H Juan Alberto Franco MD   25 mg at 12/07/20 2009   • rOPINIRole (REQUIP) tablet 0.5 mg  0.5 mg Oral Nightly PRN Carrol Castro DO   0.5 mg at 12/07/20 2009   • sodium chloride 0.9 % flush 10 mL  10 mL Intravenous PRN Skip Raygoza DO   10 mL at 12/07/20 0939    • tamsulosin (FLOMAX) 24 hr capsule 0.4 mg  0.4 mg Oral Daily Jaret, Naidaar DO   0.4 mg at 12/07/20 0940   • vitamin C (ASCORBIC ACID) tablet 500 mg  500 mg Oral Daily Carrol Castro DO   500 mg at 12/07/20 0940       Assessment/Plan:  1. Chronic kidney disease stage III: baseline creatinine ~1.3-1.5 fluctuates with volume status.  No further worsening of renal function noted  2. Hypertension in CKD: It is under fair control continue to watch closely.  3. Hyperkalemia: Multifactorial likely secondary to hyperglycemia as well as history of lisinopril use that may be contributing.  It is back to normal.  4. Anasarca: Continue to optimize diuretics  5. Acute respiratory failure with hypoxia  6. Pneumonia due to COVID-19 virus  7. Acute metabolic encephalopathy  8. Type 2 Diabetes Mellitus with hyperglycemia  9. Thrombocytopenia  10. History of aortic valve replacement  11. Dyslipidemia  12. History of DVT  13. Tremor    Plan:  · Clinically stable awaiting discharge to the rehab.  · Details were discussed with the patient no family in the room.  Clinically appears to be improving.  Hold off giving any diuretics at discharge.  · Details were also discussed with the hospitalist service.  He will need to follow-up with me in 6 weeks after discharge.  · Continue with rest of the current treatment plan and surveillance labs.  · Further recommendations will depend on clinical course of the patient during the current hospitalization.    · I also discussed the details with the nursing staff.  · Rest as ordered.    Rickey Zee MD, ALYSONN  12/08/20  08:45 EST    Dictated utilizing Dragon dictation.

## 2020-12-08 NOTE — PROGRESS NOTES
Continued Stay Note  [facility]     Patient Name: Jak Pierre  MRN: [id number]  Today's Date: 12/8/2020    Admit Date: 11/23/2020    Discharge Plan     Row Name 12/08/20 1248       Plan    Plan Received call from Noemy at [facility] and Alondra is requesting OT eval.  Called to Dr Castro and order entered.  Called to Carrol in Rehab and informed.  Noemy will watch for it in Epic.        Discharge Codes    No documentation.       Expected Discharge Date and Time     Expected Discharge Date Expected Discharge Time    Dec 9, 2020             Maricarmen Ribeiro RN

## 2020-12-08 NOTE — THERAPY TREATMENT NOTE
Pt is sleeping soundly at this time. Nursing reports pt has ambulated from bedside recliner to bathroom x 2 this date as well as tolerated sitting UIC x 5 hrs this date.  Therapy will f/u with pt tomorrow.

## 2020-12-08 NOTE — PROGRESS NOTES
Pt is improving.  Per note, he is more alert and improving gait distance with therapy.  He remains in Covid isolation.  His last test was 12/4/20, with abnormal results.  [facility] has accepted and is working on precert.  Discharge is anticipated for tomorrow (12/9/20).  Palliative services will continue to follow.

## 2020-12-08 NOTE — PROGRESS NOTES
[facility]    Name:  Jak Pierre   Age:  80 y.o.  Sex:  male  :  1940  MRN:  [id number]   Visit Number:  [id number]  Admission Date:  2020  Date Of Service:  20  Primary Care Physician:  Jason Foy MD     LOS: 15 days :      Chief Complaint:  Follow up covid pneumonia        Subjective / Interval History:   The patient was seen again today. He remains inpatient awaiting rehab, as he is too weak to go home. He denies abdominal pain, chest pain, shortness of breath        Review of Systems: reviewed again today    General ROS: Patient denies any fevers, chills or loss of consciousness. Persistent generalized weakness  Ophthalmic ROS: Denies any diplopia or transient loss of vision.  ENT ROS: Denies sore throat, nasal congestion or ear pain.   Respiratory ROS: Positive cough and shortness of breath.  Cardiovascular ROS: Denies chest pain or palpitations. No history of exertional chest pain.   Gastrointestinal ROS: Denies nausea and vomiting. Denies any abdominal pain. No diarrhea.  Genitourinary ROS: Denies dysuria or hematuria.  Musculoskeletal ROS: Denies back pain. No muscle pain. No calf pain.  Neurological ROS: Denies any focal weakness. No loss of consciousness. Denies any numbness. Denies neck pain.   Dermatological ROS: Denies any redness or pruritis.    Vital Signs:    Temp:  [97.3 °F (36.3 °C)-98.1 °F (36.7 °C)] 97.6 °F (36.4 °C)  Heart Rate:  [] 67  Resp:  [16-20] 20  BP: ()/(53-82) 119/61    Intake and output:    I/O last 3 completed shifts:  In: 1080 [P.O.:1080]  Out: 1100 [Urine:1100]  I/O this shift:  In: 1080 [P.O.:1080]  Out: 350 [Urine:350]    Physical Examination: reviewed again today    General Appearance:    Elderly man. Alert and cooperative, more talkative today. No distress   Head:    Atraumatic and normocephalic.   Eyes:            PERRLA, conjunctivae and sclerae normal, no icterus   Throat:   No oral  lesions, no thrush, oral mucosa moist.   Neck:   Supple, trachea midline, no thyromegaly   Lungs:    clear to auscultation bilaterally    Heart:    Normal S1 and S2, no murmur, no gallop, no rub   Abdomen:     Soft, positive bowel sounds, nontender   Extremities:   Moves all extremities well, no edema, no cyanosis, no           clubbing   Skin:   No bleeding, bruising or rash.   Neurologic:  No tremor, sensation intact, Motor power is normal and equal bilaterally.     Laboratory results:    Results from last 7 days   Lab Units 12/06/20  0529 12/05/20  0834 12/04/20  0533   SODIUM mmol/L 139 136 134*   POTASSIUM mmol/L 4.5 4.1 4.2   CHLORIDE mmol/L 106 103 99   CO2 mmol/L 28.0 27.3 27.4   BUN mg/dL 26* 31* 39*   CREATININE mg/dL 1.15 1.15 1.31*   CALCIUM mg/dL 8.9 8.8 8.8   GLUCOSE mg/dL 128* 106* 224*     Results from last 7 days   Lab Units 12/05/20  0558 12/04/20  0533 12/03/20  0528   WBC 10*3/mm3 12.09* 13.16* 15.14*   HEMOGLOBIN g/dL 12.7* 11.9* 12.1*   HEMATOCRIT % 38.6 35.8* 37.3*   PLATELETS 10*3/mm3 70* 67* 75*                 Results from last 7 days   Lab Units 12/02/20  1812   PH, ARTERIAL pH units 7.479   PO2 ART mm Hg 59.6*   PCO2, ARTERIAL mm Hg 39.2   HCO3 ART mmol/L 29.1*       I have reviewed the patient's laboratory results.    Radiology results:    Imaging Results (Last 24 Hours)     ** No results found for the last 24 hours. **          I have reviewed the patient's radiology reports.    Medication Review:     I have reviewed the patient's active and prn medications.     Assessment:    1.  Acute hypoxic respiratory failure, present on admission secondary to #2, improved.  2.  Bilateral pneumonia secondary to COVID-19, improved.  3.  Acute renal failure, present on admission, resolved.  4.  Chronic thrombocytopenia.  5.  History of DVT on Eliquis.  6.  Sleep apnea syndrome.  7.  Diabetes mellitus type 2.    Plan:  Continue to monitor inpatient awaiting rehab placement. Discussed with case  management and nurse.  He will hopefully be discharged tomorrow. Continue current care otherwise.      Medication risks and benefits were discussed in detail. Patient reported satisfaction with care delivered and treatment plan.     Carrol Castro DO  12/08/20  18:52 EST

## 2020-12-08 NOTE — PLAN OF CARE
Goal Outcome Evaluation:  Plan of Care Reviewed With: patient  Progress: no change  Outcome Summary: VSS att. No events noted this shift. Maintaining on 2L NC. Will continue to monitor.

## 2020-12-09 LAB
GLUCOSE BLDC GLUCOMTR-MCNC: 153 MG/DL (ref 70–130)
GLUCOSE BLDC GLUCOMTR-MCNC: 181 MG/DL (ref 70–130)
GLUCOSE BLDC GLUCOMTR-MCNC: 372 MG/DL (ref 70–130)
GLUCOSE BLDC GLUCOMTR-MCNC: 64 MG/DL (ref 70–130)
SARS-COV-2 RNA PNL SPEC NAA+PROBE: NOT DETECTED

## 2020-12-09 PROCEDURE — 82962 GLUCOSE BLOOD TEST: CPT

## 2020-12-09 PROCEDURE — 94799 UNLISTED PULMONARY SVC/PX: CPT

## 2020-12-09 PROCEDURE — 97110 THERAPEUTIC EXERCISES: CPT

## 2020-12-09 PROCEDURE — 97116 GAIT TRAINING THERAPY: CPT

## 2020-12-09 PROCEDURE — 63710000001 INSULIN DETEMIR PER 5 UNITS: Performed by: FAMILY MEDICINE

## 2020-12-09 PROCEDURE — 99232 SBSQ HOSP IP/OBS MODERATE 35: CPT | Performed by: FAMILY MEDICINE

## 2020-12-09 PROCEDURE — 63710000001 INSULIN ASPART PER 5 UNITS: Performed by: EMERGENCY MEDICINE

## 2020-12-09 PROCEDURE — 87635 SARS-COV-2 COVID-19 AMP PRB: CPT | Performed by: FAMILY MEDICINE

## 2020-12-09 PROCEDURE — 97530 THERAPEUTIC ACTIVITIES: CPT

## 2020-12-09 RX ADMIN — ALBUTEROL SULFATE 2 PUFF: 90 AEROSOL, METERED RESPIRATORY (INHALATION) at 16:48

## 2020-12-09 RX ADMIN — GABAPENTIN 400 MG: 400 CAPSULE ORAL at 22:13

## 2020-12-09 RX ADMIN — QUETIAPINE FUMARATE 25 MG: 25 TABLET ORAL at 08:20

## 2020-12-09 RX ADMIN — INSULIN ASPART 5 UNITS: 100 INJECTION, SOLUTION INTRAVENOUS; SUBCUTANEOUS at 11:24

## 2020-12-09 RX ADMIN — INSULIN ASPART 2 UNITS: 100 INJECTION, SOLUTION INTRAVENOUS; SUBCUTANEOUS at 06:51

## 2020-12-09 RX ADMIN — APIXABAN 2.5 MG: 2.5 TABLET, FILM COATED ORAL at 22:13

## 2020-12-09 RX ADMIN — CARVEDILOL 6.25 MG: 6.25 TABLET, FILM COATED ORAL at 22:12

## 2020-12-09 RX ADMIN — INSULIN ASPART 8 UNITS: 100 INJECTION, SOLUTION INTRAVENOUS; SUBCUTANEOUS at 11:24

## 2020-12-09 RX ADMIN — GABAPENTIN 400 MG: 400 CAPSULE ORAL at 08:20

## 2020-12-09 RX ADMIN — ALBUTEROL SULFATE 2 PUFF: 90 AEROSOL, METERED RESPIRATORY (INHALATION) at 06:52

## 2020-12-09 RX ADMIN — ALBUTEROL SULFATE 2 PUFF: 90 AEROSOL, METERED RESPIRATORY (INHALATION) at 11:23

## 2020-12-09 RX ADMIN — Medication 1 CAPSULE: at 22:11

## 2020-12-09 RX ADMIN — CARBIDOPA AND LEVODOPA 1 TABLET: 25; 100 TABLET ORAL at 08:20

## 2020-12-09 RX ADMIN — ATORVASTATIN CALCIUM 40 MG: 40 TABLET, FILM COATED ORAL at 22:11

## 2020-12-09 RX ADMIN — QUETIAPINE FUMARATE 25 MG: 25 TABLET ORAL at 22:11

## 2020-12-09 RX ADMIN — CARBIDOPA AND LEVODOPA 1 TABLET: 25; 100 TABLET ORAL at 16:48

## 2020-12-09 RX ADMIN — Medication 1 TABLET: at 08:20

## 2020-12-09 RX ADMIN — TAMSULOSIN HYDROCHLORIDE 0.4 MG: 0.4 CAPSULE ORAL at 08:20

## 2020-12-09 RX ADMIN — CARVEDILOL 6.25 MG: 6.25 TABLET, FILM COATED ORAL at 08:34

## 2020-12-09 RX ADMIN — INSULIN ASPART 5 UNITS: 100 INJECTION, SOLUTION INTRAVENOUS; SUBCUTANEOUS at 08:34

## 2020-12-09 RX ADMIN — INSULIN DETEMIR 30 UNITS: 100 INJECTION, SOLUTION SUBCUTANEOUS at 21:00

## 2020-12-09 RX ADMIN — PANTOPRAZOLE SODIUM 40 MG: 40 TABLET, DELAYED RELEASE ORAL at 06:51

## 2020-12-09 RX ADMIN — OXYCODONE HYDROCHLORIDE AND ACETAMINOPHEN 500 MG: 500 TABLET ORAL at 08:20

## 2020-12-09 RX ADMIN — Medication 1 CAPSULE: at 08:20

## 2020-12-09 RX ADMIN — CARBIDOPA AND LEVODOPA 1 TABLET: 25; 100 TABLET ORAL at 22:12

## 2020-12-09 RX ADMIN — Medication 5 MG: at 22:11

## 2020-12-09 RX ADMIN — APIXABAN 2.5 MG: 2.5 TABLET, FILM COATED ORAL at 08:20

## 2020-12-09 NOTE — THERAPY TREATMENT NOTE
Patient Name: Jak Pierre  : 1940    MRN: [id number]                              Today's Date: 2020       Admit Date: 2020    Visit Dx:     ICD-10-CM ICD-9-CM   1. Pneumonia due to COVID-19 virus  U07.1 480.8    J12.89    2. 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)   • Acute renal failure superimposed on stage 3 chronic kidney disease (CMS/HCC)   • Chronic kidney disease, stage III (moderate)   • Pneumonia due to COVID-19 virus   • COVID-19   • Thrombocytopenia (CMS/HCC)     Past Medical History:   Diagnosis Date   • Angina of effort (CMS/HCC)    • Aortic valve replaced    • Arthritis    • Cataract    • CHF (congestive heart failure) (CMS/HCC)    • Colitis    • Diabetes (CMS/HCC)    • Diverticulitis    • Gall stones    • Heart disease    • Hyperlipidemia    • Hypertension    • Hypertension    • Impaired functional mobility, balance, gait, and endurance    • Kidney stones    • Skin cancer     left shoulder   • Tremor      Past Surgical History:   Procedure Laterality Date   • AORTIC VALVE REPAIR/REPLACEMENT  2016   • COLONOSCOPY  2018   • HERNIA REPAIR  1963    x2   • SINUS SURGERY  1978     General Information     Row Name 20 1315          Physical Therapy Time and Intention    Document Type  therapy note (daily note)  -RM     Mode of Treatment  physical therapy  -RM     Row Name 20 1314          General Information    Patient Profile Reviewed  yes  -RM     Existing Precautions/Restrictions  fall;oxygen therapy device and L/min  -RM     Row Name 20 1313          Safety Issues, Functional Mobility    Safety Issues  Affecting Function (Mobility)  safety precautions follow-through/compliance;safety precaution awareness  -RM     Impairments Affecting Function (Mobility)  balance;coordination;endurance/activity tolerance;shortness of breath;strength  -RM       User Key  (r) = Recorded By, (t) = Taken By, (c) = Cosigned By    Initials Name Provider Type    Uche Yates, ROGER Physical Therapy Assistant        Mobility     Row Name 12/09/20 1324          Bed Mobility    Supine-Sit [place] (Bed Mobility)  standby assist;verbal cues  -RM     Assistive Device (Bed Mobility)  bed rails;head of bed elevated  -RM     Comment (Bed Mobility)  extended time  -RM     Row Name 12/09/20 1324          Sit-Stand Transfer    Sit-Stand [place] (Transfers)  minimum assist (75% patient effort);2 person assist;verbal cues  -RM     Assistive Device (Sit-Stand Transfers)  walker, front-wheeled  -RM     Row Name 12/09/20 1324          Gait/Stairs (Locomotion)    [place] Level (Gait)  contact guard;minimum assist (75% patient effort);verbal cues  -RM     Assistive Device (Gait)  walker, front-wheeled  -RM     Distance in Feet (Gait)  42'x2  -RM     Deviations/Abnormal Patterns (Gait)  weight shifting decreased;base of support, wide;gait speed decreased;stride length decreased  -RM     Bilateral Gait Deviations  forward flexed posture;heel strike decreased  -RM       User Key  (r) = Recorded By, (t) = Taken By, (c) = Cosigned By    Initials Name Provider Type    Uche Yates, ROGER Physical Therapy Assistant        Obj/Interventions    No documentation.       Goals/Plan    No documentation.       Clinical Impression     Row Name 12/09/20 1326          Pain Scale: Numbers Pre/Post-Treatment    Pretreatment Pain Rating  0/10 - no pain  -RM     Posttreatment Pain Rating  0/10 - no pain  -RM     Row Name 12/09/20 1326          Plan of Care Review    Plan of Care Reviewed With  patient  -RM     Progress  improving  -RM     Outcome  Summary  Pt tolerates treatment well. Pt requuired decreased assistance for bed mobility. Pt was able to advance gait distance to 42' x 2 with one seated rest cga/min a with rw.  Pt also was able to perform mobility tasks on RA with O2 saturations > 88%. Pt returned to wall O2 post treatment. See flowsheet for details.  -RM     Row Name 12/09/20 1326          Vital Signs    Pre SpO2 (%)  95  -RM     O2 Delivery Pre Treatment  room air  -RM     Intra SpO2 (%)  88  -RM     O2 Delivery Intra Treatment  room air  -RM     Post SpO2 (%)  93  -RM     O2 Delivery Post Treatment  supplemental O2 2 lpm  -RM     Pre Patient Position  Supine  -RM     Intra Patient Position  Standing  -RM     Post Patient Position  Sitting  -RM     Rest Breaks   1  -RM     Row Name 12/09/20 1326          Positioning and Restraints    Pre-Treatment Position  in bed  -RM     Post Treatment Position  chair  -RM     In Chair  reclined;call light within reach;encouraged to call for assist;exit alarm on;with OT;notified nsg  -RM       User Key  (r) = Recorded By, (t) = Taken By, (c) = Cosigned By    Initials Name Provider Type    RM Uche Flores, PTA Physical Therapy Assistant        Outcome Measures     Row Name 12/09/20 1331          How much help from another person do you currently need...    Turning from your back to your side while in flat bed without using bedrails?  4  -RM     Moving from lying on back to sitting on the side of a flat bed without bedrails?  4  -RM     Moving to and from a bed to a chair (including a wheelchair)?  3  -RM     Standing up from a chair using your arms (e.g., wheelchair, bedside chair)?  2  -RM     Climbing 3-5 steps with a railing?  2  -RM     To walk in hospital room?  3  -RM     AM-PAC 6 Clicks Score (PT)  18  -RM     Row Name 12/09/20 1331          Functional Assessment    Outcome Measure Options  AM-PAC 6 Clicks Basic Mobility (PT)  -RM       User Key  (r) = Recorded By, (t) = Taken By, (c) = Cosigned By     Initials Name Provider Type     Uche Flores, ROGER Physical Therapy Assistant        Physical Therapy Education                 Title: PT OT SLP Therapies (In Progress)     Topic: Physical Therapy (In Progress)     Point: Mobility training (Done)     Learning Progress Summary           Patient Acceptance, E,TB,D, VU,NR by  at 12/9/2020 1331    Acceptance, E,TB,D, VU,NR by  at 12/7/2020 1658    Comment: safety with mobility    Acceptance, E,D, DU by  at 12/6/2020 1215    Comment: Pt education for LE ther ex technique as well as technique for transfers using rolling walker.    Acceptance, E,TB,D, VU,NR by  at 12/4/2020 1557    Comment: safety with mobility and sequencing of rw.    Acceptance, E,TB,D, VU,NR by  at 12/3/2020 1525    Comment: Exercise techniques and safety with mobility    Acceptance, E,D, NR by  at 12/2/2020 1119    Comment: PT education for technique/safety with rolling and boosting in bed.                   Point: Home exercise program (Done)     Learning Progress Summary           Patient Acceptance, E,D, DU by  at 12/6/2020 1215    Comment: Pt education for LE ther ex technique as well as technique for transfers using rolling walker.    Acceptance, E,TB,D, VU,NR by  at 12/3/2020 1525    Comment: Exercise techniques and safety with mobility                   Point: Body mechanics (Not Started)     Learner Progress:  Not documented in this visit.                      User Key     Initials Effective Dates Name Provider Type Discipline     03/07/18 -  Uche Flores, ROGER Physical Therapy Assistant PT     03/26/19 -  Fani Prince PT Physical Therapist PT              PT Recommendation and Plan     Plan of Care Reviewed With: patient  Progress: improving  Outcome Summary: Pt tolerates treatment well. Pt requuired decreased assistance for bed mobility. Pt was able to advance gait distance to 42' x 2 with one seated rest cga/min a with rw.  Pt also was able to perform  mobility tasks on RA with O2 saturations > 88%. Pt returned to wall O2 post treatment. See flowsheet for details.     Time Calculation:   PT Charges     Row Name 12/09/20 1332             Time Calculation    Start Time  1014  -RM      Stop Time  1043  -RM      Time Calculation (min)  29 min  -RM      PT Received On  12/09/20  -RM      PT Goal Re-Cert Due Date  12/12/20  -RM         Time Calculation- PT    Total Timed Code Minutes- PT  29 minute(s)  -RM         Timed Charges    18822 - Gait Training Minutes   29  -RM        User Key  (r) = Recorded By, (t) = Taken By, (c) = Cosigned By    Initials Name Provider Type    Uche Yates, ROGER Physical Therapy Assistant        Therapy Charges for Today     Code Description Service Date Service Provider Modifiers Qty    [id number] HC GAIT TRAINING EA 15 MIN 12/9/2020 Uche Flores PTA GP 2          PT G-Codes  Outcome Measure Options: AM-PAC 6 Clicks Basic Mobility (PT)  AM-PAC 6 Clicks Score (PT): 18  AM-PAC 6 Clicks Score (OT): 17    Uche Flores PTA  12/9/2020

## 2020-12-09 NOTE — PLAN OF CARE
Goal Outcome Evaluation:  Plan of Care Reviewed With: patient  Progress: improving  Outcome Summary: pt noted with functional improvement with bed mobility and functional mobility tasks.  Pt able to sit eob with supervision and was able to walk 40' with cga using RW.  Pt completed ther ex as per flow sheet using red theraband for resistance.

## 2020-12-09 NOTE — PLAN OF CARE
Goal Outcome Evaluation:  Plan of Care Reviewed With: patient  Progress: improving  Outcome Summary: Pt tolerates treatment well. Pt requuired decreased assistance for bed mobility. Pt was able to advance gait distance to 42' x 2 with one seated rest cga/min a with rw.  Pt also was able to perform mobility tasks on RA with O2 saturations > 88%. Pt returned to wall O2 post treatment. See flowsheet for details.

## 2020-12-09 NOTE — PLAN OF CARE
Goal Outcome Evaluation:  Plan of Care Reviewed With: patient  Progress: no change  Outcome Summary: VSS.  No acute changes to pt condition to report.  Will continue to monitor.

## 2020-12-09 NOTE — THERAPY TREATMENT NOTE
Patient Name: Jak Pierre  : 1940    MRN: [id number]                              Today's Date: 2020       Admit Date: 2020    Visit Dx:     ICD-10-CM ICD-9-CM   1. Pneumonia due to COVID-19 virus  U07.1 480.8    J12.89    2. 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)   • Acute renal failure superimposed on stage 3 chronic kidney disease (CMS/HCC)   • Chronic kidney disease, stage III (moderate)   • Pneumonia due to COVID-19 virus   • COVID-19   • Thrombocytopenia (CMS/HCC)     Past Medical History:   Diagnosis Date   • Angina of effort (CMS/HCC)    • Aortic valve replaced    • Arthritis    • Cataract    • CHF (congestive heart failure) (CMS/HCC)    • Colitis    • Diabetes (CMS/[facility])    • Diverticulitis    • Gall stones    • Heart disease    • Hyperlipidemia    • Hypertension    • Hypertension    • Impaired functional mobility, balance, gait, and endurance    • Kidney stones    • Skin cancer     left shoulder   • Tremor      Past Surgical History:   Procedure Laterality Date   • AORTIC VALVE REPAIR/REPLACEMENT  2016   • COLONOSCOPY  2018   • HERNIA REPAIR  1963    x2   • SINUS SURGERY  1978     General Information     Row Name 20 1141          OT Time and Intention    Document Type  therapy note (daily note)  -     Mode of Treatment  occupational therapy  -       User Key  (r) = Recorded By, (t) = Taken By, (c) = Cosigned By    Initials Name Provider Type     Maribel Little Occupational Therapist        Mobility/ADL's     Row Name 20 1143          Bed Mobility    Bed Mobility  supine-sit  -     Supine-Sit  [place] (Bed Mobility)  supervision  -     Assistive Device (Bed Mobility)  head of bed elevated;bed rails  -[facility] Name 12/09/20 1143          Transfers    Transfers  sit-stand transfer  -     Sit-Stand [place] (Transfers)  minimum assist (75% patient effort);2 person assist  -[facility] Name 12/09/20 1143          Sit-Stand Transfer    Assistive Device (Sit-Stand Transfers)  walker, front-wheeled  -[facility] Name 12/09/20 1143          Functional Mobility    Functional Mobility- Ind. Level  contact guard assist  -     Functional Mobility- Device  rolling walker  -     Functional Mobility-Distance (Feet)  40  -     Functional Mobility- Comment  Pt walked without O2 today with sats dropping to 88%  -       User Key  (r) = Recorded By, (t) = Taken By, (c) = Cosigned By    Initials Name Provider Type    Maribel Reilly Occupational Therapist        Obj/Interventions     [facility] Name 12/09/20 1145          Shoulder (Therapeutic Exercise)    Shoulder (Therapeutic Exercise)  strengthening exercise  -     Shoulder Strengthening (Therapeutic Exercise)  bilateral;flexion;extension;horizontal aBduction/aDduction;10 repetitions;resistance band;red  -AH     Row Name 12/09/20 1145          Elbow/Forearm (Therapeutic Exercise)    Elbow/Forearm (Therapeutic Exercise)  strengthening exercise  -     Elbow/Forearm Strengthening (Therapeutic Exercise)  bilateral;flexion;extension;resistance band;red;10 repetitions  -[facility] Name 12/09/20 1145          Therapeutic Exercise    Therapeutic Exercise  shoulder;elbow/forearm  -       User Key  (r) = Recorded By, (t) = Taken By, (c) = Cosigned By    Initials Name Provider Type    Maribel Reilly Occupational Therapist        Goals/Plan    No documentation.       Clinical Impression     [facility] Name 12/09/20 1146          Pain Scale: Numbers Pre/Post-Treatment    Pretreatment Pain Rating  0/10 - no pain  -     Posttreatment Pain Rating  0/10 - no pain  -      Row Name 12/09/20 1146          Plan of Care Review    Plan of Care Reviewed With  patient  -     Progress  improving  -     Outcome Summary  pt noted with functional improvement with bed mobility and functional mobility tasks.  Pt able to sit eob with supervision and was able to walk 40' with cga using RW.  Pt completed ther ex as per flow sheet using red theraband for resistance.  -     Row Name 12/09/20 1146          Vital Signs    Pre SpO2 (%)  95  -AH     O2 Delivery Pre Treatment  supplemental O2 2L  -AH     Intra SpO2 (%)  88  -AH     O2 Delivery Intra Treatment  room air  -AH     Post SpO2 (%)  93  -AH     O2 Delivery Post Treatment  supplemental O2 2L  -AH     Row Name 12/09/20 1146          Positioning and Restraints    Pre-Treatment Position  in bed  -     Post Treatment Position  chair  -     In Chair  reclined;call light within reach;encouraged to call for assist;exit alarm on  -       User Key  (r) = Recorded By, (t) = Taken By, (c) = Cosigned By    Initials Name Provider Type    Maribel Reilly Occupational Therapist        Outcome Measures     Row Name 12/09/20 1152          How much help from another is currently needed...    Putting on and taking off regular lower body clothing?  3  -AH     Bathing (including washing, rinsing, and drying)  2  -AH     Toileting (which includes using toilet bed pan or urinal)  3  -AH     Putting on and taking off regular upper body clothing  3  -AH     Taking care of personal grooming (such as brushing teeth)  3  -AH     Eating meals  3  -AH     AM-PAC 6 Clicks Score (OT)  17  -       User Key  (r) = Recorded By, (t) = Taken By, (c) = Cosigned By    Initials Name Provider Type    Maribel Reilly Occupational Therapist        Occupational Therapy Education                 Title: PT OT SLP Therapies (In Progress)     Topic: Occupational Therapy (In Progress)     Point: ADL training (Done)     Description:   Instruct learner(s) on proper safety  adaptation and remediation techniques during self care or transfers.   Instruct in proper use of assistive devices.              Learning Progress Summary           Patient Acceptance, E,TB, VU by  at 12/9/2020 1152    Comment: benefit of increased activity and UB ex    Acceptance, E,TB, VU by SD at 12/8/2020 1329    Comment: Benefit of OT; OT POC                   Point: Home exercise program (Done)     Description:   Instruct learner(s) on appropriate technique for monitoring, assisting and/or progressing therapeutic exercises/activities.              Learning Progress Summary           Patient Acceptance, E,TB, VU by  at 12/9/2020 1152    Comment: benefit of increased activity and UB ex                   Point: Precautions (Not Started)     Description:   Instruct learner(s) on prescribed precautions during self-care and functional transfers.              Learner Progress:  Not documented in this visit.          Point: Body mechanics (Not Started)     Description:   Instruct learner(s) on proper positioning and spine alignment during self-care, functional mobility activities and/or exercises.              Learner Progress:  Not documented in this visit.                      User Key     Initials Effective Dates Name Provider Type Discipline     03/07/18 -  Maribel Little Occupational Therapist OT    SD 03/07/18 -  Sonia Castillo OT Occupational Therapist OT              OT Recommendation and Plan     Plan of Care Review  Plan of Care Reviewed With: patient  Progress: improving  Outcome Summary: pt noted with functional improvement with bed mobility and functional mobility tasks.  Pt able to sit eob with supervision and was able to walk 40' with cga using RW.  Pt completed ther ex as per flow sheet using red theraband for resistance.     Time Calculation:   Time Calculation- OT     Row Name 12/09/20 1152             Time Calculation- OT    OT Start Time  1015  -      OT Stop Time  1046  -      OT Time  Calculation (min)  31 min  -      OT Received On  12/09/20  -      OT Goal Re-Cert Due Date  12/18/20  -         Timed Charges    06106 - OT Therapeutic Exercise Minutes  10  -      34775 - OT Therapeutic Activity Minutes  21  -        User Key  (r) = Recorded By, (t) = Taken By, (c) = Cosigned By    Initials Name Provider Type    Maribel Reilly Occupational Therapist        Therapy Charges for Today     Code Description Service Date Service Provider Modifiers Qty    15016092770  OT THER PROC EA 15 MIN 12/9/2020 Maribel Little 1    [id number]  OT THERAPEUTIC ACT EA 15 MIN 12/9/2020 Maribel Little 1               Maribel Little  12/9/2020

## 2020-12-09 NOTE — PROGRESS NOTES
[facility]    Name:  Jak Pierre   Age:  80 y.o.  Sex:  male  :  1940  MRN:  [id number]   Visit Number:  [id number]  Admission Date:  2020  Date Of Service:  20  Primary Care Physician:  Jason Foy MD     LOS: 16 days :      Chief Complaint:  Follow up covid pneumonia        Subjective / Interval History:   The patient was seen again today.  He was sitting up in bed watching television in no acute distress.  He asked to talk to his wife on the phone and I helped him dial the phone.  Prior to handing him the phone, the patient's wife talk to me and stated being happy with the patient's clinical improvement.  Later today his repeat Covid screening test was negative.  I called her back to let her know that he will be taken out of Covid precautions as he is currently asymptomatic and with negative testing can be transferred to a different area of the hospital.  She was very happy to hear that she could come and visit now.  He remains weak so he is still here requiring acute rehab placement.    No acute events occurred overnight.  He denies chest pain, shortness of breath, abdominal pain.      Review of Systems:  Reviewed again today    General ROS: Patient denies any fevers, chills or loss of consciousness. Improving generalized weakness  Ophthalmic ROS: Denies any diplopia or transient loss of vision.  ENT ROS: Denies sore throat, nasal congestion or ear pain.   Respiratory ROS: Denies cough and shortness of breath.  Cardiovascular ROS: Denies chest pain or palpitations. No history of exertional chest pain.   Gastrointestinal ROS: Denies nausea and vomiting. Denies any abdominal pain. No diarrhea.  Genitourinary ROS: Denies dysuria or hematuria.  Musculoskeletal ROS: Denies back pain. No muscle pain. No calf pain.  Neurological ROS: Denies any focal weakness. No loss of consciousness. Denies any numbness. Denies neck pain.   Dermatological  ROS: Denies any redness or pruritis.    Vital Signs:    Temp:  [97.1 °F (36.2 °C)-99 °F (37.2 °C)] 97.4 °F (36.3 °C)  Heart Rate:  [75-89] 89  Resp:  [16-20] 20  BP: (108-123)/(57-70) 119/65    Intake and output:    I/O last 3 completed shifts:  In: 1200 [P.O.:1200]  Out: 700 [Urine:700]  I/O this shift:  In: 720 [P.O.:720]  Out: 250 [Urine:250]    Physical Examination:  Examined again today.    General Appearance:    Elderly man. Sitting up in bed. No acute distress.   Head:    Atraumatic and normocephalic.   Eyes:            PERRLA, EOMI   Throat:   Clear and moist   Neck:   Supple, no LAD   Lungs:    No distress. Clear bilaterally.    Heart:    Regularly regular. No murmur   Abdomen:    soft, nontender, nondistended   Extremities:   Moves all extremities well, no edema, no cyanosis, no           clubbing   Skin:   No bleeding, bruising or rash. Stable right knee excoriation   Neurologic:  No tremor, sensation intact, slowed movements but equal      Laboratory results:    Results from last 7 days   Lab Units 12/06/20  0529 12/05/20  0834 12/04/20  0533   SODIUM mmol/L 139 136 134*   POTASSIUM mmol/L 4.5 4.1 4.2   CHLORIDE mmol/L 106 103 99   CO2 mmol/L 28.0 27.3 27.4   BUN mg/dL 26* 31* 39*   CREATININE mg/dL 1.15 1.15 1.31*   CALCIUM mg/dL 8.9 8.8 8.8   GLUCOSE mg/dL 128* 106* 224*     Results from last 7 days   Lab Units 12/05/20  0558 12/04/20  0533 12/03/20  0528   WBC 10*3/mm3 12.09* 13.16* 15.14*   HEMOGLOBIN g/dL 12.7* 11.9* 12.1*   HEMATOCRIT % 38.6 35.8* 37.3*   PLATELETS 10*3/mm3 70* 67* 75*                 Results from last 7 days   Lab Units 12/02/20  1812   PH, ARTERIAL pH units 7.479   PO2 ART mm Hg 59.6*   PCO2, ARTERIAL mm Hg 39.2   HCO3 ART mmol/L 29.1*       I have reviewed the patient's laboratory results.    Radiology results:    Imaging Results (Last 24 Hours)     ** No results found for the last 24 hours. **          I have reviewed the patient's radiology reports.    Medication Review:     I  have reviewed the patient's active and prn medications.     Assessment:    1.  Acute hypoxic respiratory failure, due to COVID-19, present on admission secondary to #2, improved.  2.  Bilateral pneumonia secondary to COVID-19, improved.  3.  Acute renal failure, present on admission, resolved.  4.  Chronic thrombocytopenia.  5.  History of DVT on Eliquis.  6.  Sleep apnea syndrome.  7.  Diabetes mellitus type 2.    Plan:  Continue him on oxygen. He is asymptomatic for COVID, now here for 17 days, and COVID negative. He may be transferred out of COVID precautions.  Await acute rehab placement. Continue daily PT, OT. I discussed with wife and case management via phone.       Carrol Castro,   12/09/20  15:57 EST

## 2020-12-09 NOTE — PROGRESS NOTES
Continued Stay Note  [facility]     Patient Name: Jak Pierre  MRN: [id number]  Today's Date: 12/9/2020    Admit Date: 11/23/2020    Discharge Plan     Row Name 12/09/20 1222       Plan    Plan Message from Noemy at [facility] still waiting on precert.      13:00 EST  Called to [facility] spoke to ED A concerning precert for [facility] Case is still under reveiw.  CM will continue to follow.        Discharge Codes    No documentation.       Expected Discharge Date and Time     Expected Discharge Date Expected Discharge Time    Dec 9, 2020             Maricarmen Ribeiro RN

## 2020-12-09 NOTE — PLAN OF CARE
Problem: Noninvasive Ventilation Acute  Goal: Effective Unassisted Ventilation and Oxygenation  Outcome: Ongoing, Progressing  Intervention: Monitor and Manage Noninvasive Ventilation  Flowsheets (Taken 12/9/2020 0534)  NPPV/CPAP Maintenance:   mask adjusted   mask secure   other (see comments)     Problem: Noninvasive Ventilation Acute  Goal: Effective Unassisted Ventilation and Oxygenation  Intervention: Monitor and Manage Noninvasive Ventilation  Flowsheets (Taken 12/9/2020 0534)  NPPV/CPAP Maintenance:   mask adjusted   mask secure   other (see comments)   Goal Outcome Evaluation:  Plan of Care Reviewed With: patient  Progress: no change

## 2020-12-10 VITALS
SYSTOLIC BLOOD PRESSURE: 119 MMHG | BODY MASS INDEX: 29.97 KG/M2 | WEIGHT: 214.07 LBS | HEART RATE: 95 BPM | HEIGHT: 71 IN | TEMPERATURE: 98.6 F | RESPIRATION RATE: 18 BRPM | OXYGEN SATURATION: 90 % | DIASTOLIC BLOOD PRESSURE: 61 MMHG

## 2020-12-10 LAB
GLUCOSE BLDC GLUCOMTR-MCNC: 123 MG/DL (ref 70–130)
GLUCOSE BLDC GLUCOMTR-MCNC: 260 MG/DL (ref 70–130)
GLUCOSE BLDC GLUCOMTR-MCNC: 84 MG/DL (ref 70–130)

## 2020-12-10 PROCEDURE — 97110 THERAPEUTIC EXERCISES: CPT

## 2020-12-10 PROCEDURE — 82962 GLUCOSE BLOOD TEST: CPT

## 2020-12-10 PROCEDURE — 97116 GAIT TRAINING THERAPY: CPT

## 2020-12-10 PROCEDURE — 99239 HOSP IP/OBS DSCHRG MGMT >30: CPT | Performed by: FAMILY MEDICINE

## 2020-12-10 PROCEDURE — 94799 UNLISTED PULMONARY SVC/PX: CPT

## 2020-12-10 PROCEDURE — 97530 THERAPEUTIC ACTIVITIES: CPT

## 2020-12-10 RX ORDER — BLOOD-GLUCOSE METER
KIT MISCELLANEOUS
Qty: 200 EACH | Refills: 0 | Status: SHIPPED | OUTPATIENT
Start: 2020-12-10

## 2020-12-10 RX ORDER — QUETIAPINE FUMARATE 25 MG/1
25 TABLET, FILM COATED ORAL EVERY 12 HOURS SCHEDULED
Qty: 60 TABLET | Refills: 0
Start: 2020-12-10

## 2020-12-10 RX ORDER — LANCETS 28 GAUGE
EACH MISCELLANEOUS
Qty: 200 EACH | Refills: 0 | Status: SHIPPED | OUTPATIENT
Start: 2020-12-10

## 2020-12-10 RX ORDER — ASCORBIC ACID 500 MG
500 TABLET ORAL DAILY
Qty: 30 TABLET | Refills: 0 | Status: SHIPPED | OUTPATIENT
Start: 2020-12-11

## 2020-12-10 RX ORDER — ALBUTEROL SULFATE 90 UG/1
2 AEROSOL, METERED RESPIRATORY (INHALATION)
Qty: 8.5 G | Refills: 0 | Status: SHIPPED | OUTPATIENT
Start: 2020-12-10

## 2020-12-10 RX ORDER — SYRINGE-NEEDLE,INSULIN,0.5 ML 27GX1/2"
1 SYRINGE, EMPTY DISPOSABLE MISCELLANEOUS DAILY
Qty: 30 EACH | Refills: 0 | Status: SHIPPED | OUTPATIENT
Start: 2020-12-10

## 2020-12-10 RX ADMIN — QUETIAPINE FUMARATE 25 MG: 25 TABLET ORAL at 09:14

## 2020-12-10 RX ADMIN — APIXABAN 2.5 MG: 2.5 TABLET, FILM COATED ORAL at 09:14

## 2020-12-10 RX ADMIN — OXYCODONE HYDROCHLORIDE AND ACETAMINOPHEN 500 MG: 500 TABLET ORAL at 09:14

## 2020-12-10 RX ADMIN — SODIUM CHLORIDE, PRESERVATIVE FREE 10 ML: 5 INJECTION INTRAVENOUS at 09:15

## 2020-12-10 RX ADMIN — PANTOPRAZOLE SODIUM 40 MG: 40 TABLET, DELAYED RELEASE ORAL at 06:36

## 2020-12-10 RX ADMIN — ALBUTEROL SULFATE 2 PUFF: 90 AEROSOL, METERED RESPIRATORY (INHALATION) at 15:33

## 2020-12-10 RX ADMIN — TAMSULOSIN HYDROCHLORIDE 0.4 MG: 0.4 CAPSULE ORAL at 09:14

## 2020-12-10 RX ADMIN — GABAPENTIN 400 MG: 400 CAPSULE ORAL at 09:14

## 2020-12-10 RX ADMIN — Medication 1 TABLET: at 09:14

## 2020-12-10 RX ADMIN — Medication 1 CAPSULE: at 09:14

## 2020-12-10 RX ADMIN — CARBIDOPA AND LEVODOPA 1 TABLET: 25; 100 TABLET ORAL at 09:14

## 2020-12-10 NOTE — THERAPY TREATMENT NOTE
Patient Name: Jak Pierre  : 1940    MRN: [id number]                              Today's Date: 12/10/2020       Admit Date: 2020    Visit Dx:     ICD-10-CM ICD-9-CM   1. Pneumonia due to COVID-19 virus  U07.1 480.8    J12.89    2. Hyperglycemia  R73.9 790.29   3. Acute respiratory failure due to COVID-19 (CMS/HCC)  U07.1 518.81    J96.00 079.89     Patient Active Problem List   Diagnosis   • Tremors of nervous system   • Monoclonal gammopathy of unknown significance (MGUS)   • Acute respiratory failure due to COVID-19 (CMS/HCC)   • Acute infection without sepsis   • Acute respiratory failure with hypoxia (CMS/HCC)   • Cellulitis and abscess of left lower extremity   • Type 2 diabetes mellitus with nephropathy (CMS/HCC)   • Acute renal failure superimposed on stage 3 chronic kidney disease (CMS/HCC)   • Chronic kidney disease, stage III (moderate)   • Pneumonia due to COVID-19 virus   • COVID-19   • Thrombocytopenia (CMS/HCC)     Past Medical History:   Diagnosis Date   • Angina of effort (CMS/HCC)    • Aortic valve replaced    • Arthritis    • Cataract    • CHF (congestive heart failure) (CMS/HCC)    • Colitis    • Diabetes (CMS/[facility])    • Diverticulitis    • Gall stones    • Heart disease    • Hyperlipidemia    • Hypertension    • Hypertension    • Impaired functional mobility, balance, gait, and endurance    • Kidney stones    • Skin cancer     left shoulder   • Tremor      Past Surgical History:   Procedure Laterality Date   • AORTIC VALVE REPAIR/REPLACEMENT  2016   • COLONOSCOPY  2018   • HERNIA REPAIR  1963    x2   • SINUS SURGERY  1978     General Information     Row Name 12/10/20 1354          Physical Therapy Time and Intention    Document Type  therapy note (daily note)  -JR     Mode of Treatment  physical therapy  -JR     Row Name 12/10/20 1354          General Information    Patient Profile Reviewed  yes  -JR       User Key  (r) = Recorded By, (t) = Taken By, (c) = Cosigned By    Initials  Name Provider Type    Carrol Butler, PT Physical Therapist        Mobility     Row Name 12/10/20 [facility]          Bed Mobility    Comment (Bed Mobility)  Patient is in the chair when PT arrived  -     Row Name 12/10/20 [facility]          Sit-Stand Transfer    Sit-Stand [place] (Transfers)  minimum assist (75% patient effort)  -     Assistive Device (Sit-Stand Transfers)  walker, front-wheeled  -     Row Name 12/10/20 [facility]          Gait/Stairs (Locomotion)    [place] Level (Gait)  contact guard;minimum assist (75% patient effort);verbal cues  -     Assistive Device (Gait)  walker, front-wheeled  -     Distance in Feet (Gait)  63  -     Deviations/Abnormal Patterns (Gait)  weight shifting decreased;base of support, wide;gait speed decreased;stride length decreased  -     Bilateral Gait Deviations  forward flexed posture;heel strike decreased  -       User Key  (r) = Recorded By, (t) = Taken By, (c) = Cosigned By    Initials Name Provider Type    JR Carrol Lopez, PT Physical Therapist        Obj/Interventions     Row Name 12/10/20 [facility]          Motor Skills    Therapeutic Exercise  knee;hip;ankle  -[facility] Name 12/10/20 [facility]          Hip (Therapeutic Exercise)    Hip (Therapeutic Exercise)  isometric exercises  -     Hip Strengthening (Therapeutic Exercise)  bilateral;marching while seated;sitting;10 repetitions  -[facility] Name 12/10/20 [facility]          Knee (Therapeutic Exercise)    Knee (Therapeutic Exercise)  strengthening exercise  -     Knee Strengthening (Therapeutic Exercise)  bilateral;LAQ (long arc quad);10 repetitions  -[facility] Name 12/10/20 [facility]          Ankle (Therapeutic Exercise)    Ankle (Therapeutic Exercise)  strengthening exercise  -     Ankle AROM (Therapeutic Exercise)  bilateral;dorsiflexion;plantarflexion  -     Row Name 12/10/20 [facility]          Balance    Balance Interventions  sit to stand;standing;supported;static;dynamic;minimal challenge  -       User Key  (r)  = Recorded By, (t) = Taken By, (c) = Cosigned By    Initials Name Provider Type    Carrol Butler, PT Physical Therapist        Goals/Plan    No documentation.       Clinical Impression     Row Name 12/10/20 [facility]          Pain    Additional Documentation  Pain Scale: Numbers Pre/Post-Treatment (Group)  -[facility] Name 12/10/20 [facility]          Pain Scale: Numbers Pre/Post-Treatment    Pretreatment Pain Rating  0/10 - no pain  -     Posttreatment Pain Rating  0/10 - no pain  -[facility] Name 12/10/20 [facility]          Plan of Care Review    Plan of Care Reviewed With  patient;spouse  -     Progress  improving  -     Outcome Summary  Patient is able to perform gait with RW and CGA x 63 feet with instructions for posture and keeping RW close to him.  He is able to demonstrate improving strength and balance as seen by decreased assistance needed.  She is expected to benefit from additional PT services.  -[facility] Name 12/10/20 [facility]          Vital Signs    Pre SpO2 (%)  92  -JR     O2 Delivery Pre Treatment  room air  -JR     Intra SpO2 (%)  94  -JR     O2 Delivery Intra Treatment  room air  -JR     Post SpO2 (%)  92  -JR     O2 Delivery Post Treatment  room air  -JR     Pre Patient Position  Sitting  -JR     Intra Patient Position  Standing  -JR     Post Patient Position  Sitting  -JR     [facility] Name 12/10/20 [facility]          Positioning and Restraints    Pre-Treatment Position  sitting in chair/recliner  -JR     Post Treatment Position  chair  -JR     In Chair  reclined;call light within reach;encouraged to call for assist;with family/caregiver  -       User Key  (r) = Recorded By, (t) = Taken By, (c) = Cosigned By    Initials Name Provider Type    Carrol Butler, PT Physical Therapist        Outcome Measures     Row Name 12/10/20 [facility]          How much help from another person do you currently need...    Turning from your back to your side while in flat bed without using bedrails?  4  -JR     Moving from lying on back  to sitting on the side of a flat bed without bedrails?  4  -JR     Moving to and from a bed to a chair (including a wheelchair)?  3  -JR     Standing up from a chair using your arms (e.g., wheelchair, bedside chair)?  3  -JR     Climbing 3-5 steps with a railing?  2  -JR     To walk in hospital room?  3  -JR     AM-PAC 6 Clicks Score (PT)  19  -JR     Row Name 12/10/20 1354          Functional Assessment    Outcome Measure Options  AM-PAC 6 Clicks Basic Mobility (PT)  -       User Key  (r) = Recorded By, (t) = Taken By, (c) = Cosigned By    Initials Name Provider Type    Carrol Butler, PT Physical Therapist        Physical Therapy Education                 Title: PT OT SLP Therapies (In Progress)     Topic: Physical Therapy (Done)     Point: Mobility training (Done)     Learning Progress Summary           Patient Acceptance, E,TB,D, VU,NR by  at 12/9/2020 1331    Acceptance, E,TB,D, VU,NR by  at 12/7/2020 1658    Comment: safety with mobility    Acceptance, E,D, DU by  at 12/6/2020 1215    Comment: Pt education for LE ther ex technique as well as technique for transfers using rolling walker.    Acceptance, E,TB,D, VU,NR by  at 12/4/2020 1557    Comment: safety with mobility and sequencing of rw.    Acceptance, E,TB,D, VU,NR by  at 12/3/2020 1525    Comment: Exercise techniques and safety with mobility    Acceptance, E,D, NR by  at 12/2/2020 1119    Comment: PT education for technique/safety with rolling and boosting in bed.                   Point: Home exercise program (Done)     Learning Progress Summary           Patient Acceptance, E,D, DU by  at 12/6/2020 1215    Comment: Pt education for LE ther ex technique as well as technique for transfers using rolling walker.    Acceptance, E,TB,D, VU,NR by  at 12/3/2020 1525    Comment: Exercise techniques and safety with mobility                   Point: Body mechanics (Done)     Learning Progress Summary           Patient Acceptance, E,TB, VU by   at 12/10/2020 1544    Comment: Posture while walking                               User Key     Initials Effective Dates Name Provider Type Discipline     04/03/18 -  Carrol Lopez, PT Physical Therapist PT    RM 03/07/18 -  Uche Flores, PTA Physical Therapy Assistant PT     03/26/19 -  Fani Prince PT Physical Therapist PT              PT Recommendation and Plan     Plan of Care Reviewed With: patient, spouse  Progress: improving  Outcome Summary: Patient is able to perform gait with RW and CGA x 63 feet with instructions for posture and keeping RW close to him.  He is able to demonstrate improving strength and balance as seen by decreased assistance needed.  She is expected to benefit from additional PT services.     Time Calculation:   PT Charges     Row Name 12/10/20 1545             Time Calculation    Start Time  1354  -      Stop Time  1425  -      Time Calculation (min)  31 min  -      PT Received On  12/10/20  -      PT Goal Re-Cert Due Date  12/12/20  -        User Key  (r) = Recorded By, (t) = Taken By, (c) = Cosigned By    Initials Name Provider Type     Carrol Lopez, PT Physical Therapist        Therapy Charges for Today     Code Description Service Date Service Provider Modifiers Qty    51317776271 HC GAIT TRAINING EA 15 MIN 12/10/2020 Carrol Lopez, PT GP 1    [id number] HC PT THER PROC EA 15 MIN 12/10/2020 Carrol Lopez, PT GP 1          PT G-Codes  Outcome Measure Options: AM-PAC 6 Clicks Basic Mobility (PT)  AM-PAC 6 Clicks Score (PT): 19  AM-PAC 6 Clicks Score (OT): 18    Carrol Lopez PT  12/10/2020

## 2020-12-10 NOTE — PLAN OF CARE
Goal Outcome Evaluation:  Plan of Care Reviewed With: patient, spouse  Progress: improving  Outcome Summary: Patient is able to perform gait with RW and CGA x 63 feet with instructions for posture and keeping RW close to him.  He is able to demonstrate improving strength and balance as seen by decreased assistance needed.  She is expected to benefit from additional PT services.

## 2020-12-10 NOTE — DISCHARGE SUMMARY
[facility] HOSPITALIST   DISCHARGE SUMMARY      Name:  Jak Pierre   Age:  80 y.o.  Sex:  male  :  1940  MRN:  [id number]   Visit Number:  [id number]  Primary Care Physician:  Jason Foy MD  Date of Discharge:  12/10/2020  Admission Date:  2020    Discharge Diagnosis:       1.  Acute hypoxic respiratory failure, due to COVID-19, present on admission secondary to #2, improved.  2.  Bilateral pneumonia secondary to COVID-19, resolved  3.  Acute renal failure, present on admission, resolved.  4.  Chronic thrombocytopenia.  5.  History of DVT on Eliquis.  6.  Sleep apnea syndrome.  7.  Diabetes mellitus type 2.       History of Present Illness/Hospital Course:  The patient is a 80 yr old man with history of DVT, paroxysmal afib on anticoagulation, history of aortic valve replacement, recent history of COVID19 infection. He presented to the ER with shortness of breath and fever. He had just been hospitalized two weeks prior post 3 day course of remdesivir and dexamethasone. He was subsequently placed on antibiotics for cough and chest congestion.     Chest xray on admit showed bilateral infiltrates. He was febrile. IV fluid given and he was placed on ceftazidime and vancomycin. Creatinine was elevated at 1.66. Dr Zee was consulted. Hospitalist service asked to admit.He was provided lasix and had hyperkalemia.     He was not treated with remdesivir due to renal failure. He was continued on anticoagulation. Levemir and sliding scale insulin were used for diabetes control.     He had severe hypoxic respiratory failure requiring high flow oxygen now weaned down to 2 L NC.     He had been confused during earlier hospitalization due to hypoxia, improved now. He improved and is continued on seroquel. He had required restraints to wear oxygen during his stay, off for 5 days so far.     He had been severely weak  and getting daily PT, OT with clinical improvement.     His blood  cultures and nasal swab for MRSA remain negative and his antibiotics were discontinued.  He also completed a course of dexamethasone.  He was also seen by Dr. Pino from pulmonology during this admission.     Patient was also seen by Dr. Sheffield from palliative care service.  Advanced directives were discussed with the patient's family and the patient's CODE STATUS was changed to DNR.  Patient was also seen by nutritional service and was continued on nutritional supplements.  Due to generalized weakness, case management was consulted for short-term rehab placement.  Patient was previously planned for  Transfer to short-term rehabilitation when bed becomes available. During his stay his physical ability improved.     His COVID symptoms resolved. 17 days later, his COVID test is negative. He may be taken out of quarantine and precautions now.    He Walked 42 feet twice, with contact guard assistance and rolling walker. Insurance denied rehabilitation. The patient has gotten stronger successfully with the PT, OT received here inpatient. His request to insurance for acute rehab was denied.     He will be discharged home with wife. SW assisted discharge planning. A wheelchair was prescribed, as well as oxygen. Bedside commode ordered. Home health ordered. Wife has walker at home for him already. She was agreeable to take him home and is a retired nurse.       Consults:     Consults     Date and Time Order Name Status Description    12/3/2020 0031 Inpatient Pulmonology Consult Completed     11/27/2020 0808 Inpatient Palliative Care MD Consult Completed     11/23/2020 0428 Inpatient Nephrology Consult Completed           Procedures Performed:             Vital Signs:    Temp:  [97.5 °F (36.4 °C)-98.1 °F (36.7 °C)] 98.1 °F (36.7 °C)  Heart Rate:  [72-85] 85  Resp:  [16-18] 18  BP: ()/(43-60) 97/60    Physical Exam:      General Appearance:    Elderly man. Alert, cooperative, in no acute distress   Head:     Normocephalic, without obvious abnormality, atraumatic   Eyes:            Lids and lashes normal, conjunctivae and sclerae normal, no   icterus, no pallor, corneas clear, PERRLA   Ears:    Ears appear intact with no abnormalities noted   Throat:   No oral lesions, no thrush, oral mucosa moist   Neck:   No adenopathy, supple, trachea midline, no thyromegaly, no   carotid bruit, no JVD   Back:     No kyphosis present, no scoliosis present, no skin lesions,      erythema or scars, no tenderness to percussion or                   palpation,   range of motion normal   Lungs:     Clear to auscultation,respirations regular, even and                  unlabored    Heart:    Regular rhythm and normal rate, normal S1 and S2, no            murmur, no gallop, no rub, no click   Chest Wall:    No abnormalities observed   Abdomen:     Normal bowel sounds, no masses, no organomegaly, soft        non-tender, non-distended, no guarding, no rebound                tenderness   Rectal:     Deferred   Extremities:   Moves all extremities well, no edema, no cyanosis, no             redness   Pulses:   Pulses palpable and equal bilaterally   Skin:   No bleeding, bruising or rash   Lymph nodes:   No palpable adenopathy   Neurologic:   Cranial nerves 2 - 12 grossly intact, sensation intact, DTR       present and equal bilaterally         Pertinent Lab Results:     Results from last 7 days   Lab Units 12/06/20  0529 12/05/20  0834 12/04/20  0533   SODIUM mmol/L 139 136 134*   POTASSIUM mmol/L 4.5 4.1 4.2   CHLORIDE mmol/L 106 103 99   CO2 mmol/L 28.0 27.3 27.4   BUN mg/dL 26* 31* 39*   CREATININE mg/dL 1.15 1.15 1.31*   CALCIUM mg/dL 8.9 8.8 8.8   GLUCOSE mg/dL 128* 106* 224*     Results from last 7 days   Lab Units 12/05/20  0558 12/04/20  0533   WBC 10*3/mm3 12.09* 13.16*   HEMOGLOBIN g/dL 12.7* 11.9*   HEMATOCRIT % 38.6 35.8*   PLATELETS 10*3/mm3 70* 67*                                   Invalid input(s): USDES,  BLOODU, NITRITITE, BACT,  EP  Pain Management Panel     There is no flowsheet data to display.              Pertinent Radiology Results:    Imaging Results (All)     Procedure Component Value Units Date/Time    XR Chest 1 View [[id number]] Collected: 12/02/20 1100     Updated: 12/02/20 1201    Narrative:      PROCEDURE: XR CHEST 1 VW-        HISTORY: FOLLOW UP ON COVID PNEUMONIA; U07.1-COVID-19; J12.89-Other  viral pneumonia; R73.9-Hyperglycemia, unspecified     COMPARISON: 11/27/2020.     FINDINGS: The heart is normal in size. The mediastinum is unremarkable.  There is worsening bilateral airspace disease. There is no pneumothorax.  There are no acute osseous abnormalities.       Impression:      Worsening bilateral airspace disease.           Images were reviewed, interpreted, and dictated by Dr. Carmelina Mooney M.D.  Transcribed by Flores Harley PA-C.     This report was finalized on 12/2/2020 11:59 AM by Carmelina Mooney M.D..    XR Chest 1 View [[id number]] Collected: 11/27/20 0805     Updated: 11/27/20 0807    Narrative:      PROCEDURE: XR CHEST 1 VW-     HISTORY: respiratory failure with severe hypoxia, persistent pneumonia  comparison; U07.1-COVID-19; J12.89-Other viral pneumonia;  R73.9-Hyperglycemia, unspecified     COMPARISON: 11/23/2020.     FINDINGS: The heart is normal in size. The mediastinum is unremarkable.  There has been interval improvement in the patient's bilateral airspace  disease. No significant effusions are evident. There is no pneumothorax.   There are no acute osseous abnormalities.       Impression:      Interval improvement in the patient's bilateral airspace  disease.     Continued followup is recommended.     This report was finalized on 11/27/2020 8:05 AM by Carmelina Mooney M.D..    CT Head Without Contrast [[id number]] Collected: 11/23/20 0729     Updated: 11/23/20 0732    Narrative:      PROCEDURE: CT HEAD WO CONTRAST-     HISTORY: Mental status change, unknown cause;  U07.1-COVID-19;  J12.89-Other viral pneumonia; R73.9-Hyperglycemia, unspecified     COMPARISON:  None .     TECHNIQUE: Multiple axial CT images were performed from the foramen  magnum to the vertex without enhancement.      FINDINGS:  There is generalized cerebral volume loss. Patchy hypodensities in the  periventricular white matter consistent with chronic small vessel  ischemic change.  There is no CT evidence of hemorrhage. There is no  mass, mass effect or midline shift.  There is no hydrocephalus. The  paranasal sinuses are clear. Bone windows reveal no acute osseous  abnormalities.       Impression:      No acute intracranial process.           1106.18 mGy.cm        This study was performed with techniques to keep radiation doses as low  as reasonably achievable (ALARA). Individualized dose reduction  techniques using automated exposure control or adjustment of mA and/or  kV according to the patient size were employed.      This report was finalized on 11/23/2020 7:30 AM by Carmelina Mooney M.D..    XR Chest 1 View [[id number]] Collected: 11/23/20 0727     Updated: 11/23/20 0731    Narrative:      PROCEDURE: XR CHEST 1 VW-     HISTORY: sob, fever, covid +     COMPARISON: 11/20/2020.     FINDINGS: The heart is normal in size. The mediastinum is unremarkable.  There is been interval development of bilateral airspace disease  consistent with a pneumonia. No effusions are evident. There is no  pneumothorax.  There are no acute osseous abnormalities.       Impression:      Bilateral airspace disease consistent with a pneumonia.     Continued followup is recommended.     This report was finalized on 11/23/2020 7:29 AM by Carmelina Mooney M.D..          ECHO:    Results for orders placed during the hospital encounter of 11/23/20   Adult Transthoracic Echo Complete W/ Cont if Necessary Per Protocol    Narrative 1.  Technically difficult study.  2.  Normal left ventricular size with normal LV systolic function,  "LVEF   55-60%.  3.  Mild concentric LVH.  4.  Indeterminate LV diastolic filling pattern.  5.  Normal left atrial volume index.  6.  Normal right ventricular size and systolic function.  7.  Aortic sclerosis without significant stenosis.       Condition on Discharge:  Stable        Code status during the hospital stay:    DNR    Discharge Disposition:    Home-Health Care [facility]    Discharge Medication:       Discharge Medications      New Medications      Instructions Start Date   albuterol sulfate  (90 Base) MCG/ACT inhaler  Commonly known as: PROVENTIL HFA;VENTOLIN HFA;PROAIR HFA   2 puffs, Inhalation, 4 Times Daily - RT      ascorbic acid 500 MG tablet  Commonly known as: VITAMIN C   500 mg, Oral, Daily   Start Date: December 11, 2020     b complex-vitamin c-folic acid 0.8 MG tablet tablet   1 tablet, Oral, Daily      freestyle lancets   Test 4 times daily. E11.9 sub any lancets of choice      FREESTYLE LITE test strip  Generic drug: glucose blood   Test glucose 4 times daily. Sub any test strips of choice. E11.9      insulin detemir 100 UNIT/ML injection  Commonly known as: LEVEMIR   30 Units, Subcutaneous, Nightly      Insulin Syringe 31G X 5/16\" 1 ML misc   1 syringe, Does not apply, Daily, May sub any syringes of choice for insulin. E11.9      QUEtiapine 25 MG tablet  Commonly known as: SEROquel   25 mg, Oral, Every 12 Hours Scheduled         Changes to Medications      Instructions Start Date   carbidopa-levodopa  MG per tablet  Commonly known as: SINEMET  What changed: additional instructions   1 tablet, Oral, 3 Times Daily, With food      gabapentin 400 MG capsule  Commonly known as: NEURONTIN  What changed: See the new instructions.   400 mg, Oral, 2 times daily         Continue These Medications      Instructions Start Date   atorvastatin 40 MG tablet  Commonly known as: LIPITOR   40 mg, Oral, Daily      Calcium-Magnesium-Zinc-D3 tablet   1 tablet, Oral, Daily      carvedilol 6.25 MG " tablet  Commonly known as: COREG   6.25 mg, Oral, 2 Times Daily, One-half tablet twice daily      cyanocobalamin 100 MCG tablet tablet  Commonly known as: CYANOCOBALAMIN   100 mcg, Oral, Daily      pantoprazole 40 MG EC tablet  Commonly known as: PROTONIX   40 mg, Oral, Daily      rivaroxaban 20 MG tablet  Commonly known as: XARELTO   20 mg, Oral, Daily      rOPINIRole 0.5 MG tablet  Commonly known as: REQUIP   0.5 mg, Oral, Nightly PRN, Take 1 hour before bedtime.       tamsulosin 0.4 MG capsule 24 hr capsule  Commonly known as: FLOMAX   1 capsule, Oral, Daily         Stop These Medications    azithromycin 250 MG tablet  Commonly known as: ZITHROMAX     cefuroxime 500 MG tablet  Commonly known as: CEFTIN     CINNAMON PO     clotrimazole 1 % external solution  Commonly known as: LOTRIMIN     dexamethasone 4 MG tablet  Commonly known as: DECADRON     Entresto 24-26 MG tablet  Generic drug: sacubitril-valsartan     famotidine 20 MG tablet  Commonly known as: PEPCID     glimepiride 4 MG tablet  Commonly known as: AMARYL     predniSONE 20 MG tablet  Commonly known as: DELTASONE     Trulicity 1.5 MG/0.5ML solution pen-injector  Generic drug: Dulaglutide            Discharge Diet:     Diet Instructions     Diet: Consistent Carbohydrate      Discharge Diet: Consistent Carbohydrate    Diet: Consistent Carbohydrate, Renal; Thin      Discharge Diet:  Consistent Carbohydrate  Renal       Fluid Consistency: Thin          Activity at Discharge:     Activity Instructions     Activity as Tolerated      Up WIth Assist            Follow-up Appointments:    No future appointments.  Additional Instructions for the Follow-ups that You Need to Schedule     Ambulatory Referral to Home Health   As directed      Face to Face Visit Date: 12/10/2020    Follow-up provider for Plan of Care?: I treated the patient in an acute care facility and will not continue treatment after discharge.    Follow-up provider: ROMINA LEHMAN [[id number]]     Reason/Clinical Findings: covid pneumonia and respiratory failure    Describe mobility limitations that make leaving home difficult: taxing effort to leave home    Nursing/Therapeutic Services Requested: Occupational Therapy Physical Therapy Skilled Nursing    Skilled nursing orders: Telehealth O2 instruction Cardiopulmonary assessments Medication education    PT orders: Strengthening    Occupational orders: Strengthening Cognition Activities of daily living    Frequency: 1 Week 1         Discharge Follow-up with PCP   As directed       Currently Documented PCP:    Jason Foy MD    PCP Phone Number:    [phone]     Follow Up Details: 1 week               Test Results Pending at Discharge:    Pending Labs     Order Current Status    Green Top (No Gel) In process    Hardy Draw In process             Carrol Castro DO  12/10/20  15:44 EST      Medication risks and benefits were discussed in great detail. The patient reported being satisfied with the current treatment plan and the care delivered while hospitalized.     Time:  I spent 35 minutes preparing discharge counseling and teaching.

## 2020-12-10 NOTE — PROGRESS NOTES
Continued Stay Note   Madhu     Patient Name: Jak Pierre  MRN: [id number]  Today's Date: 12/10/2020    Admit Date: 11/23/2020    Discharge Plan     Row Name 12/10/20 1522       Plan    Plan  [facility] called and [facility] denied rehab for patient. Called wife regarding patient going home. She requests a wheelchair and bedside commode. Already using [employer]. Already involved with [facility]. Contacted [facility] to notify of discharge. Will fax orders and clinicals when available. Requested respiratory determine if patient needs pulse ox.   Faxed orders for wheelchair, o2 and bedside commode to [employer], Swathi said they will deliver to hospital. Notified nurse.     Discharge Codes    No documentation.       Expected Discharge Date and Time     Expected Discharge Date Expected Discharge Time    Dec 11, 2020             Martha Fried LCSW

## 2020-12-10 NOTE — PROGRESS NOTES
Continued Stay Note   Leung     Patient Name: Jak Pierre  MRN: [id number]  Today's Date: 12/10/2020    Admit Date: 11/23/2020    Discharge Plan     Row Name 12/10/20 1042       Plan    Plan  [facility] is still waiting on a pre cert for patient to go to [facility].        Discharge Codes    No documentation.       Expected Discharge Date and Time     Expected Discharge Date Expected Discharge Time    Dec 11, 2020             Martha Fried LCSW

## 2020-12-10 NOTE — THERAPY TREATMENT NOTE
Patient Name: Jak Pierre  : 1940    MRN: [id number]                              Today's Date: 12/10/2020       Admit Date: 2020    Visit Dx:     ICD-10-CM ICD-9-CM   1. Pneumonia due to COVID-19 virus  U07.1 480.8    J12.89    2. 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)   • Acute renal failure superimposed on stage 3 chronic kidney disease (CMS/HCC)   • Chronic kidney disease, stage III (moderate)   • Pneumonia due to COVID-19 virus   • COVID-19   • Thrombocytopenia (CMS/HCC)     Past Medical History:   Diagnosis Date   • Angina of effort (CMS/HCC)    • Aortic valve replaced    • Arthritis    • Cataract    • CHF (congestive heart failure) (CMS/HCC)    • Colitis    • Diabetes (CMS/HCC)    • Diverticulitis    • Gall stones    • Heart disease    • Hyperlipidemia    • Hypertension    • Hypertension    • Impaired functional mobility, balance, gait, and endurance    • Kidney stones    • Skin cancer     left shoulder   • Tremor      Past Surgical History:   Procedure Laterality Date   • AORTIC VALVE REPAIR/REPLACEMENT  2016   • COLONOSCOPY  2018   • HERNIA REPAIR  1963    x2   • SINUS SURGERY  1978     General Information     Row Name 12/10/20 1228          OT Time and Intention    Document Type  therapy note (daily note)  -SD     Mode of Treatment  occupational therapy  -SD       User Key  (r) = Recorded By, (t) = Taken By, (c) = Cosigned By    Initials Name Provider Type    SD Sonia Castillo OT Occupational Therapist        Mobility/ADL's     Row Name 12/10/20 1228          Bed Mobility    Bed Mobility  supine-sit  -SD     Supine-Sit  [place] (Bed Mobility)  minimum assist (75% patient effort)  -SD     Assistive Device (Bed Mobility)  bed rails;head of bed elevated  -SD     Row Name 12/10/20 1228          Transfers    Transfers  sit-stand transfer  -SD     Sit-Stand [place] (Transfers)  minimum assist (75% patient effort)  -SD     Row Name 12/10/20 1228          Sit-Stand Transfer    Assistive Device (Sit-Stand Transfers)  walker, front-wheeled  -SD     Row Name 12/10/20 1228          Functional Mobility    Functional Mobility- Ind. Level  minimum assist (75% patient effort)  -SD     Functional Mobility- Device  rolling walker  -SD     Functional Mobility-Distance (Feet)  44  -SD       User Key  (r) = Recorded By, (t) = Taken By, (c) = Cosigned By    Initials Name Provider Type    Sonia Mcwilliams OT Occupational Therapist        Obj/Interventions     Row Name 12/10/20 1229          Shoulder (Therapeutic Exercise)    Shoulder (Therapeutic Exercise)  strengthening exercise  -SD     Shoulder Strengthening (Therapeutic Exercise)  bilateral;flexion;extension;aBduction;aDduction;horizontal aBduction/aDduction;10 repetitions;2 sets;yellow;resistance band  -SD     Row Name 12/10/20 1229          Elbow/Forearm (Therapeutic Exercise)    Elbow/Forearm (Therapeutic Exercise)  strengthening exercise  -SD     Elbow/Forearm Strengthening (Therapeutic Exercise)  bilateral;flexion;extension;resistance band;yellow;10 repetitions;2 sets  -SD     Row Name 12/10/20 1229          Therapeutic Exercise    Therapeutic Exercise  shoulder;elbow/forearm  -SD       User Key  (r) = Recorded By, (t) = Taken By, (c) = Cosigned By    Initials Name Provider Type    Sonia Mcwilliams OT Occupational Therapist        Goals/Plan    No documentation.       Clinical Impression     Row Name 12/10/20 1230          Pain Scale: Numbers Pre/Post-Treatment    Pretreatment Pain Rating  0/10 - no pain  -SD     Posttreatment Pain Rating  0/10 - no pain  -SD     Row Name  12/10/20 1230          Plan of Care Review    Plan of Care Reviewed With  patient;spouse  -SD     Progress  improving  -SD     Outcome Summary  OT tx completed. Patient completed bed mobility and transfer with Min A, walked 44' with CGA; followed by UB resistance exercises. Continue OT POC  -SD     Row Name 12/10/20 1230          Vital Signs    Pre SpO2 (%)  92  -SD     O2 Delivery Pre Treatment  supplemental O2  -SD     Intra SpO2 (%)  89  -SD     O2 Delivery Intra Treatment  room air  -SD     Post SpO2 (%)  92  -SD     O2 Delivery Post Treatment  supplemental O2  -SD     Row Name 12/10/20 1230          Positioning and Restraints    Pre-Treatment Position  in bed  -SD     Post Treatment Position  chair  -SD     In Chair  sitting;call light within reach;encouraged to call for assist;with family/caregiver  -SD       User Key  (r) = Recorded By, (t) = Taken By, (c) = Cosigned By    Initials Name Provider Type    Sonia Mcwilliams OT Occupational Therapist        Outcome Measures     Row Name 12/10/20 1231          How much help from another is currently needed...    Putting on and taking off regular lower body clothing?  3  -SD     Bathing (including washing, rinsing, and drying)  2  -SD     Toileting (which includes using toilet bed pan or urinal)  3  -SD     Putting on and taking off regular upper body clothing  3  -SD     Taking care of personal grooming (such as brushing teeth)  3  -SD     Eating meals  4  -SD     AM-PAC 6 Clicks Score (OT)  18  -SD     Row Name 12/10/20 1231          Functional Assessment    Outcome Measure Options  AM-PAC 6 Clicks Daily Activity (OT)  -SD       User Key  (r) = Recorded By, (t) = Taken By, (c) = Cosigned By    Initials Name Provider Type    Sonia Mcwilliams OT Occupational Therapist        Occupational Therapy Education                 Title: PT OT SLP Therapies (In Progress)     Topic: Occupational Therapy (In Progress)     Point: ADL training (Done)     Description:    Instruct learner(s) on proper safety adaptation and remediation techniques during self care or transfers.   Instruct in proper use of assistive devices.              Learning Progress Summary           Patient Acceptance, E,TB, VU by SD at 12/10/2020 1232    Comment: Safety and sequencing during functional tf and mobility    Acceptance, E,TB, VU by  at 12/9/2020 1152    Comment: benefit of increased activity and UB ex    Acceptance, E,TB, VU by SD at 12/8/2020 1329    Comment: Benefit of OT; OT POC                   Point: Home exercise program (Done)     Description:   Instruct learner(s) on appropriate technique for monitoring, assisting and/or progressing therapeutic exercises/activities.              Learning Progress Summary           Patient Acceptance, E,TB, VU by  at 12/9/2020 1152    Comment: benefit of increased activity and UB ex                   Point: Precautions (Not Started)     Description:   Instruct learner(s) on prescribed precautions during self-care and functional transfers.              Learner Progress:  Not documented in this visit.          Point: Body mechanics (Not Started)     Description:   Instruct learner(s) on proper positioning and spine alignment during self-care, functional mobility activities and/or exercises.              Learner Progress:  Not documented in this visit.                      User Key     Initials Effective Dates Name Provider Type Discipline     03/07/18 -  Maribel Little Occupational Therapist OT    SD 03/07/18 -  Sonia Castillo OT Occupational Therapist OT              OT Recommendation and Plan  Planned Therapy Interventions (OT): activity tolerance training, adaptive equipment training, BADL retraining, patient/caregiver education/training, strengthening exercise, transfer/mobility retraining  Therapy Frequency (OT): 3 times/wk(5 times if indicated)  Plan of Care Review  Plan of Care Reviewed With: patient, spouse  Progress: improving  Outcome  Summary: OT tx completed. Patient completed bed mobility and transfer with Min A, walked 44' with CGA; followed by UB resistance exercises. Continue OT POC     Time Calculation:   Time Calculation- OT     Row Name 12/10/20 1232             Time Calculation- OT    OT Start Time  1048  -SD      OT Stop Time  1116  -SD      OT Time Calculation (min)  28 min  -SD      OT Received On  12/10/20  -SD      OT Goal Re-Cert Due Date  12/18/20  -SD         Timed Charges    92777 - OT Therapeutic Exercise Minutes  15  -SD      62760 - OT Therapeutic Activity Minutes  13  -SD        User Key  (r) = Recorded By, (t) = Taken By, (c) = Cosigned By    Initials Name Provider Type    SD Sonia Castillo OT Occupational Therapist        Therapy Charges for Today     Code Description Service Date Service Provider Modifiers Qty    88392838262 HC OT THER PROC EA 15 MIN 12/10/2020 Sonia Castillo OT GO 1    96980380690 HC OT THERAPEUTIC ACT EA 15 MIN 12/10/2020 Sonia Castillo OT GO 1               Sonia Castillo OT  12/10/2020

## 2020-12-10 NOTE — PLAN OF CARE
Goal Outcome Evaluation:  Plan of Care Reviewed With: patient, spouse  Progress: improving  Outcome Summary: OT tx completed. Patient completed bed mobility and transfer with Min A, walked 44' with CGA; followed by UB resistance exercises. Continue OT POC

## 2020-12-10 NOTE — PLAN OF CARE
Goal Outcome Evaluation:  Plan of Care Reviewed With: patient, spouse  Progress: improving  Outcome Summary: Patient is being discharged to home with walker. Medication filled here at the hospital prior to discharge. IV discontinued. Follow up appt made for him. Discharge plan reviewed with patient and spouse.

## 2020-12-11 ENCOUNTER — READMISSION MANAGEMENT (OUTPATIENT)
Dept: CALL CENTER | Facility: HOSPITAL | Age: 80
End: 2020-12-11

## 2020-12-11 NOTE — PROGRESS NOTES
Case Management Discharge Note           Provided Post Acute Provider List?: N/A  N/A Provider List Comment: icu    Selected Continued Care - Discharged on 12/10/2020 Admission date: 11/23/2020 - Discharge disposition: Home-Health Care Svc    Destination    No services have been selected for the patient.              Durable Medical Equipment Coordination complete    Service Provider Selected Services Address Phone Fax Patient Preferred    [facility]  Durable Medical Equipment [address] [facility] [id number] [phone] --       Internal Comment last updated by Martha Fried, [facility] 12/10/2020 1502    02 and wheelchair bsc                     Dialysis/Infusion    No services have been selected for the patient.              Home Medical Care Coordination complete    Service Provider Selected Services Address Phone Fax Patient Preferred    [facility]  Home Health Services [address] [facility] [address] [phone] --          Therapy    No services have been selected for the patient.              Community Resources    No services have been selected for the patient.                Selected Continued Care - Prior Encounters Includes selections from prior encounters from 8/25/2020 to 12/10/2020    Discharged on 11/12/2020 Admission date: 11/9/2020 - Discharge disposition: Home-Health Care Svc    Home Medical Care     Service Provider Selected Services Address Phone Fax Patient Preferred    [facility]  Home Health Services [address] [facility] [address] [phone] --                         Final Discharge Disposition Code: 01 - home or self-care

## 2020-12-11 NOTE — PROGRESS NOTES
Pt discharged to home with HH referral after Insurance denied approval for short term rehab.  Pt discharged to home 12/10/2020 @ 1808.   HH referral initiated.  No PC referral.

## 2020-12-11 NOTE — OUTREACH NOTE
Prep Survey      Responses   [facility]?  [facility]   Is LACE score < 7 ?  No   Eligibility  Readm Mgmt   Discharge diagnosis  Acute hypoxic respiratory failureBilateral pneumonia secondary to COVID-19.   Does the patient have one of the following disease processes/diagnoses(primary or secondary)?  COVID-19   Does the patient have Home health ordered?  Yes   What is the Home health agency?   [facility]    Is there a DME ordered?  Yes   What DME was ordered?  wheelchair and bsc [employer].,  hospital supplied pulse ox   Prep survey completed?  Yes          Jessica Cherry RN

## 2020-12-12 ENCOUNTER — READMISSION MANAGEMENT (OUTPATIENT)
Dept: CALL CENTER | Facility: HOSPITAL | Age: 80
End: 2020-12-12

## 2020-12-12 NOTE — OUTREACH NOTE
COVID-19 Week 1 Survey      Responses   [facility] patient discharged from?  [facility]   Does the patient have one of the following disease processes/diagnoses(primary or secondary)?  COVID-19   COVID-19 underlying condition?  None   Call Number  Call 1   Week 1 Call successful?  Yes   Call start time  1215   Call end time  1231   Discharge diagnosis  Acute hypoxic respiratory failureBilateral pneumonia secondary to COVID-19.   Person spoke with today (if not patient) and relationship  Parris, wife   Meds reviewed with patient/caregiver?  Yes   Is the patient having any side effects they believe may be caused by any medication additions or changes?  No   Does the patient have all medications ordered at discharge?  Yes [Seroquel d/c'd at d/c]   Is the patient taking all medications as directed (includes completed medication regime)?  Yes   Does the patient have a primary care provider?   Yes   Does the patient have an appointment with their PCP or specialist within 7 days of discharge?  Yes   Has the patient kept scheduled appointments due by today?  N/A   Has home health visited the patient within 72 hours of discharge?  Unsure   Psychosocial issues?  No   Comments  Wife is RN, pt has low platelets and has been occasionally coughing up blood and intermittent nosebleeds, wife is RN and is watching pt closely and plans to take pt to ED if bleeding becomes continuous, pt has recent hx of bldg problems   Did the patient receive a copy of their discharge instructions?  Yes   Did the patient receive a copy of COVID-19 specific instructions?  Yes   Nursing interventions  Reviewed instructions with patient   What is the patient's perception of their health status since discharge?  Improving   Does the patient have any of the following symptoms?  Cough, Loss of taste/smell [slight cough]   Nursing Interventions  Nurse provided patient education   Pulse Ox monitoring  Intermittent   Pulse Ox device source  Patient   O2 Sat  comments  93-94% on RA   O2 Sat: education provided  Sat levels, Monitoring frequency, When to seek care   Is the patient/caregiver able to teach back steps to recovery at home?  Set small, achievable goals for return to baseline health, Rest and rebuild strength, gradually increase activity, Practice good oral hygiene, Eat a well-balance diet [wife has sanitized home, all family members have had COVID and finished quarantine period, pt has dentures, wife sanitized all related items]   Is the patient/caregiver able to teach back the hierarchy of who to call/visit for symptoms/problems? PCP, Specialist, Home health nurse, Urgent Care, ED, 911  Yes [pt's blood sugars now WNL's]   COVID-19 call completed?  Yes          Elisha Ramirez RN

## 2020-12-13 ENCOUNTER — READMISSION MANAGEMENT (OUTPATIENT)
Dept: CALL CENTER | Facility: HOSPITAL | Age: 80
End: 2020-12-13

## 2020-12-13 NOTE — OUTREACH NOTE
COVID-19 Week 1 Survey      Responses   [facility] [place] discharged from?  [facility]   Does the patient have one of the following disease processes/diagnoses(primary or secondary)?  COVID-19   COVID-19 underlying condition?  None   Call Number  Call 2   Week 1 Call successful?  No   Discharge diagnosis  Acute hypoxic respiratory failureBilateral pneumonia secondary to COVID-19.          Elisha Ramirez RN

## 2020-12-14 ENCOUNTER — READMISSION MANAGEMENT (OUTPATIENT)
Dept: CALL CENTER | Facility: HOSPITAL | Age: 80
End: 2020-12-14

## 2020-12-14 NOTE — OUTREACH NOTE
COVID-19 Week 1 Survey      Responses   [facility] patient discharged from?  [facility]   Does the patient have one of the following disease processes/diagnoses(primary or secondary)?  COVID-19   COVID-19 underlying condition?  None   Call Number  Call 3   Week 1 Call successful?  Yes   Call start time  1144   Call end time  1151   Discharge diagnosis  Acute hypoxic respiratory failureBilateral pneumonia secondary to COVID-19.   Is patient permission given to speak with other caregiver?  Yes   List who call center can speak with  wife   Person spoke with today (if not patient) and relationship  Parris, wife   Is the patient taking all medications as directed (includes completed medication regime)?  Yes   Has the patient kept scheduled appointments due by today?  N/A   Psychosocial issues?  No   Does the patient have any of the following symptoms?  Cough   Nursing Interventions  Nurse provided patient education   Pulse Ox monitoring  Intermittent   Pulse Ox device source  Patient   O2 Sat comments  90 baseline and sometimes 95%   O2 Sat: education provided  Sat levels, When to seek care   O2 Sat education comments  if sats 92 or below and stay, call 911   COVID-19 call completed?  Yes   Wrap up additional comments  Doing well and only had a fever of 99, doing well with fluids          Nasra Holman RN

## 2020-12-16 NOTE — PAYOR COMM NOTE
"TO:AETNA  FROM:FIDELINA ASCENCIO, RN PHONE [phone]  DC [facility] DC 12/10    Dilan Mcbride (80 y.o. Male)     Date of Birth Social Security Number Address Home Phone MRN    1940  [address] [place] [id number] [phone] [id number]    Temple Marital Status          Unknown        Admission Date Admission Type Admitting Provider Attending Provider Department, Room/Bed    20 Emergency Carrol Castro DO  [facility] MED SURG  4, 430/1    Discharge Date Discharge Disposition Discharge Destination        12/10/2020 Home-Health Care Svc              Attending Provider: (none)   Allergies: No Known Allergies    Isolation: None   Infection: None   Code Status: Prior    Ht: 180.3 cm (71\")   Wt: 97.1 kg (214 lb 1.1 oz)    Admission Cmt: None   Principal Problem: Acute respiratory failure with hypoxia (CMS/HCC) [J96.01]                 Active Insurance as of 2020     Primary Coverage     Payor Plan Insurance Group Employer/Plan Group    AETNA MEDICARE REPLACEMENT AETNA MEDICARE REPLACEMENT [id number]     Payor Plan Address Payor Plan Phone Number Payor Plan Fax Number Effective Dates    [address] [phone]  2020 - None Entered    [place] [address]       Subscriber Name Subscriber Birth Date Member ID       DILAN MCBRIDE 1940 [id number]                 Emergency Contacts      (Rel.) Home Phone Work Phone Mobile Phone    Parris Mcbride (Spouse) [phone] -- --               Discharge Summary      Juan Alberto Franco MD [facility]              [facility]   [facility]CHARGE SUMMARY      Name:  Dilan Mcbride   Age:  80 y.o.  Sex:  male  :  1940  MRN:  [id number]   Visit Number:  [id number]    Admission Date:  2020  Date of Discharge:  2020  Primary Care Physician:  Jason Foy MD    Discharge Diagnoses:     1.  Acute hypoxic respiratory failure, present on admission secondary " to #2.  2.  Bilateral pneumonia secondary to COVID-19.  3.  Acute renal failure, present on admission.  4.  Chronic thrombocytopenia.  5.  History of DVT on Eliquis.  6.  Sleep apnea syndrome.  7.  Diabetes mellitus type 2.    Problem List:       Acute respiratory failure with hypoxia (CMS/HCC)    Type 2 diabetes mellitus with nephropathy (CMS/[facility])    Acute renal failure superimposed on stage 3 chronic kidney disease (CMS/HCC)    Pneumonia due to COVID-19 virus    Thrombocytopenia (CMS/[facility])    Presenting Problem:    Pneumonia due to COVID-19 virus [U07.1, J12.89]  COVID-19 [U07.1]     Consults:     Consulting Physician(s)     Provider Relationship Specialty    Rickey Zee MD, DELMY Consulting Physician Nephrology    Xochilt Sheffield MD Consulting Physician Internal Medicine    Diane Pino MD Consulting Physician Pulmonary Disease        Procedures Performed:    None.    History of presenting illness/Hospital Course:    This is an 80-year-old male with history of hypertension, diabetes, history of DVT on anticoagulation, history of aortic valve replacement, recent history of COVID-19 infection was brought to the emergency room by EMS with shortness of breath and fevers over the last 3 days prior to arrival.  Patient was also noted to have urinary incontinence.  He was recently in the hospital about 2 weeks ago and was treated with 3 days course of remdesivir and dexamethasone.  Subsequently he has been on oral antibiotics therapy for cough and chest congestion.  He was noted to have a fever of 102 in the emergency room.  Chest x-ray done in the emergency room revealed bilateral infiltrates.  He was given IV fluid bolus in the emergency room and was started on empiric antibiotics therapy with ceftazidime and vancomycin and was subsequently admitted to the medical floor with telemetry.  His initial creatinine was 1.66 and he was consulted by Dr. Gamez from nephrology.  He was given IV Lasix as he  also had hyperkalemia.     Patient did not receive remdesivir due to renal failure but was continued on dexamethasone.  He was continued on his home medications including anticoagulation.  He was placed on subcutaneous insulin protocol and Levemir for blood sugar control.  He was noted to have thrombocytopenia but this has been chronic.  Platelet count remained stable during the hospital stay.  Patient was initially on high flow oxygen but was subsequently was able to get down to 2 L of nasal cannula oxygen.  His course during the hospital stay was complicated by significant agitation and pulling on IV line requiring bilateral wrist restraints.  He was given Haldol as needed but was subsequently started on Seroquel which seems to have helped his agitation and he was able to calm down with Seroquel.  His wrist restraints were stopped.  He also had an episode of atrial fibrillation with rapid ventricular rate that improved with IV metoprolol.      His blood cultures and nasal swab for MRSA remain negative and his antibiotics were discontinued.  He also completed a course of dexamethasone.  He was also seen by Dr. Pino from pulmonology during this admission.    Patient was also seen by Dr. Sheffield from palliative care service.  Advanced directives were discussed with the patient's family and the patient's CODE STATUS was changed to DNR.  Patient was also seen by nutritional service and was continued on nutritional supplements.  Due to generalized weakness, case management was consulted for short-term rehab placement.  Patient will be transferred to short-term rehabilitation when bed becomes available.    Vital Signs:    Temp:  [97 °F (36.1 °C)-98.2 °F (36.8 °C)] 98.2 °F (36.8 °C)  Heart Rate:  [61-92] 61  Resp:  [17-20] 17  BP: (100-121)/(49-69) 112/56    Physical Exam:    General Appearance:  Alert and cooperative, not in any acute distress.   Head:  Atraumatic and normocephalic, without obvious abnormality.    Eyes:          PERRLA, conjunctivae and sclerae normal, no icterus. No pallor. Extraocular movements are within normal limits.   Ears:  Ears appear intact with no abnormalities noted.   Throat: No oral lesions, no thrush, oral mucosa moist.   Neck: Supple, trachea midline, no thyromegaly, no carotid bruit.   Back:   No kyphoscoliosis present. No tenderness to palpation,   range of motion normal.   Lungs:   Chest shape is normal. Breath sounds heard bilaterally equally.  No crackles or wheezing. No Pleural rub or bronchial breathing.   Heart:  Normal S1 and S2, no murmur, no gallop, no rub. No JVD.   Abdomen:   Normal bowel sounds, no masses, no organomegaly. Soft, nontender, nondistended, no guarding, no rebound tenderness.   Extremities: Moves all extremities, no edema, no cyanosis, no clubbing. 1+ pitting edema noted in bilateral upper extremities especially on the left upper extremity.   Pulses: Pulses palpable and equal bilaterally.   Skin: No bleeding or rash.  Ecchymotic patches noted on bilateral forearms.   Neurologic: Alert and oriented x 3. Moves all four limbs equally. No tremors. No facial asymmetry.     Pertinent Lab Results:     Results from last 7 days   Lab Units 12/05/20  0834 12/04/20  0533 12/03/20  0528  11/30/20  0550 11/29/20  0554   SODIUM mmol/L 136 134* 139   < > 146* 148*   POTASSIUM mmol/L 4.1 4.2 4.0   < > 4.0 3.9   CHLORIDE mmol/L 103 99 103   < > 105 104   CO2 mmol/L 27.3 27.4 25.0   < > 30.4* 35.7*   BUN mg/dL 31* 39* 40*   < > 43* 48*   CREATININE mg/dL 1.15 1.31* 1.36*   < > 1.15 1.10   CALCIUM mg/dL 8.8 8.8 9.2   < > 9.6 10.0   BILIRUBIN mg/dL  --   --   --   --  1.5* 1.3*   ALK PHOS U/L  --   --   --   --  63 67   ALT (SGPT) U/L  --   --   --   --  <5 <5   AST (SGOT) U/L  --   --   --   --  28 25   GLUCOSE mg/dL 106* 224* 64*   < > 142* 149*    < > = values in this interval not displayed.     Results from last 7 days   Lab Units 12/05/20  0558 12/04/20  0533 12/03/20  0528   WBC  10*3/mm3 12.09* 13.16* 15.14*   HEMOGLOBIN g/dL 12.7* 11.9* 12.1*   HEMATOCRIT % 38.6 35.8* 37.3*   PLATELETS 10*3/mm3 70* 67* 75*       Results from last 7 days   Lab Units 12/02/20  1812   PH, ARTERIAL pH units 7.479   PO2 ART mm Hg 59.6*   PCO2, ARTERIAL mm Hg 39.2   HCO3 ART mmol/L 29.1*     Results from last 7 days   Lab Units 12/02/20  1603   COLOR UA  Dark Yellow*   GLUCOSE UA  Negative   KETONES UA  Negative   LEUKOCYTES UA  Trace*   PH, URINE  6.0   BILIRUBIN UA  Negative   UROBILINOGEN UA  2.0 E.U./dL*     Pertinent Radiology Results:    Imaging Results (All)     Procedure Component Value Units Date/Time    XR Chest 1 View [[id number]] Collected: 12/02/20 1100     Updated: 12/02/20 1201    Narrative:      PROCEDURE: XR CHEST 1 VW-        HISTORY: FOLLOW UP ON COVID PNEUMONIA; U07.1-COVID-19; J12.89-Other  viral pneumonia; R73.9-Hyperglycemia, unspecified     COMPARISON: 11/27/2020.     FINDINGS: The heart is normal in size. The mediastinum is unremarkable.  There is worsening bilateral airspace disease. There is no pneumothorax.  There are no acute osseous abnormalities.       Impression:      Worsening bilateral airspace disease.     Images were reviewed, interpreted, and dictated by Dr. Camrelina Mooney M.D.  Transcribed by Flores Harley PA-C.     This report was finalized on 12/2/2020 11:59 AM by Carmelina Mooney M.D..    XR Chest 1 View [[id number]] Collected: 11/27/20 0805     Updated: 11/27/20 0807    Narrative:      PROCEDURE: XR CHEST 1 VW-     HISTORY: respiratory failure with severe hypoxia, persistent pneumonia  comparison; U07.1-COVID-19; J12.89-Other viral pneumonia;  R73.9-Hyperglycemia, unspecified     COMPARISON: 11/23/2020.     FINDINGS: The heart is normal in size. The mediastinum is unremarkable.  There has been interval improvement in the patient's bilateral airspace  disease. No significant effusions are evident. There is no pneumothorax.   There are no acute osseous  abnormalities.       Impression:      Interval improvement in the patient's bilateral airspace  disease.     Continued followup is recommended.     This report was finalized on 11/27/2020 8:05 AM by Carmelina Mooney M.D..    CT Head Without Contrast [[id number]] Collected: 11/23/20 0729     Updated: 11/23/20 0732    Narrative:      PROCEDURE: CT HEAD WO CONTRAST-     HISTORY: Mental status change, unknown cause; U07.1-COVID-19;  J12.89-Other viral pneumonia; R73.9-Hyperglycemia, unspecified     COMPARISON:  None .     TECHNIQUE: Multiple axial CT images were performed from the foramen  magnum to the vertex without enhancement.      FINDINGS:  There is generalized cerebral volume loss. Patchy hypodensities in the  periventricular white matter consistent with chronic small vessel  ischemic change.  There is no CT evidence of hemorrhage. There is no  mass, mass effect or midline shift.  There is no hydrocephalus. The  paranasal sinuses are clear. Bone windows reveal no acute osseous  abnormalities.       Impression:      No acute intracranial process.     1106.18 mGy.cm        This study was performed with techniques to keep radiation doses as low  as reasonably achievable (ALARA). Individualized dose reduction  techniques using automated exposure control or adjustment of mA and/or  kV according to the patient size were employed.      This report was finalized on 11/23/2020 7:30 AM by Carmelina Mooney M.D..    XR Chest 1 View [[id number]] Collected: 11/23/20 0727     Updated: 11/23/20 0731    Narrative:      PROCEDURE: XR CHEST 1 VW-     HISTORY: sob, fever, covid +     COMPARISON: 11/20/2020.     FINDINGS: The heart is normal in size. The mediastinum is unremarkable.  There is been interval development of bilateral airspace disease  consistent with a pneumonia. No effusions are evident. There is no  pneumothorax.  There are no acute osseous abnormalities.       Impression:      Bilateral airspace disease  consistent with a pneumonia.     Continued followup is recommended.     This report was finalized on 11/23/2020 7:29 AM by Carmelina Mooney M.D..        Condition on Discharge:      Stable.    Code status during the hospital stay:    Code Status and Medical Interventions:   Ordered at: 11/27/20 1547     Limited Support to NOT Include:    Intubation     Code Status:    No CPR     Medical Interventions (Level of Support Prior to Arrest):    Limited     Comments:    Discussed with spouse, daughter and son by conference call.     Discharge Disposition:    Rehab Facility or Unit (DC - External)    Discharge Medications:       Discharge Medications      New Medications      Instructions Start Date   b complex-vitamin c-folic acid 0.8 MG tablet tablet   1 tablet, Oral, Daily   Start Date: December 6, 2020     QUEtiapine 25 MG tablet  Commonly known as: SEROquel   25 mg, Oral, Every 12 Hours Scheduled         Changes to Medications      Instructions Start Date   carbidopa-levodopa  MG per tablet  Commonly known as: SINEMET  What changed: additional instructions   1 tablet, Oral, 3 Times Daily, With food      gabapentin 400 MG capsule  Commonly known as: NEURONTIN  What changed: See the new instructions.   400 mg, Oral, 2 times daily         Continue These Medications      Instructions Start Date   atorvastatin 40 MG tablet  Commonly known as: LIPITOR   40 mg, Oral, Daily      Calcium-Magnesium-Zinc-D3 tablet   1 tablet, Oral, Daily      carvedilol 6.25 MG tablet  Commonly known as: COREG   6.25 mg, Oral, 2 Times Daily, One-half tablet twice daily      CINNAMON PO   1 tablet, Oral, Daily      cyanocobalamin 100 MCG tablet tablet  Commonly known as: CYANOCOBALAMIN   100 mcg, Oral, Daily      famotidine 20 MG tablet  Commonly known as: PEPCID   20 mg, Oral, 2 Times Daily PRN      glimepiride 4 MG tablet  Commonly known as: AMARYL   TAKE 1 TABLET BY MOUTH DAILY WITH BREAKFAST OR THE FIRST MAIN MEAL OF THE DAY       pantoprazole 40 MG EC tablet  Commonly known as: PROTONIX   40 mg, Oral, Daily      rivaroxaban 20 MG tablet  Commonly known as: XARELTO   20 mg, Oral, Daily      rOPINIRole 0.5 MG tablet  Commonly known as: REQUIP   0.5 mg, Oral, Nightly PRN, Take 1 hour before bedtime.       tamsulosin 0.4 MG capsule 24 hr capsule  Commonly known as: FLOMAX   1 capsule, Oral, Daily      Trulicity 1.5 MG/0.5ML solution pen-injector  Generic drug: Dulaglutide   1.5 mg, Subcutaneous, Weekly, Takes on Fridays          Stop These Medications    azithromycin 250 MG tablet  Commonly known as: ZITHROMAX     cefuroxime 500 MG tablet  Commonly known as: CEFTIN     clotrimazole 1 % external solution  Commonly known as: LOTRIMIN     dexamethasone 4 MG tablet  Commonly known as: DECADRON     Entresto 24-26 MG tablet  Generic drug: sacubitril-valsartan     predniSONE 20 MG tablet  Commonly known as: DELTASONE            Discharge Diet:     Diet Instructions     Diet: Consistent Carbohydrate, Renal; Thin      Discharge Diet:  Consistent Carbohydrate  Renal       Fluid Consistency: Thin        Activity at Discharge:     Activity Instructions     Activity as Tolerated          Follow-up Appointments:     Contact information for follow-up providers     Jason Foy MD .    Specialty: General Practice  Contact information:  [address]  [address]  [facility] [address] [phone]                   Contact information for after-discharge care     Durable Medical Equipment     [facility] .    Service: Durable Medical Equipment  Contact information:  [address]  [facility] [address]  [phone]                 Home Medical Care     [facility] .    Service: Home Health Services  Contact information:  [address] [facility] [address] [phone]                           Test Results Pending at Discharge:    None.       Juan Alberto Franco MD  12/05/20  12:16 EST    Time: I  spent 25 minutes on this discharge activity which included: face-to-face encounter with the patient, reviewing the data in the system, coordination of the care with the nursing staff as well as consultants, documentation, and entering orders.     Dictated utilizing Dragon dictation.      Electronically signed by Juan Alberto Franco MD at 20 1249     Carrol Castro DO at 12/10/20 1549              [facility]   DISCHARGE SUMMARY      Name:  Jak Pierre   Age:  80 y.o.  Sex:  male  :  1940  MRN:  [id number]   Visit Number:  [id number]  Primary Care Physician:  Jason Foy MD  Date of Discharge:  12/10/2020  Admission Date:  2020    Discharge Diagnosis:       1.  Acute hypoxic respiratory failure, due to COVID-19, present on admission secondary to #2, improved.  2.  Bilateral pneumonia secondary to COVID-19, resolved  3.  Acute renal failure, present on admission, resolved.  4.  Chronic thrombocytopenia.  5.  History of DVT on Eliquis.  6.  Sleep apnea syndrome.  7.  Diabetes mellitus type 2.       History of Present Illness/Hospital Course:  The patient is a 80 yr old man with history of DVT, paroxysmal afib on anticoagulation, history of aortic valve replacement, recent history of COVID19 infection. He presented to the ER with shortness of breath and fever. He had just been hospitalized two weeks prior post 3 day course of remdesivir and dexamethasone. He was subsequently placed on antibiotics for cough and chest congestion.     Chest xray on admit showed bilateral infiltrates. He was febrile. IV fluid given and he was placed on ceftazidime and vancomycin. Creatinine was elevated at 1.66. Dr Zee was consulted. Hospitalist service asked to admit.He was provided lasix and had hyperkalemia.     He was not treated with remdesivir due to renal failure. He was continued on anticoagulation. Levemir and sliding scale insulin were used for diabetes control.     He had  severe hypoxic respiratory failure requiring high flow oxygen now weaned down to 2 L NC.     He had been confused during earlier hospitalization due to hypoxia, improved now. He improved and is continued on seroquel. He had required restraints to wear oxygen during his stay, off for 5 days so far.     He had been severely weak  and getting daily PT, OT with clinical improvement.     His blood cultures and nasal swab for MRSA remain negative and his antibiotics were discontinued.  He also completed a course of dexamethasone.  He was also seen by Dr. Pino from pulmonology during this admission.     Patient was also seen by Dr. Sheffield from palliative care service.  Advanced directives were discussed with the patient's family and the patient's CODE STATUS was changed to DNR.  Patient was also seen by nutritional service and was continued on nutritional supplements.  Due to generalized weakness, case management was consulted for short-term rehab placement.  Patient was previously planned for  Transfer to short-term rehabilitation when bed becomes available. During his stay his physical ability improved.     His COVID symptoms resolved. 17 days later, his COVID test is negative. He may be taken out of quarantine and precautions now.    He Walked 42 feet twice, with contact guard assistance and rolling walker. Insurance denied rehabilitation. The patient has gotten stronger successfully with the PT, OT received here inpatient. His request to insurance for acute rehab was denied.     He will be discharged home with wife. [employer] assisted discharge planning. A wheelchair was prescribed, as well as oxygen. Bedside commode ordered. Home health ordered. Wife has walker at home for him already. She was agreeable to take him home and is a retired nurse.       Consults:     Consults     Date and Time Order Name Status Description    12/3/2020 0031 Inpatient Pulmonology Consult Completed     11/27/2020 0808 Inpatient Palliative  Care MD Consult Completed     11/23/2020 0428 Inpatient Nephrology Consult Completed           Procedures Performed:             Vital Signs:    Temp:  [97.5 °F (36.4 °C)-98.1 °F (36.7 °C)] 98.1 °F (36.7 °C)  Heart Rate:  [72-85] 85  Resp:  [16-18] 18  BP: ()/(43-60) 97/60    Physical Exam:      General Appearance:    Elderly man. Alert, cooperative, in no acute distress   Head:    Normocephalic, without obvious abnormality, atraumatic   Eyes:            Lids and lashes normal, conjunctivae and sclerae normal, no   icterus, no pallor, corneas clear, PERRLA   Ears:    Ears appear intact with no abnormalities noted   Throat:   No oral lesions, no thrush, oral mucosa moist   Neck:   No adenopathy, supple, trachea midline, no thyromegaly, no   carotid bruit, no JVD   Back:     No kyphosis present, no scoliosis present, no skin lesions,      erythema or scars, no tenderness to percussion or                   palpation,   range of motion normal   Lungs:     Clear to auscultation,respirations regular, even and                  unlabored    Heart:    Regular rhythm and normal rate, normal S1 and S2, no            murmur, no gallop, no rub, no click   Chest Wall:    No abnormalities observed   Abdomen:     Normal bowel sounds, no masses, no organomegaly, soft        non-tender, non-distended, no guarding, no rebound                tenderness   Rectal:     Deferred   Extremities:   Moves all extremities well, no edema, no cyanosis, no             redness   Pulses:   Pulses palpable and equal bilaterally   Skin:   No bleeding, bruising or rash   Lymph nodes:   No palpable adenopathy   Neurologic:   Cranial nerves 2 - 12 grossly intact, sensation intact, DTR       present and equal bilaterally         Pertinent Lab Results:     Results from last 7 days   Lab Units 12/06/20  0529 12/05/20  0834 12/04/20  0533   SODIUM mmol/L 139 136 134*   POTASSIUM mmol/L 4.5 4.1 4.2   CHLORIDE mmol/L 106 103 99   CO2 mmol/L 28.0 27.3  27.4   BUN mg/dL 26* 31* 39*   CREATININE mg/dL 1.15 1.15 1.31*   CALCIUM mg/dL 8.9 8.8 8.8   GLUCOSE mg/dL 128* 106* 224*     Results from last 7 days   Lab Units 12/05/20  0558 12/04/20  0533   WBC 10*3/mm3 12.09* 13.16*   HEMOGLOBIN g/dL 12.7* 11.9*   HEMATOCRIT % 38.6 35.8*   PLATELETS 10*3/mm3 70* 67*                                   Invalid input(s): USDES,  BLOODU, NITRITITE, BACT, EP  Pain Management Panel     There is no flowsheet data to display.              Pertinent Radiology Results:    Imaging Results (All)     Procedure Component Value Units Date/Time    XR Chest 1 View [[id number]] Collected: 12/02/20 1100     Updated: 12/02/20 1201    Narrative:      PROCEDURE: XR CHEST 1 VW-        HISTORY: FOLLOW UP ON COVID PNEUMONIA; U07.1-COVID-19; J12.89-Other  viral pneumonia; R73.9-Hyperglycemia, unspecified     COMPARISON: 11/27/2020.     FINDINGS: The heart is normal in size. The mediastinum is unremarkable.  There is worsening bilateral airspace disease. There is no pneumothorax.  There are no acute osseous abnormalities.       Impression:      Worsening bilateral airspace disease.           Images were reviewed, interpreted, and dictated by Dr. Carmelina Mooney M.D.  Transcribed by Flores Harley PA-C.     This report was finalized on 12/2/2020 11:59 AM by Carmelina Mooney M.D..    XR Chest 1 View [[id number]] Collected: 11/27/20 0805     Updated: 11/27/20 0807    Narrative:      PROCEDURE: XR CHEST 1 VW-     HISTORY: respiratory failure with severe hypoxia, persistent pneumonia  comparison; U07.1-COVID-19; J12.89-Other viral pneumonia;  R73.9-Hyperglycemia, unspecified     COMPARISON: 11/23/2020.     FINDINGS: The heart is normal in size. The mediastinum is unremarkable.  There has been interval improvement in the patient's bilateral airspace  disease. No significant effusions are evident. There is no pneumothorax.   There are no acute osseous abnormalities.       Impression:      Interval  improvement in the patient's bilateral airspace  disease.     Continued followup is recommended.     This report was finalized on 11/27/2020 8:05 AM by Carmelina Mooney M.D..    CT Head Without Contrast [[id number]] Collected: 11/23/20 0729     Updated: 11/23/20 0732    Narrative:      PROCEDURE: CT HEAD WO CONTRAST-     HISTORY: Mental status change, unknown cause; U07.1-COVID-19;  J12.89-Other viral pneumonia; R73.9-Hyperglycemia, unspecified     COMPARISON:  None .     TECHNIQUE: Multiple axial CT images were performed from the foramen  magnum to the vertex without enhancement.      FINDINGS:  There is generalized cerebral volume loss. Patchy hypodensities in the  periventricular white matter consistent with chronic small vessel  ischemic change.  There is no CT evidence of hemorrhage. There is no  mass, mass effect or midline shift.  There is no hydrocephalus. The  paranasal sinuses are clear. Bone windows reveal no acute osseous  abnormalities.       Impression:      No acute intracranial process.           1106.18 mGy.cm        This study was performed with techniques to keep radiation doses as low  as reasonably achievable (ALARA). Individualized dose reduction  techniques using automated exposure control or adjustment of mA and/or  kV according to the patient size were employed.      This report was finalized on 11/23/2020 7:30 AM by Carmelina Mooney M.D..    XR Chest 1 View [[id number]] Collected: 11/23/20 0727     Updated: 11/23/20 0731    Narrative:      PROCEDURE: XR CHEST 1 VW-     HISTORY: sob, fever, covid +     COMPARISON: 11/20/2020.     FINDINGS: The heart is normal in size. The mediastinum is unremarkable.  There is been interval development of bilateral airspace disease  consistent with a pneumonia. No effusions are evident. There is no  pneumothorax.  There are no acute osseous abnormalities.       Impression:      Bilateral airspace disease consistent with a pneumonia.     Continued  "followup is recommended.     This report was finalized on 11/23/2020 7:29 AM by Carmelina Mooney M.D..          ECHO:    Results for orders placed during the hospital encounter of 11/23/20   Adult Transthoracic Echo Complete W/ Cont if Necessary Per Protocol    Narrative 1.  Technically difficult study.  2.  Normal left ventricular size with normal LV systolic function, LVEF   55-60%.  3.  Mild concentric LVH.  4.  Indeterminate LV diastolic filling pattern.  5.  Normal left atrial volume index.  6.  Normal right ventricular size and systolic function.  7.  Aortic sclerosis without significant stenosis.       Condition on Discharge:  Stable        Code status during the hospital stay:    DNR    Discharge Disposition:    Home-Health Care [facility]    Discharge Medication:       Discharge Medications      New Medications      Instructions Start Date   albuterol sulfate  (90 Base) MCG/ACT inhaler  Commonly known as: PROVENTIL HFA;VENTOLIN HFA;PROAIR HFA   2 puffs, Inhalation, 4 Times Daily - RT      ascorbic acid 500 MG tablet  Commonly known as: VITAMIN C   500 mg, Oral, Daily   Start Date: December 11, 2020     b complex-vitamin c-folic acid 0.8 MG tablet tablet   1 tablet, Oral, Daily      freestyle lancets   Test 4 times daily. E11.9 sub any lancets of choice      FREESTYLE LITE test strip  Generic drug: glucose blood   Test glucose 4 times daily. Sub any test strips of choice. E11.9      insulin detemir 100 UNIT/ML injection  Commonly known as: LEVEMIR   30 Units, Subcutaneous, Nightly      Insulin Syringe 31G X 5/16\" 1 ML misc   1 syringe, Does not apply, Daily, May sub any syringes of choice for insulin. E11.9      QUEtiapine 25 MG tablet  Commonly known as: SEROquel   25 mg, Oral, Every 12 Hours Scheduled         Changes to Medications      Instructions Start Date   carbidopa-levodopa  MG per tablet  Commonly known as: SINEMET  What changed: additional instructions   1 tablet, Oral, 3 Times Daily, " With food      gabapentin 400 MG capsule  Commonly known as: NEURONTIN  What changed: See the new instructions.   400 mg, Oral, 2 times daily         Continue These Medications      Instructions Start Date   atorvastatin 40 MG tablet  Commonly known as: LIPITOR   40 mg, Oral, Daily      Calcium-Magnesium-Zinc-D3 tablet   1 tablet, Oral, Daily      carvedilol 6.25 MG tablet  Commonly known as: COREG   6.25 mg, Oral, 2 Times Daily, One-half tablet twice daily      cyanocobalamin 100 MCG tablet tablet  Commonly known as: CYANOCOBALAMIN   100 mcg, Oral, Daily      pantoprazole 40 MG EC tablet  Commonly known as: PROTONIX   40 mg, Oral, Daily      rivaroxaban 20 MG tablet  Commonly known as: XARELTO   20 mg, Oral, Daily      rOPINIRole 0.5 MG tablet  Commonly known as: REQUIP   0.5 mg, Oral, Nightly PRN, Take 1 hour before bedtime.       tamsulosin 0.4 MG capsule 24 hr capsule  Commonly known as: FLOMAX   1 capsule, Oral, Daily         Stop These Medications    azithromycin 250 MG tablet  Commonly known as: ZITHROMAX     cefuroxime 500 MG tablet  Commonly known as: CEFTIN     CINNAMON PO     clotrimazole 1 % external solution  Commonly known as: LOTRIMIN     dexamethasone 4 MG tablet  Commonly known as: DECADRON     Entresto 24-26 MG tablet  Generic drug: sacubitril-valsartan     famotidine 20 MG tablet  Commonly known as: PEPCID     glimepiride 4 MG tablet  Commonly known as: AMARYL     predniSONE 20 MG tablet  Commonly known as: DELTASONE     Trulicity 1.5 MG/0.5ML solution pen-injector  Generic drug: Dulaglutide            Discharge Diet:     Diet Instructions     Diet: Consistent Carbohydrate      Discharge Diet: Consistent Carbohydrate    Diet: Consistent Carbohydrate, Renal; Thin      Discharge Diet:  Consistent Carbohydrate  Renal       Fluid Consistency: Thin          Activity at Discharge:     Activity Instructions     Activity as Tolerated      Up WIth Assist            Follow-up Appointments:    No future  appointments.  Additional Instructions for the Follow-ups that You Need to Schedule     Ambulatory Referral to Home Health   As directed      Face to Face Visit Date: 12/10/2020    Follow-up provider for Plan of Care?: I treated the patient in an acute care facility and will not continue treatment after discharge.    Follow-up provider: ROMINA LEHMAN [[id number]]    Reason/Clinical Findings: covid pneumonia and respiratory failure    Describe mobility limitations that make leaving home difficult: taxing effort to leave home    Nursing/Therapeutic Services Requested: Occupational Therapy Physical Therapy Skilled Nursing    Skilled nursing orders: Telehealth O2 instruction Cardiopulmonary assessments Medication education    PT orders: Strengthening    Occupational orders: Strengthening Cognition Activities of daily living    Frequency: 1 Week 1         Discharge Follow-up with PCP   As directed       Currently Documented PCP:    Romina Lehman MD    PCP Phone Number:    [phone]     Follow Up Details: 1 week               Test Results Pending at Discharge:    Pending Labs     Order Current Status    Green Top (No Gel) In process    S [place] Draw In process             Carrol Castro DO  12/10/20  15:44 EST      Medication risks and benefits were discussed in great detail. The patient reported being satisfied with the current treatment plan and the care delivered while hospitalized.     Time:  I spent 35 minutes preparing discharge counseling and teaching.            Electronically signed by Carrol Castro DO at 12/11/20 0352

## 2020-12-18 ENCOUNTER — READMISSION MANAGEMENT (OUTPATIENT)
Dept: CALL CENTER | Facility: HOSPITAL | Age: 80
End: 2020-12-18

## 2020-12-18 NOTE — OUTREACH NOTE
COVID-19 Week 2 Survey      Responses   [facility] patient discharged from?  [facility]   Does the patient have one of the following disease processes/diagnoses(primary or secondary)?  COVID-19   COVID-19 underlying condition?  None   Call Number  Call 1   COVID-19 Week 2: Call 1 attempt successful?  Yes   Revoke  Readmitted [wife reports pt has been readmitted to [facility] on 12/15/20, ]          Elisha Ramirez RN

## 2020-12-23 ENCOUNTER — TELEPHONE (OUTPATIENT)
Dept: ONCOLOGY | Facility: CLINIC | Age: 80
End: 2020-12-23

## 2020-12-23 NOTE — TELEPHONE ENCOUNTER
Caller: Maribel    Relationship: [facility]    Best call back number: [phone]    What form or medical record are you requesting: Office note 05/06/20    How would you like to receive the form or medical records (pick-up, mail, fax): Fax  If fax, what is the fax number: [phone]    Timeframe paperwork needed: Pt will be seen in their office next week

## 2021-05-10 ENCOUNTER — APPOINTMENT (OUTPATIENT)
Dept: CT IMAGING | Facility: HOSPITAL | Age: 81
End: 2021-05-10

## 2021-05-10 ENCOUNTER — HOSPITAL ENCOUNTER (INPATIENT)
Facility: HOSPITAL | Age: 81
LOS: 1 days | Discharge: HOSPICE | End: 2021-05-11
Attending: EMERGENCY MEDICINE | Admitting: HOSPITALIST

## 2021-05-10 DIAGNOSIS — E87.5 HYPERKALEMIA: ICD-10-CM

## 2021-05-10 DIAGNOSIS — C67.9 MALIGNANT NEOPLASM OF URINARY BLADDER, UNSPECIFIED SITE (HCC): ICD-10-CM

## 2021-05-10 DIAGNOSIS — J18.9 PNEUMONIA OF BOTH LUNGS DUE TO INFECTIOUS ORGANISM, UNSPECIFIED PART OF LUNG: ICD-10-CM

## 2021-05-10 DIAGNOSIS — R41.82 ALTERED MENTAL STATUS, UNSPECIFIED ALTERED MENTAL STATUS TYPE: Primary | ICD-10-CM

## 2021-05-10 DIAGNOSIS — R73.09 ELEVATED GLUCOSE: ICD-10-CM

## 2021-05-10 DIAGNOSIS — Z79.01 CHRONIC ANTICOAGULATION: ICD-10-CM

## 2021-05-10 DIAGNOSIS — R53.1 RIGHT SIDED WEAKNESS: ICD-10-CM

## 2021-05-10 DIAGNOSIS — I50.9 CONGESTIVE HEART FAILURE, UNSPECIFIED HF CHRONICITY, UNSPECIFIED HEART FAILURE TYPE (HCC): ICD-10-CM

## 2021-05-10 DIAGNOSIS — R54 SENESCENCE: ICD-10-CM

## 2021-05-10 LAB
ALBUMIN SERPL-MCNC: 3.7 G/DL (ref 3.5–5.2)
ALBUMIN/GLOB SERPL: 1.3 G/DL
ALP SERPL-CCNC: 129 U/L (ref 39–117)
ALT SERPL W P-5'-P-CCNC: <5 U/L (ref 1–41)
ANION GAP SERPL CALCULATED.3IONS-SCNC: 6 MMOL/L (ref 5–15)
APTT PPP: 30.3 SECONDS (ref 22–39)
AST SERPL-CCNC: 15 U/L (ref 1–40)
BACTERIA UR QL AUTO: ABNORMAL /HPF
BASE EXCESS BLDA CALC-SCNC: 12 MMOL/L (ref -5–5)
BASOPHILS # BLD AUTO: 0.06 10*3/MM3 (ref 0–0.2)
BASOPHILS NFR BLD AUTO: 0.5 % (ref 0–1.5)
BILIRUB SERPL-MCNC: 0.4 MG/DL (ref 0–1.2)
BILIRUB UR QL STRIP: NEGATIVE
BUN SERPL-MCNC: 25 MG/DL (ref 8–23)
BUN/CREAT SERPL: 15.5 (ref 7–25)
CA-I BLDA-SCNC: 1.31 MMOL/L (ref 1.2–1.32)
CALCIUM SPEC-SCNC: 9.5 MG/DL (ref 8.6–10.5)
CHLORIDE SERPL-SCNC: 99 MMOL/L (ref 98–107)
CLARITY UR: ABNORMAL
CO2 BLDA-SCNC: 43 MMOL/L (ref 24–29)
CO2 SERPL-SCNC: 36 MMOL/L (ref 22–29)
COLOR UR: YELLOW
CREAT BLDA-MCNC: 1.8 MG/DL (ref 0.6–1.3)
CREAT SERPL-MCNC: 1.61 MG/DL (ref 0.76–1.27)
D-LACTATE SERPL-SCNC: 0.9 MMOL/L (ref 0.5–2)
DEPRECATED RDW RBC AUTO: 50.6 FL (ref 37–54)
EOSINOPHIL # BLD AUTO: 0.1 10*3/MM3 (ref 0–0.4)
EOSINOPHIL NFR BLD AUTO: 0.9 % (ref 0.3–6.2)
ERYTHROCYTE [DISTWIDTH] IN BLOOD BY AUTOMATED COUNT: 13.6 % (ref 12.3–15.4)
GFR SERPL CREATININE-BSD FRML MDRD: 41 ML/MIN/1.73
GLOBULIN UR ELPH-MCNC: 2.9 GM/DL
GLUCOSE BLDC GLUCOMTR-MCNC: 215 MG/DL (ref 70–130)
GLUCOSE BLDC GLUCOMTR-MCNC: 246 MG/DL (ref 70–130)
GLUCOSE BLDC GLUCOMTR-MCNC: 267 MG/DL (ref 70–130)
GLUCOSE BLDC GLUCOMTR-MCNC: 270 MG/DL (ref 70–130)
GLUCOSE SERPL-MCNC: 213 MG/DL (ref 65–99)
GLUCOSE UR STRIP-MCNC: NEGATIVE MG/DL
HCO3 BLDA-SCNC: 40 MMOL/L (ref 22–26)
HCT VFR BLD AUTO: 45.2 % (ref 37.5–51)
HCT VFR BLDA CALC: 43 % (ref 38–51)
HGB BLD-MCNC: 13.9 G/DL (ref 13–17.7)
HGB BLDA-MCNC: 14.6 G/DL (ref 12–17)
HGB UR QL STRIP.AUTO: ABNORMAL
HOLD SPECIMEN: NORMAL
HYALINE CASTS UR QL AUTO: ABNORMAL /LPF
IMM GRANULOCYTES # BLD AUTO: 0.05 10*3/MM3 (ref 0–0.05)
IMM GRANULOCYTES NFR BLD AUTO: 0.5 % (ref 0–0.5)
KETONES UR QL STRIP: NEGATIVE
LEUKOCYTE ESTERASE UR QL STRIP.AUTO: ABNORMAL
LYMPHOCYTES # BLD AUTO: 1.22 10*3/MM3 (ref 0.7–3.1)
LYMPHOCYTES NFR BLD AUTO: 11 % (ref 19.6–45.3)
MAGNESIUM SERPL-MCNC: 2 MG/DL (ref 1.6–2.4)
MCH RBC QN AUTO: 31.1 PG (ref 26.6–33)
MCHC RBC AUTO-ENTMCNC: 30.8 G/DL (ref 31.5–35.7)
MCV RBC AUTO: 101.1 FL (ref 79–97)
MONOCYTES # BLD AUTO: 1.05 10*3/MM3 (ref 0.1–0.9)
MONOCYTES NFR BLD AUTO: 9.5 % (ref 5–12)
NEUTROPHILS NFR BLD AUTO: 77.6 % (ref 42.7–76)
NEUTROPHILS NFR BLD AUTO: 8.61 10*3/MM3 (ref 1.7–7)
NITRITE UR QL STRIP: NEGATIVE
NRBC BLD AUTO-RTO: 0 /100 WBC (ref 0–0.2)
NT-PROBNP SERPL-MCNC: 520.5 PG/ML (ref 0–1800)
PCO2 BLDA: 97.7 MM HG (ref 35–45)
PH BLDA: 7.22 PH UNITS (ref 7.35–7.6)
PH UR STRIP.AUTO: <=5 [PH] (ref 5–8)
PLATELET # BLD AUTO: 113 10*3/MM3 (ref 140–450)
PMV BLD AUTO: 9.8 FL (ref 6–12)
PO2 BLDA: 41 MMHG (ref 80–105)
POTASSIUM BLDA-SCNC: 5.6 MMOL/L (ref 3.5–4.9)
POTASSIUM SERPL-SCNC: 5.8 MMOL/L (ref 3.5–5.2)
PROT SERPL-MCNC: 6.6 G/DL (ref 6–8.5)
PROT UR QL STRIP: ABNORMAL
QT INTERVAL: 340 MS
QTC INTERVAL: 427 MS
RBC # BLD AUTO: 4.47 10*6/MM3 (ref 4.14–5.8)
RBC # UR: ABNORMAL /HPF
REF LAB TEST METHOD: ABNORMAL
SAO2 % BLDA: 62 % (ref 95–98)
SODIUM BLD-SCNC: 140 MMOL/L (ref 138–146)
SODIUM SERPL-SCNC: 141 MMOL/L (ref 136–145)
SP GR UR STRIP: 1.02 (ref 1–1.03)
SPERM URNS QL MICRO: ABNORMAL /HPF
SQUAMOUS #/AREA URNS HPF: ABNORMAL /HPF
TROPONIN T SERPL-MCNC: 0.03 NG/ML (ref 0–0.03)
UROBILINOGEN UR QL STRIP: ABNORMAL
WBC # BLD AUTO: 11.09 10*3/MM3 (ref 3.4–10.8)
WBC UR QL AUTO: ABNORMAL /HPF
WHOLE BLOOD HOLD SPECIMEN: NORMAL

## 2021-05-10 PROCEDURE — 94660 CPAP INITIATION&MGMT: CPT

## 2021-05-10 PROCEDURE — 84132 ASSAY OF SERUM POTASSIUM: CPT

## 2021-05-10 PROCEDURE — 74176 CT ABD & PELVIS W/O CONTRAST: CPT

## 2021-05-10 PROCEDURE — 83735 ASSAY OF MAGNESIUM: CPT | Performed by: EMERGENCY MEDICINE

## 2021-05-10 PROCEDURE — 83880 ASSAY OF NATRIURETIC PEPTIDE: CPT | Performed by: EMERGENCY MEDICINE

## 2021-05-10 PROCEDURE — 82565 ASSAY OF CREATININE: CPT

## 2021-05-10 PROCEDURE — 70450 CT HEAD/BRAIN W/O DYE: CPT

## 2021-05-10 PROCEDURE — 25010000002 LEVETIRACETAM IN NACL 0.82% 500 MG/100ML SOLUTION: Performed by: HOSPITALIST

## 2021-05-10 PROCEDURE — 99223 1ST HOSP IP/OBS HIGH 75: CPT | Performed by: HOSPITALIST

## 2021-05-10 PROCEDURE — 84484 ASSAY OF TROPONIN QUANT: CPT | Performed by: EMERGENCY MEDICINE

## 2021-05-10 PROCEDURE — 81001 URINALYSIS AUTO W/SCOPE: CPT | Performed by: EMERGENCY MEDICINE

## 2021-05-10 PROCEDURE — U0004 COV-19 TEST NON-CDC HGH THRU: HCPCS | Performed by: HOSPITALIST

## 2021-05-10 PROCEDURE — 87086 URINE CULTURE/COLONY COUNT: CPT | Performed by: EMERGENCY MEDICINE

## 2021-05-10 PROCEDURE — 71250 CT THORAX DX C-: CPT

## 2021-05-10 PROCEDURE — 85014 HEMATOCRIT: CPT

## 2021-05-10 PROCEDURE — 82330 ASSAY OF CALCIUM: CPT

## 2021-05-10 PROCEDURE — 93005 ELECTROCARDIOGRAM TRACING: CPT | Performed by: EMERGENCY MEDICINE

## 2021-05-10 PROCEDURE — 25010000002 CEFTRIAXONE PER 250 MG: Performed by: EMERGENCY MEDICINE

## 2021-05-10 PROCEDURE — 85025 COMPLETE CBC W/AUTO DIFF WBC: CPT | Performed by: EMERGENCY MEDICINE

## 2021-05-10 PROCEDURE — 25010000002 FUROSEMIDE PER 20 MG: Performed by: EMERGENCY MEDICINE

## 2021-05-10 PROCEDURE — 25010000002 ONDANSETRON PER 1 MG: Performed by: EMERGENCY MEDICINE

## 2021-05-10 PROCEDURE — 82803 BLOOD GASES ANY COMBINATION: CPT

## 2021-05-10 PROCEDURE — 87040 BLOOD CULTURE FOR BACTERIA: CPT | Performed by: EMERGENCY MEDICINE

## 2021-05-10 PROCEDURE — 99285 EMERGENCY DEPT VISIT HI MDM: CPT

## 2021-05-10 PROCEDURE — 83605 ASSAY OF LACTIC ACID: CPT | Performed by: EMERGENCY MEDICINE

## 2021-05-10 PROCEDURE — 25010000002 HYDROMORPHONE PER 4 MG: Performed by: EMERGENCY MEDICINE

## 2021-05-10 PROCEDURE — 63710000001 INSULIN DETEMIR PER 5 UNITS: Performed by: HOSPITALIST

## 2021-05-10 PROCEDURE — 82947 ASSAY GLUCOSE BLOOD QUANT: CPT

## 2021-05-10 PROCEDURE — 80053 COMPREHEN METABOLIC PANEL: CPT | Performed by: EMERGENCY MEDICINE

## 2021-05-10 PROCEDURE — 85730 THROMBOPLASTIN TIME PARTIAL: CPT | Performed by: EMERGENCY MEDICINE

## 2021-05-10 PROCEDURE — 94799 UNLISTED PULMONARY SVC/PX: CPT

## 2021-05-10 PROCEDURE — P9612 CATHETERIZE FOR URINE SPEC: HCPCS

## 2021-05-10 PROCEDURE — 84295 ASSAY OF SERUM SODIUM: CPT

## 2021-05-10 RX ORDER — ASPIRIN 81 MG/1
81 TABLET, CHEWABLE ORAL DAILY
Status: DISCONTINUED | OUTPATIENT
Start: 2021-05-10 | End: 2021-05-11 | Stop reason: HOSPADM

## 2021-05-10 RX ORDER — SODIUM CHLORIDE 450 MG/100ML
50 INJECTION, SOLUTION INTRAVENOUS CONTINUOUS
Status: ACTIVE | OUTPATIENT
Start: 2021-05-10 | End: 2021-05-11

## 2021-05-10 RX ORDER — TAMSULOSIN HYDROCHLORIDE 0.4 MG/1
0.4 CAPSULE ORAL DAILY
Status: DISCONTINUED | OUTPATIENT
Start: 2021-05-11 | End: 2021-05-11 | Stop reason: HOSPADM

## 2021-05-10 RX ORDER — CARVEDILOL 6.25 MG/1
6.25 TABLET ORAL EVERY 12 HOURS SCHEDULED
Status: DISCONTINUED | OUTPATIENT
Start: 2021-05-10 | End: 2021-05-11 | Stop reason: HOSPADM

## 2021-05-10 RX ORDER — MORPHINE SULFATE 2 MG/ML
1 INJECTION, SOLUTION INTRAMUSCULAR; INTRAVENOUS
Status: DISCONTINUED | OUTPATIENT
Start: 2021-05-10 | End: 2021-05-11

## 2021-05-10 RX ORDER — SODIUM CHLORIDE 0.9 % (FLUSH) 0.9 %
10 SYRINGE (ML) INJECTION AS NEEDED
Status: DISCONTINUED | OUTPATIENT
Start: 2021-05-10 | End: 2021-05-11 | Stop reason: HOSPADM

## 2021-05-10 RX ORDER — SODIUM CHLORIDE 0.9 % (FLUSH) 0.9 %
10 SYRINGE (ML) INJECTION EVERY 12 HOURS SCHEDULED
Status: DISCONTINUED | OUTPATIENT
Start: 2021-05-10 | End: 2021-05-11 | Stop reason: HOSPADM

## 2021-05-10 RX ORDER — ASPIRIN 81 MG/1
81 TABLET, CHEWABLE ORAL DAILY
COMMUNITY

## 2021-05-10 RX ORDER — HEPARIN SODIUM 5000 [USP'U]/ML
5000 INJECTION, SOLUTION INTRAVENOUS; SUBCUTANEOUS EVERY 8 HOURS SCHEDULED
Status: DISCONTINUED | OUTPATIENT
Start: 2021-05-10 | End: 2021-05-11 | Stop reason: HOSPADM

## 2021-05-10 RX ORDER — ROPINIROLE 0.5 MG/1
0.5 TABLET, FILM COATED ORAL NIGHTLY PRN
Status: DISCONTINUED | OUTPATIENT
Start: 2021-05-10 | End: 2021-05-11 | Stop reason: HOSPADM

## 2021-05-10 RX ORDER — MORPHINE SULFATE 2 MG/ML
1 INJECTION, SOLUTION INTRAMUSCULAR; INTRAVENOUS EVERY 4 HOURS PRN
Status: DISCONTINUED | OUTPATIENT
Start: 2021-05-10 | End: 2021-05-10

## 2021-05-10 RX ORDER — DEXTROSE MONOHYDRATE 25 G/50ML
25 INJECTION, SOLUTION INTRAVENOUS
Status: DISCONTINUED | OUTPATIENT
Start: 2021-05-10 | End: 2021-05-11 | Stop reason: HOSPADM

## 2021-05-10 RX ORDER — ATORVASTATIN CALCIUM 40 MG/1
80 TABLET, FILM COATED ORAL NIGHTLY
Status: DISCONTINUED | OUTPATIENT
Start: 2021-05-10 | End: 2021-05-11 | Stop reason: HOSPADM

## 2021-05-10 RX ORDER — HYDROMORPHONE HYDROCHLORIDE 1 MG/ML
0.5 INJECTION, SOLUTION INTRAMUSCULAR; INTRAVENOUS; SUBCUTANEOUS
Status: DISCONTINUED | OUTPATIENT
Start: 2021-05-10 | End: 2021-05-10

## 2021-05-10 RX ORDER — ONDANSETRON 2 MG/ML
4 INJECTION INTRAMUSCULAR; INTRAVENOUS ONCE
Status: COMPLETED | OUTPATIENT
Start: 2021-05-10 | End: 2021-05-10

## 2021-05-10 RX ORDER — BISACODYL 10 MG
10 SUPPOSITORY, RECTAL RECTAL DAILY PRN
Status: DISCONTINUED | OUTPATIENT
Start: 2021-05-10 | End: 2021-05-11 | Stop reason: HOSPADM

## 2021-05-10 RX ORDER — LEVETIRACETAM 5 MG/ML
500 INJECTION INTRAVASCULAR EVERY 12 HOURS SCHEDULED
Status: DISCONTINUED | OUTPATIENT
Start: 2021-05-10 | End: 2021-05-11 | Stop reason: HOSPADM

## 2021-05-10 RX ORDER — FUROSEMIDE 10 MG/ML
20 INJECTION INTRAMUSCULAR; INTRAVENOUS ONCE
Status: COMPLETED | OUTPATIENT
Start: 2021-05-10 | End: 2021-05-10

## 2021-05-10 RX ORDER — ACETAMINOPHEN 650 MG/1
650 SUPPOSITORY RECTAL EVERY 4 HOURS PRN
Status: DISCONTINUED | OUTPATIENT
Start: 2021-05-10 | End: 2021-05-11 | Stop reason: HOSPADM

## 2021-05-10 RX ORDER — ASPIRIN 300 MG/1
300 SUPPOSITORY RECTAL DAILY
Status: DISCONTINUED | OUTPATIENT
Start: 2021-05-10 | End: 2021-05-11 | Stop reason: HOSPADM

## 2021-05-10 RX ORDER — HYDROCODONE BITARTRATE AND ACETAMINOPHEN 5; 325 MG/1; MG/1
1 TABLET ORAL EVERY 6 HOURS PRN
Status: DISCONTINUED | OUTPATIENT
Start: 2021-05-10 | End: 2021-05-11 | Stop reason: HOSPADM

## 2021-05-10 RX ORDER — NICOTINE POLACRILEX 4 MG
15 LOZENGE BUCCAL
Status: DISCONTINUED | OUTPATIENT
Start: 2021-05-10 | End: 2021-05-11 | Stop reason: HOSPADM

## 2021-05-10 RX ADMIN — HYDROMORPHONE HYDROCHLORIDE 0.5 MG: 1 INJECTION, SOLUTION INTRAMUSCULAR; INTRAVENOUS; SUBCUTANEOUS at 08:01

## 2021-05-10 RX ADMIN — ONDANSETRON 4 MG: 2 INJECTION INTRAMUSCULAR; INTRAVENOUS at 08:00

## 2021-05-10 RX ADMIN — SODIUM CHLORIDE 1 G: 900 INJECTION INTRAVENOUS at 08:16

## 2021-05-10 RX ADMIN — INSULIN DETEMIR 3 UNITS: 100 INJECTION, SOLUTION SUBCUTANEOUS at 22:13

## 2021-05-10 RX ADMIN — FUROSEMIDE 20 MG: 10 INJECTION, SOLUTION INTRAMUSCULAR; INTRAVENOUS at 08:01

## 2021-05-10 RX ADMIN — SODIUM CHLORIDE, PRESERVATIVE FREE 10 ML: 5 INJECTION INTRAVENOUS at 21:01

## 2021-05-10 RX ADMIN — LEVETIRACETAM 500 MG: 5 INJECTION INTRAVASCULAR at 21:01

## 2021-05-11 ENCOUNTER — READMISSION MANAGEMENT (OUTPATIENT)
Dept: CALL CENTER | Facility: HOSPITAL | Age: 81
End: 2021-05-11

## 2021-05-11 VITALS
SYSTOLIC BLOOD PRESSURE: 131 MMHG | HEIGHT: 71 IN | RESPIRATION RATE: 20 BRPM | TEMPERATURE: 99.6 F | OXYGEN SATURATION: 94 % | HEART RATE: 103 BPM | WEIGHT: 245.9 LBS | BODY MASS INDEX: 34.43 KG/M2 | DIASTOLIC BLOOD PRESSURE: 59 MMHG

## 2021-05-11 LAB
ANION GAP SERPL CALCULATED.3IONS-SCNC: 5 MMOL/L (ref 5–15)
ANION GAP SERPL CALCULATED.3IONS-SCNC: 8 MMOL/L (ref 5–15)
BACTERIA SPEC AEROBE CULT: NO GROWTH
BUN SERPL-MCNC: 37 MG/DL (ref 8–23)
BUN SERPL-MCNC: 41 MG/DL (ref 8–23)
BUN/CREAT SERPL: 19.2 (ref 7–25)
BUN/CREAT SERPL: 22.2 (ref 7–25)
CALCIUM SPEC-SCNC: 9.4 MG/DL (ref 8.6–10.5)
CALCIUM SPEC-SCNC: 9.6 MG/DL (ref 8.6–10.5)
CHLORIDE SERPL-SCNC: 100 MMOL/L (ref 98–107)
CHLORIDE SERPL-SCNC: 100 MMOL/L (ref 98–107)
CHOLEST SERPL-MCNC: 175 MG/DL (ref 0–200)
CO2 SERPL-SCNC: 32 MMOL/L (ref 22–29)
CO2 SERPL-SCNC: 36 MMOL/L (ref 22–29)
CREAT SERPL-MCNC: 1.85 MG/DL (ref 0.76–1.27)
CREAT SERPL-MCNC: 1.93 MG/DL (ref 0.76–1.27)
DEPRECATED RDW RBC AUTO: 50.6 FL (ref 37–54)
ERYTHROCYTE [DISTWIDTH] IN BLOOD BY AUTOMATED COUNT: 13.7 % (ref 12.3–15.4)
GFR SERPL CREATININE-BSD FRML MDRD: 33 ML/MIN/1.73
GFR SERPL CREATININE-BSD FRML MDRD: 35 ML/MIN/1.73
GLUCOSE BLDC GLUCOMTR-MCNC: 177 MG/DL (ref 70–130)
GLUCOSE SERPL-MCNC: 180 MG/DL (ref 65–99)
GLUCOSE SERPL-MCNC: 186 MG/DL (ref 65–99)
HBA1C MFR BLD: 7 % (ref 4.8–5.6)
HCT VFR BLD AUTO: 42.8 % (ref 37.5–51)
HDLC SERPL-MCNC: 36 MG/DL (ref 40–60)
HGB BLD-MCNC: 13.1 G/DL (ref 13–17.7)
LDLC SERPL CALC-MCNC: 111 MG/DL (ref 0–100)
LDLC/HDLC SERPL: 2.99 {RATIO}
MCH RBC QN AUTO: 31 PG (ref 26.6–33)
MCHC RBC AUTO-ENTMCNC: 30.6 G/DL (ref 31.5–35.7)
MCV RBC AUTO: 101.4 FL (ref 79–97)
PLATELET # BLD AUTO: 86 10*3/MM3 (ref 140–450)
PMV BLD AUTO: 9.4 FL (ref 6–12)
POTASSIUM SERPL-SCNC: 6 MMOL/L (ref 3.5–5.2)
POTASSIUM SERPL-SCNC: 7.7 MMOL/L (ref 3.5–5.2)
QT INTERVAL: 368 MS
QTC INTERVAL: 502 MS
RBC # BLD AUTO: 4.22 10*6/MM3 (ref 4.14–5.8)
SARS-COV-2 RNA PNL SPEC NAA+PROBE: NOT DETECTED
SODIUM SERPL-SCNC: 140 MMOL/L (ref 136–145)
SODIUM SERPL-SCNC: 141 MMOL/L (ref 136–145)
TRIGL SERPL-MCNC: 156 MG/DL (ref 0–150)
VLDLC SERPL-MCNC: 28 MG/DL (ref 5–40)
WBC # BLD AUTO: 11.97 10*3/MM3 (ref 3.4–10.8)

## 2021-05-11 PROCEDURE — 80048 BASIC METABOLIC PNL TOTAL CA: CPT | Performed by: PHYSICIAN ASSISTANT

## 2021-05-11 PROCEDURE — 25010000002 LEVETIRACETAM IN NACL 0.82% 500 MG/100ML SOLUTION: Performed by: HOSPITALIST

## 2021-05-11 PROCEDURE — 80061 LIPID PANEL: CPT | Performed by: NURSE PRACTITIONER

## 2021-05-11 PROCEDURE — 25010000002 CALCIUM GLUCONATE PER 10 ML: Performed by: PHYSICIAN ASSISTANT

## 2021-05-11 PROCEDURE — 94799 UNLISTED PULMONARY SVC/PX: CPT

## 2021-05-11 PROCEDURE — 83036 HEMOGLOBIN GLYCOSYLATED A1C: CPT | Performed by: NURSE PRACTITIONER

## 2021-05-11 PROCEDURE — 94660 CPAP INITIATION&MGMT: CPT

## 2021-05-11 PROCEDURE — 93005 ELECTROCARDIOGRAM TRACING: CPT | Performed by: PHYSICIAN ASSISTANT

## 2021-05-11 PROCEDURE — 94644 CONT INHLJ TX 1ST HOUR: CPT

## 2021-05-11 PROCEDURE — 82962 GLUCOSE BLOOD TEST: CPT

## 2021-05-11 PROCEDURE — 63710000001 INSULIN REGULAR HUMAN PER 5 UNITS: Performed by: PHYSICIAN ASSISTANT

## 2021-05-11 PROCEDURE — 25010000002 CEFTRIAXONE PER 250 MG: Performed by: HOSPITALIST

## 2021-05-11 PROCEDURE — 85027 COMPLETE CBC AUTOMATED: CPT | Performed by: HOSPITALIST

## 2021-05-11 PROCEDURE — 80048 BASIC METABOLIC PNL TOTAL CA: CPT | Performed by: HOSPITALIST

## 2021-05-11 PROCEDURE — 25010000002 MORPHINE PER 10 MG: Performed by: NURSE PRACTITIONER

## 2021-05-11 PROCEDURE — 99239 HOSP IP/OBS DSCHRG MGMT >30: CPT | Performed by: HOSPITALIST

## 2021-05-11 PROCEDURE — 94640 AIRWAY INHALATION TREATMENT: CPT

## 2021-05-11 RX ORDER — MORPHINE SULFATE 20 MG/ML
5 SOLUTION ORAL EVERY 4 HOURS PRN
Status: DISCONTINUED | OUTPATIENT
Start: 2021-05-11 | End: 2021-05-11 | Stop reason: HOSPADM

## 2021-05-11 RX ORDER — LORAZEPAM 1 MG/1
1 TABLET ORAL EVERY 4 HOURS PRN
Status: DISCONTINUED | OUTPATIENT
Start: 2021-05-11 | End: 2021-05-11 | Stop reason: HOSPADM

## 2021-05-11 RX ORDER — DEXTROSE MONOHYDRATE 25 G/50ML
50 INJECTION, SOLUTION INTRAVENOUS ONCE
Status: COMPLETED | OUTPATIENT
Start: 2021-05-11 | End: 2021-05-11

## 2021-05-11 RX ORDER — MORPHINE SULFATE 2 MG/ML
2 INJECTION, SOLUTION INTRAMUSCULAR; INTRAVENOUS
Status: DISCONTINUED | OUTPATIENT
Start: 2021-05-11 | End: 2021-05-11 | Stop reason: HOSPADM

## 2021-05-11 RX ORDER — ALBUTEROL SULFATE 2.5 MG/3ML
10 SOLUTION RESPIRATORY (INHALATION) ONCE
Status: COMPLETED | OUTPATIENT
Start: 2021-05-11 | End: 2021-05-11

## 2021-05-11 RX ORDER — LORAZEPAM 0.5 MG/1
0.5 TABLET ORAL EVERY 6 HOURS PRN
Qty: 15 TABLET | Refills: 0 | Status: SHIPPED | OUTPATIENT
Start: 2021-05-11

## 2021-05-11 RX ORDER — MORPHINE SULFATE 20 MG/ML
5 SOLUTION ORAL EVERY 4 HOURS PRN
Qty: 30 ML | Refills: 0 | Status: SHIPPED | OUTPATIENT
Start: 2021-05-11 | End: 2021-06-10

## 2021-05-11 RX ORDER — MORPHINE SULFATE 2 MG/ML
1 INJECTION, SOLUTION INTRAMUSCULAR; INTRAVENOUS
Status: DISCONTINUED | OUTPATIENT
Start: 2021-05-11 | End: 2021-05-11 | Stop reason: HOSPADM

## 2021-05-11 RX ADMIN — SODIUM CHLORIDE 1 G: 900 INJECTION INTRAVENOUS at 09:02

## 2021-05-11 RX ADMIN — SODIUM CHLORIDE, PRESERVATIVE FREE 10 ML: 5 INJECTION INTRAVENOUS at 09:03

## 2021-05-11 RX ADMIN — MORPHINE SULFATE 2 MG: 2 INJECTION, SOLUTION INTRAMUSCULAR; INTRAVENOUS at 15:14

## 2021-05-11 RX ADMIN — MORPHINE SULFATE 2 MG: 2 INJECTION, SOLUTION INTRAMUSCULAR; INTRAVENOUS at 13:13

## 2021-05-11 RX ADMIN — ALBUTEROL SULFATE 10 MG: 2.5 SOLUTION RESPIRATORY (INHALATION) at 08:31

## 2021-05-11 RX ADMIN — INSULIN HUMAN 10 UNITS: 100 INJECTION, SOLUTION PARENTERAL at 06:35

## 2021-05-11 RX ADMIN — MORPHINE SULFATE 2 MG: 2 INJECTION, SOLUTION INTRAMUSCULAR; INTRAVENOUS at 16:38

## 2021-05-11 RX ADMIN — MORPHINE SULFATE 1 MG: 2 INJECTION, SOLUTION INTRAMUSCULAR; INTRAVENOUS at 08:08

## 2021-05-11 RX ADMIN — DEXTROSE MONOHYDRATE 50 ML: 25 INJECTION, SOLUTION INTRAVENOUS at 06:40

## 2021-05-11 RX ADMIN — LEVETIRACETAM 500 MG: 5 INJECTION INTRAVASCULAR at 09:06

## 2021-05-11 RX ADMIN — DOXYCYCLINE 100 MG: 100 INJECTION, POWDER, LYOPHILIZED, FOR SOLUTION INTRAVENOUS at 09:40

## 2021-05-11 RX ADMIN — MORPHINE SULFATE 2 MG: 2 INJECTION, SOLUTION INTRAMUSCULAR; INTRAVENOUS at 09:39

## 2021-05-11 RX ADMIN — SODIUM BICARBONATE 50 MEQ: 84 INJECTION, SOLUTION INTRAVENOUS at 06:40

## 2021-05-11 RX ADMIN — CALCIUM GLUCONATE 1 G: 98 INJECTION, SOLUTION INTRAVENOUS at 06:53

## 2021-05-11 NOTE — OUTREACH NOTE
Prep Survey      Responses   [facility] facility patient discharged from?  [facility]   Is LACE score < 7 ?  No   Emergency Room discharge w/ pulse ox?  No   Eligibility  Not Eligible   What are the reasons patient is not eligible?  Hospice/Pallative Care   Does the patient have one of the following disease processes/diagnoses(primary or secondary)?  Other   Prep survey completed?  Yes          Deborah Chinchilla RN

## 2021-05-15 LAB
BACTERIA SPEC AEROBE CULT: NORMAL
BACTERIA SPEC AEROBE CULT: NORMAL

## 2021-07-08 NOTE — PAYOR COMM NOTE
"TP:AETNA  FROM:FIDELINA ASCENCIO, RN PHONE [phone]  INPT CLINICAL UPDATE    Dilan Pierre (80 y.o. Male)     Date of Birth Social Security Number Address Home Phone MRN    1940  [address] [facility] [phone] [id number]    [place] Marital Status          Unknown        Admission Date Admission Type Admitting Provider Attending Provider Department, Room/Bed    11/23/20 Emergency Carrol Castro DO Gandee, Julie G,  [facility] MED SURG  3, 306/1    Discharge Date Discharge Disposition Discharge Destination                       Attending Provider: Carrol Castro DO    Allergies: No Known Allergies    Isolation: Enh Drop/Con, Contact Air   Infection: COVID (confirmed) (11/09/20)   Code Status: No CPR    Ht: 180.3 cm (71\")   Wt: 97.1 kg (214 lb 1.1 oz)    Admission Cmt: None   Principal Problem: Acute respiratory failure with hypoxia (CMS/HCC) [J96.01]                 Active Insurance as of 11/23/2020     Primary Coverage     Payor Plan Insurance Group Employer/Plan Group    AETNA MEDICARE REPLACEMENT AETNA MEDICARE REPLACEMENT [id number]     Payor Plan Address Payor Plan Phone Number Payor Plan Fax Number Effective Dates    [address] [phone]  1/1/2020 - None Entered    [facility] [id number]       Subscriber Name Subscriber Birth Date Member ID       DILAN PIERRE 1940 [id number]                 Emergency Contacts      (Rel.) Home Phone Work Phone Mobile Phone    Parris Pierre (Spouse) [phone] -- --            Vital Signs (last day)     Date/Time   Temp   Temp src   Pulse   Resp   BP   Patient Position   SpO2    12/09/20 0700   97.3 (36.3)   Oral   79   19   112/65   Lying   96    12/09/20 0338   97.1 (36.2)   Oral   75   16   113/70   Lying   95    12/09/20 0031   98.6 (37)   Axillary   81   16   123/57   Lying   96    12/08/20 2056   99 (37.2)   Oral   87   18   108/70   Lying   95    12/08/20 1500   97.6 " (36.4)   Oral   67   20   119/61   Lying   95    12/08/20 1100   98.1 (36.7)   Oral   --   19   --   Lying   --    12/08/20 0700   97.9 (36.6)   Oral   76   20   110/65   Lying   90    12/08/20 0514   98 (36.7)   Oral   82   18   94/53   Lying   92    12/08/20 0114   97.3 (36.3)   Oral   84   16   107/54   Lying   90              Current Facility-Administered Medications   Medication Dose Route Frequency Provider Last Rate Last Admin   • albuterol sulfate HFA (PROVENTIL HFA;VENTOLIN HFA;PROAIR HFA) inhaler 2 puff  2 puff Inhalation 4x Daily - RT Skip Raygoza DO   2 puff at 12/09/20 0652   • apixaban (ELIQUIS) tablet 2.5 mg  2.5 mg Oral Q12H Rickey Zee MD, FASN   2.5 mg at 12/08/20 2107   • atorvastatin (LIPITOR) tablet 40 mg  40 mg Oral Nightly Skip Raygoza DO   40 mg at 12/08/20 2107   • b complex-vitamin c-folic acid (NEPHRO-AGATA) tablet 1 tablet  1 tablet Oral Daily Carrol Castro DO   1 tablet at 12/08/20 0932   • carbidopa-levodopa (SINEMET)  MG per tablet 1 tablet  1 tablet Oral TID Skip Raygoza DO   1 tablet at 12/08/20 2107   • carvedilol (COREG) tablet 6.25 mg  6.25 mg Oral BID Carrol Castro DO   6.25 mg at 12/08/20 2107   • dextrose (D50W) 25 g/ 50mL Intravenous Solution 25 g  25 g Intravenous Q15 Min PRN Skip Raygoza,        • dextrose (GLUTOSE) oral gel 1 tube  1 tube Oral Q15 Min PRN Skip Raygoza DO       • gabapentin (NEURONTIN) capsule 400 mg  400 mg Oral Q12H Skip Raygoza DO   400 mg at 12/08/20 2107   • glucagon (human recombinant) (GLUCAGEN DIAGNOSTIC) injection 1 mg  1 mg Subcutaneous PRN Skip Raygoza, DO       • insulin aspart (novoLOG) injection 0-9 Units  0-9 Units Subcutaneous TID AC Skip Raygoza DO   2 Units at 12/09/20 0651   • insulin aspart (novoLOG) injection 5 Units  5 Units Subcutaneous TID With Meals Skip Raygoza DO   5 Units at 12/08/20 1724   • insulin detemir (LEVEMIR) injection 30 Units  30 Units Subcutaneous Nightly Myranda Davalos DO   30 Units at  12/08/20 2116   • lactobacillus acidophilus (RISAQUAD) capsule 1 capsule  1 capsule Oral BID JoshuadeCarrol coughlin G, DO   1 capsule at 12/08/20 2107   • melatonin tablet 5 mg  5 mg Oral Nightly Joshuajames, Carrol G, DO   5 mg at 12/08/20 2107   • metoprolol tartrate (LOPRESSOR) injection 5 mg  5 mg Intravenous Q4H PRN Carrol Castro G, DO   5 mg at 11/27/20 2220   • pantoprazole (PROTONIX) EC tablet 40 mg  40 mg Oral Daily Joshuadeced Carrol G, DO   40 mg at 12/09/20 0651   • QUEtiapine (SEROquel) tablet 25 mg  25 mg Oral Q12H Juan Alberto Franco MD   25 mg at 12/08/20 2107   • rOPINIRole (REQUIP) tablet 0.5 mg  0.5 mg Oral Nightly PRN Matthew Carrol G, DO   0.5 mg at 12/07/20 2009   • sodium chloride 0.9 % flush 10 mL  10 mL Intravenous PRN Jaret, Umar, DO   10 mL at 12/07/20 0939   • tamsulosin (FLOMAX) 24 hr capsule 0.4 mg  0.4 mg Oral Daily Jaret, Umar, DO   0.4 mg at 12/08/20 0932   • vitamin C (ASCORBIC ACID) tablet 500 mg  500 mg Oral Daily Joshuadeced Carrol G, DO   500 mg at 12/08/20 0932     Lab Results (last 24 hours)     Procedure Component Value Units Date/Time    POC Glucose Once [[id number]]  (Abnormal) Collected: 12/09/20 0637    Specimen: Blood Updated: 12/09/20 0643     Glucose 153 mg/dL      Comment: Serial Number: [id number]:  [id number]       POC Glucose Once [[id number]]  (Abnormal) Collected: 12/08/20 2054    Specimen: Blood Updated: 12/08/20 2103     Glucose 221 mg/dL      Comment: Serial Number: [id number]:  [id number]       POC Glucose Once [[id number]]  (Abnormal) Collected: 12/08/20 1539    Specimen: Blood Updated: 12/08/20 1546     Glucose 210 mg/dL      Comment: Serial Number: [id number]:  [id number]       POC Glucose Once [[id number]]  (Abnormal) Collected: 12/08/20 1044    Specimen: Blood Updated: 12/08/20 1128     Glucose 165 mg/dL      Comment: Serial Number: [id number]:  [id number]              Physician Progress Notes (last 48 hours) (Notes from 12/07/20 0758 through 12/09/20 0758)      Matthew  Carrol AYALA DO at 20 1852                [facility]    [facility]    Name:  Jak Pierre   Age:  80 y.o.  Sex:  male  :  1940  MRN:  [id number]   Visit Number:  [id number]  Admission Date:  2020  Date Of Service:  20  Primary Care Physician:  Jason Foy MD     LOS: 15 days :      Chief Complaint:  Follow up covid pneumonia        Subjective / Interval History:   The patient was seen again today. He remains inpatient awaiting rehab, as he is too weak to go home. He denies abdominal pain, chest pain, shortness of breath        Review of Systems: reviewed again today    General ROS: Patient denies any fevers, chills or loss of consciousness. Persistent generalized weakness  Ophthalmic ROS: Denies any diplopia or transient loss of vision.  ENT ROS: Denies sore throat, nasal congestion or ear pain.   Respiratory ROS: Positive cough and shortness of breath.  Cardiovascular ROS: Denies chest pain or palpitations. No history of exertional chest pain.   Gastrointestinal ROS: Denies nausea and vomiting. Denies any abdominal pain. No diarrhea.  Genitourinary ROS: Denies dysuria or hematuria.  Musculoskeletal ROS: Denies back pain. No muscle pain. No calf pain.  Neurological ROS: Denies any focal weakness. No loss of consciousness. Denies any numbness. Denies neck pain.   Dermatological ROS: Denies any redness or pruritis.    Vital Signs:    Temp:  [97.3 °F (36.3 °C)-98.1 °F (36.7 °C)] 97.6 °F (36.4 °C)  Heart Rate:  [] 67  Resp:  [16-20] 20  BP: ()/(53-82) 119/61    Intake and output:    I/O last 3 completed shifts:  In: 1080 [P.O.:1080]  Out: 1100 [Urine:1100]  I/O this shift:  In: 1080 [P.O.:1080]  Out: 350 [Urine:350]    Physical Examination: reviewed again today    General Appearance:    Elderly man. Alert and cooperative, more talkative today. No distress   Head:    Atraumatic and normocephalic.   Eyes:            PERRLA, conjunctivae and sclerae  normal, no icterus   Throat:   No oral lesions, no thrush, oral mucosa moist.   Neck:   Supple, trachea midline, no thyromegaly   Lungs:    clear to auscultation bilaterally    Heart:    Normal S1 and S2, no murmur, no gallop, no rub   Abdomen:     Soft, positive bowel sounds, nontender   Extremities:   Moves all extremities well, no edema, no cyanosis, no           clubbing   Skin:   No bleeding, bruising or rash.   Neurologic:  No tremor, sensation intact, Motor power is normal and equal bilaterally.     Laboratory results:    Results from last 7 days   Lab Units 12/06/20  0529 12/05/20  0834 12/04/20  0533   SODIUM mmol/L 139 136 134*   POTASSIUM mmol/L 4.5 4.1 4.2   CHLORIDE mmol/L 106 103 99   CO2 mmol/L 28.0 27.3 27.4   BUN mg/dL 26* 31* 39*   CREATININE mg/dL 1.15 1.15 1.31*   CALCIUM mg/dL 8.9 8.8 8.8   GLUCOSE mg/dL 128* 106* 224*     Results from last 7 days   Lab Units 12/05/20  0558 12/04/20  0533 12/03/20  0528   WBC 10*3/mm3 12.09* 13.16* 15.14*   HEMOGLOBIN g/dL 12.7* 11.9* 12.1*   HEMATOCRIT % 38.6 35.8* 37.3*   PLATELETS 10*3/mm3 70* 67* 75*                 Results from last 7 days   Lab Units 12/02/20  1812   PH, ARTERIAL pH units 7.479   PO2 ART mm Hg 59.6*   PCO2, ARTERIAL mm Hg 39.2   HCO3 ART mmol/L 29.1*       I have reviewed the patient's laboratory results.    Radiology results:    Imaging Results (Last 24 Hours)     ** No results found for the last 24 hours. **          I have reviewed the patient's radiology reports.    Medication Review:     I have reviewed the patient's active and prn medications.     Assessment:    1.  Acute hypoxic respiratory failure, present on admission secondary to #2, improved.  2.  Bilateral pneumonia secondary to COVID-19, improved.  3.  Acute renal failure, present on admission, resolved.  4.  Chronic thrombocytopenia.  5.  History of DVT on Eliquis.  6.  Sleep apnea syndrome.  7.  Diabetes mellitus type 2.    Plan:  Continue to monitor inpatient awaiting rehab  "placement. Discussed with case management and nurse.  He will hopefully be discharged tomorrow. Continue current care otherwise.      Medication risks and benefits were discussed in detail. Patient reported satisfaction with care delivered and treatment plan.     Carrol Castro DO  12/08/20  18:52 EST            Electronically signed by Carrol Castro DO at 12/08/20 2636     Rickey Zee MD, FASN at 12/08/20 0845          Nephrology Progress Note.    LOS: 15 days    Patient Care Team:  Jason Foy MD as PCP - General (General Practice)    Chief Complaint:    Chief Complaint   Patient presents with   • Fever   • Shortness of Breath       Subjective:   Follow up for ALEXANDRA and Chronic Kidney disease stage 3 / Anemia.     Interval History:   Patient Complaints: none  Patient seen and examined this morning.  Events from last 24 hours noted.  No significant change, patient is up in the chair sleeping comfortably, easily arousable and in no acute distress.  Objective:    Vital Signs  /65 (BP Location: Left arm, Patient Position: Lying)   Pulse 76   Temp 97.9 °F (36.6 °C) (Oral)   Resp 20   Ht 180.3 cm (71\")   Wt 97.1 kg (214 lb 1.1 oz)   SpO2 90%   BMI 29.86 kg/m²     No intake/output data recorded.    Intake/Output Summary (Last 24 hours) at 12/8/2020 0845  Last data filed at 12/8/2020 0514  Gross per 24 hour   Intake 1080 ml   Output 700 ml   Net 380 ml       Physical Exam:  General Appearance: no acute distress,   HEENT: Oral mucosa dry, extra occular movements intact. Sclera clear.  Skin: Warm and dry  Neck: supple, no JVD, trachea midline  Lungs:Chest shape is normal. Breath sounds heard scattered rhonchi and crackles, No wheezing.   Heart: Irregular rate and rhythm. normal S1 and S2, no S3, no rub, peripheral pulses weak but palpable.  Abdomen: Obese, soft, non-tender,  present bowel sounds to auscultation  : no palpable bladder.  Extremities: Trace edema, no cyanosis or clubbing.   Neuro: " Randomly does move his extremities.     Results Review:   Results from last 7 days   Lab Units 12/06/20  0529 12/05/20  0834 12/04/20  0533   SODIUM mmol/L 139 136 134*   POTASSIUM mmol/L 4.5 4.1 4.2   CHLORIDE mmol/L 106 103 99   CO2 mmol/L 28.0 27.3 27.4   BUN mg/dL 26* 31* 39*   CREATININE mg/dL 1.15 1.15 1.31*   CALCIUM mg/dL 8.9 8.8 8.8   ALBUMIN g/dL 2.50* 2.50* 2.50*   GLUCOSE mg/dL 128* 106* 224*     Estimated Creatinine Clearance: 58.8 mL/min (by C-G formula based on SCr of 1.15 mg/dL).  Results from last 7 days   Lab Units 12/06/20  0529 12/05/20  0834 12/04/20  0533   PHOSPHORUS mg/dL 2.5 2.6 3.0     Results from last 7 days   Lab Units 12/01/20  0938   URIC ACID mg/dL 7.0     Results from last 7 days   Lab Units 12/05/20  0558 12/04/20  0533 12/03/20  0528 12/02/20  0532   WBC 10*3/mm3 12.09* 13.16* 15.14* 17.07*   HEMOGLOBIN g/dL 12.7* 11.9* 12.1* 12.4*   PLATELETS 10*3/mm3 70* 67* 75* 93*         Brief Urine Lab Results  (Last result in the past 365 days)      Color   Clarity   Blood   Leuk Est   Nitrite   Protein   CREAT   Urine HCG        12/02/20 1603 Dark Yellow Clear Trace Trace Negative Negative             No results found for: UTPCR  Imaging Results (Last 24 Hours)     ** No results found for the last 24 hours. **        albuterol sulfate HFA, 2 puff, Inhalation, 4x Daily - RT  apixaban, 2.5 mg, Oral, Q12H  atorvastatin, 40 mg, Oral, Nightly  b complex-vitamin c-folic acid, 1 tablet, Oral, Daily  carbidopa-levodopa, 1 tablet, Oral, TID  carvedilol, 6.25 mg, Oral, BID  gabapentin, 400 mg, Oral, Q12H  insulin aspart, 0-9 Units, Subcutaneous, TID AC  insulin aspart, 5 Units, Subcutaneous, TID With Meals  insulin detemir, 30 Units, Subcutaneous, Nightly  lactobacillus acidophilus, 1 capsule, Oral, BID  melatonin, 5 mg, Oral, Nightly  pantoprazole, 40 mg, Oral, Daily  QUEtiapine, 25 mg, Oral, Q12H  tamsulosin, 0.4 mg, Oral, Daily  vitamin C, 500 mg, Oral, Daily             Medication Review:    Current Facility-Administered Medications   Medication Dose Route Frequency Provider Last Rate Last Admin   • albuterol sulfate HFA (PROVENTIL HFA;VENTOLIN HFA;PROAIR HFA) inhaler 2 puff  2 puff Inhalation 4x Daily - RT Skip Raygoza,    2 puff at 12/07/20 2009   • apixaban (ELIQUIS) tablet 2.5 mg  2.5 mg Oral Q12H Rickey Zee MD, FASN   2.5 mg at 12/07/20 2009   • atorvastatin (LIPITOR) tablet 40 mg  40 mg Oral Nightly JaretSkip smiley, DO   40 mg at 12/07/20 2009   • b complex-vitamin c-folic acid (NEPHRO-AGATA) tablet 1 tablet  1 tablet Oral Daily Carrol Castro DO   1 tablet at 12/07/20 0940   • carbidopa-levodopa (SINEMET)  MG per tablet 1 tablet  1 tablet Oral TID JaretSkip smiley, DO   1 tablet at 12/07/20 2009   • carvedilol (COREG) tablet 6.25 mg  6.25 mg Oral BID Carrol Castro DO   6.25 mg at 12/07/20 2009   • dextrose (D50W) 25 g/ 50mL Intravenous Solution 25 g  25 g Intravenous Q15 Min PRN JaretSkip smiley, DO       • dextrose (GLUTOSE) oral gel 1 tube  1 tube Oral Q15 Min PRN JaretSkip smiley, DO       • gabapentin (NEURONTIN) capsule 400 mg  400 mg Oral Q12H JaretSkip smiley, DO   400 mg at 12/07/20 2009   • glucagon (human recombinant) (GLUCAGEN DIAGNOSTIC) injection 1 mg  1 mg Subcutaneous PRN JaretSkip smiley, DO       • insulin aspart (novoLOG) injection 0-9 Units  0-9 Units Subcutaneous TID AC JaretSkip smiley, DO   2 Units at 12/07/20 1242   • insulin aspart (novoLOG) injection 5 Units  5 Units Subcutaneous TID With Meals JaretSkip smiley, DO   5 Units at 12/07/20 1700   • insulin detemir (LEVEMIR) injection 30 Units  30 Units Subcutaneous Nightly Myranda Davalos DO   30 Units at 12/07/20 2009   • lactobacillus acidophilus (RISAQUAD) capsule 1 capsule  1 capsule Oral BID Carrol Castro DO   1 capsule at 12/07/20 2009   • melatonin tablet 5 mg  5 mg Oral Nightly Carrol Castro DO   5 mg at 12/07/20 2009   • metoprolol tartrate (LOPRESSOR) injection 5 mg  5 mg Intravenous Q4H PRN Carrol Castro DO    5 mg at 11/27/20 2220   • pantoprazole (PROTONIX) EC tablet 40 mg  40 mg Oral Daily Carrol Castro, DO   40 mg at 12/08/20 0629   • QUEtiapine (SEROquel) tablet 25 mg  25 mg Oral Q12H Juan Alberto Franco MD   25 mg at 12/07/20 2009   • rOPINIRole (REQUIP) tablet 0.5 mg  0.5 mg Oral Nightly PRN Carrol Castro, DO   0.5 mg at 12/07/20 2009   • sodium chloride 0.9 % flush 10 mL  10 mL Intravenous PRN Naida Raygozaar, DO   10 mL at 12/07/20 0939   • tamsulosin (FLOMAX) 24 hr capsule 0.4 mg  0.4 mg Oral Daily Skip Raygoza, DO   0.4 mg at 12/07/20 0940   • vitamin C (ASCORBIC ACID) tablet 500 mg  500 mg Oral Daily Carrol Castro, DO   500 mg at 12/07/20 0940       Assessment/Plan:  1. Chronic kidney disease stage III: baseline creatinine ~1.3-1.5 fluctuates with volume status.  No further worsening of renal function noted  2. Hypertension in CKD: It is under fair control continue to watch closely.  3. Hyperkalemia: Multifactorial likely secondary to hyperglycemia as well as history of lisinopril use that may be contributing.  It is back to normal.  4. Anasarca: Continue to optimize diuretics  5. Acute respiratory failure with hypoxia  6. Pneumonia due to COVID-19 virus  7. Acute metabolic encephalopathy  8. Type 2 Diabetes Mellitus with hyperglycemia  9. Thrombocytopenia  10. History of aortic valve replacement  11. Dyslipidemia  12. History of DVT  13. Tremor    Plan:  · Clinically stable awaiting discharge to the rehab.  · Details were discussed with the patient no family in the room.  Clinically appears to be improving.  Hold off giving any diuretics at discharge.  · Details were also discussed with the hospitalist service.  He will need to follow-up with me in 6 weeks after discharge.  · Continue with rest of the current treatment plan and surveillance labs.  · Further recommendations will depend on clinical course of the patient during the current hospitalization.    · I also discussed the details with the nursing  staff.  · Rest as ordered.    Rickey Zee MD, DELMY  20  08:45 EST    Dictated utilizing Dragon dictation.      Electronically signed by Rickey Zee MD, DELMY at 20     Carrol Castro DO at 20 1805                [facility]    Name:  Jak Pierre   Age:  80 y.o.  Sex:  male  :  1940  MRN:  [id number]   Visit Number:  [id number]  Admission Date:  2020  Date Of Service:  20  Primary Care Physician:  Jason Foy MD     LOS: 14 days :      Chief Complaint:  Follow up covid pneumonia        Subjective / Interval History: The patient was seen this morning. He is no longer confused. He is talkative and awake and alert. He denies chest pain,shortness of breath, abdominal pain.   No acute events occurred overnight.      Review of Systems:     General ROS: Patient denies any fevers, chills or loss of consciousness. Positive generalized weakness  Ophthalmic ROS: Denies any diplopia or transient loss of vision.  ENT ROS: Denies sore throat, nasal congestion or ear pain.   Respiratory ROS: Positive cough and shortness of breath.  Cardiovascular ROS: Denies chest pain or palpitations. No history of exertional chest pain.   Gastrointestinal ROS: Denies nausea and vomiting. Denies any abdominal pain. No diarrhea.  Genitourinary ROS: Denies dysuria or hematuria.  Musculoskeletal ROS: Denies back pain. No muscle pain. No calf pain.  Neurological ROS: Denies any focal weakness. No loss of consciousness. Denies any numbness. Denies neck pain.   Dermatological ROS: Denies any redness or pruritis.    Vital Signs:    Temp:  [97.1 °F (36.2 °C)-98.1 °F (36.7 °C)] 97.8 °F (36.6 °C)  Heart Rate:  [] 74  Resp:  [18-20] 20  BP: ()/(57-73) 115/73    Intake and output:    I/O last 3 completed shifts:  In: 470 [P.O.:360; I.V.:110]  Out: 550 [Urine:550]  I/O this shift:  In: 720 [P.O.:720]  Out: 550 [Urine:550]    Physical  Examination:    General Appearance:    Elderly man. Alert and cooperative, not in any acute distress.   Head:    Atraumatic and normocephalic, without obvious abnormality.   Eyes:            PERRLA, conjunctivae and sclerae normal, no Icterus. No pallor. Extraocular movements are within normal limits.   Throat:   No oral lesions, no thrush, oral mucosa moist.   Neck:   Supple, trachea midline, no thyromegaly, no carotid bruit.   Lungs:     Chest shape is normal. Breath sounds heard bilaterally equally.  No crackles or wheezing. No Pleural rub or bronchial breathing.    Heart:    Normal S1 and S2, no murmur, no gallop, no rub. No JVD   Abdomen:     Normal bowel sounds, no masses, no organomegaly. Soft     nontender, nondistended, no guarding, no rebound                tenderness   Extremities:   Moves all extremities well, no edema, no cyanosis, no           clubbing   Skin:   No bleeding, bruising or rash.   Neurologic:  No tremor, sensation intact, Motor power is normal and equal bilaterally.   Laboratory results:    Results from last 7 days   Lab Units 12/06/20  0529 12/05/20  0834 12/04/20  0533   SODIUM mmol/L 139 136 134*   POTASSIUM mmol/L 4.5 4.1 4.2   CHLORIDE mmol/L 106 103 99   CO2 mmol/L 28.0 27.3 27.4   BUN mg/dL 26* 31* 39*   CREATININE mg/dL 1.15 1.15 1.31*   CALCIUM mg/dL 8.9 8.8 8.8   GLUCOSE mg/dL 128* 106* 224*     Results from last 7 days   Lab Units 12/05/20  0558 12/04/20  0533 12/03/20  0528   WBC 10*3/mm3 12.09* 13.16* 15.14*   HEMOGLOBIN g/dL 12.7* 11.9* 12.1*   HEMATOCRIT % 38.6 35.8* 37.3*   PLATELETS 10*3/mm3 70* 67* 75*                 Results from last 7 days   Lab Units 12/02/20  1812   PH, ARTERIAL pH units 7.479   PO2 ART mm Hg 59.6*   PCO2, ARTERIAL mm Hg 39.2   HCO3 ART mmol/L 29.1*       I have reviewed the patient's laboratory results.    Radiology results:    Imaging Results (Last 24 Hours)     ** No results found for the last 24 hours. **          I have reviewed the patient's  radiology reports.    Medication Review:     I have reviewed the patient's active and prn medications.     Assessment:    1.  Acute hypoxic respiratory failure, present on admission secondary to #2, improved.  2.  Bilateral pneumonia secondary to COVID-19, improved.  3.  Acute renal failure, present on admission, resolved.  4.  Chronic thrombocytopenia.  5.  History of DVT on Eliquis.  6.  Sleep apnea syndrome.  7.  Diabetes mellitus type 2.      Plan:  Continue to monitor inpatient. Await approval for acute rehab. He is not strong enough to return home yet with his wife.  Titrate oxygen. Patient happy with improvement. He states feeling very satisfied he has improvement.  Palliative consulted and had been following in his care.     Medication risks and benefits were discussed in detail. Patient reported satisfaction with care delivered and treatment plan.     Carrol Castro DO  12/07/20  18:05 EST            Electronically signed by Carrol Castro DO at 12/07/20 0549       Consult Notes (last 48 hours) (Notes from 12/07/20 0758 through 12/09/20 4808)    No notes of this type exist for this encounter.        No